# Patient Record
Sex: FEMALE | Race: WHITE | Employment: OTHER | URBAN - METROPOLITAN AREA
[De-identification: names, ages, dates, MRNs, and addresses within clinical notes are randomized per-mention and may not be internally consistent; named-entity substitution may affect disease eponyms.]

---

## 2017-01-10 ENCOUNTER — HOSPITAL ENCOUNTER (OUTPATIENT)
Dept: OTHER | Age: 82
Discharge: HOME | End: 2017-01-10
Attending: FAMILY MEDICINE | Admitting: CLINIC/CENTER

## 2017-01-10 LAB
BACTERIA: NEGATIVE /HPF
BILIRUBIN URINE: NEGATIVE MG/DL
BLOOD, URINE: NEGATIVE
CLARITY: CLEAR
COLOR: ABNORMAL
GLUCOSE, URINE: 150 MG/DL
HYALINE CASTS: 0 /LPF
KETONES, URINE: NEGATIVE MG/DL
LEUKOCYTE ESTERASE, URINE: ABNORMAL
MUCUS: ABNORMAL HPF
NITRITE URINE, QUANTITATIVE: NEGATIVE
PH, URINE: 5 (ref 5–8)
PROTEIN UA: NEGATIVE MG/DL
RBC URINE: <1 /HPF (ref 0–6)
SPECIFIC GRAVITY UA: 1.01 (ref 1–1.03)
SQUAMOUS EPITHELIAL: 1 /HPF
TRANSITIONAL EPITHELIAL: <1 /HPF
UROBILINOGEN, URINE: NORMAL EU/DL (ref 0.2–1)
WBC UA: 2 /HPF (ref 0–5)

## 2017-01-12 LAB
CULTURE: NORMAL
REPORT STATUS: NORMAL
REQUEST PROBLEM: NORMAL
SPECIMEN: NORMAL
TOTAL COLONY COUNT: NORMAL

## 2017-03-21 ENCOUNTER — HOSPITAL ENCOUNTER (OUTPATIENT)
Dept: OTHER | Age: 82
Discharge: HOME | End: 2017-03-21
Attending: FAMILY MEDICINE | Admitting: CLINIC/CENTER

## 2018-12-17 PROBLEM — K43.2 INCISIONAL HERNIA, WITHOUT OBSTRUCTION OR GANGRENE: Status: ACTIVE | Noted: 2018-12-17

## 2019-03-11 ENCOUNTER — HOSPITAL ENCOUNTER (OUTPATIENT)
Dept: CT IMAGING | Age: 84
Discharge: HOME | End: 2019-03-11
Payer: MEDICARE

## 2019-03-11 DIAGNOSIS — R91.1 COIN LESION: ICD-10-CM

## 2019-03-11 LAB
GFR AFRICAN AMERICAN: >60 ML/MIN/1.73M2
GFR NON-AFRICAN AMERICAN: 58 ML/MIN/1.73M2
POC CREATININE: 0.9 MG/DL (ref 0.6–1.1)

## 2019-03-11 PROCEDURE — 2580000003 HC RX 258: Performed by: FAMILY MEDICINE

## 2019-03-11 PROCEDURE — 6360000004 HC RX CONTRAST MEDICATION: Performed by: FAMILY MEDICINE

## 2019-03-11 PROCEDURE — 71260 CT THORAX DX C+: CPT

## 2019-03-11 RX ORDER — SODIUM CHLORIDE 0.9 % (FLUSH) 0.9 %
10 SYRINGE (ML) INJECTION PRN
Status: DISCONTINUED | OUTPATIENT
Start: 2019-03-11 | End: 2019-03-12 | Stop reason: HOSPADM

## 2019-03-11 RX ADMIN — IOPAMIDOL 75 ML: 755 INJECTION, SOLUTION INTRAVENOUS at 10:02

## 2019-03-11 RX ADMIN — Medication 10 ML: at 10:02

## 2019-05-02 RX ORDER — HYDROCHLOROTHIAZIDE 12.5 MG/1
12.5 TABLET ORAL DAILY
Qty: 30 TABLET | Refills: 1 | Status: ON HOLD | OUTPATIENT
Start: 2019-05-02 | End: 2019-05-31 | Stop reason: HOSPADM

## 2019-05-02 NOTE — TELEPHONE ENCOUNTER
Sent hctz 12.5 1 qg #30/1 refill to [facility] at 427pm per pharm request from [employer]   Electronically signed by Pascale Christine MA on 5/2/2019 at 4:27 PM

## 2019-05-28 ENCOUNTER — APPOINTMENT (OUTPATIENT)
Dept: MRI IMAGING | Age: 84
End: 2019-05-28
Payer: MEDICARE

## 2019-05-28 ENCOUNTER — APPOINTMENT (OUTPATIENT)
Dept: GENERAL RADIOLOGY | Age: 84
End: 2019-05-28
Payer: MEDICARE

## 2019-05-28 ENCOUNTER — HOSPITAL ENCOUNTER (OUTPATIENT)
Age: 84
Setting detail: OBSERVATION
Discharge: HOME | End: 2019-05-31
Attending: EMERGENCY MEDICINE | Admitting: HOSPITALIST
Payer: MEDICARE

## 2019-05-28 ENCOUNTER — APPOINTMENT (OUTPATIENT)
Dept: CT IMAGING | Age: 84
End: 2019-05-28
Payer: MEDICARE

## 2019-05-28 DIAGNOSIS — R00.2 PALPITATIONS: ICD-10-CM

## 2019-05-28 DIAGNOSIS — R55 NEAR SYNCOPE: Primary | ICD-10-CM

## 2019-05-28 LAB
ALBUMIN SERPL-MCNC: 3.7 GM/DL (ref 3.4–5)
ALP BLD-CCNC: 112 IU/L (ref 40–129)
ALT SERPL-CCNC: 29 U/L (ref 10–40)
ANION GAP SERPL CALCULATED.3IONS-SCNC: 13 MMOL/L (ref 4–16)
AST SERPL-CCNC: 22 IU/L (ref 15–37)
BASOPHILS ABSOLUTE: 0 K/CU MM
BASOPHILS RELATIVE PERCENT: 0.6 % (ref 0–1)
BILIRUB SERPL-MCNC: 0.6 MG/DL (ref 0–1)
BUN BLDV-MCNC: 17 MG/DL (ref 6–23)
CALCIUM SERPL-MCNC: 10 MG/DL (ref 8.3–10.6)
CHLORIDE BLD-SCNC: 100 MMOL/L (ref 99–110)
CO2: 24 MMOL/L (ref 21–32)
CREAT SERPL-MCNC: 1 MG/DL (ref 0.6–1.1)
DIFFERENTIAL TYPE: ABNORMAL
DIGOXIN LEVEL: 1.4 NG/ML (ref 0.8–2)
DOSE AMOUNT: NORMAL
DOSE TIME: NORMAL
EKG ATRIAL RATE: 90 BPM
EKG DIAGNOSIS: NORMAL
EKG P AXIS: 44 DEGREES
EKG P-R INTERVAL: 176 MS
EKG Q-T INTERVAL: 358 MS
EKG QRS DURATION: 84 MS
EKG QTC CALCULATION (BAZETT): 437 MS
EKG R AXIS: -19 DEGREES
EKG T AXIS: 60 DEGREES
EKG VENTRICULAR RATE: 90 BPM
EOSINOPHILS ABSOLUTE: 0.2 K/CU MM
EOSINOPHILS RELATIVE PERCENT: 2.9 % (ref 0–3)
GFR AFRICAN AMERICAN: >60 ML/MIN/1.73M2
GFR NON-AFRICAN AMERICAN: 53 ML/MIN/1.73M2
GLUCOSE BLD-MCNC: 149 MG/DL (ref 70–99)
GLUCOSE BLD-MCNC: 170 MG/DL (ref 70–99)
GLUCOSE BLD-MCNC: 262 MG/DL (ref 70–99)
HCT VFR BLD CALC: 39.6 % (ref 37–47)
HEMOGLOBIN: 12.5 GM/DL (ref 12.5–16)
IMMATURE NEUTROPHIL %: 0.7 % (ref 0–0.43)
LV EF: 60 %
LVEF MODALITY: NORMAL
LYMPHOCYTES ABSOLUTE: 1.7 K/CU MM
LYMPHOCYTES RELATIVE PERCENT: 24.2 % (ref 24–44)
MCH RBC QN AUTO: 28.9 PG (ref 27–31)
MCHC RBC AUTO-ENTMCNC: 31.6 % (ref 32–36)
MCV RBC AUTO: 91.5 FL (ref 78–100)
MONOCYTES ABSOLUTE: 0.6 K/CU MM
MONOCYTES RELATIVE PERCENT: 8.9 % (ref 0–4)
NUCLEATED RBC %: 0 %
PDW BLD-RTO: 13.7 % (ref 11.7–14.9)
PLATELET # BLD: 141 K/CU MM (ref 140–440)
PMV BLD AUTO: 8.8 FL (ref 7.5–11.1)
POTASSIUM SERPL-SCNC: 4.3 MMOL/L (ref 3.5–5.1)
PRO-BNP: 498.6 PG/ML
RBC # BLD: 4.33 M/CU MM (ref 4.2–5.4)
SEGMENTED NEUTROPHILS ABSOLUTE COUNT: 4.3 K/CU MM
SEGMENTED NEUTROPHILS RELATIVE PERCENT: 62.7 % (ref 36–66)
SODIUM BLD-SCNC: 137 MMOL/L (ref 135–145)
T4 FREE: 1.41 NG/DL (ref 0.9–1.8)
TOTAL IMMATURE NEUTOROPHIL: 0.05 K/CU MM
TOTAL NUCLEATED RBC: 0 K/CU MM
TOTAL PROTEIN: 7 GM/DL (ref 6.4–8.2)
TROPONIN T: <0.01 NG/ML
TROPONIN T: <0.01 NG/ML
TSH HIGH SENSITIVITY: 8.15 UIU/ML (ref 0.27–4.2)
WBC # BLD: 6.9 K/CU MM (ref 4–10.5)

## 2019-05-28 PROCEDURE — G0378 HOSPITAL OBSERVATION PER HR: HCPCS

## 2019-05-28 PROCEDURE — 6360000002 HC RX W HCPCS: Performed by: NURSE PRACTITIONER

## 2019-05-28 PROCEDURE — 84439 ASSAY OF FREE THYROXINE: CPT

## 2019-05-28 PROCEDURE — 6370000000 HC RX 637 (ALT 250 FOR IP): Performed by: INTERNAL MEDICINE

## 2019-05-28 PROCEDURE — 99285 EMERGENCY DEPT VISIT HI MDM: CPT

## 2019-05-28 PROCEDURE — 93005 ELECTROCARDIOGRAM TRACING: CPT | Performed by: EMERGENCY MEDICINE

## 2019-05-28 PROCEDURE — 70450 CT HEAD/BRAIN W/O DYE: CPT

## 2019-05-28 PROCEDURE — 96375 TX/PRO/DX INJ NEW DRUG ADDON: CPT

## 2019-05-28 PROCEDURE — 84443 ASSAY THYROID STIM HORMONE: CPT

## 2019-05-28 PROCEDURE — 93226 XTRNL ECG REC<48 HR SCAN A/R: CPT

## 2019-05-28 PROCEDURE — 83036 HEMOGLOBIN GLYCOSYLATED A1C: CPT

## 2019-05-28 PROCEDURE — 71046 X-RAY EXAM CHEST 2 VIEWS: CPT

## 2019-05-28 PROCEDURE — 96374 THER/PROPH/DIAG INJ IV PUSH: CPT

## 2019-05-28 PROCEDURE — 36415 COLL VENOUS BLD VENIPUNCTURE: CPT

## 2019-05-28 PROCEDURE — 83880 ASSAY OF NATRIURETIC PEPTIDE: CPT

## 2019-05-28 PROCEDURE — 96372 THER/PROPH/DIAG INJ SC/IM: CPT

## 2019-05-28 PROCEDURE — 99223 1ST HOSP IP/OBS HIGH 75: CPT | Performed by: INTERNAL MEDICINE

## 2019-05-28 PROCEDURE — 93306 TTE W/DOPPLER COMPLETE: CPT

## 2019-05-28 PROCEDURE — 80162 ASSAY OF DIGOXIN TOTAL: CPT

## 2019-05-28 PROCEDURE — 93010 ELECTROCARDIOGRAM REPORT: CPT | Performed by: INTERNAL MEDICINE

## 2019-05-28 PROCEDURE — 80053 COMPREHEN METABOLIC PANEL: CPT

## 2019-05-28 PROCEDURE — 2580000003 HC RX 258: Performed by: NURSE PRACTITIONER

## 2019-05-28 PROCEDURE — 6370000000 HC RX 637 (ALT 250 FOR IP): Performed by: NURSE PRACTITIONER

## 2019-05-28 PROCEDURE — 84484 ASSAY OF TROPONIN QUANT: CPT

## 2019-05-28 PROCEDURE — 85025 COMPLETE CBC W/AUTO DIFF WBC: CPT

## 2019-05-28 PROCEDURE — 82962 GLUCOSE BLOOD TEST: CPT

## 2019-05-28 RX ORDER — MELOXICAM 7.5 MG/1
7.5 TABLET ORAL DAILY
COMMUNITY
End: 2019-06-12 | Stop reason: SDUPTHER

## 2019-05-28 RX ORDER — NICOTINE POLACRILEX 4 MG
15 LOZENGE BUCCAL PRN
Status: DISCONTINUED | OUTPATIENT
Start: 2019-05-28 | End: 2019-05-31 | Stop reason: HOSPADM

## 2019-05-28 RX ORDER — HYDROCHLOROTHIAZIDE 12.5 MG/1
12.5 TABLET ORAL DAILY
Status: DISCONTINUED | OUTPATIENT
Start: 2019-05-28 | End: 2019-05-30

## 2019-05-28 RX ORDER — DEXTROSE MONOHYDRATE 25 G/50ML
12.5 INJECTION, SOLUTION INTRAVENOUS PRN
Status: DISCONTINUED | OUTPATIENT
Start: 2019-05-28 | End: 2019-05-31 | Stop reason: HOSPADM

## 2019-05-28 RX ORDER — HYDRALAZINE HYDROCHLORIDE 20 MG/ML
10 INJECTION INTRAMUSCULAR; INTRAVENOUS EVERY 6 HOURS PRN
Status: DISCONTINUED | OUTPATIENT
Start: 2019-05-28 | End: 2019-05-30

## 2019-05-28 RX ORDER — M-VIT,TX,IRON,MINS/CALC/FOLIC 27MG-0.4MG
1 TABLET ORAL DAILY
Status: DISCONTINUED | OUTPATIENT
Start: 2019-05-28 | End: 2019-05-31 | Stop reason: HOSPADM

## 2019-05-28 RX ORDER — DIGOXIN 125 MCG
250 TABLET ORAL DAILY
Status: DISCONTINUED | OUTPATIENT
Start: 2019-05-28 | End: 2019-05-31 | Stop reason: HOSPADM

## 2019-05-28 RX ORDER — DILTIAZEM HYDROCHLORIDE 120 MG/1
120 CAPSULE, COATED, EXTENDED RELEASE ORAL EVERY EVENING
Status: DISCONTINUED | OUTPATIENT
Start: 2019-05-28 | End: 2019-05-29

## 2019-05-28 RX ORDER — ONDANSETRON 2 MG/ML
4 INJECTION INTRAMUSCULAR; INTRAVENOUS EVERY 6 HOURS PRN
Status: DISCONTINUED | OUTPATIENT
Start: 2019-05-28 | End: 2019-05-31 | Stop reason: HOSPADM

## 2019-05-28 RX ORDER — SODIUM CHLORIDE 0.9 % (FLUSH) 0.9 %
10 SYRINGE (ML) INJECTION PRN
Status: DISCONTINUED | OUTPATIENT
Start: 2019-05-28 | End: 2019-05-31 | Stop reason: HOSPADM

## 2019-05-28 RX ORDER — ACETAMINOPHEN 325 MG/1
650 TABLET ORAL EVERY 4 HOURS PRN
Status: DISCONTINUED | OUTPATIENT
Start: 2019-05-28 | End: 2019-05-31 | Stop reason: HOSPADM

## 2019-05-28 RX ORDER — DEXTROSE MONOHYDRATE 50 MG/ML
100 INJECTION, SOLUTION INTRAVENOUS PRN
Status: DISCONTINUED | OUTPATIENT
Start: 2019-05-28 | End: 2019-05-31 | Stop reason: HOSPADM

## 2019-05-28 RX ORDER — SODIUM CHLORIDE 0.9 % (FLUSH) 0.9 %
10 SYRINGE (ML) INJECTION EVERY 12 HOURS SCHEDULED
Status: DISCONTINUED | OUTPATIENT
Start: 2019-05-28 | End: 2019-05-31 | Stop reason: HOSPADM

## 2019-05-28 RX ADMIN — ENOXAPARIN SODIUM 40 MG: 40 INJECTION SUBCUTANEOUS at 15:54

## 2019-05-28 RX ADMIN — HYDRALAZINE HYDROCHLORIDE 10 MG: 20 INJECTION INTRAMUSCULAR; INTRAVENOUS at 21:35

## 2019-05-28 RX ADMIN — ACETAMINOPHEN 650 MG: 325 TABLET ORAL at 21:34

## 2019-05-28 RX ADMIN — DILTIAZEM HYDROCHLORIDE 120 MG: 120 CAPSULE, COATED, EXTENDED RELEASE ORAL at 21:30

## 2019-05-28 RX ADMIN — SODIUM CHLORIDE, PRESERVATIVE FREE 10 ML: 5 INJECTION INTRAVENOUS at 21:37

## 2019-05-28 RX ADMIN — INSULIN LISPRO 1 UNITS: 100 INJECTION, SOLUTION INTRAVENOUS; SUBCUTANEOUS at 17:14

## 2019-05-28 RX ADMIN — ONDANSETRON 4 MG: 2 INJECTION INTRAMUSCULAR; INTRAVENOUS at 22:51

## 2019-05-28 ASSESSMENT — PAIN DESCRIPTION - DESCRIPTORS: DESCRIPTORS: ACHING

## 2019-05-28 ASSESSMENT — PAIN DESCRIPTION - ONSET: ONSET: ON-GOING

## 2019-05-28 ASSESSMENT — PAIN DESCRIPTION - PAIN TYPE: TYPE: CHRONIC PAIN

## 2019-05-28 ASSESSMENT — PAIN DESCRIPTION - LOCATION
LOCATION: BACK
LOCATION: ABDOMEN

## 2019-05-28 ASSESSMENT — PAIN DESCRIPTION - ORIENTATION: ORIENTATION: LEFT

## 2019-05-28 ASSESSMENT — PAIN DESCRIPTION - FREQUENCY: FREQUENCY: INTERMITTENT

## 2019-05-28 ASSESSMENT — PAIN SCALES - GENERAL
PAINLEVEL_OUTOF10: 4
PAINLEVEL_OUTOF10: 0
PAINLEVEL_OUTOF10: 7
PAINLEVEL_OUTOF10: 1

## 2019-05-28 ASSESSMENT — PAIN DESCRIPTION - PROGRESSION: CLINICAL_PROGRESSION: NOT CHANGED

## 2019-05-28 NOTE — ED PROVIDER NOTES
eMERGENCY dEPARTMENT eNCOUnter      PCP: Lesvia Tobias MD    [address] [facility]    Chief Complaint   Patient presents with    Palpitations     denies cp, reports feeling palpitations and dizziness when getting ready this morning    Dizziness     states, starting to settle down       HPI    Kendy Castillo is a 80 y.o. female who presents with palpitations, near syncope. She states that she was in her office getting some papers when she had onset of palpitations, help felt like her heart was racing, felt lightheaded and vision started to darken like she was about to pass out. She states she sat down, symptoms continued for a while until she arrived here. Now feeling better. Denies any chest pain. Does admit to some ankle edema recently. Denies any recent illness. She does state this happened once in the past and she was told that she had congestive heart failure after a workup. REVIEW OF SYSTEMS    Constitutional:  Denies fever, chills.    HENT:  Denies sore throat or ear pain   Cardiovascular:  Denies chest pain, + palpitations   Respiratory:  Denies cough or shortness of breath    GI:  Denies abdominal pain, nausea, vomiting, or diarrhea  :  Denies any urinary symptoms, flank pain  Musculoskeletal:  Denies back pain, extremity pain  Skin:  Denies rash, color change  Neurologic:  Denies headache, focal weakness or sensory changes   Lymphatic:  Denies swollen glands, edema    All other review of systems are negative  See HPI and nursing notes for additional information     PAST MEDICAL AND SURGICAL HISTORY    Past Medical History:   Diagnosis Date    Arrhythmia     Arthritis     osteoarthritis cervical and lumbar spine    Blood transfusion     CAD (coronary artery disease)     Cataracts, bilateral     CHF (congestive heart failure) (HCC)     COPD (chronic obstructive pulmonary disease) ([address]     Depression     [facility] mellitus [facility]     Fatty liver disease, nonalcoholic     H/O 24 hour EKG monitoring 9/15/2000    9/15/2000 -  Predominant rhythm is a sinus rhythm. No significant ectopy noted.  H/O cardiac catheterization 8/4/2005 8/4/2005 - Moderate degree of stenosis of the high diag, which is a heavily calcified vessel, however, there is a large ramus vessel supplying this same area as well. Rest of vessel is w/o any significant disease. Renals are w/o any significant stenosis.  H/O cardiovascular stress test 12/2/2010, 2/28/2008 12/2/2010 - Normal pattern of perfusion in all regions. LV is normal. No inducible myocardial ischemia. EF is 66%. LVSF is normal. No ECG changes. Exercise capacity is normal.    H/O cardiovascular stress test 2/24/2015    cardiolite-normal, no ischemia, EF69%    H/O Doppler ultrasound 9/15/2000    9/15/2000 - Carotid - No hemodynamically significant stenosis.  H/O echocardiogram 12/2/2010, 9/22/2009 12/2/2010 - Difficult apical imaging due to patient COPD. LVSF is normal. EF = > 55%. Impaired LV impairment. Mild-Moderate MR. Mild TR.    H/O echocardiogram 7/15/14    EF 55-60%. No significant valvulopathy is seen.  Heart valve problem     2 leaky heart valves    History of Doppler ultrasound 10/31/2000    10/31/2000 - Peripheral - Normal study.  HX OTHER MEDICAL 1/14/2004 1/14/2004 - 48 hr Holter - Predominant rhythm is sinus rhythm. No significant ectopy is seen.     Hyperlipidemia     Hypertension     Mild tricuspid regurgitation     Mitral regurgitation     Nausea & vomiting     Pulmonary hypertension (HCC)     Pulmonary nodules     Thyroid disease      Past Surgical History:   Procedure Laterality Date    APPENDECTOMY      BREAST SURGERY      bilateral lumpectomy    CHOLECYSTECTOMY      COLECTOMY      also head of pancreas    COLONOSCOPY  12-21-12    polyp    DIAGNOSTIC CARDIAC CATH LAB PROCEDURE  8/2005    FRACTURE SURGERY      repair of fractured nose    HYSTERECTOMY      PANCREAS SURGERY  11/7/2006    Whippcabrera Procedure    SKIN BIOPSY      moles from right shoulder, chin, left leg    THYROIDECTOMY, PARTIAL      THYROIDECTOMY, PARTIAL  1999    TONSILLECTOMY         CURRENT MEDICATIONS    Current Outpatient Rx   Medication Sig Dispense Refill    hydrochlorothiazide (HYDRODIURIL) 12.5 MG tablet Take 1 tablet by mouth daily 30 tablet 1    metFORMIN (GLUCOPHAGE) 1000 MG tablet Take 1,000 mg by mouth daily (with breakfast)      desoximetasone (TOPICORT) 0.25 % cream Apply topically 2 times daily Apply topically 2 times daily.  digoxin (LANOXIN) 0.25 MG tablet Take 250 mcg by mouth daily.  therapeutic multivitamin-minerals (THERAGRAN-M) tablet Take 1 tablet by mouth daily. ALLERGIES    Allergies   Allergen Reactions    Sulfa Antibiotics Anaphylaxis    Quinapril Hcl      Cough       SOCIAL AND FAMILY HISTORY    Social History     Socioeconomic History    Marital status:      Spouse name: None    Number of children: None    Years of education: None    Highest education level: None   Occupational History    Occupation: Retired   Social Needs    Financial resource strain: None    Food insecurity:     Worry: None     Inability: None    Transportation needs:     Medical: None     Non-medical: None   Tobacco Use    Smoking status: Never Smoker    Smokeless tobacco: Never Used    Tobacco comment: reviewed 7/20/15   Substance and Sexual Activity    Alcohol use: Yes     Comment: Rare alcohol use.        CAFFEINE: 1 cup coffee daily    Drug use: No    Sexual activity: Yes     Partners: Male     Comment:    Lifestyle    Physical activity:     Days per week: None     Minutes per session: None    Stress: None   Relationships    Social connections:     Talks on phone: None     Gets together: None     Attends Bahai service: None     Active member of club or organization: None     Attends meetings of clubs or organizations: None     Relationship status: None    Intimate partner violence:     Fear of current or ex partner: None     Emotionally abused: None     Physically abused: None     Forced sexual activity: None   Other Topics Concern    None   Social History Narrative    None     Family History   Problem Relation Age of Onset    Heart Failure Mother         CHF    Hypertension Mother     Cancer Father     Hypertension Father     Heart Disease Sister         CMP    Cancer Brother     Other Brother         aneurysm    Coronary Art Dis Brother     Heart Attack Brother     Other Sister         Bone problems    Other Sister         infection    Cancer Sister     Cancer Son     Early Death Son         MVA    Coronary Art Dis Daughter     Hypertension Daughter          PHYSICAL EXAM    VITAL SIGNS: BP (!) 200/95   Pulse 94   Temp 98.6 °F (37 °C) (Oral)   Resp 15   Ht 5' 6\" (1.676 m)   Wt 120 lb (54.4 kg)   SpO2 97%   BMI 19.37 kg/m²    Constitutional:  Well developed, No acute distress. HENT:  Normocephalic, Atraumatic, PERRL. EOMI. Sclera clear. Conjunctiva normal.  Neck: supple without ridigity  Cardiovascular:  Regular rate and rhythm, No murmurs  Respiratory:  Nonlabored breathing. Normal breath sounds  Abdomen: Soft, Non tender, bowel sounds present. Musculoskeletal: trace ankle edema, No tenderness  Integument:  Warm, Dry, no rash  Neurologic:  Alert & oriented , No focal deficits noted.   Speech normal.  Psychiatric:  Affect normal, Mood normal.       Labs:  Labs Reviewed   CBC WITH AUTO DIFFERENTIAL - Abnormal; Notable for the following components:       Result Value    MCHC 31.6 (*)     Monocytes % 8.9 (*)     Immature Neutrophil % 0.7 (*)     All other components within normal limits   COMPREHENSIVE METABOLIC PANEL - Abnormal; Notable for the following components:    Glucose 262 (*)     GFR Non- 53 (*)     All other components within normal limits   BRAIN NATRIURETIC PEPTIDE - Abnormal; Notable for the following components:    Pro-BNP 498.6 (*)     All other components within normal limits   TSH WITHOUT REFLEX - Abnormal; Notable for the following components:    TSH, High Sensitivity 8.150 (*)     All other components within normal limits   TROPONIN   T4, FREE   TROPONIN   DIGOXIN LEVEL   POCT GLUCOSE         EKG    This EKG was interpreted by me. Rate is 90, rhythm is sinus. ID and QT intervals are within normal limits. There is no ST segment or T wave changes. This EKG was compared to previous EKG from date 4/11/17    RADIOLOGY    Xr Chest Standard (2 Vw)    Result Date: 5/28/2019  EXAMINATION: TWO XRAY VIEWS OF THE CHEST 5/28/2019 9:51 am COMPARISON: 04/11/2017 HISTORY: ORDERING SYSTEM PROVIDED HISTORY: tachycardia, dizziness TECHNOLOGIST PROVIDED HISTORY: Reason for exam:->tachycardia, dizziness Ordering Physician Provided Reason for Exam:  chest pain Initial encounter FINDINGS: Lungs are hyperinflated. Diffuse osteopenia. No focal consolidation, pleural effusion or pneumothorax. The cardiomediastinal silhouette is unremarkable. No overt pulmonary edema. No acute osseous abnormality. COPD without acute cardiopulmonary findings. ED COURSE & MEDICAL DECISION MAKING       Vital signs and nursing notes reviewed during ED course. All pertinent Lab data and radiographic results reviewed with patient at bedside. The patient and/or the family were informed of the results of any tests/labs/imaging, the treatment plan, and time was allotted to answer questions. This is an 80 year female who presented with near syncope and palpitations. EKG here showed sinus rhythm, no evidence of arrhythmia. Workup thus far has been unremarkable with the exception of slightly elevated TSH. Blood pressure was elevated. Given the near syncope with palpitations concern for arrhythmia. Plan will be admission to the hospital for further observation and care.       Clinical  IMPRESSION    Palpitations, near syncope          (Please note the MDM and HPI sections of this note were completed with a voice recognition program.  Efforts were made to edit the dictations but occasionally words are mis-transcribed.)      Ty Ayala DO  05/28/19 8080

## 2019-05-28 NOTE — PROGRESS NOTES
This nurse was asked by tele to go to this pt's room, stating pt is in SVT, this nurse went to the room to assess pt, she complained of dizziness at the time and was finished ambulating. This nurse and another nurse, Ty Thrasher, applied O2 and heart rate began to decrease back to normal sinus rhythm. This nurse informed this pt's primary nurse of this episode.

## 2019-05-28 NOTE — H&P
History and Physical      Name:  Hugo Jamil /Age/Sex: 1933  (80 y.o. female)   MRN & CSN:  [id number] & [id number] Admission Date/Time: 2019  9:47 AM   Location:  ED21/ED-21 PCP: Shelly Sheffield MD       Admitting Physicians : Dr. Faye Warren is a 80 y.o.  female  who presents with Palpitations (denies cp, reports feeling palpitations and dizziness when getting ready this morning) and Dizziness (states, starting to settle down)    Assessment and Plan:   1. Near syncope likely cardiac related  # Dizziness with palpitation  No focal deficit. Elevated TSH and normal T4 free likely reactive. CXR showed COPD without acute exacerbation. Elevated blood pressure on arrival. CT head pending  -Orthostatic  Blood pressure  -Neuro check  -Echo  -Tele  -MRI brain  -Consult cardiology    2. HTN  -Continue HCTZ and Digoxin  - Hydralazine PRN  -Check dig level     3. DMII without complications    -Monitor FSBS and start SSI  -Check A1c  -Hold Metformin        DVT-PPX: Lovenox  Diet: Cardiac    Medications:   Medications:    Infusions:   PRN Meds:   No current facility-administered medications for this encounter. Current Outpatient Medications:     hydrochlorothiazide (HYDRODIURIL) 12.5 MG tablet, Take 1 tablet by mouth daily, Disp: 30 tablet, Rfl: 1    metFORMIN (GLUCOPHAGE) 1000 MG tablet, Take 1,000 mg by mouth daily (with breakfast), Disp: , Rfl:     desoximetasone (TOPICORT) 0.25 % cream, Apply topically 2 times daily Apply topically 2 times daily. , Disp: , Rfl:     digoxin (LANOXIN) 0.25 MG tablet, Take 250 mcg by mouth daily. , Disp: , Rfl:     therapeutic multivitamin-minerals (THERAGRAN-M) tablet, Take 1 tablet by mouth daily. , Disp: , Rfl:     History of present illness     Chief Complaint: Palpitations (denies cp, reports feeling palpitations and dizziness when getting ready this morning) and Dizziness (states, starting to settle down)      Hugo Jamil is a 80 y.o. female  who presents with palpitation and near syncope. Patient states she was in her office getting some papers when she started having palpitations with lightheaded and vision changes. The room started to darken as if she was going to pass out. She states she is due for eyeglasses changes and has been having occipital pain for a week. She hasn't been able to check her blood pressure regularly as she BP cuffs has been reading errors al the time. She reports similar symptoms and was told she had COPD and CHF. She has been stressed lately because her daughter has been diagnosed with stage IV ovarian cancer and has been taking care of her. Denies focal deficit, tingling, numbness, chest pain, recent illness, abdominal pain, N/V/D, or urinary symptoms. Hx of HTN, DMII, HLD, PHTN, CAD, COPD, depression, and thyroid disease. Review of Systems   Ten point ROS reviewed and negative, unless as noted below. GENERAL:  Denies fever, chills, night sweats, or changes in weight. EYES:   visual changes. ENT:  Denies ear pain, hearing loss or tinnitus  RESP:  Denies any cough, dyspnea, or wheezing. CV:  Denies any chest pain with exertion or at rest, palpitations, syncope, or edema. GI:  Denies any dysphagia, nausea, vomiting, abdominal pain, heartburn, changes in bowel habit, melena or rectal bleeding  MUSCULOSKELETAL:  Denies any joint swelling, joint pain, or loss of range of motion. NEURO:  lightheaded, tremors, dizziness, vertigo, memory loss, confusion, weakness, numbness or tingling. PSYCH:  Denies any sleeping problems, history of abuse, marital discord. HEME/LYMPHATIC/IMMUNO:  Denies , bruising, bleeding abnormalities   ENDO:  Denies any heat or cold intolerance, panemiaolyuria or polydipsia.       Objective:   No intake or output data in the 24 hours ending 05/28/19 1203   Vitals:   Vitals:    05/28/19 1123   BP:    Pulse: 87   Resp: 16   Temp:    SpO2: 98%     Physical Exam:   Gen:  awake, alert, Occupation: Retired   Social Needs    Financial resource strain: None    Food insecurity:     Worry: None     Inability: None    Transportation needs:     Medical: None     Non-medical: None   Tobacco Use    Smoking status: Never Smoker    Smokeless tobacco: Never Used    Tobacco comment: reviewed 7/20/15   Substance and Sexual Activity    Alcohol use: Yes     Comment: Rare alcohol use.        CAFFEINE: 1 cup coffee daily    Drug use: No    Sexual activity: Yes     Partners: Male     Comment:    Lifestyle    Physical activity:     Days per week: None     Minutes per session: None    Stress: None   Relationships    Social connections:     Talks on phone: None     Gets together: None     Attends Mormon service: None     Active member of club or organization: None     Attends meetings of clubs or organizations: None     Relationship status: None    Intimate partner violence:     Fear of current or ex partner: None     Emotionally abused: None     Physically abused: None     Forced sexual activity: None   Other Topics Concern    None   Social History Narrative    None       Electronically signed by MCKINLEY Winston CNP on 5/28/2019 at 12:03 PM

## 2019-05-28 NOTE — ED NOTES
Patient ambulated to bathroom, tolerated well. Urine sample obtained and sent to lab. Patient tells this RN upon return to room, \"I think I was bit by a bug or something. \" Patient proceeds to show this RN, of which she has a large red rash noted to her upper left thigh. Patient states this is ongoing 3 days. Dr Oliva Sparks is aware.       [facility], RN  05/28/19 1126

## 2019-05-28 NOTE — PLAN OF CARE
Problem: Pain:  Goal: Pain level will decrease  Description  Pain level will decrease  Outcome: Ongoing  Goal: Control of acute pain  Description  Control of acute pain  Outcome: Ongoing  Goal: Control of chronic pain  Description  Control of chronic pain  Outcome: Ongoing  Goal: Patient's pain/discomfort is manageable  Description  Patient's pain/discomfort is manageable  Outcome: Ongoing     Problem: Infection:  Goal: Will remain free from infection  Description  Will remain free from infection  Outcome: Ongoing     Problem: Safety:  Goal: Free from accidental physical injury  Description  Free from accidental physical injury  Outcome: Ongoing  Goal: Free from intentional harm  Description  Free from intentional harm  Outcome: Ongoing     Problem: Daily Care:  Goal: Daily care needs are met  Description  Daily care needs are met  Outcome: Ongoing     Problem: Skin Integrity:  Goal: Skin integrity will stabilize  Description  Skin integrity will stabilize  Outcome: Ongoing     Problem: Discharge Planning:  Goal: Patients continuum of care needs are met  Description  Patients continuum of care needs are met  Outcome: Ongoing

## 2019-05-28 NOTE — CONSULTS
CARDIOLOGY CONSULT NOTE   Reason for consultation:  dizziness    Referring physician:  Ariel Hinkle MD     Primary care physician: Rajan Simpson MD      Dear  Dr. Ariel Hinkle MD   Thanks for the consult. Chief Complaints :  Chief Complaint   Patient presents with    Palpitations     denies cp, reports feeling palpitations and dizziness when getting ready this morning    Dizziness     states, starting to settle down        History of present illness:Annie is a 80 y. o.year old who presents with complaints of feeling lightheaded, dizzy, associated palpitations. She was noted to have runs of SVT with heart rate up to 180. She says that she was trying to sort out some papers for her daughter when she started feeling lightheaded associated everything appeared to go dark. She felt that she was going to passout. She also felt palpitations  had some headache  . She has seen cardiology in the past. Echo in 2015 showed no valvular abnormality. She denies any previous history of SVT of palpitations  Labwork suggests   abnormal TSH but normal T4 levels    Past medical history:    has a past medical history of Arrhythmia, Arthritis, Blood transfusion, CAD (coronary artery disease), Cataracts, bilateral, CHF (congestive heart failure) ([address] COPD (chronic obstructive pulmonary disease) ([address] Depression, Diabetes mellitus ([address] Fatty liver disease, nonalcoholic, H/O 24 hour EKG monitoring, H/O cardiac catheterization, H/O cardiovascular stress test, H/O cardiovascular stress test, H/O Doppler ultrasound, H/O echocardiogram, H/O echocardiogram, Heart valve problem, History of Doppler ultrasound, HX OTHER MEDICAL, Hyperlipidemia, Hypertension, Mild tricuspid regurgitation, Mitral regurgitation, Nausea & vomiting, Pulmonary hypertension ([address] Pulmonary nodules, and Thyroid disease. Past surgical history:   has a past surgical history that includes Thyroidectomy, partial; colectomy;  Cholecystectomy; vial 0-3 Units  0-3 Units Subcutaneous Nightly Phillip [place], APRN - CNP        sodium chloride flush 0.9 % injection 10 mL  10 mL Intravenous 2 times per day Phillip [place], APRN - CNP        sodium chloride flush 0.9 % injection 10 mL  10 mL Intravenous PRN Phillip [place], APRN - CNP        magnesium hydroxide (MILK OF MAGNESIA) 400 MG/5ML suspension 30 mL  30 mL Oral Daily PRN Phillip , APRN - CNP        ondansetron (ZOFRAN) injection 4 mg  4 mg Intravenous Q6H PRN Phillip , APRN - CNP        enoxaparin (LOVENOX) injection 40 mg  40 mg Subcutaneous Daily Phillip , APRN - CNP   40 mg at 19 1554    acetaminophen (TYLENOL) tablet 650 mg  650 mg Oral Q4H PRN Phillip [place], APRN - CNP        hydrALAZINE (APRESOLINE) injection 10 mg  10 mg Intravenous Q6H PRN Phillip , APRN - CNP        glucose (GLUTOSE) 40 % oral gel 15 g  15 g Oral PRN Phillip , APRN - CNP        dextrose 50 % solution 12.5 g  12.5 g Intravenous PRN Phillip , APRN - CNP        glucagon (rDNA) injection 1 mg  1 mg Intramuscular PRN Phillip , APRN - CNP        dextrose 5 % solution  100 mL/hr Intravenous PRN Phillip , APRN - CNP         Review of Systems:   · Constitutional: No Fever or Weight Loss   · Eyes: No Decreased Vision  · ENT: No Headaches, Hearing Loss or Vertigo  · Cardiovascular: As per HPI  · Respiratory: As per HPI  · Gastrointestinal: No abdominal pain, appetite loss, blood in stools, constipation, diarrhea or heartburn  · Genitourinary: No dysuria, trouble voiding, or hematuria  · Musculoskeletal:  No gait disturbance, weakness or joint complaints  · Integumentary: No rash or pruritis  · Neurological: No TIA or stroke symptoms  · Psychiatric: No anxiety or depression  · Endocrine: No malaise, fatigue or temperature intolerance  · Hematologic/Lymphatic: No bleeding problems, blood clots or swollen lymph nodes  · Allergic/Immunologic: No nasal congestion or continue present medications   DVT prophylaxis if no contraindication  6. Dyslipidemia: continue statins           Thank you  much for consult and giving us the opportunity in contributing in the care of this patient. Please feel free to call me for any questions.        Guanakito Barillas MD, 5/28/2019 6:16 PM

## 2019-05-28 NOTE — PROGRESS NOTES
Medication History  [facility]    Patient Name: Ferny Chiang 5/25/1933     Medication history has been completed by: Mar Moore CPhT    Source(s) of information: Patient and Insurance claims    Primary Care Physician: Ziggy Foote MD     Pharmacy: [address] [place] [address]    Allergies as of 05/28/2019 - Review Complete 05/28/2019   Allergen Reaction Noted    Sulfa antibiotics Anaphylaxis 12/18/2012    Quinapril hcl  04/11/2013        Prior to Admission medications    Medication Sig Start Date End Date Taking? Authorizing Provider   meloxicam (MOBIC) 7.5 MG tablet Take 7.5 mg by mouth daily   Yes Historical Provider, MD   hydrochlorothiazide (HYDRODIURIL) 12.5 MG tablet Take 1 tablet by mouth daily 5/2/19  Yes Ziggy Foote MD   digoxin (LANOXIN) 0.25 MG tablet Take 250 mcg by mouth daily. Yes Historical Provider, MD   therapeutic multivitamin-minerals (THERAGRAN-M) tablet Take 1 tablet by mouth daily. Yes Historical Provider, MD           desoximetasone (TOPICORT) 0.25 % cream Apply topically 2 times daily Apply topically 2 times daily. Historical Provider, MD       Medications added or changed (ex. new medication, dosage change, interval change, formulation change):   Mobic new medication    Medications removed from list (include reason, ex. noncompliance, medication cost, therapy complete etc.):   Metformin no longer taking per patient    Medications requiring reconciliation with provider:    Mobic new medication not ordered    Other Comments:  Reviewed and updated med list per patient and verified with claims    To my knowledge the above medication history is accurate as of 5/28/2019 12:12 PM.   Mar Moore CPhT   5/28/2019 12:12 PM

## 2019-05-28 NOTE — ED TRIAGE NOTES
Patient to ER today for c/o palpitations and dizziness starting this morning when she was getting ready to leave the house. Patient denies any chest pain, but states she \"felt like my heart was racing\". Patient also reports being dizzy when this occurred, but states dizziness is improving and almost gone.

## 2019-05-29 ENCOUNTER — APPOINTMENT (OUTPATIENT)
Dept: GENERAL RADIOLOGY | Age: 84
End: 2019-05-29
Payer: MEDICARE

## 2019-05-29 ENCOUNTER — APPOINTMENT (OUTPATIENT)
Dept: ULTRASOUND IMAGING | Age: 84
End: 2019-05-29
Payer: MEDICARE

## 2019-05-29 ENCOUNTER — APPOINTMENT (OUTPATIENT)
Dept: NUCLEAR MEDICINE | Age: 84
End: 2019-05-29
Payer: MEDICARE

## 2019-05-29 ENCOUNTER — APPOINTMENT (OUTPATIENT)
Dept: MRI IMAGING | Age: 84
End: 2019-05-29
Payer: MEDICARE

## 2019-05-29 DIAGNOSIS — I47.1 SUPRAVENTRICULAR TACHYCARDIA (HCC): ICD-10-CM

## 2019-05-29 DIAGNOSIS — R91.8 PULMONARY NODULES: ICD-10-CM

## 2019-05-29 DIAGNOSIS — I10 ESSENTIAL HYPERTENSION: ICD-10-CM

## 2019-05-29 DIAGNOSIS — N95.2 ATROPHIC VAGINITIS: ICD-10-CM

## 2019-05-29 LAB
ALBUMIN SERPL-MCNC: 3.6 GM/DL (ref 3.4–5)
ANION GAP SERPL CALCULATED.3IONS-SCNC: 13 MMOL/L (ref 4–16)
BUN BLDV-MCNC: 18 MG/DL (ref 6–23)
CALCIUM SERPL-MCNC: 9.2 MG/DL (ref 8.3–10.6)
CHLORIDE BLD-SCNC: 100 MMOL/L (ref 99–110)
CO2: 23 MMOL/L (ref 21–32)
CREAT SERPL-MCNC: 0.9 MG/DL (ref 0.6–1.1)
ESTIMATED AVERAGE GLUCOSE: 163 MG/DL
GFR AFRICAN AMERICAN: >60 ML/MIN/1.73M2
GFR NON-AFRICAN AMERICAN: 59 ML/MIN/1.73M2
GLUCOSE BLD-MCNC: 158 MG/DL (ref 70–99)
GLUCOSE BLD-MCNC: 176 MG/DL (ref 70–99)
GLUCOSE BLD-MCNC: 231 MG/DL (ref 70–99)
HBA1C MFR BLD: 7.3 % (ref 4.2–6.3)
HCT VFR BLD CALC: 36.4 % (ref 37–47)
HEMOGLOBIN: 11.8 GM/DL (ref 12.5–16)
LIPASE: 10 IU/L (ref 13–60)
LV EF: 64 %
LVEF MODALITY: NORMAL
MCH RBC QN AUTO: 29.2 PG (ref 27–31)
MCHC RBC AUTO-ENTMCNC: 32.4 % (ref 32–36)
MCV RBC AUTO: 90.1 FL (ref 78–100)
PDW BLD-RTO: 13.7 % (ref 11.7–14.9)
PHOSPHORUS: 3.3 MG/DL (ref 2.5–4.9)
PLATELET # BLD: 155 K/CU MM (ref 140–440)
PMV BLD AUTO: 8.8 FL (ref 7.5–11.1)
POTASSIUM SERPL-SCNC: 4.1 MMOL/L (ref 3.5–5.1)
RBC # BLD: 4.04 M/CU MM (ref 4.2–5.4)
SODIUM BLD-SCNC: 136 MMOL/L (ref 135–145)
WBC # BLD: 8 K/CU MM (ref 4–10.5)

## 2019-05-29 PROCEDURE — 96372 THER/PROPH/DIAG INJ SC/IM: CPT

## 2019-05-29 PROCEDURE — 83690 ASSAY OF LIPASE: CPT

## 2019-05-29 PROCEDURE — 86376 MICROSOMAL ANTIBODY EACH: CPT

## 2019-05-29 PROCEDURE — 85027 COMPLETE CBC AUTOMATED: CPT

## 2019-05-29 PROCEDURE — G0378 HOSPITAL OBSERVATION PER HR: HCPCS

## 2019-05-29 PROCEDURE — 99233 SBSQ HOSP IP/OBS HIGH 50: CPT | Performed by: INTERNAL MEDICINE

## 2019-05-29 PROCEDURE — 82962 GLUCOSE BLOOD TEST: CPT

## 2019-05-29 PROCEDURE — 80069 RENAL FUNCTION PANEL: CPT

## 2019-05-29 PROCEDURE — 74018 RADEX ABDOMEN 1 VIEW: CPT

## 2019-05-29 PROCEDURE — 78452 HT MUSCLE IMAGE SPECT MULT: CPT

## 2019-05-29 PROCEDURE — 93880 EXTRACRANIAL BILAT STUDY: CPT

## 2019-05-29 PROCEDURE — 6370000000 HC RX 637 (ALT 250 FOR IP): Performed by: HOSPITALIST

## 2019-05-29 PROCEDURE — A9500 TC99M SESTAMIBI: HCPCS | Performed by: INTERNAL MEDICINE

## 2019-05-29 PROCEDURE — 6360000002 HC RX W HCPCS: Performed by: NURSE PRACTITIONER

## 2019-05-29 PROCEDURE — 6370000000 HC RX 637 (ALT 250 FOR IP): Performed by: NURSE PRACTITIONER

## 2019-05-29 PROCEDURE — 70551 MRI BRAIN STEM W/O DYE: CPT

## 2019-05-29 PROCEDURE — 6360000002 HC RX W HCPCS: Performed by: HOSPITALIST

## 2019-05-29 PROCEDURE — 2580000003 HC RX 258: Performed by: NURSE PRACTITIONER

## 2019-05-29 PROCEDURE — 93017 CV STRESS TEST TRACING ONLY: CPT

## 2019-05-29 PROCEDURE — 3430000000 HC RX DIAGNOSTIC RADIOPHARMACEUTICAL: Performed by: INTERNAL MEDICINE

## 2019-05-29 PROCEDURE — 6370000000 HC RX 637 (ALT 250 FOR IP): Performed by: INTERNAL MEDICINE

## 2019-05-29 PROCEDURE — APPSS30 APP SPLIT SHARED TIME 16-30 MINUTES: Performed by: NURSE PRACTITIONER

## 2019-05-29 PROCEDURE — 76536 US EXAM OF HEAD AND NECK: CPT

## 2019-05-29 PROCEDURE — 6360000002 HC RX W HCPCS: Performed by: INTERNAL MEDICINE

## 2019-05-29 PROCEDURE — 96376 TX/PRO/DX INJ SAME DRUG ADON: CPT

## 2019-05-29 PROCEDURE — 86800 THYROGLOBULIN ANTIBODY: CPT

## 2019-05-29 PROCEDURE — 36415 COLL VENOUS BLD VENIPUNCTURE: CPT

## 2019-05-29 RX ORDER — AMLODIPINE BESYLATE 5 MG/1
5 TABLET ORAL DAILY
Status: DISCONTINUED | OUTPATIENT
Start: 2019-05-29 | End: 2019-05-29

## 2019-05-29 RX ORDER — AMINOPHYLLINE DIHYDRATE 25 MG/ML
75 INJECTION, SOLUTION INTRAVENOUS ONCE
Status: COMPLETED | OUTPATIENT
Start: 2019-05-29 | End: 2019-05-29

## 2019-05-29 RX ORDER — LISINOPRIL 10 MG/1
10 TABLET ORAL DAILY
Status: ON HOLD | COMMUNITY
End: 2019-05-31 | Stop reason: HOSPADM

## 2019-05-29 RX ORDER — TRAMADOL HYDROCHLORIDE 50 MG/1
50 TABLET ORAL ONCE
Status: COMPLETED | OUTPATIENT
Start: 2019-05-29 | End: 2019-05-29

## 2019-05-29 RX ORDER — PROMETHAZINE HYDROCHLORIDE 25 MG/ML
6.25 INJECTION, SOLUTION INTRAMUSCULAR; INTRAVENOUS EVERY 6 HOURS PRN
Status: DISCONTINUED | OUTPATIENT
Start: 2019-05-29 | End: 2019-05-31 | Stop reason: HOSPADM

## 2019-05-29 RX ORDER — LEVOTHYROXINE SODIUM 0.05 MG/1
50 TABLET ORAL DAILY
Status: DISCONTINUED | OUTPATIENT
Start: 2019-05-30 | End: 2019-05-31 | Stop reason: HOSPADM

## 2019-05-29 RX ORDER — AMLODIPINE BESYLATE 10 MG/1
10 TABLET ORAL DAILY
Status: DISCONTINUED | OUTPATIENT
Start: 2019-05-30 | End: 2019-05-31 | Stop reason: HOSPADM

## 2019-05-29 RX ADMIN — ONDANSETRON 4 MG: 2 INJECTION INTRAMUSCULAR; INTRAVENOUS at 09:16

## 2019-05-29 RX ADMIN — Medication 30 MILLICURIE: at 11:05

## 2019-05-29 RX ADMIN — METOPROLOL TARTRATE 25 MG: 25 TABLET, FILM COATED ORAL at 21:37

## 2019-05-29 RX ADMIN — TRAMADOL HYDROCHLORIDE 50 MG: 50 TABLET, FILM COATED ORAL at 02:41

## 2019-05-29 RX ADMIN — Medication 10 MILLICURIE: at 08:35

## 2019-05-29 RX ADMIN — AMLODIPINE BESYLATE 5 MG: 5 TABLET ORAL at 15:15

## 2019-05-29 RX ADMIN — HYDRALAZINE HYDROCHLORIDE 10 MG: 20 INJECTION INTRAMUSCULAR; INTRAVENOUS at 11:41

## 2019-05-29 RX ADMIN — AMINOPHYLLINE 75 MG: 25 INJECTION, SOLUTION INTRAVENOUS at 11:08

## 2019-05-29 RX ADMIN — SODIUM CHLORIDE, PRESERVATIVE FREE 10 ML: 5 INJECTION INTRAVENOUS at 22:36

## 2019-05-29 RX ADMIN — PROMETHAZINE HYDROCHLORIDE 6.25 MG: 25 INJECTION INTRAMUSCULAR; INTRAVENOUS at 12:57

## 2019-05-29 RX ADMIN — INSULIN LISPRO 1 UNITS: 100 INJECTION, SOLUTION INTRAVENOUS; SUBCUTANEOUS at 21:37

## 2019-05-29 RX ADMIN — REGADENOSON 0.4 MG: 0.08 INJECTION, SOLUTION INTRAVENOUS at 11:06

## 2019-05-29 ASSESSMENT — PAIN DESCRIPTION - PROGRESSION
CLINICAL_PROGRESSION: NOT CHANGED

## 2019-05-29 ASSESSMENT — PAIN SCALES - GENERAL
PAINLEVEL_OUTOF10: 0
PAINLEVEL_OUTOF10: 0
PAINLEVEL_OUTOF10: 8

## 2019-05-29 NOTE — CARE COORDINATION
LSW spoke with pt about discharge plans. Pt lives with her  in a 1 story home. Pt denies any DME or HC in the home. Pt stated she is independent of all her ADLs. Pt has a PCP and can afford meds. Pt denies any needs and plans home.

## 2019-05-29 NOTE — PROGRESS NOTES
[address] [place] [address]  HOSPITALIST PROGRESS NOTE                       Name:  Agustina Doherty /Age/Sex: 1933  (80 y.o. female)   MRN & CSN:  [id number] & [id number] Admission Date/Time: 2019  9:47 AM   Location:  5268/6767-Y Attending:  Felizardo Boast, MD                                                  HPI  Agustina Doherty is a 80 y.o. female who presents with near syncope    SUBJECTIVE  -reports intermittent episodes of nausea/vomiting since this morning, denies having prior to admission, denies feeling dizzy or passing out. No chest pain/shortness of breath    10 point review of systems reviewed and negative unless noted above. ALLERGIES:   Allergies   Allergen Reactions    Sulfa Antibiotics Anaphylaxis    Quinapril Hcl      Cough       PCP: Latisha Payton MD    PAST MEDICAL HISTORY, SURGICAL HISTORY, SOCIAL HISTORY and  HOME MEDICATIONS all reviewed. OBJECTIVE  Vitals:    19 1141 19 1157 19 1200 19 1233   BP: (!) 178/80 (!) 219/90 (!) 214/94 (!) 187/69   Pulse:   85 86   Resp:   15 14   Temp:       TempSrc:       SpO2:       Weight:       Height:           PHYSICAL EXAM   GEN Awake female, sitting upright in bed in no apparent distress. Appears given age. EYES Pupils are equally round. No scleral erythema, discharge, or conjunctivitis. HENT Mucous membranes are moist. Oral pharynx without exudates, no evidence of thrush. NECK Supple, no apparent thyromegaly or masses. RESP Clear to auscultation, no wheezes, rales or rhonchi. Symmetric chest movement while on room air. CARDIO/VASC S1/S2 auscultated. Regular rate without appreciable murmurs, rubs, or gallops. No JVD or carotid bruits. Peripheral pulses equal bilaterally and palpable. No peripheral edema. GI Abdomen is soft without significant tenderness, masses, or guarding. Bowel sounds are normoactive. Rectal exam deferred.  No costovertebral angle tenderness. Normal appearing external genitalia.  Choi persistent will get CT  3-HTN- uncontrolled, needs optimization- adjusting medications  4-Elevated TSH- consulted endo          Disp:     Diet DIET CARDIAC;   DVT Prophylaxis [x] Lovenox, []  Heparin, [] SCDs, [] Ambulation   GI Prophylaxis [] PPI,  [] H2 Blocker,  [] Carafate,  [] Diet/Tube Feeds   Code Status Full Code   Disposition Patient requires continued admission due to nausea   CMS Level of Risk [] Low, [] Moderate,[x]  High  Patient's risk as above due to nausea     LETITIA GUZMAN MD 5/29/2019 1:12 PM

## 2019-05-29 NOTE — PROGRESS NOTES
Cardiology Progress Note     Today's Plan: stress test  echo    Admit Date:  5/28/2019    Consult reason/ Seen today for: dizziness    Subjective and  Overnight Events:  She c/o nausea today- no emesis. She denies any chest pain or SOB. She notes she had palpitations yesterday- denies any through the night    Telemetry SR  Had SVT early yesterday evening- no further events     Assessment / Plan / Recommendation:     SVT-  Check echo. Further treatment and testing pending clinical course. ? If related to abnormal TSH  Stress test shows no ischemia, normal echo with no significant valve abnormality  HTN: Very erratic high blood pressures  Dizziness : ? Orthostatics- she did have a drop in SBP of 22 points from lying to sitting,  Add compression stockings-keep hydrated   Dyslipidemia: continue statins       History of Presenting Illness:    Chief complain on admission : 80 y. o.year old who is admitted for  Chief Complaint   Patient presents with    Palpitations     denies cp, reports feeling palpitations and dizziness when getting ready this morning    Dizziness     states, starting to settle down        Past medical history:    has a past medical history of Arrhythmia, Arthritis, Blood transfusion, CAD (coronary artery disease), Cataracts, bilateral, CHF (congestive heart failure) ([address] COPD (chronic obstructive pulmonary disease) ([address] Depression, Diabetes mellitus ([address] Fatty liver disease, nonalcoholic, H/O 24 hour EKG monitoring, H/O cardiac catheterization, H/O cardiovascular stress test, H/O cardiovascular stress test, H/O Doppler ultrasound, H/O echocardiogram, H/O echocardiogram, Heart valve problem, History of Doppler ultrasound, HX OTHER MEDICAL, Hyperlipidemia, Hypertension, Mild tricuspid regurgitation, Mitral regurgitation, Nausea & vomiting, Pulmonary hypertension ([address] Pulmonary nodules, and Thyroid disease.   Past surgical history:   has a past surgical history that includes Thyroidectomy, partial; colectomy; Cholecystectomy; Appendectomy; Hysterectomy; Breast surgery; fracture surgery; Tonsillectomy; skin biopsy; Colonoscopy (12-21-12); Pancreas surgery (11/7/2006); Thyroidectomy, partial (1999); and Diagnostic Cardiac Cath Lab Procedure (8/2005). Social History:   reports that she has never smoked. She has never used smokeless tobacco. She reports that she drinks alcohol. She reports that she does not use drugs. Family history:  family history includes Cancer in her brother, father, sister, and son; Coronary Art Dis in her brother and daughter; Early Death in her son; Heart Attack in her brother; Heart Disease in her sister; Heart Failure in her mother; Hypertension in her daughter, father, and mother; Other in her brother, sister, and sister. Allergies   Allergen Reactions    Sulfa Antibiotics Anaphylaxis    Quinapril Hcl      Cough       Review of Systems:   All 14 systems were reviewed and are negative  Except for the positive findings  which as documented     BP (!) 165/82   Pulse 70   Temp 97.8 °F (36.6 °C) (Oral)   Resp 18   Ht 5' 6\" (1.676 m)   Wt 127 lb 9.6 oz (57.9 kg)   SpO2 98%   BMI 20.60 kg/m²       Intake/Output Summary (Last 24 hours) at 5/29/2019 1138  Last data filed at 5/28/2019 2130  Gross per 24 hour   Intake 100 ml   Output --   Net 100 ml       Physical Exam:  Constitutional:  Well developed, Well nourished, No acute distress  HENT:  Normocephalic, Atraumatic, Bilateral external ears normal, Oropharynx moist, Nose normal.   Neck-  No tenderness, Supple, No stridor. Eyes:  PERRL, Conjunctiva normal, No discharge. Respiratory:  Normal breath sounds, No respiratory distress, No wheezing, No chest tenderness.    Cardiovascular:  Normal heart rate, Normal rhythm, no murmurs appreciated  Abdomen/GI:  Bowel sounds normal, Soft, nausea    Musculoskeletal:  Intact distal pulses, no edema, No tenderness, No cyanosis, No clubbing. Integument:  Warm, Dry  Lymphatic:  No lymphadenopathy noted. Neurologic:  Alert & oriented x 3  Psychiatric:  Affect and Mood :pleasant     Medications:    digoxin  250 mcg Oral Daily    therapeutic multivitamin-minerals  1 tablet Oral Daily    hydrochlorothiazide  12.5 mg Oral Daily    insulin lispro  0-6 Units Subcutaneous TID WC    insulin lispro  0-3 Units Subcutaneous Nightly    sodium chloride flush  10 mL Intravenous 2 times per day    enoxaparin  40 mg Subcutaneous Daily    diltiazem  120 mg Oral QPM      dextrose       technetium sestamibi, technetium sestamibi, sodium chloride flush, magnesium hydroxide, ondansetron, acetaminophen, hydrALAZINE, glucose, dextrose, glucagon (rDNA), dextrose    Lab Data:  CBC:   Recent Labs     05/28/19  1000 05/29/19  1006   WBC 6.9 8.0   HGB 12.5 11.8*   HCT 39.6 36.4*   MCV 91.5 90.1    155     BMP:   Recent Labs     05/28/19  1000 05/29/19  1006    136   K 4.3 4.1    100   CO2 24 23   PHOS  --  3.3   BUN 17 18   CREATININE 1.0 0.9     PT/INR: No results for input(s): PROTIME, INR in the last 72 hours. BNP:    Recent Labs     05/28/19  1000   PROBNP 498.6*     TROPONIN:   Recent Labs     05/28/19  1000 05/28/19  1616   TROPONINT <0.010 <0.010            Impression:  Active Problems:    Near syncope  Resolved Problems:    * No resolved hospital problems. *       All labs, medications and tests reviewed by myself, continue all other medications of all above medical condition listed as is except for changes mentioned above. Thank you   Please call with questions. Electronically signed by MCKINLEY TABARES CNP on 5/29/2019 at 11:38 AM     CARDIOLOGY ATTENDING ADDENDUM    I have seen ,spoken to  and examined this patient personally, independently of the nurse practitioner. I have reviewed the hospital care given to date and reviewed all pertinent labs and imaging. The plan was developed mutually at the time of the visit with the patient,  NP   and myself. I have spoken with patient, nursing staff and provided written and verbal instructions . The above note has been reviewed and I agree with the assessment, diagnosis, and treatment plan with changes made by me as follows     HPI:  I have reviewed the above HPI  And agree with above   Francis Santiago is a 80 y. o.year old who and presents with had concerns including Palpitations (denies cp, reports feeling palpitations and dizziness when getting ready this morning) and Dizziness (states, starting to settle down). Chief Complaint   Patient presents with    Palpitations     denies cp, reports feeling palpitations and dizziness when getting ready this morning    Dizziness     states, starting to settle down     Interval history: The pressures running very high today    Physical Exam:  General:  Awake, alert, NAD  Head:normal  Eye:normal  Neck:  No JVD   Chest:  Clear to auscultation, respiration easy  Cardiovascular:  S1 and S2 audible, No added heart sounds, No signs of ankle edema, or volume overload, No evidence of JVD, No crackles  Abdomen:   nontender  Extremities:no edema  Pulses; palpable  Neuro: grossly normal      MEDICAL DECISION MAKING;    I agree with the above plan, which was planned by myself and discussed with NP.   Normal stress test and normal echo  Increase amlodipine to 10 milligrams daily        Emerald Jennings MD [facility]

## 2019-05-29 NOTE — PROGRESS NOTES
Dr. Juvencio Pettit was advised of patient's blood pressure after 10mg hydralazine. He stated she can have another 10mg hydralazine IV when she gets back to her room. I notified floor nurse, [address] [place] [address] RN.

## 2019-05-30 ENCOUNTER — APPOINTMENT (OUTPATIENT)
Dept: CT IMAGING | Age: 84
End: 2019-05-30
Payer: MEDICARE

## 2019-05-30 LAB
ALBUMIN SERPL-MCNC: 3.7 GM/DL (ref 3.4–5)
ANION GAP SERPL CALCULATED.3IONS-SCNC: 13 MMOL/L (ref 4–16)
BUN BLDV-MCNC: 22 MG/DL (ref 6–23)
CALCIUM SERPL-MCNC: 9.2 MG/DL (ref 8.3–10.6)
CHLORIDE BLD-SCNC: 100 MMOL/L (ref 99–110)
CO2: 23 MMOL/L (ref 21–32)
CREAT SERPL-MCNC: 1 MG/DL (ref 0.6–1.1)
GFR AFRICAN AMERICAN: >60 ML/MIN/1.73M2
GFR NON-AFRICAN AMERICAN: 53 ML/MIN/1.73M2
GLUCOSE BLD-MCNC: 178 MG/DL (ref 70–99)
GLUCOSE BLD-MCNC: 188 MG/DL (ref 70–99)
GLUCOSE BLD-MCNC: 194 MG/DL (ref 70–99)
GLUCOSE BLD-MCNC: 297 MG/DL (ref 70–99)
HCT VFR BLD CALC: 36.5 % (ref 37–47)
HEMOGLOBIN: 11.7 GM/DL (ref 12.5–16)
MCH RBC QN AUTO: 29 PG (ref 27–31)
MCHC RBC AUTO-ENTMCNC: 32.1 % (ref 32–36)
MCV RBC AUTO: 90.3 FL (ref 78–100)
PDW BLD-RTO: 14.1 % (ref 11.7–14.9)
PHOSPHORUS: 3.7 MG/DL (ref 2.5–4.9)
PLATELET # BLD: 160 K/CU MM (ref 140–440)
PMV BLD AUTO: 8.9 FL (ref 7.5–11.1)
POTASSIUM SERPL-SCNC: 4.4 MMOL/L (ref 3.5–5.1)
RBC # BLD: 4.04 M/CU MM (ref 4.2–5.4)
SODIUM BLD-SCNC: 136 MMOL/L (ref 135–145)
WBC # BLD: 8.4 K/CU MM (ref 4–10.5)

## 2019-05-30 PROCEDURE — 99233 SBSQ HOSP IP/OBS HIGH 50: CPT | Performed by: INTERNAL MEDICINE

## 2019-05-30 PROCEDURE — 80069 RENAL FUNCTION PANEL: CPT

## 2019-05-30 PROCEDURE — 6370000000 HC RX 637 (ALT 250 FOR IP): Performed by: HOSPITALIST

## 2019-05-30 PROCEDURE — G0378 HOSPITAL OBSERVATION PER HR: HCPCS

## 2019-05-30 PROCEDURE — 6360000002 HC RX W HCPCS: Performed by: NURSE PRACTITIONER

## 2019-05-30 PROCEDURE — 6370000000 HC RX 637 (ALT 250 FOR IP): Performed by: INTERNAL MEDICINE

## 2019-05-30 PROCEDURE — 85027 COMPLETE CBC AUTOMATED: CPT

## 2019-05-30 PROCEDURE — 36415 COLL VENOUS BLD VENIPUNCTURE: CPT

## 2019-05-30 PROCEDURE — 96372 THER/PROPH/DIAG INJ SC/IM: CPT

## 2019-05-30 PROCEDURE — APPSS30 APP SPLIT SHARED TIME 16-30 MINUTES: Performed by: NURSE PRACTITIONER

## 2019-05-30 PROCEDURE — 2580000003 HC RX 258: Performed by: NURSE PRACTITIONER

## 2019-05-30 PROCEDURE — 93225 XTRNL ECG REC<48 HRS REC: CPT

## 2019-05-30 PROCEDURE — 82962 GLUCOSE BLOOD TEST: CPT

## 2019-05-30 PROCEDURE — 74176 CT ABD & PELVIS W/O CONTRAST: CPT

## 2019-05-30 PROCEDURE — 6370000000 HC RX 637 (ALT 250 FOR IP): Performed by: NURSE PRACTITIONER

## 2019-05-30 PROCEDURE — 94761 N-INVAS EAR/PLS OXIMETRY MLT: CPT

## 2019-05-30 RX ORDER — HYDRALAZINE HYDROCHLORIDE 25 MG/1
25 TABLET, FILM COATED ORAL EVERY 8 HOURS PRN
Status: DISCONTINUED | OUTPATIENT
Start: 2019-05-30 | End: 2019-05-31 | Stop reason: HOSPADM

## 2019-05-30 RX ORDER — ACYCLOVIR 800 MG/1
800 TABLET ORAL
Status: DISCONTINUED | OUTPATIENT
Start: 2019-05-30 | End: 2019-05-31 | Stop reason: HOSPADM

## 2019-05-30 RX ORDER — PYRIDOSTIGMINE BROMIDE 60 MG/1
60 TABLET ORAL EVERY 8 HOURS SCHEDULED
Status: DISCONTINUED | OUTPATIENT
Start: 2019-05-30 | End: 2019-05-31 | Stop reason: HOSPADM

## 2019-05-30 RX ADMIN — SODIUM CHLORIDE, PRESERVATIVE FREE 10 ML: 5 INJECTION INTRAVENOUS at 08:06

## 2019-05-30 RX ADMIN — SODIUM CHLORIDE, PRESERVATIVE FREE 10 ML: 5 INJECTION INTRAVENOUS at 21:09

## 2019-05-30 RX ADMIN — MULTIPLE VITAMINS W/ MINERALS TAB 1 TABLET: TAB at 08:04

## 2019-05-30 RX ADMIN — MAGNESIUM CITRATE 296 ML: 1.75 LIQUID ORAL at 13:21

## 2019-05-30 RX ADMIN — ACETAMINOPHEN 650 MG: 325 TABLET ORAL at 21:08

## 2019-05-30 RX ADMIN — INSULIN LISPRO 3 UNITS: 100 INJECTION, SOLUTION INTRAVENOUS; SUBCUTANEOUS at 17:49

## 2019-05-30 RX ADMIN — METOPROLOL TARTRATE 25 MG: 25 TABLET, FILM COATED ORAL at 08:04

## 2019-05-30 RX ADMIN — HYDRALAZINE HYDROCHLORIDE 25 MG: 25 TABLET, FILM COATED ORAL at 14:43

## 2019-05-30 RX ADMIN — METOPROLOL TARTRATE 25 MG: 25 TABLET, FILM COATED ORAL at 21:08

## 2019-05-30 RX ADMIN — ACETAMINOPHEN 650 MG: 325 TABLET ORAL at 13:20

## 2019-05-30 RX ADMIN — INSULIN LISPRO 1 UNITS: 100 INJECTION, SOLUTION INTRAVENOUS; SUBCUTANEOUS at 08:07

## 2019-05-30 RX ADMIN — INSULIN LISPRO 1 UNITS: 100 INJECTION, SOLUTION INTRAVENOUS; SUBCUTANEOUS at 21:10

## 2019-05-30 RX ADMIN — ACYCLOVIR 800 MG: 800 TABLET ORAL at 17:49

## 2019-05-30 RX ADMIN — LEVOTHYROXINE SODIUM 50 MCG: 50 TABLET ORAL at 05:08

## 2019-05-30 RX ADMIN — AMLODIPINE BESYLATE 10 MG: 10 TABLET ORAL at 08:04

## 2019-05-30 RX ADMIN — PYRIDOSTIGMINE BROMIDE 60 MG: 60 TABLET ORAL at 21:08

## 2019-05-30 RX ADMIN — ENOXAPARIN SODIUM 40 MG: 40 INJECTION SUBCUTANEOUS at 08:03

## 2019-05-30 RX ADMIN — ACETAMINOPHEN 650 MG: 325 TABLET ORAL at 05:08

## 2019-05-30 RX ADMIN — HYDROCHLOROTHIAZIDE 12.5 MG: 12.5 TABLET ORAL at 08:04

## 2019-05-30 RX ADMIN — DIGOXIN 250 MCG: 125 TABLET ORAL at 08:04

## 2019-05-30 ASSESSMENT — PAIN DESCRIPTION - FREQUENCY: FREQUENCY: INTERMITTENT

## 2019-05-30 ASSESSMENT — PAIN DESCRIPTION - PROGRESSION
CLINICAL_PROGRESSION: NOT CHANGED

## 2019-05-30 ASSESSMENT — PAIN DESCRIPTION - LOCATION: LOCATION: BACK

## 2019-05-30 ASSESSMENT — PAIN SCALES - GENERAL
PAINLEVEL_OUTOF10: 5
PAINLEVEL_OUTOF10: 3
PAINLEVEL_OUTOF10: 8
PAINLEVEL_OUTOF10: 7

## 2019-05-30 ASSESSMENT — PAIN DESCRIPTION - PAIN TYPE: TYPE: CHRONIC PAIN

## 2019-05-30 ASSESSMENT — PAIN DESCRIPTION - DESCRIPTORS: DESCRIPTORS: ACHING

## 2019-05-30 ASSESSMENT — PAIN DESCRIPTION - ONSET: ONSET: ON-GOING

## 2019-05-30 NOTE — PLAN OF CARE
Problem: Pain:  Goal: Pain level will decrease  Description  Pain level will decrease  5/30/2019 1846 by Judith Wise RN  Outcome: Ongoing  5/30/2019 0548 by Tommy Hooper RN  Outcome: Ongoing  Goal: Control of acute pain  Description  Control of acute pain  5/30/2019 1846 by Judith Wise RN  Outcome: Ongoing  5/30/2019 0548 by Tommy Hooper RN  Outcome: Ongoing  Goal: Control of chronic pain  Description  Control of chronic pain  5/30/2019 1846 by Judith Wise RN  Outcome: Ongoing  5/30/2019 0548 by Tommy Hooper RN  Outcome: Ongoing  Goal: Patient's pain/discomfort is manageable  Description  Patient's pain/discomfort is manageable  5/30/2019 1846 by Judith Wise RN  Outcome: Ongoing  5/30/2019 0548 by Tommy Hooper RN  Outcome: Ongoing     Problem: Infection:  Goal: Will remain free from infection  Description  Will remain free from infection  5/30/2019 1846 by Judith Wise RN  Outcome: Ongoing  5/30/2019 0548 by Tommy Hooper RN  Outcome: Ongoing     Problem: Safety:  Goal: Free from accidental physical injury  Description  Free from accidental physical injury  5/30/2019 1846 by Judith Wise RN  Outcome: Ongoing  5/30/2019 0548 by Tommy Hooper RN  Outcome: Ongoing  Goal: Free from intentional harm  Description  Free from intentional harm  5/30/2019 1846 by Judith Wise RN  Outcome: Ongoing  5/30/2019 0548 by Tommy Hooper RN  Outcome: Ongoing     Problem: Daily Care:  Goal: Daily care needs are met  Description  Daily care needs are met  5/30/2019 1846 by Judith Wise RN  Outcome: Ongoing  5/30/2019 0548 by Tommy Hooper RN  Outcome: Ongoing     Problem: Skin Integrity:  Goal: Skin integrity will stabilize  Description  Skin integrity will stabilize  5/30/2019 1846 by Judith Wsie RN  Outcome: Ongoing  5/30/2019 0548 by Tommy Hooper RN  Outcome: Ongoing     Problem: Discharge Planning:  Goal: Patients continuum of care needs are met  Description  Patients continuum of care needs are met  5/30/2019 1846 by Larry Jimenez RN  Outcome: Ongoing  5/30/2019 0548 by Suhail Savage RN  Outcome: Ongoing

## 2019-05-30 NOTE — PROGRESS NOTES
Progress Note( Dr. Agnes Jensen)  5/30/2019  Subjective:   Admit Date: 5/28/2019  PCP: Alexsander Erickson MD    Admitted For : Syncope and passing out spell    Consulted For: Or possible borderline hypothyroidism in patient had partial thyroidectomy of left lobe    Interval History: Feels much better    Denies any chest pains,   Denies SOB . Denies nausea or vomiting. No new bowel or bladder symptoms. No intake or output data in the 24 hours ending 05/30/19 0706    DATA    CBC:   Recent Labs     05/28/19  1000 05/29/19  1006 05/30/19  0442   WBC 6.9 8.0 8.4   HGB 12.5 11.8* 11.7*    155 160    CMP:  Recent Labs     05/28/19  1000 05/29/19  1006 05/30/19  0442    136 136   K 4.3 4.1 4.4    100 100   CO2 24 23 23   BUN 17 18 22   CREATININE 1.0 0.9 1.0   CALCIUM 10.0 9.2 9.2   PROT 7.0  --   --    LABALBU 3.7 3.6 3.7   BILITOT 0.6  --   --    ALKPHOS 112  --   --    AST 22  --   --    ALT 29  --   --      Lipids:   Lab Results   Component Value Date    CHOL 149 08/01/2005    HDL 39 08/01/2005    TRIG 185 08/01/2005     Glucose:  Recent Labs     05/28/19  2336 05/29/19  1404 05/29/19  2137   POCGLU 170* 231* 176*     [id number]:  Lab Results   Component Value Date    LABA1C 7.3 05/28/2019     High Sensitivity TSH:   Lab Results   Component Value Date    TSHHS 8.150 05/28/2019     Free T3: No results found for: FT3  Free T4:  Lab Results   Component Value Date    T4FREE 1.41 05/28/2019       Xr Chest Standard (2 Vw)    Result Date: 5/28/2019  EXAMINATION: TWO XRAY VIEWS OF THE CHEST 5/28/2019 9:51 am COMPARISON: 04/11/2017 HISTORY: ORDERING SYSTEM PROVIDED HISTORY: tachycardia, dizziness TECHNOLOGIST PROVIDED HISTORY: Reason for exam:->tachycardia, dizziness Ordering Physician Provided Reason for Exam:  chest pain Initial encounter FINDINGS: Lungs are hyperinflated. Diffuse osteopenia. No focal consolidation, pleural effusion or pneumothorax. The cardiomediastinal silhouette is unremarkable. No overt pulmonary edema. No acute osseous abnormality. COPD without acute cardiopulmonary findings. Xr Abdomen (kub) (single Ap View)    Result Date: 5/29/2019  EXAMINATION: ONE SUPINE XRAY VIEW(S) OF THE ABDOMEN 5/29/2019 8:22 pm COMPARISON: 12/19/2016. HISTORY: ORDERING SYSTEM PROVIDED HISTORY: nausea/vomiting TECHNOLOGIST PROVIDED HISTORY: Reason for exam:->nausea/vomiting Ordering Physician Provided Reason for Exam: nausea/vomiting Acuity: Unknown Type of Exam: Subsequent/Follow-up Additional signs and symptoms: nausea/vomiting Relevant Medical/Surgical History: nausea/vomiting FINDINGS: Nonspecific nonobstructive bowel gas pattern. No free intraperitoneal air. Mild-to-moderate teen stool throughout the colon. Vascular calcifications noted. Surgical clips within the central abdomen. No definite renal calculi. Nonspecific nonobstructive bowel gas pattern. Mild-to-moderate retained stool throughout the colon     Ct Head Wo Contrast    Result Date: 5/29/2019  EXAMINATION: CT OF THE HEAD WITHOUT CONTRAST  5/28/2019 10:11 pm TECHNIQUE: CT of the head was performed without the administration of intravenous contrast. Dose modulation, iterative reconstruction, and/or weight based adjustment of the mA/kV was utilized to reduce the radiation dose to as low as reasonably achievable. COMPARISON: None. HISTORY: ORDERING SYSTEM PROVIDED HISTORY: SYNCOPE/FAINTING TECHNOLOGIST PROVIDED HISTORY: Has a \"code stroke\" or \"stroke alert\" been called? ->No Ordering Physician Provided Reason for Exam: Syncope/fainting Acuity: Acute Type of Exam: Initial Additional signs and symptoms: no Relevant Medical/Surgical History: none FINDINGS: BRAIN/VENTRICLES: There is no acute intracranial hemorrhage, mass effect or midline shift. No abnormal extra-axial fluid collection. The gray-white differentiation is maintained without evidence of an acute infarct. There is no evidence of hydrocephalus.  Atherosclerosis of the intracranial vasculature is noted. ORBITS: The visualized portion of the orbits demonstrate no acute abnormality. SINUSES: Complete opacification of the left maxillary sinus. Scattered opacification of the remaining paranasal sinuses. The mastoid air cells are clear. SOFT TISSUES/SKULL:  No acute abnormality of the visualized skull or soft tissues. 1. No acute intracranial abnormality. 2. Atherosclerosis. 3. Scattered sinusitis. Us Head Neck Soft Tissue Thyroid    Result Date: 5/29/2019  EXAMINATION: THYROID ULTRASOUND 5/29/2019 COMPARISON: None. HISTORY: ORDERING SYSTEM PROVIDED HISTORY: THYROID DISEASE TECHNOLOGIST PROVIDED HISTORY: Ordering Physician Provided Reason for Exam: abnormal  labs, partial thyroidectomy 1999 Acuity: Acute FINDINGS: Cervical lymphadenopathy: No abnormal lymph nodes in the imaged portions of the neck. Right thyroid lobe:  4.8 x 2.4 x 1.5 cm Left thyroid lobe:  Absent Isthmus:  5 mm thickness Thyroid Gland:  Thyroid gland demonstrates heterogeneous, multinodular echotexture and normal appearing vascularity. Nodules: No completely solid thyroid nodules are present. Multifocal colloid cysts are seen. NODULE: Right 1 Size: 1 x 1.2 x 1.2 cm Location: Inferior right thyroid lobe 1. Composition:  Mixed cystic and solid (1) 2. Echogenicity:  Isoechoic (1)-cyst with nodule 3. Shape: Wider-than-tall (0) 4. Margins:  Smooth (0) 5. Echogenic foci:  None (0) ACR TI-RADS total points:  2 ACR TI-RADS risk category: TR2     Left thyroid lobectomy. Mild multinodular goiter. Benign colloid cysts as well as benign cyst with nodule (NODULE Right 1: ACR TI-RADS TR2: Recommend:  No follow-up. *) ______________________________________________________________________________ ____ Skyler Reach TI-RADS recommendations: TR5 (>= 7 points):  FNA if >= 1 cm; follow-up if 0.5-0.9 cm in 1, 2, 3, 4, and 5 years TR4 (4-6 points):  FNA if >= 1.5 cm; follow-up if 1.0-1.4 cm in 1, 2, 3, and 5 years TR3 (3 points):  FNA if RECOMMENDATIONS: Consider follow-up evaluation with CTA neck. Mri Brain Wo Contrast    Result Date: 5/29/2019  EXAMINATION: MRI OF THE BRAIN WITHOUT CONTRAST  5/28/2019 5:11 pm TECHNIQUE: Multiplanar multisequence MRI of the brain was performed without the administration of intravenous contrast. COMPARISON: None. HISTORY: ORDERING SYSTEM PROVIDED HISTORY: SYNCOPE/FAINTING TECHNOLOGIST PROVIDED HISTORY: Ordering Physician Provided Reason for Exam: dizziness, syncope Acuity: Acute Type of Exam: Initial FINDINGS: INTRACRANIAL STRUCTURES/VENTRICLES: There is no acute infarct. No mass effect or midline shift. No evidence of an acute intracranial hemorrhage. The ventricles and sulci are normal in size and configuration. The sellar/suprasellar regions appear unremarkable. The normal signal voids within the major intracranial vessels appear maintained. ORBITS: The visualized portion of the orbits demonstrate no acute abnormality. SINUSES: There is a large air-fluid level in the left maxillary sinus and a small air-fluid level in the right maxillary antrum. There is also opacification of several left-sided ethmoid air cells. BONES/SOFT TISSUES: The bone marrow signal intensity appears normal. The soft tissues demonstrate no acute abnormality. No acute infarct. Probable acute sinusitis.      Nm Myocardial Spect Rest Exercise Or Rx    Result Date: 5/29/2019  Cardiac Perfusion Imaging   Demographics   Patient Name      Dhavalbelle Xie     Date of study        05/29/2019   Date of Birth     05/25/1933         Gender               Female   Age               80 year(s)         Race                    Patient Number    [id number]         Room Number          8372   Visit Number      [id number]          Height               66 inches   Corporate ID      [id number]           Weight               120 pounds   Accession Number  [id number]                                        NM Technologist      Barrington Headley RT   Ordering          Marie Todd Tyson 7  Physician         MD                 Cardiologist         MD   Conclusions   Summary  Normal EF 64 % with normal ventricular contractility. No infarct or ischemia noted. Normal stress myocardial perfusion. This is a normal study. Signatures   ------------------------------------------------------------------  Electronically signed by Josie Gambino MD (Interpreting  cardiologist) on 05/29/2019 at 14:30  ------------------------------------------------------------------  Procedure Procedure Type:   Nuclear Stress Test:Pharmacological, Myocardial Perfusion Imaging with  Pharm, NM MYOCARDIAL SPECT REST EXERCISE OR RX  Indications: Syncope and abnormal rest ECG. Risk Factors   The patient risk factors include:hypercholesterolemia, hypertension,  diabetes mellitus, chronic lung disease and prior heart failure . Stress Protocols   Resting ECG  Normal sinus rhythm. Resting HR:70 bpm  Resting BP:184/86 mmHg  Stress Protocol:Pharmacologic - Lexiscan  Peak HR:85 bpm                               HR/BP product:[id number]  Peak BP:215/90 mmHg  Predicted HR: 134 bpm  % of predicted HR: 63   Exercise duration: 01:06 min  Reason for termination:Completed   ECG Findings  Normal sinus rhythm. Arrhythmias  No rhythm abnormality. Symptoms  Nausea. The patient was given an intravenous injection of Aminophylline to relieve  symptoms from [place]. Complications  Procedure complication was none. Stress Interpretation  ECG portion of stress test is negative for ischemia by diagnostic criteria. Procedure Medications   - Lexiscan I.V. 0.4 mg admininstered @ 05/29/2019 11:05.   - Aminophylline I.V. bolus (over 15sec.) 75 mg admininstered @ 05/29/2019     11:08.   Imaging Protocols   Rest                             Stress   Isotope:Sestamibi 99mTc          Isotope: Sestamibi 99mTc  Isotope dose:10.6 mCi            Isotope dose:32.5 mCi  Administration route: I.V. Administration route: I.V. Injection Date:05/29/2019 08:35  Injection Date:05/29/2019 11:05  Scan Date:05/29/2019 09:20       Scan Date:05/29/2019 11:50   Perfusion Interpretation   Normal EF 64 % with normal ventricular contractility. No infarct or ischemia noted. Imaging Results    Summed scores     - Summed stress score: 11     - Summed rest score: 6     - Summed difference score:    5   Rest ejection  Ejection fraction:64 %  EDV :67 ml  ESV :24 ml  Stroke volume :43 ml  Medical History   Accession#:  [id number]  Admission Data Admission date: 05/28/2019 Admission Time: 09:47 Hospital Status: Outpatient. Scheduled Medicines   Medications:    amLODIPine  10 mg Oral Daily    metoprolol tartrate  25 mg Oral BID    levothyroxine  50 mcg Oral Daily    digoxin  250 mcg Oral Daily    therapeutic multivitamin-minerals  1 tablet Oral Daily    hydrochlorothiazide  12.5 mg Oral Daily    insulin lispro  0-6 Units Subcutaneous TID WC    insulin lispro  0-3 Units Subcutaneous Nightly    sodium chloride flush  10 mL Intravenous 2 times per day    enoxaparin  40 mg Subcutaneous Daily      Infusions:    dextrose           Objective:   Vitals: BP (!) 171/81   Pulse 71   Temp 98.9 °F (37.2 °C) (Oral)   Resp 16   Ht 5' 6\" (1.676 m)   Wt 129 lb (58.5 kg)   SpO2 99%   BMI 20.82 kg/m²   General appearance: alert and cooperative with exam  Neck: no JVD or bruit  Thyroid : scar of LEFT Thyroidectomy , NODULE IN RIGHT LOBE  Lungs: Has Vesicular Breath sounds   Heart:  regular rate and rhythm  Abdomen: soft, non-tender; bowel sounds normal; no masses,  no organomegaly  Musculoskeletal: Normal  Extremities: extremities normal, , no edema  Neurologic:  Awake, alert, oriented to name, place and time. Cranial nerves II-XII are grossly intact. Motor is  intact. Sensory is intact. ,  and gait is normal.    Assessment:     Patient Active

## 2019-05-30 NOTE — PROGRESS NOTES
[address] [place] [address]  HOSPITALIST PROGRESS NOTE                       Name:  Alejandrina Hercules /Age/Sex: 1933  (80 y.o. female)   MRN & CSN:  [id number] & [id number] Admission Date/Time: 2019  9:47 AM   Location:  [address] [place] [address] Attending:  Tamar Houston MD                                                  HPI  Alejandrina Hercules is a 80 y.o. female who presents with near syncope    SUBJECTIVE  -reports left sided abdominal pain, reports new lesions on left thigh- rash    10 point review of systems reviewed and negative unless noted above. ALLERGIES:   Allergies   Allergen Reactions    Sulfa Antibiotics Anaphylaxis    Nitrofuran Derivatives     Quinapril Hcl      Cough       PCP: Anna Hunt MD    PAST MEDICAL HISTORY, SURGICAL HISTORY, SOCIAL HISTORY and  HOME MEDICATIONS all reviewed. OBJECTIVE  Vitals:    19 2137 19 2145 19 0356 19 0803   BP: (!) 150/65 (!) 150/65 (!) 171/81 (!) 194/86   Pulse: 75 74 71 77   Resp:  15 16 16   Temp:  98.5 °F (36.9 °C) 98.9 °F (37.2 °C) 98.4 °F (36.9 °C)   TempSrc:  Oral Oral Oral   SpO2:   99% 98%   Weight:   129 lb (58.5 kg)    Height:           PHYSICAL EXAM   GEN Awake female, sitting upright in bed in no apparent distress. Appears given age. EYES Pupils are equally round. No scleral erythema, discharge, or conjunctivitis. HENT Mucous membranes are moist. Oral pharynx without exudates, no evidence of thrush. NECK Supple, no apparent thyromegaly or masses. RESP Clear to auscultation, no wheezes, rales or rhonchi. Symmetric chest movement while on room air. CARDIO/VASC S1/S2 auscultated. Regular rate without appreciable murmurs, rubs, or gallops. No JVD or carotid bruits. Peripheral pulses equal bilaterally and palpable. No peripheral edema. GI Abdomen is soft without significant tenderness, masses, or guarding. Bowel sounds are normoactive. Rectal exam deferred.  No costovertebral angle tenderness.  Normal appearing external genitalia. Choi catheter is not present. HEME/LYMPH No palpable cervical lymphadenopathy and no hepatosplenomegaly. No petechiae or ecchymoses. MSK Spontaneous movement of all extremities. No gross joint deformities. SKIN Normal coloration, warm, dry. NEURO Cranial nerves appear grossly intact, normal speech, no lateralizing weakness. PSYCH Awake, alert, oriented x 4. Affect appropriate. INTAKE: In: 10 [I.V.:10]  Out: -   OUTPUT: In: 10   Out: -     LABS  Recent Labs     05/28/19  1000 05/29/19  1006 05/30/19  0442   WBC 6.9 8.0 8.4   HGB 12.5 11.8* 11.7*   HCT 39.6 36.4* 36.5*    155 160      Recent Labs     05/28/19  1000 05/29/19  1006 05/30/19  0442    136 136   K 4.3 4.1 4.4    100 100   CO2 24 23 23   PHOS  --  3.3 3.7   BUN 17 18 22   CREATININE 1.0 0.9 1.0     Recent Labs     05/28/19  1000   AST 22   ALT 29   BILITOT 0.6   ALKPHOS 112     No results for input(s): INR in the last 72 hours.   Recent Labs     05/28/19  1000 05/28/19  1616   TROPONINT <0.010 <0.010          Abnormal labs for today noted      Imaging:     ECHO:    Microbiology:  Blood culture:    Urine culture:    Sputum culture:    Procedures done this admission:    MEDS  Scheduled Meds:   acyclovir  800 mg Oral 5x Daily    amLODIPine  10 mg Oral Daily    metoprolol tartrate  25 mg Oral BID    levothyroxine  50 mcg Oral Daily    digoxin  250 mcg Oral Daily    therapeutic multivitamin-minerals  1 tablet Oral Daily    hydrochlorothiazide  12.5 mg Oral Daily    insulin lispro  0-6 Units Subcutaneous TID WC    insulin lispro  0-3 Units Subcutaneous Nightly    sodium chloride flush  10 mL Intravenous 2 times per day    enoxaparin  40 mg Subcutaneous Daily     Continuous Infusions:   dextrose       PRN Meds:iohexol, hydrALAZINE, promethazine, sodium chloride flush, magnesium hydroxide, ondansetron, acetaminophen, glucose, dextrose, glucagon (rDNA), dextrose        ASSESSMENT and PLAN  Hospital Day: 3    1-Near syncope/?arrythmia- stress test negative, carotid duplex and MRI negative. Likely related to either hypertensive encephalopathy or orthostatic hypotension. Asymptomatic today  2-left sided abdominal pain with nausea- ?constipation per KUB, CT non-acute- giving laxatives and see how she does  3-HTN- uncontrolled, adding hydralazine oral prn- prefer short acting given orthostatic hypotension  4-Elevated TSH- consulted endo  5-Left thigh ? herpes zoster- short course of acyclovir    Discharge planning once BP is better controlled      Disp:     Diet DIET CARDIAC;   DVT Prophylaxis [x] Lovenox, []  Heparin, [] SCDs, [] Ambulation   GI Prophylaxis [] PPI,  [] H2 Blocker,  [] Carafate,  [] Diet/Tube Feeds   Code Status Full Code   Disposition Patient requires continued admission due to nausea   CMS Level of Risk [] Low, [] Moderate,[x]  High  Patient's risk as above due to nausea     LETITIA GUZMAN MD 5/30/2019 2:03 PM

## 2019-05-30 NOTE — PROGRESS NOTES
Cardiology Progress Note     Today's Plan: Holter    Admit Date:  5/28/2019    Consult reason/ Seen today for: dizziness    Subjective and  Overnight Events:  Her nausea has improved. She denies any chest pain or SOB- denies any palpations     Telemetry SR  Had SVT :  no further events per telemetry     Assessment / Plan / Recommendation:     SVT- Echo EF 60 % no significant VHD; Will check Holter monitor:  ? If related to abnormal TSH  Stress test shows no ischemia, normal echo with no significant valve abnormality  HTN: Very erratic high blood pressures- tolerating higher dose of Norvasc   Dizziness : +Orthostatics- she did have a drop in SBP of 22 points from lying to sitting,  Add compression stockings-keep hydrated   Stop hctz, although hydralaizine is not the best option  Dyslipidemia: continue statins       History of Presenting Illness:    Chief complain on admission : 80 y. o.year old who is admitted for  Chief Complaint   Patient presents with    Palpitations     denies cp, reports feeling palpitations and dizziness when getting ready this morning    Dizziness     states, starting to settle down        Past medical history:    has a past medical history of Arrhythmia, Arthritis, Atrophic vaginitis, Blood transfusion, CAD (coronary artery disease), Cataracts, bilateral, CHF (congestive heart failure) ([address] COPD (chronic obstructive pulmonary disease) ([address] Depression, Essential hypertension, Fatty liver disease, nonalcoholic, GERD (gastroesophageal reflux disease), H/O 24 hour EKG monitoring, H/O cardiac catheterization, H/O cardiovascular stress test, H/O cardiovascular stress test, H/O Doppler ultrasound, H/O echocardiogram, H/O echocardiogram, Heart valve problem, History of Doppler ultrasound, HX OTHER MEDICAL, Hyperlipidemia, Mild tricuspid regurgitation, Mitral regurgitation, Nausea & vomiting, Osteopenia, Pulmonary hypertension ([facility] Pulmonary nodules, Supraventricular tachycardia ([facility] [address] Thyroid disease, and Type 2 diabetes mellitus ([facility] [address] Past surgical history:   has a past surgical history that includes Thyroidectomy, partial; colectomy; Cholecystectomy; Appendectomy; Hysterectomy; Breast surgery; fracture surgery; Tonsillectomy; skin biopsy; Colonoscopy (12-21-12); Pancreas surgery (11/7/2006); Thyroidectomy, partial (1999); and Diagnostic Cardiac Cath Lab Procedure (8/2005). Social History:   reports that she has never smoked. She has never used smokeless tobacco. She reports that she drinks alcohol. She reports that she does not use drugs. Family history:  family history includes Cancer in her brother, father, sister, and son; Coronary Art Dis in her brother and daughter; Early Death in her son; Heart Attack in her brother; Heart Disease in her sister; Heart Failure in her mother; Hypertension in her daughter, father, and mother; Other in her brother, sister, and sister. Allergies   Allergen Reactions    Sulfa Antibiotics Anaphylaxis    Nitrofuran Derivatives     Quinapril Hcl      Cough       Review of Systems:   All 14 systems were reviewed and are negative  Except for the positive findings  which as documented     BP (!) 194/86   Pulse 77   Temp 98.4 °F (36.9 °C) (Oral)   Resp 16   Ht 5' 6\" (1.676 m)   Wt 129 lb (58.5 kg)   SpO2 98%   BMI 20.82 kg/m²       Intake/Output Summary (Last 24 hours) at 5/30/2019 1022  Last data filed at 5/30/2019 0806  Gross per 24 hour   Intake 10 ml   Output --   Net 10 ml       Physical Exam:  Constitutional:  Well developed, Well nourished, No acute distress  HENT:  Normocephalic, Atraumatic, Bilateral external ears normal, Oropharynx moist, Nose normal.   Neck-  No tenderness, Supple, No stridor. Eyes:  PERRL, Conjunctiva normal, No discharge. Respiratory:  Normal breath sounds, No respiratory distress, No wheezing, No chest tenderness.    Cardiovascular:  Normal heart rate, Normal rhythm, no murmurs appreciated  Abdomen/GI:  Bowel sounds normal, Soft, nausea    Musculoskeletal:  Intact distal pulses, no edema, No tenderness, No cyanosis, No clubbing. Integument:  Warm, Dry  Lymphatic:  No lymphadenopathy noted. Neurologic:  Alert & oriented x 3  Psychiatric:  Affect and Mood :pleasant     Medications:    amLODIPine  10 mg Oral Daily    metoprolol tartrate  25 mg Oral BID    levothyroxine  50 mcg Oral Daily    digoxin  250 mcg Oral Daily    therapeutic multivitamin-minerals  1 tablet Oral Daily    hydrochlorothiazide  12.5 mg Oral Daily    insulin lispro  0-6 Units Subcutaneous TID WC    insulin lispro  0-3 Units Subcutaneous Nightly    sodium chloride flush  10 mL Intravenous 2 times per day    enoxaparin  40 mg Subcutaneous Daily      dextrose       promethazine, sodium chloride flush, magnesium hydroxide, ondansetron, acetaminophen, hydrALAZINE, glucose, dextrose, glucagon (rDNA), dextrose    Lab Data:  CBC:   Recent Labs     05/28/19  1000 05/29/19  1006 05/30/19  0442   WBC 6.9 8.0 8.4   HGB 12.5 11.8* 11.7*   HCT 39.6 36.4* 36.5*   MCV 91.5 90.1 90.3    155 160     BMP:   Recent Labs     05/28/19  1000 05/29/19  1006 05/30/19  0442    136 136   K 4.3 4.1 4.4    100 100   CO2 24 23 23   PHOS  --  3.3 3.7   BUN 17 18 22   CREATININE 1.0 0.9 1.0     PT/INR: No results for input(s): PROTIME, INR in the last 72 hours. BNP:    Recent Labs     05/28/19  1000   PROBNP 498.6*     TROPONIN:   Recent Labs     05/28/19  1000 05/28/19  1616   TROPONINT <0.010 <0.010            Impression:  Active Problems:    Near syncope  Resolved Problems:    * No resolved hospital problems. *       All labs, medications and tests reviewed by myself, continue all other medications of all above medical condition listed as is except for changes mentioned above. Thank you   Please call with questions.     Electronically signed by MCKINLEY Sanon - PARISA on 5/30/2019 at 10:22 AM     CARDIOLOGY ATTENDING ADDENDUM    I have seen ,spoken to  and examined this patient personally, independently of the nurse practitioner. I have reviewed the hospital care given to date and reviewed all pertinent labs and imaging. The plan was developed mutually at the time of the visit with the patient,  NP   and myself. I have spoken with patient, nursing staff and provided written and verbal instructions . The above note has been reviewed and I agree with the assessment, diagnosis, and treatment plan with changes made by me as follows     HPI:  I have reviewed the above HPI  And agree with above   Logan Lynne is a 80 y. o.year old who and presents with had concerns including Palpitations (denies cp, reports feeling palpitations and dizziness when getting ready this morning) and Dizziness (states, starting to settle down). Chief Complaint   Patient presents with    Palpitations     denies cp, reports feeling palpitations and dizziness when getting ready this morning    Dizziness     states, starting to settle down     Interval history:  bp running high c/o right sided pain     Physical Exam:  General:  Awake, alert, NAD  Head:normal  Eye:normal  Neck:  No JVD   Chest:  Clear to auscultation, respiration easy  Cardiovascular:  S1 and S2 audible, No added heart sounds, No signs of ankle edema, or volume overload, No evidence of JVD, No crackles  Abdomen:   nontender  Extremities:  No  edema  Pulses; palpable  Neuro: grossly normal      MEDICAL DECISION MAKING;    I agree with the above plan, which was planned by myself and discussed with NP.   Stop hctz  I am not so sure about hydralazine but her bp is running very high  Can consider pyridosytgmine for orthostatic        Jc Shaikh MD [facility] [place]

## 2019-05-31 VITALS
SYSTOLIC BLOOD PRESSURE: 157 MMHG | BODY MASS INDEX: 20.73 KG/M2 | OXYGEN SATURATION: 96 % | HEART RATE: 78 BPM | DIASTOLIC BLOOD PRESSURE: 92 MMHG | RESPIRATION RATE: 19 BRPM | HEIGHT: 66 IN | WEIGHT: 129 LBS | TEMPERATURE: 97.7 F

## 2019-05-31 LAB
ACQUISITION DURATION: NORMAL S
ANTITHYROGLOBULIN AB: <0.9 IU/ML (ref 0–4)
ANTITHYROGLOBULIN AB: NORMAL IU/ML (ref 0–4)
ANTITHYROID MICORSOMAL: 0.4 IU/ML (ref 0–9)
ANTITHYROID MICORSOMAL: NORMAL IU/ML (ref 0–9)
AVERAGE HEART RATE: 77 BPM
EKG DIAGNOSIS: NORMAL
FASTEST SUPRAVENTRICULAR RATE: 128 BPM
FASTEST VENTRICULAR RATE: 106 BPM
GLUCOSE BLD-MCNC: 188 MG/DL (ref 70–99)
HOOKUP DATE: NORMAL
HOOKUP TIME: NORMAL
LONGEST SUPRAVENTRICULAR RATE: 128 BPM
LONGEST VE RUN RATE: 106 BPM
MAX HEART RATE TIME/DATE: NORMAL
MAX HEART RATE: 128 BPM
MIN HEART RATE TIME/DATE: NORMAL
MIN HEART RATE: 48 BPM
NUMBER OF FASTEST SUPRAVENTRICULAR BEATS: 4
NUMBER OF FASTEST VENTRICULAR BEATS: 3
NUMBER OF LONGEST SUPRAVENTRICULAR BEATS: 4
NUMBER OF LONGEST VENTRICULAR BEATS: 3
NUMBER OF QRS COMPLEXES: NORMAL
NUMBER OF SUPRAVENTRICULAR BEATS IN RUNS: 7
NUMBER OF SUPRAVENTRICULAR COUPLETS: 6
NUMBER OF SUPRAVENTRICULAR ECTOPICS: 282
NUMBER OF SUPRAVENTRICULAR ISOLATED BEATS: 263
NUMBER OF SUPRAVENTRICULAR RUNS: 2
NUMBER OF VENTRICULAR BEATS IN RUNS: 3
NUMBER OF VENTRICULAR BIGEMINAL CYCLES: 0
NUMBER OF VENTRICULAR COUPLETS: 0
NUMBER OF VENTRICULAR ECTOPICS: 52
NUMBER OF VENTRICULAR ISOLATED BEATS: 49
NUMBER OF VENTRICULAR RUNS: 1

## 2019-05-31 PROCEDURE — 96372 THER/PROPH/DIAG INJ SC/IM: CPT

## 2019-05-31 PROCEDURE — 6370000000 HC RX 637 (ALT 250 FOR IP): Performed by: INTERNAL MEDICINE

## 2019-05-31 PROCEDURE — 6360000002 HC RX W HCPCS: Performed by: NURSE PRACTITIONER

## 2019-05-31 PROCEDURE — 6370000000 HC RX 637 (ALT 250 FOR IP): Performed by: HOSPITALIST

## 2019-05-31 PROCEDURE — 82962 GLUCOSE BLOOD TEST: CPT

## 2019-05-31 PROCEDURE — 2580000003 HC RX 258: Performed by: NURSE PRACTITIONER

## 2019-05-31 PROCEDURE — 96376 TX/PRO/DX INJ SAME DRUG ADON: CPT

## 2019-05-31 PROCEDURE — 6370000000 HC RX 637 (ALT 250 FOR IP): Performed by: NURSE PRACTITIONER

## 2019-05-31 PROCEDURE — G0378 HOSPITAL OBSERVATION PER HR: HCPCS

## 2019-05-31 RX ORDER — LEVOTHYROXINE SODIUM 0.05 MG/1
50 TABLET ORAL DAILY
Qty: 30 TABLET | Refills: 3 | Status: SHIPPED | OUTPATIENT
Start: 2019-06-01 | End: 2019-06-12

## 2019-05-31 RX ORDER — AMLODIPINE BESYLATE 10 MG/1
10 TABLET ORAL DAILY
Qty: 30 TABLET | Refills: 3 | Status: SHIPPED | OUTPATIENT
Start: 2019-06-01 | End: 2019-06-12

## 2019-05-31 RX ORDER — ONDANSETRON 4 MG/1
4 TABLET, FILM COATED ORAL DAILY PRN
Qty: 30 TABLET | Refills: 0 | Status: SHIPPED | OUTPATIENT
Start: 2019-05-31 | End: 2019-06-04

## 2019-05-31 RX ORDER — VALACYCLOVIR HYDROCHLORIDE 1 G/1
1000 TABLET, FILM COATED ORAL 3 TIMES DAILY
Qty: 15 TABLET | Refills: 0 | Status: SHIPPED | OUTPATIENT
Start: 2019-05-31 | End: 2019-06-04

## 2019-05-31 RX ORDER — HYDRALAZINE HYDROCHLORIDE 25 MG/1
25 TABLET, FILM COATED ORAL EVERY 8 HOURS PRN
Qty: 90 TABLET | Refills: 0 | Status: SHIPPED | OUTPATIENT
Start: 2019-05-31 | End: 2019-06-12

## 2019-05-31 RX ORDER — TRAMADOL HYDROCHLORIDE 50 MG/1
50 TABLET ORAL ONCE
Status: COMPLETED | OUTPATIENT
Start: 2019-05-31 | End: 2019-05-31

## 2019-05-31 RX ADMIN — INSULIN LISPRO 1 UNITS: 100 INJECTION, SOLUTION INTRAVENOUS; SUBCUTANEOUS at 08:02

## 2019-05-31 RX ADMIN — ACYCLOVIR 800 MG: 800 TABLET ORAL at 06:23

## 2019-05-31 RX ADMIN — AMLODIPINE BESYLATE 10 MG: 10 TABLET ORAL at 08:01

## 2019-05-31 RX ADMIN — MULTIPLE VITAMINS W/ MINERALS TAB 1 TABLET: TAB at 08:01

## 2019-05-31 RX ADMIN — ACYCLOVIR 800 MG: 800 TABLET ORAL at 00:08

## 2019-05-31 RX ADMIN — METOPROLOL TARTRATE 25 MG: 25 TABLET, FILM COATED ORAL at 08:01

## 2019-05-31 RX ADMIN — SODIUM CHLORIDE, PRESERVATIVE FREE 10 ML: 5 INJECTION INTRAVENOUS at 08:01

## 2019-05-31 RX ADMIN — ONDANSETRON 4 MG: 2 INJECTION INTRAMUSCULAR; INTRAVENOUS at 08:01

## 2019-05-31 RX ADMIN — DIGOXIN 250 MCG: 125 TABLET ORAL at 08:02

## 2019-05-31 RX ADMIN — HYDRALAZINE HYDROCHLORIDE 25 MG: 25 TABLET, FILM COATED ORAL at 02:53

## 2019-05-31 RX ADMIN — TRAMADOL HYDROCHLORIDE 50 MG: 50 TABLET, FILM COATED ORAL at 02:46

## 2019-05-31 RX ADMIN — LEVOTHYROXINE SODIUM 50 MCG: 50 TABLET ORAL at 06:23

## 2019-05-31 RX ADMIN — PYRIDOSTIGMINE BROMIDE 60 MG: 60 TABLET ORAL at 06:23

## 2019-05-31 RX ADMIN — ENOXAPARIN SODIUM 40 MG: 40 INJECTION SUBCUTANEOUS at 08:01

## 2019-05-31 ASSESSMENT — PAIN SCALES - GENERAL
PAINLEVEL_OUTOF10: 3
PAINLEVEL_OUTOF10: 6

## 2019-05-31 ASSESSMENT — PAIN DESCRIPTION - PROGRESSION
CLINICAL_PROGRESSION: NOT CHANGED
CLINICAL_PROGRESSION: NOT CHANGED

## 2019-05-31 ASSESSMENT — PAIN DESCRIPTION - PAIN TYPE
TYPE: CHRONIC PAIN
TYPE: CHRONIC PAIN

## 2019-05-31 ASSESSMENT — PAIN DESCRIPTION - ORIENTATION: ORIENTATION: LEFT

## 2019-05-31 ASSESSMENT — PAIN DESCRIPTION - DESCRIPTORS: DESCRIPTORS: ACHING

## 2019-05-31 ASSESSMENT — PAIN DESCRIPTION - LOCATION
LOCATION: BACK
LOCATION: BACK

## 2019-05-31 ASSESSMENT — PAIN DESCRIPTION - ONSET: ONSET: ON-GOING

## 2019-05-31 ASSESSMENT — PAIN DESCRIPTION - FREQUENCY: FREQUENCY: INTERMITTENT

## 2019-05-31 NOTE — DISCHARGE SUMMARY
present. HEME/LYMPH No petechiae or ecchymoses. MSK Spontaneous movement of BL upper extremities  SKIN Normal coloration, warm, dry. NEURO Cranial nerves appear grossly intact  PSYCH Awake, alert.     BMP/CBC  Recent Labs     05/29/19  1006 05/30/19  0442    136   K 4.1 4.4    100   CO2 23 23   BUN 18 22   CREATININE 0.9 1.0   WBC 8.0 8.4   HCT 36.4* 36.5*    160     SIGNIFICANT IMAGING AND LABS:      Discharge Time of 31minutes    Electronically signed by Marylin Castañeda MD on 5/31/2019 at 11:58 AM

## 2019-05-31 NOTE — PROGRESS NOTES
cardiopulmonary findings. Xr Abdomen (kub) (single Ap View)    Result Date: 5/29/2019  EXAMINATION: ONE SUPINE XRAY VIEW(S) OF THE ABDOMEN 5/29/2019 8:22 pm COMPARISON: 12/19/2016. HISTORY: ORDERING SYSTEM PROVIDED HISTORY: nausea/vomiting TECHNOLOGIST PROVIDED HISTORY: Reason for exam:->nausea/vomiting Ordering Physician Provided Reason for Exam: nausea/vomiting Acuity: Unknown Type of Exam: Subsequent/Follow-up Additional signs and symptoms: nausea/vomiting Relevant Medical/Surgical History: nausea/vomiting FINDINGS: Nonspecific nonobstructive bowel gas pattern. No free intraperitoneal air. Mild-to-moderate teen stool throughout the colon. Vascular calcifications noted. Surgical clips within the central abdomen. No definite renal calculi. Nonspecific nonobstructive bowel gas pattern. Mild-to-moderate retained stool throughout the colon     Ct Head Wo Contrast    Result Date: 5/29/2019  EXAMINATION: CT OF THE HEAD WITHOUT CONTRAST  5/28/2019 10:11 pm TECHNIQUE: CT of the head was performed without the administration of intravenous contrast. Dose modulation, iterative reconstruction, and/or weight based adjustment of the mA/kV was utilized to reduce the radiation dose to as low as reasonably achievable. COMPARISON: None. HISTORY: ORDERING SYSTEM PROVIDED HISTORY: SYNCOPE/FAINTING TECHNOLOGIST PROVIDED HISTORY: Has a \"code stroke\" or \"stroke alert\" been called? ->No Ordering Physician Provided Reason for Exam: Syncope/fainting Acuity: Acute Type of Exam: Initial Additional signs and symptoms: no Relevant Medical/Surgical History: none FINDINGS: BRAIN/VENTRICLES: There is no acute intracranial hemorrhage, mass effect or midline shift. No abnormal extra-axial fluid collection. The gray-white differentiation is maintained without evidence of an acute infarct. There is no evidence of hydrocephalus. Atherosclerosis of the intracranial vasculature is noted.  ORBITS: The visualized portion of the orbits demonstrate no acute abnormality. SINUSES: Complete opacification of the left maxillary sinus. Scattered opacification of the remaining paranasal sinuses. The mastoid air cells are clear. SOFT TISSUES/SKULL:  No acute abnormality of the visualized skull or soft tissues. 1. No acute intracranial abnormality. 2. Atherosclerosis. 3. Scattered sinusitis. Us Head Neck Soft Tissue Thyroid    Result Date: 5/29/2019  EXAMINATION: THYROID ULTRASOUND 5/29/2019 COMPARISON: None. HISTORY: ORDERING SYSTEM PROVIDED HISTORY: THYROID DISEASE TECHNOLOGIST PROVIDED HISTORY: Ordering Physician Provided Reason for Exam: abnormal  labs, partial thyroidectomy 1999 Acuity: Acute FINDINGS: Cervical lymphadenopathy: No abnormal lymph nodes in the imaged portions of the neck. Right thyroid lobe:  4.8 x 2.4 x 1.5 cm Left thyroid lobe:  Absent Isthmus:  5 mm thickness Thyroid Gland:  Thyroid gland demonstrates heterogeneous, multinodular echotexture and normal appearing vascularity. Nodules: No completely solid thyroid nodules are present. Multifocal colloid cysts are seen. NODULE: Right 1 Size: 1 x 1.2 x 1.2 cm Location: Inferior right thyroid lobe 1. Composition:  Mixed cystic and solid (1) 2. Echogenicity:  Isoechoic (1)-cyst with nodule 3. Shape: Wider-than-tall (0) 4. Margins:  Smooth (0) 5. Echogenic foci:  None (0) ACR TI-RADS total points:  2 ACR TI-RADS risk category: TR2     Left thyroid lobectomy. Mild multinodular goiter. Benign colloid cysts as well as benign cyst with nodule (NODULE Right 1: ACR TI-RADS TR2: Recommend:  No follow-up. *) ______________________________________________________________________________ ____ Rhunette Marlyn TI-RADS recommendations: TR5 (>= 7 points):  FNA if >= 1 cm; follow-up if 0.5-0.9 cm in 1, 2, 3, 4, and 5 years TR4 (4-6 points):  FNA if >= 1.5 cm; follow-up if 1.0-1.4 cm in 1, 2, 3, and 5 years TR3 (3 points):  FNA if >= 2.5 cm; follow-up if 1.5-2.4 cm in 1, 3, and 5 years TR2 (2 points):  No FNA or follow-up TR1 (0 points):  No FNA or follow-up ACR TI-RADS recommends that no more than two nodules with the highest ACR TI-RADS point total should be biopsied and no more than four nodules should be followed. Vl Dup Carotid Bilateral    Result Date: 5/29/2019  EXAMINATION: ULTRASOUND EVALUATION OF THE CAROTID ARTERIES 5/29/2019 COMPARISON: None. HISTORY: ORDERING SYSTEM PROVIDED HISTORY: syncope TECHNOLOGIST PROVIDED HISTORY: Reason for exam:->syncope Ordering Physician Provided Reason for Exam: syncope, dizziness, HTN, DM, palpitations FINDINGS: RIGHT: The right common carotid artery demonstrates peak systolic velocities of 003 and 119 cm/sec in the proximal and distal segments respectively. The right internal carotid artery demonstrates the systolic velocities of 85, 87, and 127 cm/sec in the proximal, mid and distal segments respectively. The external carotid artery is patent. The vertebral artery demonstrates normal antegrade flow. Atherosclerotic plaque is present at the bifurcation. ICA/CCA ratio of 1.2. LEFT: The left common carotid artery demonstrates peak systolic velocities of 508 and 99 cm/sec in the proximal and distal segments respectively. The left internal carotid artery demonstrates the systolic velocities of 66, 131, and 114 cm/sec in the proximal, mid and distal segments respectively. The external carotid artery is patent. The vertebral artery demonstrates normal antegrade flow. Atherosclerotic plaque is present at the bifurcation. ICA/CCA ratio of 1.1.     1. The right internal carotid artery demonstrates 50-69% stenosis by peak systolic velocity criteria. 2. The left internal carotid artery demonstrates 50-69% stenosis by peak systolic velocity criteria. 3. Bilateral vertebral arteries are patent with flow in the normal direction. 4. Atherosclerotic disease is present at the bilateral carotid bifurcation. RECOMMENDATIONS: Consider follow-up evaluation with CTA neck.      Mri Brain Wo Cody  Physician         MD                 Cardiologist         MD   Conclusions   Summary  Normal EF 64 % with normal ventricular contractility. No infarct or ischemia noted. Normal stress myocardial perfusion. This is a normal study. Signatures   ------------------------------------------------------------------  Electronically signed by Rufus Garcia MD (Interpreting  cardiologist) on 05/29/2019 at 14:30  ------------------------------------------------------------------  Procedure Procedure Type:   Nuclear Stress Test:Pharmacological, Myocardial Perfusion Imaging with  Pharm, NM MYOCARDIAL SPECT REST EXERCISE OR RX  Indications: Syncope and abnormal rest ECG. Risk Factors   The patient risk factors include:hypercholesterolemia, hypertension,  diabetes mellitus, chronic lung disease and prior heart failure . Stress Protocols   Resting ECG  Normal sinus rhythm. Resting HR:70 bpm  Resting BP:184/86 mmHg  Stress Protocol:Pharmacologic - Lexiscan  Peak HR:85 bpm                               HR/BP product:[id number]  Peak BP:215/90 mmHg  Predicted HR: 134 bpm  % of predicted HR: 63   Exercise duration: 01:06 min  Reason for termination:Completed   ECG Findings  Normal sinus rhythm. Arrhythmias  No rhythm abnormality. Symptoms  Nausea. The patient was given an intravenous injection of Aminophylline to relieve  symptoms from [place]. Complications  Procedure complication was none. Stress Interpretation  ECG portion of stress test is negative for ischemia by diagnostic criteria. Procedure Medications   - Lexiscan I.V. 0.4 mg admininstered @ 05/29/2019 11:05.   - Aminophylline I.V. bolus (over 15sec.) 75 mg admininstered @ 05/29/2019     11:08.   Imaging Protocols   Rest                             Stress   Isotope:Sestamibi 99mTc          Isotope: Sestamibi 99mTc  Isotope dose:10.6 mCi            Isotope dose:32.5 mCi  Administration route: I.V. 3. Review the ultrasound results has a nodule on the right lobe  4. Increases Synthroid dose to 50 µg  5. Has cardiac studies done including a stress test.  Negative   6. Possible home today  7. Will follow     .      Tyrone Montes MD

## 2019-06-02 ENCOUNTER — APPOINTMENT (OUTPATIENT)
Dept: CT IMAGING | Age: 84
End: 2019-06-02
Payer: MEDICARE

## 2019-06-02 ENCOUNTER — HOSPITAL ENCOUNTER (EMERGENCY)
Age: 84
Discharge: HOME | End: 2019-06-02
Attending: EMERGENCY MEDICINE
Payer: MEDICARE

## 2019-06-02 VITALS
HEIGHT: 66 IN | OXYGEN SATURATION: 98 % | DIASTOLIC BLOOD PRESSURE: 68 MMHG | SYSTOLIC BLOOD PRESSURE: 163 MMHG | WEIGHT: 129 LBS | HEART RATE: 81 BPM | BODY MASS INDEX: 20.73 KG/M2 | TEMPERATURE: 97.4 F | RESPIRATION RATE: 16 BRPM

## 2019-06-02 DIAGNOSIS — R10.9 ABDOMINAL PAIN, UNSPECIFIED ABDOMINAL LOCATION: Primary | ICD-10-CM

## 2019-06-02 LAB
ALBUMIN SERPL-MCNC: 4 GM/DL (ref 3.4–5)
ALP BLD-CCNC: 103 IU/L (ref 40–129)
ALT SERPL-CCNC: 31 U/L (ref 10–40)
ANION GAP SERPL CALCULATED.3IONS-SCNC: 10 MMOL/L (ref 4–16)
AST SERPL-CCNC: 26 IU/L (ref 15–37)
BACTERIA: NEGATIVE /HPF
BASOPHILS ABSOLUTE: 0 K/CU MM
BASOPHILS RELATIVE PERCENT: 0.3 % (ref 0–1)
BILIRUB SERPL-MCNC: 0.5 MG/DL (ref 0–1)
BILIRUBIN URINE: NEGATIVE MG/DL
BLOOD, URINE: NEGATIVE
BUN BLDV-MCNC: 15 MG/DL (ref 6–23)
CALCIUM SERPL-MCNC: 9.9 MG/DL (ref 8.3–10.6)
CHLORIDE BLD-SCNC: 95 MMOL/L (ref 99–110)
CLARITY: CLEAR
CO2: 28 MMOL/L (ref 21–32)
COLOR: YELLOW
CREAT SERPL-MCNC: 1 MG/DL (ref 0.6–1.1)
DIFFERENTIAL TYPE: ABNORMAL
EOSINOPHILS ABSOLUTE: 0.2 K/CU MM
EOSINOPHILS RELATIVE PERCENT: 1.7 % (ref 0–3)
GFR AFRICAN AMERICAN: >60 ML/MIN/1.73M2
GFR NON-AFRICAN AMERICAN: 53 ML/MIN/1.73M2
GLUCOSE BLD-MCNC: 203 MG/DL (ref 70–99)
GLUCOSE, URINE: NEGATIVE MG/DL
HCT VFR BLD CALC: 39.4 % (ref 37–47)
HEMOGLOBIN: 12.7 GM/DL (ref 12.5–16)
IMMATURE NEUTROPHIL %: 0.5 % (ref 0–0.43)
KETONES, URINE: NEGATIVE MG/DL
LACTATE: 1.3 MMOL/L (ref 0.4–2)
LEUKOCYTE ESTERASE, URINE: NEGATIVE
LIPASE: 14 IU/L (ref 13–60)
LYMPHOCYTES ABSOLUTE: 1.8 K/CU MM
LYMPHOCYTES RELATIVE PERCENT: 15.7 % (ref 24–44)
MCH RBC QN AUTO: 29.3 PG (ref 27–31)
MCHC RBC AUTO-ENTMCNC: 32.2 % (ref 32–36)
MCV RBC AUTO: 91 FL (ref 78–100)
MONOCYTES ABSOLUTE: 0.8 K/CU MM
MONOCYTES RELATIVE PERCENT: 6.6 % (ref 0–4)
NITRITE URINE, QUANTITATIVE: NEGATIVE
NUCLEATED RBC %: 0 %
PDW BLD-RTO: 13.8 % (ref 11.7–14.9)
PH, URINE: 7 (ref 5–8)
PLATELET # BLD: 175 K/CU MM (ref 140–440)
PMV BLD AUTO: 8.7 FL (ref 7.5–11.1)
POTASSIUM SERPL-SCNC: 4.3 MMOL/L (ref 3.5–5.1)
PROTEIN UA: 30 MG/DL
RBC # BLD: 4.33 M/CU MM (ref 4.2–5.4)
RBC URINE: <1 /HPF (ref 0–6)
SEGMENTED NEUTROPHILS ABSOLUTE COUNT: 8.6 K/CU MM
SEGMENTED NEUTROPHILS RELATIVE PERCENT: 75.2 % (ref 36–66)
SODIUM BLD-SCNC: 133 MMOL/L (ref 135–145)
SPECIFIC GRAVITY UA: 1.01 (ref 1–1.03)
SQUAMOUS EPITHELIAL: <1 /HPF
TOTAL IMMATURE NEUTOROPHIL: 0.06 K/CU MM
TOTAL NUCLEATED RBC: 0 K/CU MM
TOTAL PROTEIN: 7.2 GM/DL (ref 6.4–8.2)
TRICHOMONAS: ABNORMAL /HPF
UROBILINOGEN, URINE: NORMAL MG/DL (ref 0.2–1)
WBC # BLD: 11.5 K/CU MM (ref 4–10.5)
WBC UA: 2 /HPF (ref 0–5)

## 2019-06-02 PROCEDURE — 85025 COMPLETE CBC W/AUTO DIFF WBC: CPT

## 2019-06-02 PROCEDURE — 74177 CT ABD & PELVIS W/CONTRAST: CPT

## 2019-06-02 PROCEDURE — 96375 TX/PRO/DX INJ NEW DRUG ADDON: CPT

## 2019-06-02 PROCEDURE — 83605 ASSAY OF LACTIC ACID: CPT

## 2019-06-02 PROCEDURE — 6360000002 HC RX W HCPCS: Performed by: PHYSICIAN ASSISTANT

## 2019-06-02 PROCEDURE — 36415 COLL VENOUS BLD VENIPUNCTURE: CPT

## 2019-06-02 PROCEDURE — 81001 URINALYSIS AUTO W/SCOPE: CPT

## 2019-06-02 PROCEDURE — 96376 TX/PRO/DX INJ SAME DRUG ADON: CPT

## 2019-06-02 PROCEDURE — 2580000003 HC RX 258: Performed by: PHYSICIAN ASSISTANT

## 2019-06-02 PROCEDURE — 83690 ASSAY OF LIPASE: CPT

## 2019-06-02 PROCEDURE — 6360000004 HC RX CONTRAST MEDICATION: Performed by: PHYSICIAN ASSISTANT

## 2019-06-02 PROCEDURE — 99284 EMERGENCY DEPT VISIT MOD MDM: CPT

## 2019-06-02 PROCEDURE — 96374 THER/PROPH/DIAG INJ IV PUSH: CPT

## 2019-06-02 PROCEDURE — 80053 COMPREHEN METABOLIC PANEL: CPT

## 2019-06-02 PROCEDURE — 6370000000 HC RX 637 (ALT 250 FOR IP): Performed by: PHYSICIAN ASSISTANT

## 2019-06-02 RX ORDER — ONDANSETRON 2 MG/ML
4 INJECTION INTRAMUSCULAR; INTRAVENOUS ONCE
Status: COMPLETED | OUTPATIENT
Start: 2019-06-02 | End: 2019-06-02

## 2019-06-02 RX ORDER — HYDRALAZINE HYDROCHLORIDE 25 MG/1
25 TABLET, FILM COATED ORAL ONCE
Status: COMPLETED | OUTPATIENT
Start: 2019-06-02 | End: 2019-06-02

## 2019-06-02 RX ORDER — METOPROLOL TARTRATE 50 MG/1
25 TABLET, FILM COATED ORAL ONCE
Status: DISCONTINUED | OUTPATIENT
Start: 2019-06-02 | End: 2019-06-02 | Stop reason: HOSPADM

## 2019-06-02 RX ORDER — HYDROCODONE BITARTRATE AND ACETAMINOPHEN 5; 325 MG/1; MG/1
1 TABLET ORAL EVERY 4 HOURS PRN
Qty: 10 TABLET | Refills: 0 | Status: SHIPPED | OUTPATIENT
Start: 2019-06-02 | End: 2019-06-05

## 2019-06-02 RX ORDER — 0.9 % SODIUM CHLORIDE 0.9 %
10 VIAL (ML) INJECTION
Status: COMPLETED | OUTPATIENT
Start: 2019-06-02 | End: 2019-06-02

## 2019-06-02 RX ORDER — MORPHINE SULFATE 4 MG/ML
2 INJECTION, SOLUTION INTRAMUSCULAR; INTRAVENOUS
Status: DISCONTINUED | OUTPATIENT
Start: 2019-06-02 | End: 2019-06-02 | Stop reason: HOSPADM

## 2019-06-02 RX ORDER — AMLODIPINE BESYLATE 5 MG/1
10 TABLET ORAL ONCE
Status: DISCONTINUED | OUTPATIENT
Start: 2019-06-02 | End: 2019-06-02 | Stop reason: HOSPADM

## 2019-06-02 RX ADMIN — IOPAMIDOL 75 ML: 755 INJECTION, SOLUTION INTRAVENOUS at 07:49

## 2019-06-02 RX ADMIN — ONDANSETRON 4 MG: 2 INJECTION INTRAMUSCULAR; INTRAVENOUS at 08:31

## 2019-06-02 RX ADMIN — ONDANSETRON 4 MG: 2 INJECTION INTRAMUSCULAR; INTRAVENOUS at 06:56

## 2019-06-02 RX ADMIN — MORPHINE SULFATE 2 MG: 4 INJECTION INTRAVENOUS at 08:28

## 2019-06-02 RX ADMIN — HYDRALAZINE HYDROCHLORIDE 25 MG: 25 TABLET, FILM COATED ORAL at 06:56

## 2019-06-02 RX ADMIN — SODIUM CHLORIDE, PRESERVATIVE FREE 10 ML: 5 INJECTION INTRAVENOUS at 07:49

## 2019-06-02 ASSESSMENT — PAIN SCALES - GENERAL
PAINLEVEL_OUTOF10: 8
PAINLEVEL_OUTOF10: 10

## 2019-06-02 ASSESSMENT — PAIN DESCRIPTION - ORIENTATION: ORIENTATION: LEFT

## 2019-06-02 ASSESSMENT — PAIN DESCRIPTION - LOCATION: LOCATION: FLANK

## 2019-06-02 ASSESSMENT — PAIN DESCRIPTION - PAIN TYPE: TYPE: ACUTE PAIN

## 2019-06-02 NOTE — ED PROVIDER NOTES
advanced practice provider's documentation. Comment: Please note this report has been produced using speech recognition software and may contain errors related to that system including errors in grammar, punctuation, and spelling, as well as words and phrases that may be inappropriate. If there are any questions or concerns please feel free to contact the dictating provider for clarification.         Alvin Guerrero MD  06/02/19 1737

## 2019-06-02 NOTE — ED NOTES
Discharge instructions reviewed with patient. Medications and follow up were discussed.  Patient denies further questions and verbalizes understanding     Adonay RN  06/02/19 8751

## 2019-06-02 NOTE — ED PROVIDER NOTES
eMERGENCY dEPARTMENT eNCOUnter      PCP: Kendell Keys MD    [facility]    Chief Complaint   Patient presents with    Flank Pain     left       HPI    Jana Ibarra is a 80 y.o. female who presents with abdominal pain. Onset - at least several days, worse since last night  Location - left lower quadrant  Duration - since onset  Character - achy, painful  Aggravating/Alleviating factors - none known/none known  Associate symptoms - nausea  Radiation - none  Severity - 10/10      REVIEW OF SYSTEMS    Constitutional:  Denies fever, chills, weight loss or weakness   HENT:  Denies sore throat or ear pain   Cardiovascular:  Denies chest pain, palpitations or swelling   Respiratory:  Denies cough or shortness of breath   GI:  See HPI above  : No hematuria or dysuria. No vaginal symptoms. Musculoskeletal:  Denies back pain or groin pain or masses. No pain or swelling of extremities. Skin:  Denies rash  Neurologic:  Denies headache, focal weakness or sensory changes   Endocrine:  Denies polyuria or polydypsia   Lymphatic:  Denies swollen glands     All other review of systems are negative  See HPI and nursing notes for additional information     PAST MEDICAL & SURGICAL HISTORY    Past Medical History:   Diagnosis Date    Arrhythmia     Arthritis     osteoarthritis cervical and lumbar spine    Atrophic vaginitis     Blood transfusion     CAD (coronary artery disease)     Cataracts, bilateral     CHF (congestive heart failure) (HCC)     COPD (chronic obstructive pulmonary disease) ([facility] [address]     Depression     Essential hypertension     Fatty liver disease, nonalcoholic     GERD (gastroesophageal reflux disease)     H/O 24 hour EKG monitoring 9/15/2000    9/15/2000 -  Predominant rhythm is a sinus rhythm. No significant ectopy noted.     H/O cardiac catheterization 8/4/2005 8/4/2005 - Moderate degree of stenosis of the high diag, which is a heavily calcified vessel, however, there is a large Outpatient Rx   Medication Sig Dispense Refill    valACYclovir (VALTREX) 1 g tablet Take 1 tablet by mouth 3 times daily for 5 days 15 tablet 0    amLODIPine (NORVASC) 10 MG tablet Take 1 tablet by mouth daily 30 tablet 3    hydrALAZINE (APRESOLINE) 25 MG tablet Take 1 tablet by mouth every 8 hours as needed (for SBP>160) 90 tablet 0    levothyroxine (SYNTHROID) 50 MCG tablet Take 1 tablet by mouth Daily 30 tablet 3    metoprolol tartrate (LOPRESSOR) 25 MG tablet Take 1 tablet by mouth 2 times daily 60 tablet 3    ondansetron (ZOFRAN) 4 MG tablet Take 1 tablet by mouth daily as needed for Nausea or Vomiting 30 tablet 0    conjugated estrogens (PREMARIN) 0.625 MG/GM vaginal cream Place vaginally Twice a Week      meloxicam (MOBIC) 7.5 MG tablet Take 7.5 mg by mouth daily      desoximetasone (TOPICORT) 0.25 % cream Apply topically 2 times daily Apply topically 2 times daily.  digoxin (LANOXIN) 0.25 MG tablet Take 250 mcg by mouth daily.  therapeutic multivitamin-minerals (THERAGRAN-M) tablet Take 1 tablet by mouth daily. ALLERGIES    Allergies   Allergen Reactions    Sulfa Antibiotics Anaphylaxis    Nitrofuran Derivatives     Quinapril Hcl      Cough       SOCIAL AND FAMILY HISTORY    Social History     Socioeconomic History    Marital status:      Spouse name: None    Number of children: None    Years of education: None    Highest education level: None   Occupational History    Occupation: Retired   Social Needs    Financial resource strain: None    Food insecurity:     Worry: None     Inability: None    Transportation needs:     Medical: None     Non-medical: None   Tobacco Use    Smoking status: Never Smoker    Smokeless tobacco: Never Used    Tobacco comment: reviewed 7/20/15   Substance and Sexual Activity    Alcohol use: Yes     Comment: Rare alcohol use.        CAFFEINE: 1 cup coffee daily    Drug use: No    Sexual activity: Yes     Partners: Male Comment:    Lifestyle    Physical activity:     Days per week: None     Minutes per session: None    Stress: None   Relationships    Social connections:     Talks on phone: None     Gets together: None     Attends Druze service: None     Active member of club or organization: None     Attends meetings of clubs or organizations: None     Relationship status: None    Intimate partner violence:     Fear of current or ex partner: None     Emotionally abused: None     Physically abused: None     Forced sexual activity: None   Other Topics Concern    None   Social History Narrative    None     Family History   Problem Relation Age of Onset    Heart Failure Mother         CHF    Hypertension Mother     Cancer Father         Pancreatic    Hypertension Father     Heart Disease Sister         CMP    Cancer Brother         Bone    Other Brother         aneurysm    Coronary Art Dis Brother     Heart Attack Brother     Other Sister         Bone problems    Other Sister         infection    Cancer Sister         Ovarian    Cancer Son     Early Death Son         MVA    Coronary Art Dis Daughter     Hypertension Daughter        PHYSICAL EXAM    VITAL SIGNS: BP (!) 163/68   Pulse 81   Temp 97.4 °F (36.3 °C)   Resp 16   Ht 5' 6\" (1.676 m)   Wt 129 lb (58.5 kg)   SpO2 98%   BMI 20.82 kg/m²    Constitutional:  Well developed, well nourished. No distress  Eyes:  Sclera nonicteric, conjunctiva moist  HENT:  Atraumatic. PERRL. EOMI.  moist mucus membranes. Neck/Lymphatics: supple, no JVD, no swollen nodes  Respiratory:  No retractions, no accessory muscle use, normal breath sounds   Cardiovascular:   normal rate, normal rhythm, no murmurs    GI:    No gross discoloration.       -no Jac's sign (periumbilical ecchymosis)       -no Grey-Heredia's sign (flank ecchymosis)      Bowel sounds present, no audible bruits.  Soft,  No distention, no guarding, no rigidity,   + Mild left lower quadrant abdominal tenderness, no rebound, no palpable pulsatile masses,   No McBurney's point tenderness   Negative Rovsing sign    Negative Puckett's sign. Back:   No CVA tenderness to percussion. Musculoskeletal:  No edema, no deformity  Vascular: There is no discernible palpable discrepancy of radial pulses bilaterally or between radial pulses & femoral pulses.   Integument: No rash, dry skin  Neurologic:  Alert & oriented, normal speech  Psychiatric: Cooperative, pleasant affect       LABS:  Results for orders placed or performed during the hospital encounter of 06/02/19   CBC Auto Differential   Result Value Ref Range    WBC 11.5 (H) 4.0 - 10.5 K/CU MM    RBC 4.33 4.2 - 5.4 M/CU MM    Hemoglobin 12.7 12.5 - 16.0 GM/DL    Hematocrit 39.4 37 - 47 %    MCV 91.0 78 - 100 FL    MCH 29.3 27 - 31 PG    MCHC 32.2 32.0 - 36.0 %    RDW 13.8 11.7 - 14.9 %    Platelets 613 310 - 122 K/CU MM    MPV 8.7 7.5 - 11.1 FL    Differential Type AUTOMATED DIFFERENTIAL     Segs Relative 75.2 (H) 36 - 66 %    Lymphocytes % 15.7 (L) 24 - 44 %    Monocytes % 6.6 (H) 0 - 4 %    Eosinophils % 1.7 0 - 3 %    Basophils % 0.3 0 - 1 %    Segs Absolute 8.6 K/CU MM    Lymphocytes # 1.8 K/CU MM    Monocytes # 0.8 K/CU MM    Eosinophils # 0.2 K/CU MM    Basophils # 0.0 K/CU MM    Nucleated RBC % 0.0 %    Total Nucleated RBC 0.0 K/CU MM    Total Immature Neutrophil 0.06 K/CU MM    Immature Neutrophil % 0.5 (H) 0 - 0.43 %   Comprehensive Metabolic Panel   Result Value Ref Range    Sodium 133 (L) 135 - 145 MMOL/L    Potassium 4.3 3.5 - 5.1 MMOL/L    Chloride 95 (L) 99 - 110 mMol/L    CO2 28 21 - 32 MMOL/L    BUN 15 6 - 23 MG/DL    CREATININE 1.0 0.6 - 1.1 MG/DL    Glucose 203 (H) 70 - 99 MG/DL    Calcium 9.9 8.3 - 10.6 MG/DL    Alb 4.0 3.4 - 5.0 GM/DL    Total Protein 7.2 6.4 - 8.2 GM/DL    Total Bilirubin 0.5 0.0 - 1.0 MG/DL    ALT 31 10 - 40 U/L    AST 26 15 - 37 IU/L    Alkaline Phosphatase 103 40 - 129 IU/L    GFR Non- 53 (L) >60 mL/min/1.73m2    GFR African American >60 >60 mL/min/1.73m2    Anion Gap 10 4 - 16   Urinalysis   Result Value Ref Range    Color, UA YELLOW YELLOW    Clarity, UA CLEAR CLEAR    Glucose, Urine NEGATIVE NEGATIVE MG/DL    Bilirubin Urine NEGATIVE NEGATIVE MG/DL    Ketones, Urine NEGATIVE NEGATIVE MG/DL    Specific Gravity, UA 1.009 1.001 - 1.035    Blood, Urine NEGATIVE NEGATIVE    pH, Urine 7.0 5.0 - 8.0    Protein, UA 30 (A) NEGATIVE MG/DL    Urobilinogen, Urine NORMAL 0.2 - 1.0 MG/DL    Nitrite Urine, Quantitative NEGATIVE NEGATIVE    Leukocyte Esterase, Urine NEGATIVE NEGATIVE    RBC, UA <1 0 - 6 /HPF    WBC, UA 2 0 - 5 /HPF    Bacteria, UA NEGATIVE NEGATIVE /HPF    Squam Epithel, UA <1 /HPF    Trichomonas, UA NONE SEEN NONE SEEN /HPF   Lipase   Result Value Ref Range    Lipase 14 13 - 60 IU/L   Lactic Acid, Plasma   Result Value Ref Range    Lactate 1.3 0.4 - 2.0 mMOL/L           RADIOLOGY/PROCEDURES    Ct Abdomen Pelvis W Iv Contrast    Result Date: 6/2/2019  EXAMINATION: CT OF THE ABDOMEN AND PELVIS WITH CONTRAST 6/2/2019 7:44 am TECHNIQUE: CT of the abdomen and pelvis was performed with the administration of intravenous contrast. Multiplanar reformatted images are provided for review. Dose modulation, iterative reconstruction, and/or weight based adjustment of the mA/kV was utilized to reduce the radiation dose to as low as reasonably achievable. COMPARISON: 05/30/2019 CT HISTORY: ORDERING SYSTEM PROVIDED HISTORY: abd pain TECHNOLOGIST PROVIDED HISTORY: Ordering Physician Provided Reason for Exam: abd pain Acuity: Acute Type of Exam: Initial Additional signs and symptoms: LLQ pain, nausea Relevant Medical/Surgical History: Hx Hyster, [place], Appy, Colectomy, Pancreas surg / 75cc [id number] FINDINGS: Lower Chest:  The lung bases are clear. The base of the heart is normal. Organs: Decreased attenuation in the liver evidence of underlying steatosis. The gallbladder is surgically absent.   Stable pneumobilia corresponding to prior procedure. Prior surgical change of Whipple procedure. No inflammation or mass at the operative site. The pancreatic tail is normal. There are calcified granulomata in the spleen. The adrenal glands and right kidney are normal.  Stable 6.0 cm left renal cyst. GI/Bowel: There are multiple loops of nondistended small bowel that are fluid-filled and several with air-fluid levels. No obvious mucosal abnormalities. Nonvisualized appendix with no inflammation at the level of the cecum. The colon is normal. Pelvis: The bladder is unremarkable. The uterus is absent. Peritoneum/Retroperitoneum: The aorta tapers normally. Stable shotty lymph nodes in the right upper quadrant at the prior operative site measuring up to 10 mm. Bones/Soft Tissues: No significant skeletal abnormalities. 1. Stable appearance of the abdomen and pelvis from recent prior CT. No acute abnormality is detected. 2. The patient has undergone prior Whipple procedure. Stable findings of pneumobilia and mesenteric lymph node enlargement in the right upper quadrant. ED COURSE & MEDICAL DECISION MAKING        Exact cause of patient's symptoms unknown. In discussion with , pain has been present for at least one week, worse over the past several days. No acute abdomen findings on exam today and CT abdomen with IV contrast does not reveal acute abnormality. Patient clinically nontoxic-appearing, well-hydrated, afebrile. Patient states she would like to go home. I'm agreeable to this with outpatient follow-up with PCP. Patient agrees to return immediately to the emergency department if symptoms return, change in nature, any new symptoms occur. Vital signs and nursing notes reviewed during ED course. Patient care and presentation staffed with supervising MD.   Patient seen by supervising MD today- see his/her note for details of the encounter. Clinical  IMPRESSION    1.  Abdominal pain, unspecified abdominal location              Comment: Please note this report has been produced using speech recognition software and may contain errors related to that system including errors in grammar, punctuation, and spelling, as well as words and phrases that may be inappropriate. If there are any questions or concerns please feel free to contact the dictating provider for clarification.         Radha Landisma  06/02/19 [id number]

## 2019-06-02 NOTE — ED NOTES
Patient requests pain medication, Sahara ABBOTT aware.       Dalal Antoinette, [place]  06/02/19 [id number]

## 2019-06-02 NOTE — ED NOTES
Patient medicated for pain and nausea.  Patient moved back into bed from chair for comfort, denies any other needs     Alber Albert RN  06/02/19 [id number]

## 2019-06-02 NOTE — CARE COORDINATION
CM review of pt chart for readmission risk, pt last admission Obs. 5/28-31/19 for near syncope, abd pain, HTN. To returns to ER today with same abd pain and HTN. To treated in ER with fluids and medication, s/s have resolve HTN improved. POC for this pt is to discharge home with O/P follow up, pt agreeable to POC.  PANCHO,RN/CM

## 2019-06-02 NOTE — ED NOTES
Patient back from [facility], [address] [place] [address]  06/02/19 [id number]

## 2019-06-04 ENCOUNTER — OFFICE VISIT (OUTPATIENT)
Dept: FAMILY MEDICINE CLINIC | Age: 84
End: 2019-06-04
Payer: MEDICARE

## 2019-06-04 VITALS
BODY MASS INDEX: 21.19 KG/M2 | DIASTOLIC BLOOD PRESSURE: 80 MMHG | HEART RATE: 77 BPM | WEIGHT: 124.1 LBS | OXYGEN SATURATION: 95 % | SYSTOLIC BLOOD PRESSURE: 128 MMHG | HEIGHT: 64 IN

## 2019-06-04 DIAGNOSIS — R91.8 PULMONARY NODULES: ICD-10-CM

## 2019-06-04 DIAGNOSIS — I10 ESSENTIAL HYPERTENSION: Primary | ICD-10-CM

## 2019-06-04 DIAGNOSIS — I65.23 CAROTID ARTERY STENOSIS, ASYMPTOMATIC, BILATERAL: ICD-10-CM

## 2019-06-04 DIAGNOSIS — E04.9 GOITER: ICD-10-CM

## 2019-06-04 DIAGNOSIS — E78.2 MIXED HYPERLIPIDEMIA: ICD-10-CM

## 2019-06-04 DIAGNOSIS — B02.9 HERPES ZOSTER WITHOUT COMPLICATION: ICD-10-CM

## 2019-06-04 DIAGNOSIS — E11.9 TYPE 2 DIABETES MELLITUS WITHOUT COMPLICATION, WITHOUT LONG-TERM CURRENT USE OF INSULIN (HCC): ICD-10-CM

## 2019-06-04 PROCEDURE — 99214 OFFICE O/P EST MOD 30 MIN: CPT | Performed by: PHYSICIAN ASSISTANT

## 2019-06-04 RX ORDER — HYDROCHLOROTHIAZIDE 12.5 MG/1
12.5 TABLET ORAL DAILY
COMMUNITY
End: 2019-06-04

## 2019-06-04 ASSESSMENT — PATIENT HEALTH QUESTIONNAIRE - PHQ9
SUM OF ALL RESPONSES TO PHQ QUESTIONS 1-9: 0
SUM OF ALL RESPONSES TO PHQ QUESTIONS 1-9: 0
SUM OF ALL RESPONSES TO PHQ9 QUESTIONS 1 & 2: 0
2. FEELING DOWN, DEPRESSED OR HOPELESS: 0
1. LITTLE INTEREST OR PLEASURE IN DOING THINGS: 0

## 2019-06-04 NOTE — PROGRESS NOTES
6/4/2019    Ghada All    Chief Complaint   Patient presents with    Follow-Up from Hospital     sx have improved per patient       HPI  History was obtained from the patient. Pt was in the hospital 5-28 to 5-31 with near syncope question arrhythmia. Patient's stress test was negative. Echo with preserved ejection fraction. They felt it may be related to related to hypertensive encephalopathy or orthostatic hypotension. They stopped her hydrochlorothiazide and she has documented intolerance to ace inhibitors so they started her on metoprolol and Norvasc with hydralazine when necessary for systolic blood pressure greater than 160. Patient's TSH was elevated she was started on levothyroxine seen by [facility] and she will follow up with him for this. She also had left-sided abdominal pain with nausea found have constipation per KUB CT with no acute changes. She also felt was found of left thigh herpes zoster. Patient states she is to follow-up in the next week or 2 with cardiology and she will call for this appointment and is also status post a following up with endocrinology. She will call the endocrinologist when she goes home today for the appointment. Patient states she is feeling better the pain for the shingles is improving. They did give her some pain medicine for this. Patient did return to the hospital in 6/2 for abdominal pain CT of the abdomen was negative. Patient states her abdomen does feel better.       REVIEW OF SYMPTOMS    Constitutional:  Denies fever, chills, weight loss or weakness  Eyes:  Denies photophobia or discharge  ENT:  Denies sore throat or ear pain  Cardiovascular:  Denies chest pain, palpitations or swelling  Respiratory:  Denies cough or shortness of breath  GI:  Denies abdominal pain, nausea, vomiting, or diarrhea  Musculoskeletal:  Denies back pain  Skin:  She is improving  Neurologic:  Denies headache, focal weakness, or sensory changes  Endocrine:  Denies polyuria or polydipsia  Lymphatic:  Denies swollen glands  Psychiatric:  Denies depression, suicidal ideation or homicidal ideation  All symptoms negative except as marked. PAST MEDICAL HISTORY  Past Medical History:   Diagnosis Date    Arrhythmia     Arthritis     osteoarthritis cervical and lumbar spine    Atrophic vaginitis     Blood transfusion     CAD (coronary artery disease)     Cataracts, bilateral     CHF (congestive heart failure) (HCC)     COPD (chronic obstructive pulmonary disease) (HCC)     Depression     Essential hypertension     Fatty liver disease, nonalcoholic     GERD (gastroesophageal reflux disease)     H/O 24 hour EKG monitoring 9/15/2000    9/15/2000 -  Predominant rhythm is a sinus rhythm. No significant ectopy noted.  H/O cardiac catheterization 8/4/2005 8/4/2005 - Moderate degree of stenosis of the high diag, which is a heavily calcified vessel, however, there is a large ramus vessel supplying this same area as well. Rest of vessel is w/o any significant disease. Renals are w/o any significant stenosis.  H/O cardiovascular stress test 12/2/2010, 2/28/2008 12/2/2010 - Normal pattern of perfusion in all regions. LV is normal. No inducible myocardial ischemia. EF is 66%. LVSF is normal. No ECG changes. Exercise capacity is normal.    H/O cardiovascular stress test 2/24/2015    cardiolite-normal, no ischemia, EF69%    H/O Doppler ultrasound 9/15/2000    9/15/2000 - Carotid - No hemodynamically significant stenosis.  H/O echocardiogram 12/2/2010, 9/22/2009 12/2/2010 - Difficult apical imaging due to patient COPD. LVSF is normal. EF = > 55%. Impaired LV impairment. Mild-Moderate MR. Mild TR.    H/O echocardiogram 7/15/14    EF 55-60%. No significant valvulopathy is seen.  Heart valve problem     2 leaky heart valves    History of Doppler ultrasound 10/31/2000    10/31/2000 - Peripheral - Normal study.     HX OTHER MEDICAL 1/14/2004 1/14/2004 - 48 hr Holter - Predominant rhythm is sinus rhythm. No significant ectopy is seen.  Hyperlipidemia     Mild tricuspid regurgitation     Mitral regurgitation     Nausea & vomiting     Osteopenia     Pulmonary hypertension (HCC)     Pulmonary nodules     Supraventricular tachycardia (HCC)     Thyroid disease     Type 2 diabetes mellitus ([address]        FAMILY HISTORY  Family History   Problem Relation Age of Onset    Heart Failure Mother         CHF    Hypertension Mother     Cancer Father         Pancreatic    Hypertension Father     Heart Disease Sister         CMP    Cancer Brother         Bone    Other Brother         aneurysm    Coronary Art Dis Brother     Heart Attack Brother     Other Sister         Bone problems    Other Sister         infection    Cancer Sister         Ovarian    Cancer Son     Early Death Son         MVA    Coronary Art Dis Daughter     Hypertension Daughter        SOCIAL HISTORY  Social History     Socioeconomic History    Marital status:      Spouse name: None    Number of children: None    Years of education: None    Highest education level: None   Occupational History    Occupation: Retired   Social Needs    Financial resource strain: None    Food insecurity:     Worry: None     Inability: None    Transportation needs:     Medical: None     Non-medical: None   Tobacco Use    Smoking status: Never Smoker    Smokeless tobacco: Never Used    Tobacco comment: reviewed 7/20/15   Substance and Sexual Activity    Alcohol use: Yes     Comment: Rare alcohol use.        CAFFEINE: 1 cup coffee daily    Drug use: No    Sexual activity: Yes     Partners: Male     Comment:    Lifestyle    Physical activity:     Days per week: None     Minutes per session: None    Stress: None   Relationships    Social connections:     Talks on phone: None     Gets together: None     Attends Jehovah's witness service: None     Active member of club or organization: None     Attends meetings of clubs or organizations: None     Relationship status: None    Intimate partner violence:     Fear of current or ex partner: None     Emotionally abused: None     Physically abused: None     Forced sexual activity: None   Other Topics Concern    None   Social History Narrative    None        SURGICAL HISTORY  Past Surgical History:   Procedure Laterality Date    APPENDECTOMY      BREAST SURGERY      bilateral lumpectomy    CHOLECYSTECTOMY      COLECTOMY      also head of pancreas    COLONOSCOPY  12-21-12    polyp    DIAGNOSTIC CARDIAC CATH LAB PROCEDURE  8/2005    FRACTURE SURGERY      repair of fractured nose    HYSTERECTOMY      LALA/BSO    PANCREAS SURGERY  11/7/2006    Whipple Procedure    SKIN BIOPSY      moles from right shoulder, chin, left leg    THYROIDECTOMY, PARTIAL      THYROIDECTOMY, PARTIAL  1999    TONSILLECTOMY         CURRENT MEDICATIONS  Current Outpatient Medications   Medication Sig Dispense Refill    HYDROcodone-acetaminophen (NORCO) 5-325 MG per tablet Take 1 tablet by mouth every 4 hours as needed for Pain for up to 3 days. Intended supply: 3 days. Take lowest dose possible to manage pain 10 tablet 0    amLODIPine (NORVASC) 10 MG tablet Take 1 tablet by mouth daily 30 tablet 3    hydrALAZINE (APRESOLINE) 25 MG tablet Take 1 tablet by mouth every 8 hours as needed (for SBP>160) 90 tablet 0    levothyroxine (SYNTHROID) 50 MCG tablet Take 1 tablet by mouth Daily 30 tablet 3    metoprolol tartrate (LOPRESSOR) 25 MG tablet Take 1 tablet by mouth 2 times daily 60 tablet 3    conjugated estrogens (PREMARIN) 0.625 MG/GM vaginal cream Place vaginally Twice a Week      meloxicam (MOBIC) 7.5 MG tablet Take 7.5 mg by mouth daily      desoximetasone (TOPICORT) 0.25 % cream Apply topically 2 times daily Apply topically 2 times daily.  digoxin (LANOXIN) 0.25 MG tablet Take 250 mcg by mouth daily.         therapeutic multivitamin-minerals (THERAGRAN-M) tablet Take 1 tablet by mouth daily. No current facility-administered medications for this visit.         ALLERGIES  Allergies   Allergen Reactions    Sulfa Antibiotics Anaphylaxis    Nitrofuran Derivatives     Quinapril Hcl      Cough       PHYSICAL EXAM    /80   Pulse 77   Ht 5' 4\" (1.626 m)   Wt 124 lb 1.6 oz (56.3 kg)   SpO2 95%   BMI 21.30 kg/m²     Constitutional:  Well developed, well nourished  HENT:  Normocephalic, atraumatic, bilateral external ears normal, oropharynx moist, nose normal  Eyes:  PERRLA, EOMI, conjunctiva normal, no discharge, no scleral icterus  Neck:  Normal range of motion, no tenderness, supple, no thyromegaly no carotid bruits noted  Lymphatic:  No lymphadenopathy noted  Cardiovascular:  Normal heart rate, normal rhythm, no murmurs, gallops or rubs  Thorax & Lungs:  Normal breath sounds, no respiratory distress, no wheezing  Abdomen:  Soft, no tenderness, no masses, no pulsatile masses, not distended, bowel sounds normal  Skin:  Warm, dry, shingles the left thigh scabbed and healing  Back:  No tenderness, No CVA tenderness  Extremities:  No edema, no tenderness, no cyanosis, no clubbing  Musculoskeletal:  Good range of motion  all major joints, no tenderness to palpation or major deformities noted  Neurologic:  Alert & oriented X 3, normal motor function, normal sensory function, no focal deficits noted  Psychiatric:  Affect normal, mood normal    ASSESSMENT & PLAN    Alplaus Reveal was seen today for follow-up from hospital.    Diagnoses and all orders for this visit:    Essential hypertension    Pulmonary nodules    Goiter    Herpes zoster without complication    Type 2 diabetes mellitus without complication, without long-term current use of insulin (HCC)    Mixed hyperlipidemia         Medications Discontinued During This Encounter   Medication Reason    ondansetron (ZOFRAN) 4 MG tablet LIST CLEANUP    valACYclovir (VALTREX) 1 g tablet LIST CLEANUP    hydrochlorothiazide

## 2019-06-10 ENCOUNTER — OFFICE VISIT (OUTPATIENT)
Dept: FAMILY MEDICINE CLINIC | Age: 84
End: 2019-06-10
Payer: MEDICARE

## 2019-06-10 VITALS
WEIGHT: 120.4 LBS | SYSTOLIC BLOOD PRESSURE: 142 MMHG | TEMPERATURE: 98.2 F | HEART RATE: 93 BPM | DIASTOLIC BLOOD PRESSURE: 74 MMHG | OXYGEN SATURATION: 98 % | BODY MASS INDEX: 20.67 KG/M2

## 2019-06-10 DIAGNOSIS — B02.9 HERPES ZOSTER WITHOUT COMPLICATION: Primary | ICD-10-CM

## 2019-06-10 PROCEDURE — 99213 OFFICE O/P EST LOW 20 MIN: CPT | Performed by: NURSE PRACTITIONER

## 2019-06-10 RX ORDER — GABAPENTIN 100 MG/1
100 CAPSULE ORAL 2 TIMES DAILY
Qty: 60 CAPSULE | Refills: 0 | Status: SHIPPED | OUTPATIENT
Start: 2019-06-10 | End: 2019-06-10 | Stop reason: CLARIF

## 2019-06-10 RX ORDER — GABAPENTIN 100 MG/1
100 CAPSULE ORAL 2 TIMES DAILY
Qty: 30 CAPSULE | Refills: 0 | Status: SHIPPED | OUTPATIENT
Start: 2019-06-10 | End: 2019-06-12

## 2019-06-10 ASSESSMENT — ENCOUNTER SYMPTOMS
CHEST TIGHTNESS: 0
ABDOMINAL PAIN: 0
WHEEZING: 0
PHOTOPHOBIA: 0
EYE PAIN: 0
EYE DISCHARGE: 0
SHORTNESS OF BREATH: 0
SINUS PAIN: 0
COUGH: 0
RHINORRHEA: 0
EYE REDNESS: 0
BACK PAIN: 1
SINUS PRESSURE: 0
SORE THROAT: 0
ABDOMINAL DISTENTION: 0

## 2019-06-10 NOTE — PROGRESS NOTES
Christopher Arce   5/25/1933    Chief Complaint   Patient presents with    Herpes Zoster        Subjective:  Haylie Escalante is a 80 y.o. female who is here today with her  complaining of left hip and lateral upper thigh pain for 1 week with blistering painful, itching, and burning rash. She states that she was admitted to the hospital for the left sided pain and rapid heart rate. She was diagnosed with shingles during her admission and review of the discharge summary shows that she was prescribed Valtrex for 5 days. She is a poor historian but believes that she took and completed this medication. States \"my daughter takes care of all my medications for me. \"   She states the pain in her left back and hip is a burning, shooting pain that has worsened since her discharge. She has been taking ASA OTC for her pain without relief. Pt also had imaging of her left hip while in the hospital which was negative for acute injury. Denies recent falls or injury. Review of Systems   Constitutional: Negative for activity change, appetite change, chills, diaphoresis, fatigue, fever and unexpected weight change. HENT: Negative for congestion, ear discharge, ear pain, postnasal drip, rhinorrhea, sinus pressure, sinus pain, sneezing and sore throat. Eyes: Negative for photophobia, pain, discharge and redness. Respiratory: Negative for cough, chest tightness, shortness of breath and wheezing. Cardiovascular: Negative for chest pain, palpitations and leg swelling. Gastrointestinal: Negative for abdominal distention and abdominal pain. Musculoskeletal: Positive for arthralgias and back pain. Negative for joint swelling and myalgias. Back pain is left sided and radiates down lateral thigh   Skin: Positive for rash. Negative for pallor and wound. 7 day history of itching painful rash on left hip down left thigh. Allergic/Immunologic: Negative for immunocompromised state.    Neurological: Negative for dizziness, syncope, weakness, light-headedness and headaches. Hematological: Negative for adenopathy. Does not bruise/bleed easily. Psychiatric/Behavioral: Negative for sleep disturbance. Current Outpatient Medications   Medication Sig Dispense Refill    gabapentin (NEURONTIN) 100 MG capsule Take 1 capsule by mouth 2 times daily for 15 days. 60 capsule 0    amLODIPine (NORVASC) 10 MG tablet Take 1 tablet by mouth daily 30 tablet 3    hydrALAZINE (APRESOLINE) 25 MG tablet Take 1 tablet by mouth every 8 hours as needed (for SBP>160) 90 tablet 0    levothyroxine (SYNTHROID) 50 MCG tablet Take 1 tablet by mouth Daily 30 tablet 3    metoprolol tartrate (LOPRESSOR) 25 MG tablet Take 1 tablet by mouth 2 times daily 60 tablet 3    meloxicam (MOBIC) 7.5 MG tablet Take 7.5 mg by mouth daily      digoxin (LANOXIN) 0.25 MG tablet Take 250 mcg by mouth daily.  therapeutic multivitamin-minerals (THERAGRAN-M) tablet Take 1 tablet by mouth daily. No current facility-administered medications for this visit. Allergies   Allergen Reactions    Sulfa Antibiotics Anaphylaxis    Nitrofuran Derivatives     Quinapril Hcl      Cough     Past Medical History:   Diagnosis Date    Arrhythmia     Arthritis     osteoarthritis cervical and lumbar spine    Atrophic vaginitis     Blood transfusion     CAD (coronary artery disease)     Cataracts, bilateral     CHF (congestive heart failure) (HCC)     COPD (chronic obstructive pulmonary disease) (HCC)     Depression     Essential hypertension     Fatty liver disease, nonalcoholic     GERD (gastroesophageal reflux disease)     H/O 24 hour EKG monitoring 9/15/2000    9/15/2000 -  Predominant rhythm is a sinus rhythm. No significant ectopy noted.     H/O cardiac catheterization 8/4/2005 8/4/2005 - Moderate degree of stenosis of the high diag, which is a heavily calcified vessel, however, there is a large ramus vessel supplying this same area as

## 2019-06-10 NOTE — PATIENT INSTRUCTIONS
Patient Education        Neuropathic Pain: Care Instructions  Your Care Instructions    Neuropathic pain is caused by pressure on or damage to your nerves. It's often simply called nerve pain. Some people feel this type of pain all the time. For others, it comes and goes. Diabetes, shingles, or an injury can cause nerve pain. Many people say the pain feels sharp, burning, or stabbing. But some people feel it as a dull ache. In some cases, it makes your skin very sensitive. So touch, pressure, and other sensations that did not hurt before may now cause pain. It's important to know that this kind of pain is real and can affect your quality of life. It's also important to know that treatment can help. Treatment includes pain medicines, exercise, and physical therapy. Medicines can help reduce the number of pain signals that travel over the nerves. This can make the painful areas less sensitive. It can also help you sleep better and improve your mood. But medicines are only one part of successful treatment. Most people do best with more than one kind of treatment. Your doctor may recommend that you try cognitive-behavioral therapy and stress management. Or, if needed, you may decide to try to quit smoking, lower your blood pressure, or better control blood sugar. These kinds of healthy changes can also make a difference. If you feel that your treatment is not working, talk to your doctor. And be sure to tell your doctor if you think you might be depressed or anxious. These are common problems that can also be treated. Follow-up care is a key part of your treatment and safety. Be sure to make and go to all appointments, and call your doctor if you are having problems. It's also a good idea to know your test results and keep a list of the medicines you take. How can you care for yourself at home? · Be safe with medicines. Read and follow all instructions on the label.   ? If the doctor gave you a prescription medicine for pain, take it as prescribed. ? If you are not taking a prescription pain medicine, ask your doctor if you can take an over-the-counter medicine. · Save hard tasks for days when you have less pain. Follow a hard task with an easy task. And remember to take breaks. · Relax, and reduce stress. You may want to try deep breathing or meditation. These can help. · Keep moving. Gentle, daily exercise can help reduce pain. Your doctor or physical therapist can tell you what type of exercise is best for you. This may include walking, swimming, and stationary biking. It may also include stretches and range-of-motion exercises. · Try heat, cold packs, and massage. · Get enough sleep. Constant pain can make you more tired. If the pain makes it hard to sleep, talk with your doctor. · Think positively. Your thoughts can affect your pain. Do fun things to distract yourself from the pain. See a movie, read a book, listen to music, or spend time with a friend. · Keep a pain diary. Try to write down how strong your pain is and what it feels like. Also try to notice and write down how your moods, thoughts, sleep, activities, and medicine affect your pain. These notes can help you and your doctor find the best ways to treat your pain. Reducing constipation caused by pain medicine  Pain medicines often cause constipation. To reduce constipation:  · Include fruits, vegetables, beans, and whole grains in your diet each day. These foods are high in fiber. · Drink plenty of fluids, enough so that your urine is light yellow or clear like water. If you have kidney, heart, or liver disease and have to limit fluids, talk with your doctor before you increase the amount of fluids you drink. · Get some exercise every day. Build up slowly to 30 to 60 minutes a day on 5 or more days of the week. · Take a fiber supplement, such as Citrucel or Metamucil, every day if needed.  Read and follow all instructions on the label.  · Schedule time each day for a bowel movement. Having a daily routine may help. Take your time and do not strain when having a bowel movement. · Ask your doctor about a laxative. The goal is to have one easy bowel movement every 1 to 2 days. Do not let constipation go untreated for more than 3 days. When should you call for help? Call your doctor now or seek immediate medical care if:    · You feel sad, anxious, or hopeless for more than a few days. This could mean you are depressed. Depression is common in people who have a lot of pain. But it can be treated.     · You have trouble with bowel movements, such as:  ? No bowel movement in 3 days. ? Blood in the anal area, in your stool, or on the toilet paper. ? Diarrhea for more than 24 hours.    Watch closely for changes in your health, and be sure to contact your doctor if:    · Your pain is getting worse.     · You can't sleep because of pain.     · You are very worried or anxious about your pain.     · You have trouble taking your pain medicine.     · You have any concerns about your pain medicine or its side effects.     · You have vomiting or cramps for more than 2 hours. Where can you learn more? Go to [email or web address] org and sign in to your [employer] account. Enter K039 in the [facility] box to learn more about \"Neuropathic Pain: Care Instructions. \"     If you do not have an account, please click on the \"Sign Up Now\" link. Current as of: Mariaa 3, 2018  Content Version: 12.0  © 2428-0977 [employer], Incorporated. Care instructions adapted under license by [facility] ([facility]). If you have questions about a medical condition or this instruction, always ask your healthcare professional. [place] [address] any warranty or liability for your use of this information.          Patient Education        Shingles: Care Instructions  Your Care Instructions    Shingles (herpes zoster) causes pain and a blistered rash. The rash can appear anywhere on the body but will be on only one side of the body, the left or right. It will be in a band, a strip, or a small area. The pain can be very severe. Shingles can also cause tingling or itching in the area of the rash. The blisters scab over after a few days and heal in 2 to 4 weeks. Medicines can help you feel better and may help prevent more serious problems caused by shingles. Shingles is caused by the same virus that causes chickenpox. When you have chickenpox, the virus gets into your nerve roots and stays there (becomes dormant) long after you get over the chickenpox. If the virus becomes active again, it can cause shingles. Follow-up care is a key part of your treatment and safety. Be sure to make and go to all appointments, and call your doctor if you are having problems. It's also a good idea to know your test results and keep a list of the medicines you take. How can you care for yourself at home? · Be safe with medicines. Take your medicines exactly as prescribed. Call your doctor if you think you are having a problem with your medicine. Antiviral medicine helps you get better faster. · Try not to scratch or pick at the blisters. They will crust over and fall off on their own if you leave them alone. · Put cool, wet cloths on the area to relieve pain and itching. You can also use calamine lotion. Try not to use so much lotion that it cakes and is hard to get off. · Put cornstarch or baking soda on the sores to help dry them out so they heal faster. · Do not use thick ointment, such as petroleum jelly, on the sores. This will keep them from drying and healing. · To help remove loose crusts, soak them in tap water. This can help decrease oozing, and dry and soothe the skin. · Take an over-the-counter pain medicine, such as acetaminophen (Tylenol), ibuprofen (Advil, Motrin), or naproxen (Aleve). Read and follow all instructions on the label.   · Avoid close contact

## 2019-06-12 ENCOUNTER — OFFICE VISIT (OUTPATIENT)
Dept: FAMILY MEDICINE CLINIC | Age: 84
End: 2019-06-12
Payer: MEDICARE

## 2019-06-12 VITALS
TEMPERATURE: 97.2 F | BODY MASS INDEX: 20.69 KG/M2 | HEART RATE: 92 BPM | DIASTOLIC BLOOD PRESSURE: 62 MMHG | SYSTOLIC BLOOD PRESSURE: 110 MMHG | OXYGEN SATURATION: 97 % | WEIGHT: 121.2 LBS | HEIGHT: 64 IN

## 2019-06-12 DIAGNOSIS — R42 DIZZINESS: Primary | ICD-10-CM

## 2019-06-12 DIAGNOSIS — I47.1 SUPRAVENTRICULAR TACHYCARDIA (HCC): ICD-10-CM

## 2019-06-12 DIAGNOSIS — B02.9 HERPES ZOSTER WITHOUT COMPLICATION: ICD-10-CM

## 2019-06-12 DIAGNOSIS — D3A.8 PRIMARY PANCREATIC NEUROENDOCRINE TUMOR: ICD-10-CM

## 2019-06-12 PROBLEM — R55 NEAR SYNCOPE: Status: RESOLVED | Noted: 2019-05-28 | Resolved: 2019-06-12

## 2019-06-12 PROCEDURE — 99214 OFFICE O/P EST MOD 30 MIN: CPT | Performed by: PHYSICIAN ASSISTANT

## 2019-06-12 RX ORDER — MELOXICAM 7.5 MG/1
7.5 TABLET ORAL DAILY
Qty: 90 TABLET | Refills: 1 | Status: SHIPPED | OUTPATIENT
Start: 2019-06-12 | End: 2020-06-26

## 2019-06-12 RX ORDER — MIDODRINE HYDROCHLORIDE 2.5 MG/1
2.5 TABLET ORAL 3 TIMES DAILY PRN
Qty: 90 TABLET | Refills: 3 | Status: SHIPPED | OUTPATIENT
Start: 2019-06-12 | End: 2019-09-19

## 2019-06-12 RX ORDER — DIGOXIN 250 MCG
250 TABLET ORAL DAILY
Qty: 90 TABLET | Refills: 1 | Status: SHIPPED | OUTPATIENT
Start: 2019-06-12 | End: 2019-09-19 | Stop reason: ALTCHOICE

## 2019-06-12 RX ORDER — HYDROCHLOROTHIAZIDE 12.5 MG/1
12.5 TABLET ORAL DAILY
COMMUNITY
End: 2019-07-02

## 2019-06-12 RX ORDER — HYDROCHLOROTHIAZIDE 12.5 MG/1
12.5 TABLET ORAL DAILY
Qty: 90 TABLET | Refills: 1 | Status: CANCELLED | OUTPATIENT
Start: 2019-06-12

## 2019-06-12 RX ORDER — GABAPENTIN 100 MG/1
100 CAPSULE ORAL 3 TIMES DAILY PRN
Qty: 30 CAPSULE | Refills: 0 | Status: SHIPPED | OUTPATIENT
Start: 2019-06-12 | End: 2020-06-26

## 2019-06-12 ASSESSMENT — ENCOUNTER SYMPTOMS
ABDOMINAL PAIN: 0
SHORTNESS OF BREATH: 0
CHEST TIGHTNESS: 0

## 2019-06-12 NOTE — PROGRESS NOTES
6/12/2019    [facility]    Chief Complaint   Patient presents with    Congestive Heart Failure    Follow-Up from [facility] 6/2/19 LEFT FLANK PAIN. PER PT, STILL HAS OCCASSIONAL PAIN.  Dizziness     X 1 DAY. DENIES SOB OR HA.       HPI  History was obtained from the patient. Jane Brady is a 80 y.o. female who presents today to follow-up after her hospital admission and ER follow-up. She was already seen by Thelma Cowan and is here to continue her follow-up on her dizziness and her pain from the shingles over her left thigh. Pain from the shingles is improving, patient rates the pain a 3 out of 10 but  states that she complains of the pain quite often. She has tried Tylenol and ibuprofen without improvement. The dizziness persists, is only present when getting up from sitting or laying down. She has not yet followed up with cardiology. She has not been taking the hydrochlorothiazide because she ran out of the prescription but did not adjust her medicines as instructed by the hospital, is not taking the Norvasc or the metoprolol. REVIEW OF SYMPTOMS    Review of Systems   Constitutional: Negative for activity change, fatigue and unexpected weight change. Respiratory: Negative for chest tightness and shortness of breath. Cardiovascular: Negative for chest pain. Gastrointestinal: Negative for abdominal pain. Musculoskeletal:        Pain with zoster rash. Neurological: Positive for dizziness and light-headedness. Negative for syncope, facial asymmetry, numbness and headaches. No changes in hearing, vision, or speech.        SOCIAL HISTORY  Social History     Socioeconomic History    Marital status:      Spouse name: None    Number of children: None    Years of education: None    Highest education level: None   Occupational History    Occupation: Retired   Social Needs    Financial resource strain: None    Food insecurity:     Worry: None     Inability: None    Transportation needs:     Medical: None     Non-medical: None   Tobacco Use    Smoking status: Never Smoker    Smokeless tobacco: Never Used    Tobacco comment: reviewed 7/20/15   Substance and Sexual Activity    Alcohol use: Yes     Comment: Rare alcohol use. CAFFEINE: 1 cup coffee daily    Drug use: No    Sexual activity: Yes     Partners: Male     Comment:    Lifestyle    Physical activity:     Days per week: None     Minutes per session: None    Stress: None   Relationships    Social connections:     Talks on phone: None     Gets together: None     Attends [employer] service: None     Active member of club or organization: None     Attends meetings of clubs or organizations: None     Relationship status: None    Intimate partner violence:     Fear of current or ex partner: None     Emotionally abused: None     Physically abused: None     Forced sexual activity: None   Other Topics Concern    None   Social History Narrative    None        CURRENT MEDICATIONS  Current Outpatient Medications   Medication Sig Dispense Refill    hydrochlorothiazide (HYDRODIURIL) 12.5 MG tablet Take 12.5 mg by mouth daily      digoxin (LANOXIN) 250 MCG tablet Take 1 tablet by mouth daily 90 tablet 1    meloxicam (MOBIC) 7.5 MG tablet Take 1 tablet by mouth daily 90 tablet 1    midodrine (PROAMATINE) 2.5 MG tablet Take 1 tablet by mouth 3 times daily as needed (for dizziness) 90 tablet 3    gabapentin (NEURONTIN) 100 MG capsule Take 1 capsule by mouth 3 times daily as needed (shingles pain) for up to 10 days. Intended supply: 90 days 30 capsule 0     No current facility-administered medications for this visit.           PHYSICAL EXAM    /62 (Site: Left Upper Arm, Position: Standing, Cuff Size: Small Adult)   Pulse 92   Temp 97.2 °F (36.2 °C)   Ht 5' 4\" (1.626 m)   Wt 121 lb 3.2 oz (55 kg)   SpO2 97% Comment: RA  BMI 20.80 kg/m²     Physical Exam   Constitutional: She is oriented to person, place, and time. She appears well-developed and well-nourished. Cardiovascular: Normal rate, regular rhythm and normal heart sounds. Pulmonary/Chest: Effort normal and breath sounds normal.   Neurological: She is alert and oriented to person, place, and time. No cranial nerve deficit. Skin: Skin is warm. Rash (left thigh) noted. Carlyn Cunningham had to reevaluate the rash as I currently am pregnant. Josy says that the rash looks significantly better and is almost completely healed. Psychiatric: She has a normal mood and affect. Her behavior is normal. Thought content normal.   Nursing note and vitals reviewed. Orthostatics are not positive, the systolic blood pressure dropped about 15 points. Patient was not symptomatic when orthostatics were done. ASSESSMENT & PLAN    1. Dizziness  Patient to follow-up with cardiology, is to make an appointment as soon as possible. Dizziness may be related to orthostatic hypotension that was just not significant today. She already has a high diet and sodium, eating a lot of fast food, drinking soda, eating canned goods, adding table salt, and does note high intake of water. Since there is really no area for her to increase her sodium intake she was given a prescription for the Midodrine to take as needed until she can follow-up with cardiology. Her blood pressure is within normal limits today so patient is to not start the metoprolol or Norvasc from the hospital until she follows up with cardiology. - midodrine (PROAMATINE) 2.5 MG tablet; Take 1 tablet by mouth 3 times daily as needed (for dizziness)  Dispense: 90 tablet; Refill: 3    2. Herpes zoster without complication  Rash appears to be healing well per p.m. Start gabapentin 100 mg to take 3 times a day as needed for pain. - meloxicam (MOBIC) 7.5 MG tablet; Take 1 tablet by mouth daily  Dispense: 90 tablet; Refill: 1  - gabapentin (NEURONTIN) 100 MG capsule;  Take 1 capsule by mouth 3 times daily as needed (shingles pain) for up to 10 days. Intended supply: 90 days  Dispense: 30 capsule; Refill: 0    3. Supraventricular tachycardia (HCC)  - digoxin (LANOXIN) 250 MCG tablet; Take 1 tablet by mouth daily  Dispense: 90 tablet; Refill: 1       Electronically signed by Christopher Orlando PA-C on 6/12/2019    Please note that this chart was generated using dragon dictation software. Although every effort was made to ensure the accuracy of this automated transcription, some errors in transcription may have occurred.

## 2019-06-13 ENCOUNTER — HOSPITAL ENCOUNTER (EMERGENCY)
Age: 84
Discharge: HOME | End: 2019-06-13
Payer: MEDICARE

## 2019-06-13 VITALS
RESPIRATION RATE: 16 BRPM | WEIGHT: 121 LBS | OXYGEN SATURATION: 99 % | HEART RATE: 95 BPM | TEMPERATURE: 97.7 F | DIASTOLIC BLOOD PRESSURE: 101 MMHG | SYSTOLIC BLOOD PRESSURE: 218 MMHG | BODY MASS INDEX: 20.66 KG/M2 | HEIGHT: 64 IN

## 2019-06-13 DIAGNOSIS — I10 HYPERTENSION, UNSPECIFIED TYPE: ICD-10-CM

## 2019-06-13 DIAGNOSIS — R21 RASH: Primary | ICD-10-CM

## 2019-06-13 PROCEDURE — 6360000002 HC RX W HCPCS: Performed by: PHYSICIAN ASSISTANT

## 2019-06-13 PROCEDURE — 96372 THER/PROPH/DIAG INJ SC/IM: CPT

## 2019-06-13 PROCEDURE — 99283 EMERGENCY DEPT VISIT LOW MDM: CPT

## 2019-06-13 RX ORDER — VALACYCLOVIR HYDROCHLORIDE 1 G/1
1000 TABLET, FILM COATED ORAL EVERY 8 HOURS
Qty: 21 TABLET | Refills: 0 | Status: SHIPPED | OUTPATIENT
Start: 2019-06-13 | End: 2019-06-20

## 2019-06-13 RX ORDER — KETOROLAC TROMETHAMINE 30 MG/ML
15 INJECTION, SOLUTION INTRAMUSCULAR; INTRAVENOUS ONCE
Status: COMPLETED | OUTPATIENT
Start: 2019-06-13 | End: 2019-06-13

## 2019-06-13 RX ORDER — PREDNISONE 20 MG/1
20 TABLET ORAL DAILY
Qty: 5 TABLET | Refills: 0 | Status: SHIPPED | OUTPATIENT
Start: 2019-06-13 | End: 2019-06-18

## 2019-06-13 RX ADMIN — KETOROLAC TROMETHAMINE 15 MG: 30 INJECTION, SOLUTION INTRAMUSCULAR; INTRAVENOUS at 09:17

## 2019-06-13 ASSESSMENT — PAIN SCALES - GENERAL
PAINLEVEL_OUTOF10: 10
PAINLEVEL_OUTOF10: 10

## 2019-06-13 ASSESSMENT — PAIN DESCRIPTION - PAIN TYPE: TYPE: ACUTE PAIN

## 2019-06-13 NOTE — ED NOTES
This nurse explained discharge instructions to patient and  at this time;  verbally yelled at this nurse for a \"pain shot\". Gómez ABBOTT notified.       Pito Bah RN  06/13/19 [id number]

## 2019-06-13 NOTE — ED TRIAGE NOTES
Pt presents to the ED today with c/o rash to her left thigh and left hip pain, pt states that she feels the left hip pain is from the rash. Pt states that she was seen by Dr. Nataliia Baldwin office yesterday and they told her that it was shingles and called her blood pressure, pain, and nausea medications. Pt describes the pain as sharp constant pain that travels up her leg to her thigh.

## 2019-06-13 NOTE — ED PROVIDER NOTES
EMERGENCY DEPARTMENT ENCOUNTER      PCP: Deion Quintana MD    [address] [facility]    Chief Complaint   Patient presents with    Rash     painful rash to left side of hip and leg- pt concerned its shingles    Hip Pain     left sided; started last night         This patient was not evaluated by the attending physician. I have independently evaluated this patient . HPI    Berry Collins is a 80 y.o. female who presents with a rash since the onset 7-8 days. The duration has been constant since the onset. The quality rash is that it is painful. The location is left posterior lateral thigh. Patient has tried no medications for relief of symptoms. No known aggravating or alleviating factors. No new product use, contact with plants, new foods, new medications. REVIEW OF SYSTEMS    General: Denies fevers or malaise (HZO)   ENT: Denies throat swelling or tongue swelling. Denies eye pain or redness, changes in vision (HZO)   Denies ear pain or rash in ear canal or auricle  Heart:  No chest pain. Pulmonary: Denies wheezes, difficulty breathing,  or chest tightness  GI:  No abdominal pain, vomiting, or diarrhea. :  Denies urinary discomfort. Denies urine color or odor changes. Neuro:   Denies HA (HZO)  Denies numbness, tingling, weakness. Denies facial paralysis or difficulty smiling / chewing.   Skin: See HPI    All other review of systems are negative  See HPI and nursing notes for additional information     PAST MEDICAL & SURGICAL HISTORY    Past Medical History:   Diagnosis Date    Arrhythmia     Arthritis     osteoarthritis cervical and lumbar spine    Atrophic vaginitis     Blood transfusion     CAD (coronary artery disease)     Cataracts, bilateral     CHF (congestive heart failure) (HCC)     COPD (chronic obstructive pulmonary disease) ([facility] [address]     Depression     Essential hypertension     Fatty liver disease, nonalcoholic     GERD (gastroesophageal reflux disease)     H/O 24 PROCEDURE  8/2005    FRACTURE SURGERY      repair of fractured nose    HYSTERECTOMY      LALA/BSO    PANCREAS SURGERY  11/7/2006    Whipple Procedure    SKIN BIOPSY      moles from right shoulder, chin, left leg    THYROIDECTOMY, PARTIAL      THYROIDECTOMY, PARTIAL  1999    TONSILLECTOMY         CURRENT MEDICATIONS    Current Outpatient Rx   Medication Sig Dispense Refill    valACYclovir (VALTREX) 1 g tablet Take 1 tablet by mouth every 8 hours for 7 days 21 tablet 0    predniSONE (DELTASONE) 20 MG tablet Take 1 tablet by mouth daily for 5 days 5 tablet 0    hydrochlorothiazide (HYDRODIURIL) 12.5 MG tablet Take 12.5 mg by mouth daily      digoxin (LANOXIN) 250 MCG tablet Take 1 tablet by mouth daily 90 tablet 1    meloxicam (MOBIC) 7.5 MG tablet Take 1 tablet by mouth daily 90 tablet 1    midodrine (PROAMATINE) 2.5 MG tablet Take 1 tablet by mouth 3 times daily as needed (for dizziness) 90 tablet 3    gabapentin (NEURONTIN) 100 MG capsule Take 1 capsule by mouth 3 times daily as needed (shingles pain) for up to 10 days. Intended supply: 90 days 30 capsule 0       ALLERGIES    Allergies   Allergen Reactions    Sulfa Antibiotics Anaphylaxis    Nitrofuran Derivatives     Quinapril Hcl      Cough       SOCIAL & FAMILY HISTORY    Social History     Socioeconomic History    Marital status:      Spouse name: None    Number of children: None    Years of education: None    Highest education level: None   Occupational History    Occupation: Retired   Social Needs    Financial resource strain: None    Food insecurity:     Worry: None     Inability: None    Transportation needs:     Medical: None     Non-medical: None   Tobacco Use    Smoking status: Never Smoker    Smokeless tobacco: Never Used    Tobacco comment: reviewed 7/20/15   Substance and Sexual Activity    Alcohol use: Yes     Comment: Rare alcohol use.        CAFFEINE: 1 cup coffee daily    Drug use: No    Sexual activity: Yes Partners: Male     Comment:    Lifestyle    Physical activity:     Days per week: None     Minutes per session: None    Stress: None   Relationships    Social connections:     Talks on phone: None     Gets together: None     Attends Jehovah's witness service: None     Active member of club or organization: None     Attends meetings of clubs or organizations: None     Relationship status: None    Intimate partner violence:     Fear of current or ex partner: None     Emotionally abused: None     Physically abused: None     Forced sexual activity: None   Other Topics Concern    None   Social History Narrative    None     Family History   Problem Relation Age of Onset    Heart Failure Mother         CHF    Hypertension Mother     Cancer Father         Pancreatic    Hypertension Father     Heart Disease Sister         CMP    Cancer Brother         Bone    Other Brother         aneurysm    Coronary Art Dis Brother     Heart Attack Brother     Other Sister         Bone problems    Other Sister         infection    Cancer Sister         Ovarian    Cancer Son     Early Death Son         MVA    Coronary Art Dis Daughter     Hypertension Daughter          PHYSICAL EXAM    VITAL SIGNS: BP (!) 213/109   Pulse 99   Temp 97.7 °F (36.5 °C) (Oral)   Resp 16   Ht 5' 4\" (1.626 m)   Wt 121 lb (54.9 kg)   SpO2 99%   BMI 20.77 kg/m²   Constitutional:  Well developed, well nourished  HENT:  Atraumatic, no facial or lip swelling. No rash or vesicles on Pt's nose (Jarrett's sign)  No rash or vesicles on Pt's auricles or external auditory canals  ORAL EXAM:   No tongue swelling, airway patent/Throat is clear  Neck/Lymphatics: supple, no JVD, no swollen nodes  Respiratory:  Nonlabored breathing. Lungs clear. Cardiovascular:  No JVD   Musculoskeletal:  No edema, no acute deformities.      Neurologic:    - Alert & oriented person, place, time, and situation, no speech difficulties or slurring.  - No obvious errors related to that system including errors in grammar, punctuation, and spelling, as well as words and phrases that may be inappropriate. If there are any questions or concerns please feel free to contact the dictating provider for clarification.        Erica Fermin, [place]  06/13/19 [id number]

## 2019-06-13 NOTE — ED NOTES
Toradol given without difficulty; discharge education reinforced with patient and her . Both voiced understanding.       Marimar Diaz RN  06/13/19 [id number]

## 2019-07-02 RX ORDER — HYDROCHLOROTHIAZIDE 12.5 MG/1
TABLET ORAL
Qty: 30 TABLET | Refills: 3 | Status: SHIPPED | OUTPATIENT
Start: 2019-07-02 | End: 2019-09-19

## 2019-07-07 DIAGNOSIS — B02.9 HERPES ZOSTER WITHOUT COMPLICATION: ICD-10-CM

## 2019-07-09 RX ORDER — GABAPENTIN 100 MG/1
100 CAPSULE ORAL 2 TIMES DAILY
Qty: 30 CAPSULE | Refills: 0 | Status: SHIPPED | OUTPATIENT
Start: 2019-07-09 | End: 2020-06-26

## 2019-07-09 RX ORDER — GABAPENTIN 100 MG/1
CAPSULE ORAL
Qty: 30 CAPSULE | Refills: 0 | Status: SHIPPED | OUTPATIENT
Start: 2019-07-09 | End: 2020-06-26

## 2019-08-08 ENCOUNTER — TELEPHONE (OUTPATIENT)
Dept: FAMILY MEDICINE CLINIC | Age: 84
End: 2019-08-08

## 2019-09-19 ENCOUNTER — OFFICE VISIT (OUTPATIENT)
Dept: FAMILY MEDICINE CLINIC | Age: 84
End: 2019-09-19
Payer: MEDICARE

## 2019-09-19 VITALS
DIASTOLIC BLOOD PRESSURE: 80 MMHG | HEART RATE: 64 BPM | HEIGHT: 64 IN | WEIGHT: 124 LBS | BODY MASS INDEX: 21.17 KG/M2 | SYSTOLIC BLOOD PRESSURE: 180 MMHG

## 2019-09-19 DIAGNOSIS — E11.9 TYPE 2 DIABETES MELLITUS WITHOUT COMPLICATION, WITHOUT LONG-TERM CURRENT USE OF INSULIN (HCC): Chronic | ICD-10-CM

## 2019-09-19 DIAGNOSIS — N30.00 ACUTE CYSTITIS WITHOUT HEMATURIA: ICD-10-CM

## 2019-09-19 DIAGNOSIS — I10 ESSENTIAL HYPERTENSION: Chronic | ICD-10-CM

## 2019-09-19 DIAGNOSIS — R41.89 COGNITIVE DEFICITS: Chronic | ICD-10-CM

## 2019-09-19 DIAGNOSIS — I47.1 SUPRAVENTRICULAR TACHYCARDIA (HCC): Chronic | ICD-10-CM

## 2019-09-19 DIAGNOSIS — N30.00 ACUTE CYSTITIS WITHOUT HEMATURIA: Primary | ICD-10-CM

## 2019-09-19 LAB
BILIRUBIN, POC: NEGATIVE
BLOOD URINE, POC: POSITIVE
CLARITY, POC: NORMAL
COLOR, POC: YELLOW
GLUCOSE URINE, POC: NEGATIVE
KETONES, POC: NORMAL
LEUKOCYTE EST, POC: POSITIVE
NITRITE, POC: POSITIVE
PH, POC: 5
PROTEIN, POC: POSITIVE
SPECIFIC GRAVITY, POC: 1.01
UROBILINOGEN, POC: NEGATIVE

## 2019-09-19 PROCEDURE — 81002 URINALYSIS NONAUTO W/O SCOPE: CPT | Performed by: FAMILY MEDICINE

## 2019-09-19 PROCEDURE — 1123F ACP DISCUSS/DSCN MKR DOCD: CPT | Performed by: FAMILY MEDICINE

## 2019-09-19 PROCEDURE — G8420 CALC BMI NORM PARAMETERS: HCPCS | Performed by: FAMILY MEDICINE

## 2019-09-19 PROCEDURE — 1090F PRES/ABSN URINE INCON ASSESS: CPT | Performed by: FAMILY MEDICINE

## 2019-09-19 PROCEDURE — 99214 OFFICE O/P EST MOD 30 MIN: CPT | Performed by: FAMILY MEDICINE

## 2019-09-19 PROCEDURE — 1036F TOBACCO NON-USER: CPT | Performed by: FAMILY MEDICINE

## 2019-09-19 PROCEDURE — G0008 ADMIN INFLUENZA VIRUS VAC: HCPCS | Performed by: FAMILY MEDICINE

## 2019-09-19 PROCEDURE — G8427 DOCREV CUR MEDS BY ELIG CLIN: HCPCS | Performed by: FAMILY MEDICINE

## 2019-09-19 PROCEDURE — G8598 ASA/ANTIPLAT THER USED: HCPCS | Performed by: FAMILY MEDICINE

## 2019-09-19 PROCEDURE — 90653 IIV ADJUVANT VACCINE IM: CPT | Performed by: FAMILY MEDICINE

## 2019-09-19 PROCEDURE — 4040F PNEUMOC VAC/ADMIN/RCVD: CPT | Performed by: FAMILY MEDICINE

## 2019-09-19 RX ORDER — CIPROFLOXACIN 500 MG/1
250 TABLET, FILM COATED ORAL 2 TIMES DAILY
Status: DISCONTINUED | OUTPATIENT
Start: 2019-09-19 | End: 2019-09-20

## 2019-09-19 RX ORDER — DONEPEZIL HYDROCHLORIDE 5 MG/1
5 TABLET, FILM COATED ORAL NIGHTLY
Qty: 30 TABLET | Refills: 5 | Status: SHIPPED | OUTPATIENT
Start: 2019-09-19 | End: 2020-06-26

## 2019-09-19 RX ORDER — DILTIAZEM HYDROCHLORIDE 120 MG/1
120 CAPSULE, COATED, EXTENDED RELEASE ORAL DAILY
Status: DISCONTINUED | OUTPATIENT
Start: 2019-09-19 | End: 2019-09-20

## 2019-09-19 ASSESSMENT — ENCOUNTER SYMPTOMS
WHEEZING: 0
SHORTNESS OF BREATH: 0
COUGH: 0
ABDOMINAL PAIN: 0
CHEST TIGHTNESS: 0
RESPIRATORY NEGATIVE: 1

## 2019-09-19 NOTE — PROGRESS NOTES
2019     Pedro Lopes      Chief Complaint   Patient presents with    Urinary Tract Infection     BURNING W/ URINATION, ODOR x 1-2 DAYS    Follow-Up from [address] TO ER 2019. STILL HAS EPISODES OF THIS INTERMITTENGLY. PT HAD EKG, HOLTER, STRESS, ECHO AND F/U W/ CARDIOLOGIST.    Memory Loss     SHORT TERM MEMORY LOSS PER PT       LALITHA Jarquin is a 80 y.o. female who presents today with the followin. Acute cystitis without hematuria    2. Cognitive deficits    3. Type 2 diabetes mellitus without complication, without long-term current use of insulin ([facility] [address]    4. Essential hypertension    5. Supraventricular tachycardia ([facility] [address]      The patient complains of dysuria  Frequency burning with urination no flank pain no fever no chills  She had many urinary tract infections in the past but is been over a year since she had one  She is seen a urologist in the distant past  She does have atrophic vaginitis  She is allergic to sulfonylurea medicines  She was started on digoxin since she was last here because of supraventricular cardia  She had a previous negative cardiac work-up  She had a spell of this in the last week where her heart was racing lasted over 10 minutes she did not feel she was going to faint it terminated on its own  She is worried about short-term memory loss  She is a well-controlled type II diabetic  She was on blood pressure medicine in the past but her pressure got too low today was very high  REVIEW OF SYMPTOMS    Review of Systems   Constitutional: Negative. Negative for activity change, appetite change and unexpected weight change. HENT: Negative. Respiratory: Negative. Negative for cough, chest tightness, shortness of breath and wheezing. Cardiovascular: Negative. Negative for chest pain and leg swelling. Gastrointestinal: Negative for abdominal pain.    Endocrine:        Controlled type 2 diabetes   Genitourinary: nightly    Culture the urine  Start intervention for early dementia  Add Cardizem CD and stop the digoxin to control supraventricular tachycardia  Follow-up in 6 weeks    Return in about 6 weeks (around 10/31/2019). Electronically signed by Colleen Duncan MD on 9/19/2019    Please note that this chart was generated using dragon dictation software. Although every effort was made to ensure the accuracy of this automated transcription, some errors in transcription may have occurred.

## 2019-09-19 NOTE — PROGRESS NOTES
Vaccine Information Sheet, \"Influenza - Inactivated\"  given to Jin Keys, or parent/legal guardian of  Jin Keys and verbalized understanding. Patient responses:    Have you ever had a reaction to a flu vaccine? No  Are you able to eat eggs without adverse effects? No  Do you have any current illness? No  Have you ever had Guillian Lincoln Syndrome? No    Flu vaccine given per order. Please see immunization tab.   Electronically signed by Uziel Razo MA on 9/19/2019 at 11:33 AM

## 2019-09-20 RX ORDER — DILTIAZEM HYDROCHLORIDE 120 MG/1
120 CAPSULE, COATED, EXTENDED RELEASE ORAL DAILY
Qty: 90 CAPSULE | Refills: 1 | Status: SHIPPED | OUTPATIENT
Start: 2019-09-20 | End: 2020-03-27

## 2019-09-20 RX ORDER — CIPROFLOXACIN 500 MG/1
500 TABLET, FILM COATED ORAL 2 TIMES DAILY
Qty: 20 TABLET | Refills: 0 | Status: SHIPPED | OUTPATIENT
Start: 2019-09-20 | End: 2019-09-30

## 2019-09-21 LAB
ORGANISM: ABNORMAL
URINE CULTURE, ROUTINE: ABNORMAL

## 2020-03-27 RX ORDER — DILTIAZEM HYDROCHLORIDE 120 MG/1
CAPSULE, COATED, EXTENDED RELEASE ORAL
Qty: 90 CAPSULE | Refills: 0 | Status: SHIPPED | OUTPATIENT
Start: 2020-03-27 | End: 2020-07-01 | Stop reason: SDUPTHER

## 2020-06-26 ENCOUNTER — OFFICE VISIT (OUTPATIENT)
Dept: FAMILY MEDICINE CLINIC | Age: 85
End: 2020-06-26
Payer: MEDICARE

## 2020-06-26 VITALS
HEART RATE: 59 BPM | SYSTOLIC BLOOD PRESSURE: 140 MMHG | OXYGEN SATURATION: 99 % | WEIGHT: 123 LBS | TEMPERATURE: 97.2 F | HEIGHT: 64 IN | BODY MASS INDEX: 21 KG/M2 | DIASTOLIC BLOOD PRESSURE: 72 MMHG

## 2020-06-26 PROCEDURE — 1036F TOBACCO NON-USER: CPT | Performed by: PHYSICIAN ASSISTANT

## 2020-06-26 PROCEDURE — 1090F PRES/ABSN URINE INCON ASSESS: CPT | Performed by: PHYSICIAN ASSISTANT

## 2020-06-26 PROCEDURE — 4130F TOPICAL PREP RX AOE: CPT | Performed by: PHYSICIAN ASSISTANT

## 2020-06-26 PROCEDURE — G8420 CALC BMI NORM PARAMETERS: HCPCS | Performed by: PHYSICIAN ASSISTANT

## 2020-06-26 PROCEDURE — G8427 DOCREV CUR MEDS BY ELIG CLIN: HCPCS | Performed by: PHYSICIAN ASSISTANT

## 2020-06-26 PROCEDURE — 99213 OFFICE O/P EST LOW 20 MIN: CPT | Performed by: PHYSICIAN ASSISTANT

## 2020-06-26 PROCEDURE — 1123F ACP DISCUSS/DSCN MKR DOCD: CPT | Performed by: PHYSICIAN ASSISTANT

## 2020-06-26 PROCEDURE — 4040F PNEUMOC VAC/ADMIN/RCVD: CPT | Performed by: PHYSICIAN ASSISTANT

## 2020-06-26 ASSESSMENT — PATIENT HEALTH QUESTIONNAIRE - PHQ9
2. FEELING DOWN, DEPRESSED OR HOPELESS: 0
SUM OF ALL RESPONSES TO PHQ9 QUESTIONS 1 & 2: 0
SUM OF ALL RESPONSES TO PHQ QUESTIONS 1-9: 0
SUM OF ALL RESPONSES TO PHQ QUESTIONS 1-9: 0
1. LITTLE INTEREST OR PLEASURE IN DOING THINGS: 0

## 2020-06-26 NOTE — PATIENT INSTRUCTIONS
to help the drops get into the ear. · It's important to keep the liquid in the ear canal for 3 to 5 minutes. When should you call for help? Call your doctor now or seek immediate medical care if:  · You have a new or higher fever. · You have new or worse pain, swelling, warmth, or redness around or behind your ear. · You have new or increasing pus or blood draining from your ear. Watch closely for changes in your health, and be sure to contact your doctor if:  · You are not getting better after 2 days (48 hours). Where can you learn more? Go to [email or web address] and sign in to your [employer] account. Enter C706 in the Wimdu box to learn more about \"Swimmer's Ear: Care Instructions. \"     If you do not have an account, please click on the \"Sign Up Now\" link. Current as of: July 29, 2019               Content Version: 12.5  © 6809-8754 Healthwise, Incorporated. Care instructions adapted under license by [facility] ([facility]). If you have questions about a medical condition or this instruction, always ask your healthcare professional. [address] any warranty or liability for your use of this information.

## 2020-06-26 NOTE — PROGRESS NOTES
6/26/2020    Ananda Saldaña    Chief Complaint   Patient presents with    Otitis Media     pt sates she has some bleeding into her left ear for the last 2 days       HPI  History was obtained from patient and her daughter. Morris Zhou is a 80 y.o. female who presents today with complaints of possible blood from the left ear yesterday morning and this morning. Denies pain. Gabe foul odor from the ear. Denies cold-like symptoms, fever, chills, or headaches. She admits to use of T-tips. No recent swimming. REVIEW OF SYMPTOMS    Constitutional:  Denies fever, chills, weight loss or weakness  ENT: Admits possible blood from left ear. See HPI. Denies cold like symptoms. Denies ear pain. Cardiovascular:  Denies chest pain, palpitations or swelling  Respiratory:  Denies cough or shortness of breath  Skin:  No rashes  Neurologic:  Denies headache    PAST MEDICAL HISTORY  Past Medical History:   Diagnosis Date    Arrhythmia     Arthritis     osteoarthritis cervical and lumbar spine    Atrophic vaginitis     Blood transfusion     CAD (coronary artery disease)     Cataracts, bilateral     CHF (congestive heart failure) (HCC)     COPD (chronic obstructive pulmonary disease) (HCC)     Depression     Essential hypertension     Fatty liver disease, nonalcoholic     GERD (gastroesophageal reflux disease)     H/O 24 hour EKG monitoring 9/15/2000    9/15/2000 -  Predominant rhythm is a sinus rhythm. No significant ectopy noted.  H/O cardiac catheterization 8/4/2005 8/4/2005 - Moderate degree of stenosis of the high diag, which is a heavily calcified vessel, however, there is a large ramus vessel supplying this same area as well. Rest of vessel is w/o any significant disease. Renals are w/o any significant stenosis.  H/O cardiovascular stress test 12/2/2010, 2/28/2008 12/2/2010 - Normal pattern of perfusion in all regions. LV is normal. No inducible myocardial ischemia. EF is 66%.  LVSF is normal. No ECG changes. Exercise capacity is normal.    H/O cardiovascular stress test 2/24/2015    cardiolite-normal, no ischemia, EF69%    H/O Doppler ultrasound 9/15/2000    9/15/2000 - Carotid - No hemodynamically significant stenosis.  H/O echocardiogram 12/2/2010, 9/22/2009 12/2/2010 - Difficult apical imaging due to patient COPD. LVSF is normal. EF = > 55%. Impaired LV impairment. Mild-Moderate MR. Mild TR.    H/O echocardiogram 7/15/14    EF 55-60%. No significant valvulopathy is seen.  Heart valve problem     2 leaky heart valves    History of Doppler ultrasound 10/31/2000    10/31/2000 - Peripheral - Normal study.  HX OTHER MEDICAL 1/14/2004 1/14/2004 - 48 hr Holter - Predominant rhythm is sinus rhythm. No significant ectopy is seen.     Hyperlipidemia     Mild tricuspid regurgitation     Mitral regurgitation     Nausea & vomiting     Near syncope 5/28/2019    Osteopenia     Pulmonary hypertension (HCC)     Pulmonary nodules     Rectal bleeding 12/21/2012    Supraventricular tachycardia (HCC)     Thyroid disease     Type 2 diabetes mellitus ([address]        FAMILY HISTORY  Family History   Problem Relation Age of Onset    Heart Failure Mother         CHF    Hypertension Mother     Cancer Father         Pancreatic    Hypertension Father     Heart Disease Sister         CMP    Cancer Brother         Bone    Other Brother         aneurysm    Coronary Art Dis Brother     Heart Attack Brother     Other Sister         Bone problems    Other Sister         infection    Cancer Sister         Ovarian    Cancer Son     Early Death Son         MVA    Coronary Art Dis Daughter     Hypertension Daughter        SOCIAL HISTORY  Social History     Socioeconomic History    Marital status:      Spouse name: None    Number of children: None    Years of education: None    Highest education level: None   Occupational History    Occupation: Retired   Social Needs    Financial resource strain: None    Food insecurity     Worry: None     Inability: None    Transportation needs     Medical: None     Non-medical: None   Tobacco Use    Smoking status: Never Smoker    Smokeless tobacco: Never Used   Substance and Sexual Activity    Alcohol use: Yes     Comment: Rare alcohol use.        CAFFEINE: 1 cup coffee daily    Drug use: No    Sexual activity: Yes     Partners: Male     Comment:    Lifestyle    Physical activity     Days per week: None     Minutes per session: None    Stress: None   Relationships    Social connections     Talks on phone: None     Gets together: None     Attends Sikh service: None     Active member of club or organization: None     Attends meetings of clubs or organizations: None     Relationship status: None    Intimate partner violence     Fear of current or ex partner: None     Emotionally abused: None     Physically abused: None     Forced sexual activity: None   Other Topics Concern    None   Social History Narrative    None        SURGICAL HISTORY  Past Surgical History:   Procedure Laterality Date    APPENDECTOMY      BREAST SURGERY      bilateral lumpectomy    CHOLECYSTECTOMY      COLECTOMY      also head of pancreas    COLONOSCOPY  12-21-12    polyp    DIAGNOSTIC CARDIAC CATH LAB PROCEDURE  8/2005    FRACTURE SURGERY      repair of fractured nose    HYSTERECTOMY      LALA/BSO    PANCREAS SURGERY  11/7/2006    Whipple Procedure    SKIN BIOPSY      moles from right shoulder, chin, left leg    THYROIDECTOMY, PARTIAL      THYROIDECTOMY, PARTIAL  1999    TONSILLECTOMY         CURRENT MEDICATIONS  Current Outpatient Medications   Medication Sig Dispense Refill    ciprofloxacin-dexamethasone (CIPRODEX) 0.3-0.1 % otic suspension Place 4 drops into the left ear 2 times daily for 7 days 7.5 mL 0    dilTIAZem (CARDIZEM CD) 120 MG extended release capsule TAKE ONE CAPSULE BY MOUTH DAILY 90 capsule 0     No current facility-administered plan and is in agreement. Patient will call with  worsening of symptoms, questions or concerns. Please note that this chart was generated using dragon dictation software. Although every effort was made to ensure the accuracy of this automated transcription, some errors in transcription may have occurred.     Electronically signed by Shelley Vanegas PA-C on 6/26/2020

## 2020-07-01 ENCOUNTER — OFFICE VISIT (OUTPATIENT)
Dept: FAMILY MEDICINE CLINIC | Age: 85
End: 2020-07-01
Payer: MEDICARE

## 2020-07-01 VITALS
OXYGEN SATURATION: 99 % | SYSTOLIC BLOOD PRESSURE: 142 MMHG | DIASTOLIC BLOOD PRESSURE: 72 MMHG | BODY MASS INDEX: 20.45 KG/M2 | WEIGHT: 119.8 LBS | HEIGHT: 64 IN | HEART RATE: 60 BPM | TEMPERATURE: 97.4 F

## 2020-07-01 PROCEDURE — 99214 OFFICE O/P EST MOD 30 MIN: CPT | Performed by: PHYSICIAN ASSISTANT

## 2020-07-01 RX ORDER — ASPIRIN 81 MG/1
81 TABLET, CHEWABLE ORAL DAILY
COMMUNITY
End: 2021-10-22

## 2020-07-01 RX ORDER — DILTIAZEM HYDROCHLORIDE 180 MG/1
CAPSULE, COATED, EXTENDED RELEASE ORAL
Qty: 90 CAPSULE | Refills: 1 | Status: SHIPPED | OUTPATIENT
Start: 2020-07-01 | End: 2021-10-22

## 2020-07-01 ASSESSMENT — ENCOUNTER SYMPTOMS
CHEST TIGHTNESS: 0
SHORTNESS OF BREATH: 0

## 2020-07-01 NOTE — PROGRESS NOTES
7/1/2020    Trent Landa    Chief Complaint   Patient presents with    Other     med refills , no problems        HPI  History was obtained from the patient. Chey Valderrama is a 80 y.o. female who presents today for routine visit. Patient has history of hypertension. Notes that readings at home have fluctuated has had some higher readings in the 158U systolic. She states she is compliant with her medication. Patient came with a letter from her daughter who is worried about patient's memory. Patient notes that she seems to be more forgetful but has not noticed any major changes. Has history of type 2 diabetes and has been diet controlled, due for reevaluation on blood work. Has history of coronary artery disease, denies having any chest pain or dyspnea on exertion. REVIEW OF SYMPTOMS    Review of Systems   Constitutional: Negative for activity change, chills and fever. Respiratory: Negative for chest tightness and shortness of breath. Cardiovascular: Negative for chest pain. Neurological: Negative for dizziness, light-headedness and headaches. SOCIAL HISTORY  Social History     Socioeconomic History    Marital status:      Spouse name: None    Number of children: None    Years of education: None    Highest education level: None   Occupational History    Occupation: Retired   Social Needs    Financial resource strain: None    Food insecurity     Worry: None     Inability: None    Transportation needs     Medical: None     Non-medical: None   Tobacco Use    Smoking status: Never Smoker    Smokeless tobacco: Never Used   Substance and Sexual Activity    Alcohol use: Yes     Comment: Rare alcohol use.        CAFFEINE: 1 cup coffee daily    Drug use: No    Sexual activity: Yes     Partners: Male     Comment:    Lifestyle    Physical activity     Days per week: None     Minutes per session: None    Stress: None   Relationships    Social connections     Talks on phone: None     Gets together: None     Attends [employer] service: None     Active member of club or organization: None     Attends meetings of clubs or organizations: None     Relationship status: None    Intimate partner violence     Fear of current or ex partner: None     Emotionally abused: None     Physically abused: None     Forced sexual activity: None   Other Topics Concern    None   Social History Narrative    None        CURRENT MEDICATIONS  Current Outpatient Medications   Medication Sig Dispense Refill    dilTIAZem (CARDIZEM CD) 180 MG extended release capsule TAKE ONE CAPSULE BY MOUTH DAILY 90 capsule 1    aspirin 81 MG chewable tablet Take 81 mg by mouth daily      ciprofloxacin-dexamethasone (CIPRODEX) 0.3-0.1 % otic suspension Place 4 drops into the left ear 2 times daily for 7 days 7.5 mL 0     No current facility-administered medications for this visit. PHYSICAL EXAM    BP (!) 142/72   Pulse 60   Temp 97.4 °F (36.3 °C)   Ht 5' 4\" (1.626 m)   Wt 119 lb 12.8 oz (54.3 kg)   SpO2 99%   BMI 20.56 kg/m²     Physical Exam  Vitals signs and nursing note reviewed. Constitutional:       Appearance: Normal appearance. HENT:      Head: Normocephalic and atraumatic. Eyes:      Extraocular Movements: Extraocular movements intact. Cardiovascular:      Rate and Rhythm: Normal rate and regular rhythm. Pulses: Normal pulses. Heart sounds: Normal heart sounds. Pulmonary:      Effort: Pulmonary effort is normal.      Breath sounds: Normal breath sounds. Neurological:      General: No focal deficit present. Mental Status: She is alert and oriented to person, place, and time. Psychiatric:         Mood and Affect: Mood normal.         Behavior: Behavior normal.         ASSESSMENT & PLAN    1. Essential hypertension  Increasing diltiazem to 180 mg, patient to monitor blood pressures and pulses at home and give us call report in a couple of weeks.   - dilTIAZem (CARDIZEM CD) 180 MG extended release capsule; TAKE ONE CAPSULE BY MOUTH DAILY  Dispense: 90 capsule; Refill: 1    2. Memory loss  MMSE score was 26 out of 30 which is not too terrible. Patient had an MRI of her brain done a little over a year ago which was completely normal.  We will start work-up with blood work as detailed out below and if that does not show anything correctable consider referral to neurology for evaluation per daughter's request.  - Vitamin B12; Future  - CBC Auto Differential; Future  - Comprehensive Metabolic Panel; Future  - TSH without Reflex; Future    3. Type 2 diabetes mellitus without complication, without long-term current use of insulin ([facility] [address]  Reevaluate on blood work  - Hemoglobin A1C; Future    4. Coronary artery disease involving native coronary artery of native heart without angina pectoris  Currently well controlled, patient without any chest pain. Electronically signed by Sanjiv Bazan PA-C on 7/1/2020    Please note that this chart was generated using dragon dictation software. Although every effort was made to ensure the accuracy of this automated transcription, some errors in transcription may have occurred.

## 2020-10-14 ENCOUNTER — TELEPHONE (OUTPATIENT)
Dept: FAMILY MEDICINE CLINIC | Age: 85
End: 2020-10-14

## 2020-10-14 NOTE — LETTER
[address] [place] [address]  Phone: [phone]  Fax: [phone]    Chaim Cloud MD        October 15, 2020     Patient: Jared Greenberg   YOB: 1933   Date of Visit: 10/14/2020       To Whom It May Concern: It is my medical opinion that Jared Greenberg requires a disability parking placard. Duration of need: 5 years    If you have any questions or concerns, please don't hesitate to call.     Sincerely,        Chaim Cloud MD

## 2021-02-05 ENCOUNTER — OFFICE VISIT (OUTPATIENT)
Dept: PRIMARY CARE CLINIC | Age: 86
End: 2021-02-05
Payer: MEDICARE

## 2021-02-05 VITALS
HEART RATE: 62 BPM | TEMPERATURE: 96.4 F | BODY MASS INDEX: 21.87 KG/M2 | WEIGHT: 127.4 LBS | DIASTOLIC BLOOD PRESSURE: 72 MMHG | SYSTOLIC BLOOD PRESSURE: 130 MMHG | OXYGEN SATURATION: 100 %

## 2021-02-05 DIAGNOSIS — R35.0 URINARY FREQUENCY: Primary | ICD-10-CM

## 2021-02-05 DIAGNOSIS — R39.15 URINARY URGENCY: ICD-10-CM

## 2021-02-05 DIAGNOSIS — R82.90 BAD ODOR OF URINE: ICD-10-CM

## 2021-02-05 LAB
BILIRUBIN, POC: NEGATIVE
BLOOD URINE, POC: ABNORMAL
CLARITY, POC: ABNORMAL
COLOR, POC: YELLOW
GLUCOSE URINE, POC: NEGATIVE
KETONES, POC: NEGATIVE
LEUKOCYTE EST, POC: ABNORMAL
NITRITE, POC: POSITIVE
PH, POC: 5.5
PROTEIN, POC: ABNORMAL
SPECIFIC GRAVITY, POC: 1.02
UROBILINOGEN, POC: ABNORMAL

## 2021-02-05 PROCEDURE — G8420 CALC BMI NORM PARAMETERS: HCPCS | Performed by: PHYSICIAN ASSISTANT

## 2021-02-05 PROCEDURE — 81002 URINALYSIS NONAUTO W/O SCOPE: CPT | Performed by: PHYSICIAN ASSISTANT

## 2021-02-05 PROCEDURE — 99213 OFFICE O/P EST LOW 20 MIN: CPT | Performed by: PHYSICIAN ASSISTANT

## 2021-02-05 PROCEDURE — G8484 FLU IMMUNIZE NO ADMIN: HCPCS | Performed by: PHYSICIAN ASSISTANT

## 2021-02-05 PROCEDURE — 1090F PRES/ABSN URINE INCON ASSESS: CPT | Performed by: PHYSICIAN ASSISTANT

## 2021-02-05 PROCEDURE — 4040F PNEUMOC VAC/ADMIN/RCVD: CPT | Performed by: PHYSICIAN ASSISTANT

## 2021-02-05 PROCEDURE — 1123F ACP DISCUSS/DSCN MKR DOCD: CPT | Performed by: PHYSICIAN ASSISTANT

## 2021-02-05 PROCEDURE — G8427 DOCREV CUR MEDS BY ELIG CLIN: HCPCS | Performed by: PHYSICIAN ASSISTANT

## 2021-02-05 PROCEDURE — 1036F TOBACCO NON-USER: CPT | Performed by: PHYSICIAN ASSISTANT

## 2021-02-05 RX ORDER — CIPROFLOXACIN 500 MG/1
500 TABLET, FILM COATED ORAL 2 TIMES DAILY
Qty: 14 TABLET | Refills: 0 | Status: SHIPPED | OUTPATIENT
Start: 2021-02-05 | End: 2022-05-10 | Stop reason: SDUPTHER

## 2021-02-05 NOTE — PATIENT INSTRUCTIONS
Patient Education        Urinary Tract Infection in Women: Care Instructions  Your Care Instructions     A urinary tract infection, or UTI, is a general term for an infection anywhere between the kidneys and the urethra (where urine comes out). Most UTIs are bladder infections. They often cause pain or burning when you urinate. UTIs are caused by bacteria and can be cured with antibiotics. Be sure to complete your treatment so that the infection goes away. Follow-up care is a key part of your treatment and safety. Be sure to make and go to all appointments, and call your doctor if you are having problems. It's also a good idea to know your test results and keep a list of the medicines you take. How can you care for yourself at home? · Take your antibiotics as directed. Do not stop taking them just because you feel better. You need to take the full course of antibiotics. · Drink extra water and other fluids for the next day or two. This may help wash out the bacteria that are causing the infection. (If you have kidney, heart, or liver disease and have to limit fluids, talk with your doctor before you increase your fluid intake.)  · Avoid drinks that are carbonated or have caffeine. They can irritate the bladder. · Urinate often. Try to empty your bladder each time. · To relieve pain, take a hot bath or lay a heating pad set on low over your lower belly or genital area. Never go to sleep with a heating pad in place. To prevent UTIs  · Drink plenty of water each day. This helps you urinate often, which clears bacteria from your system. (If you have kidney, heart, or liver disease and have to limit fluids, talk with your doctor before you increase your fluid intake.)  · Urinate when you need to. · Urinate right after you have sex. · Change sanitary pads often. · Avoid douches, bubble baths, feminine hygiene sprays, and other feminine hygiene products that have deodorants.   · After going to the bathroom, wipe from front to back. When should you call for help? Call your doctor now or seek immediate medical care if:    · Symptoms such as fever, chills, nausea, or vomiting get worse or appear for the first time.     · You have new pain in your back just below your rib cage. This is called flank pain.     · There is new blood or pus in your urine.     · You have any problems with your antibiotic medicine. Watch closely for changes in your health, and be sure to contact your doctor if:    · You are not getting better after taking an antibiotic for 2 days.     · Your symptoms go away but then come back. Where can you learn more? Go to [email or web address] org and sign in to your [employer] account. Enter R451 in the [employer] box to learn more about \"Urinary Tract Infection in Women: Care Instructions. \"     If you do not have an account, please click on the \"Sign Up Now\" link. Current as of: June 29, 2020               Content Version: 12.6  © 2006-2020 [employer]. Care instructions adapted under license by [facility] ([facility]). If you have questions about a medical condition or this instruction, always ask your healthcare professional. [place] [address] any warranty or liability for your use of this information.

## 2021-02-05 NOTE — PROGRESS NOTES
2/5/2021    [facility]    Chief Complaint   Patient presents with    Urinary Tract Infection     roughly 4 days       HPI  History was obtained from patient and her daughter. Otoniel Almodovar is a 80 y.o. female who presents today with complaints of urinary frequency, urgency, and strong urine odor x 4 days. He denies dysuria, hematuria, vaginal discharge or bleeding, flank pain, nausea, vomiting, fever or chills. PAST MEDICAL HISTORY  Past Medical History:   Diagnosis Date    Arrhythmia     Arthritis     osteoarthritis cervical and lumbar spine    Atrophic vaginitis     Blood transfusion     CAD (coronary artery disease)     Cataracts, bilateral     CHF (congestive heart failure) (HCC)     COPD (chronic obstructive pulmonary disease) (HCC)     Depression     Essential hypertension     Fatty liver disease, nonalcoholic     GERD (gastroesophageal reflux disease)     H/O 24 hour EKG monitoring 9/15/2000    9/15/2000 -  Predominant rhythm is a sinus rhythm. No significant ectopy noted.  H/O cardiac catheterization 8/4/2005 8/4/2005 - Moderate degree of stenosis of the high diag, which is a heavily calcified vessel, however, there is a large ramus vessel supplying this same area as well. Rest of vessel is w/o any significant disease. Renals are w/o any significant stenosis.  H/O cardiovascular stress test 12/2/2010, 2/28/2008 12/2/2010 - Normal pattern of perfusion in all regions. LV is normal. No inducible myocardial ischemia. EF is 66%. LVSF is normal. No ECG changes. Exercise capacity is normal.    H/O cardiovascular stress test 2/24/2015    cardiolite-normal, no ischemia, EF69%    H/O Doppler ultrasound 9/15/2000    9/15/2000 - Carotid - No hemodynamically significant stenosis.  H/O echocardiogram 12/2/2010, 9/22/2009 12/2/2010 - Difficult apical imaging due to patient COPD. LVSF is normal. EF = > 55%. Impaired LV impairment. Mild-Moderate MR. Mild TR.  H/O echocardiogram 7/15/14    EF 55-60%. No significant valvulopathy is seen.  Heart valve problem     2 leaky heart valves    History of Doppler ultrasound 10/31/2000    10/31/2000 - Peripheral - Normal study.  HX OTHER MEDICAL 1/14/2004 1/14/2004 - 48 hr Holter - Predominant rhythm is sinus rhythm. No significant ectopy is seen.  Hyperlipidemia     Mild tricuspid regurgitation     Mitral regurgitation     Nausea & vomiting     Near syncope 5/28/2019    Osteopenia     Pulmonary hypertension (HCC)     Pulmonary nodules     Rectal bleeding 12/21/2012    Supraventricular tachycardia (HCC)     Thyroid disease     Type 2 diabetes mellitus ([facility] [address]        FAMILY HISTORY  Family History   Problem Relation Age of Onset    Heart Failure Mother         CHF    Hypertension Mother     Cancer Father         Pancreatic    Hypertension Father     Heart Disease Sister         CMP    Cancer Brother         Bone    Other Brother         aneurysm    Coronary Art Dis Brother     Heart Attack Brother     Other Sister         Bone problems    Other Sister         infection    Cancer Sister         Ovarian    Cancer Son     Early Death Son         MVA    Coronary Art Dis Daughter     Hypertension Daughter        SOCIAL HISTORY  Social History     Socioeconomic History    Marital status:      Spouse name: None    Number of children: None    Years of education: None    Highest education level: None   Occupational History    Occupation: Retired   Social Needs    Financial resource strain: None    Food insecurity     Worry: None     Inability: None    Transportation needs     Medical: None     Non-medical: None   Tobacco Use    Smoking status: Never Smoker    Smokeless tobacco: Never Used   Substance and Sexual Activity    Alcohol use: Yes     Comment: Rare alcohol use.        CAFFEINE: 1 cup coffee daily    Drug use: No    Sexual activity: Yes     Partners: Male Comment:    Lifestyle    Physical activity     Days per week: None     Minutes per session: None    Stress: None   Relationships    Social connections     Talks on phone: None     Gets together: None     Attends Religion service: None     Active member of club or organization: None     Attends meetings of clubs or organizations: None     Relationship status: None    Intimate partner violence     Fear of current or ex partner: None     Emotionally abused: None     Physically abused: None     Forced sexual activity: None   Other Topics Concern    None   Social History Narrative    None        SURGICAL HISTORY  Past Surgical History:   Procedure Laterality Date    APPENDECTOMY      BREAST SURGERY      bilateral lumpectomy    CHOLECYSTECTOMY      COLECTOMY      also head of pancreas    COLONOSCOPY  12-21-12    polyp    DIAGNOSTIC CARDIAC CATH LAB PROCEDURE  8/2005    FRACTURE SURGERY      repair of fractured nose    HYSTERECTOMY      LALA/BSO    PANCREAS SURGERY  11/7/2006    Whipple Procedure    SKIN BIOPSY      moles from right shoulder, chin, left leg    THYROIDECTOMY, PARTIAL      THYROIDECTOMY, PARTIAL  1999    TONSILLECTOMY         CURRENT MEDICATIONS  Current Outpatient Medications   Medication Sig Dispense Refill    ciprofloxacin (CIPRO) 500 MG tablet Take 1 tablet by mouth 2 times daily for 7 days 14 tablet 0    dilTIAZem (CARDIZEM CD) 180 MG extended release capsule TAKE ONE CAPSULE BY MOUTH DAILY (Patient not taking: Reported on 2/5/2021) 90 capsule 1    aspirin 81 MG chewable tablet Take 81 mg by mouth daily       No current facility-administered medications for this visit.         ALLERGIES  Allergies   Allergen Reactions    Sulfa Antibiotics Anaphylaxis    Nitrofuran Derivatives     Quinapril Hcl      Cough       PHYSICAL EXAM    /72   Pulse 62   Temp 96.4 °F (35.8 °C) (Infrared)   Wt 127 lb 6.4 oz (57.8 kg)   SpO2 100%   BMI 21.87 kg/m² Constitutional:  Well developed, well nourished  HENT:  Normocephalic, atraumatic, bilateral external ears normal  Eyes: conjunctiva normal, no discharge, no scleral icterus  Cardiovascular:  Normal heart rate, normal rhythm, no murmurs, gallops or rubs  Thorax & Lungs:  Normal breath sounds, no respiratory distress, no wheezing  Abdomen:  Soft, mild suprapubic tenderness to palpation, no masses, no pulsatile masses, no distention, bowel sounds normal  Skin:  Warm, dry, no erythema, no rash  Back:  No tenderness, No CVA tenderness  Neurologic:  Alert & oriented   Psychiatric:  Affect normal, mood normal    ASSESSMENT & PLAN    Adriana Albarran was seen today for urinary tract infection. Diagnoses and all orders for this visit:    Urinary frequency  -     ciprofloxacin (CIPRO) 500 MG tablet; Take 1 tablet by mouth 2 times daily for 7 days  -     POCT Urinalysis no Micro  -     Culture, Urine    Urinary urgency  -     ciprofloxacin (CIPRO) 500 MG tablet; Take 1 tablet by mouth 2 times daily for 7 days  -     POCT Urinalysis no Micro  -     Culture, Urine    Bad odor of urine  -     ciprofloxacin (CIPRO) 500 MG tablet; Take 1 tablet by mouth 2 times daily for 7 days  -     POCT Urinalysis no Micro  -     Culture, Urine    UA positive for leukocytes, blood, nitrates. We will treat for presumed urinary tract infection with Cipro 500 mg twice daily for 7 days. Urine culture pending and we will call with results when available. Patient was encouraged to drink plenty of water. She was counseled on signs/symptoms that would warrant reevaluation at the emergency room. There are no discontinued medications. No follow-ups on file. Plan of care reviewed with patient who verbalizes understanding and wishes to continue. Patient verbalizes understanding with the above plan and is in agreement. Patient will call with  worsening of symptoms, questions or concerns. Please note that this chart was generated using dragon dictation software. Although every effort was made to ensure the accuracy of this automated transcription, some errors in transcription may have occurred.     Electronically signed by Kerri Chaudhari PA-C on 2/5/2021

## 2021-02-05 NOTE — PROGRESS NOTES
2/5/2021    [facility]    Chief Complaint   Patient presents with    Urinary Tract Infection     roughly 4 days       HPI  History was obtained from patient and her daughter. Cass Hall is a 80 y.o. female who presents today with complaints of urinary frequency, urgency, and strong urine odor for 4 days. Patient denies dysuria, hematuria, vaginal discharge or bleeding, nausea, vomiting, fever, chills, flank pain. PAST MEDICAL HISTORY  Past Medical History:   Diagnosis Date    Arrhythmia     Arthritis     osteoarthritis cervical and lumbar spine    Atrophic vaginitis     Blood transfusion     CAD (coronary artery disease)     Cataracts, bilateral     CHF (congestive heart failure) (HCC)     COPD (chronic obstructive pulmonary disease) (HCC)     Depression     Essential hypertension     Fatty liver disease, nonalcoholic     GERD (gastroesophageal reflux disease)     H/O 24 hour EKG monitoring 9/15/2000    9/15/2000 -  Predominant rhythm is a sinus rhythm. No significant ectopy noted.  H/O cardiac catheterization 8/4/2005 8/4/2005 - Moderate degree of stenosis of the high diag, which is a heavily calcified vessel, however, there is a large ramus vessel supplying this same area as well. Rest of vessel is w/o any significant disease. Renals are w/o any significant stenosis.  H/O cardiovascular stress test 12/2/2010, 2/28/2008 12/2/2010 - Normal pattern of perfusion in all regions. LV is normal. No inducible myocardial ischemia. EF is 66%. LVSF is normal. No ECG changes. Exercise capacity is normal.    H/O cardiovascular stress test 2/24/2015    cardiolite-normal, no ischemia, EF69%    H/O Doppler ultrasound 9/15/2000    9/15/2000 - Carotid - No hemodynamically significant stenosis.  H/O echocardiogram 12/2/2010, 9/22/2009 12/2/2010 - Difficult apical imaging due to patient COPD. LVSF is normal. EF = > 55%. Impaired LV impairment. Mild-Moderate MR.  Mild TR.    H/O echocardiogram 7/15/14    EF 55-60%. No significant valvulopathy is seen.  Heart valve problem     2 leaky heart valves    History of Doppler ultrasound 10/31/2000    10/31/2000 - Peripheral - Normal study.  HX OTHER MEDICAL 1/14/2004 1/14/2004 - 48 hr Holter - Predominant rhythm is sinus rhythm. No significant ectopy is seen.  Hyperlipidemia     Mild tricuspid regurgitation     Mitral regurgitation     Nausea & vomiting     Near syncope 5/28/2019    Osteopenia     Pulmonary hypertension (HCC)     Pulmonary nodules     Rectal bleeding 12/21/2012    Supraventricular tachycardia (HCC)     Thyroid disease     Type 2 diabetes mellitus ([address]        FAMILY HISTORY  Family History   Problem Relation Age of Onset    Heart Failure Mother         CHF    Hypertension Mother     Cancer Father         Pancreatic    Hypertension Father     Heart Disease Sister         CMP    Cancer Brother         Bone    Other Brother         aneurysm    Coronary Art Dis Brother     Heart Attack Brother     Other Sister         Bone problems    Other Sister         infection    Cancer Sister         Ovarian    Cancer Son     Early Death Son         MVA    Coronary Art Dis Daughter     Hypertension Daughter        SOCIAL HISTORY  Social History     Socioeconomic History    Marital status:      Spouse name: None    Number of children: None    Years of education: None    Highest education level: None   Occupational History    Occupation: Retired   Social Needs    Financial resource strain: None    Food insecurity     Worry: None     Inability: None    Transportation needs     Medical: None     Non-medical: None   Tobacco Use    Smoking status: Never Smoker    Smokeless tobacco: Never Used   Substance and Sexual Activity    Alcohol use: Yes     Comment: Rare alcohol use.        CAFFEINE: 1 cup coffee daily    Drug use: No    Sexual activity: Yes     Partners: Male     Comment:    Lifestyle    Physical activity     Days per week: None     Minutes per session: None    Stress: None   Relationships    Social connections     Talks on phone: None     Gets together: None     Attends Jehovah's witness service: None     Active member of club or organization: None     Attends meetings of clubs or organizations: None     Relationship status: None    Intimate partner violence     Fear of current or ex partner: None     Emotionally abused: None     Physically abused: None     Forced sexual activity: None   Other Topics Concern    None   Social History Narrative    None        SURGICAL HISTORY  Past Surgical History:   Procedure Laterality Date    APPENDECTOMY      BREAST SURGERY      bilateral lumpectomy    CHOLECYSTECTOMY      COLECTOMY      also head of pancreas    COLONOSCOPY  12-21-12    polyp    DIAGNOSTIC CARDIAC CATH LAB PROCEDURE  8/2005    FRACTURE SURGERY      repair of fractured nose    HYSTERECTOMY      LALA/BSO    PANCREAS SURGERY  11/7/2006    Whipple Procedure    SKIN BIOPSY      moles from right shoulder, chin, left leg    THYROIDECTOMY, PARTIAL      THYROIDECTOMY, PARTIAL  1999    TONSILLECTOMY         CURRENT MEDICATIONS  Current Outpatient Medications   Medication Sig Dispense Refill    ciprofloxacin (CIPRO) 500 MG tablet Take 1 tablet by mouth 2 times daily for 7 days 14 tablet 0    dilTIAZem (CARDIZEM CD) 180 MG extended release capsule TAKE ONE CAPSULE BY MOUTH DAILY (Patient not taking: Reported on 2/5/2021) 90 capsule 1    aspirin 81 MG chewable tablet Take 81 mg by mouth daily       No current facility-administered medications for this visit.         ALLERGIES  Allergies   Allergen Reactions    Sulfa Antibiotics Anaphylaxis    Nitrofuran Derivatives     Quinapril Hcl      Cough       PHYSICAL EXAM    /72   Pulse 62   Temp 96.4 °F (35.8 °C) (Infrared)   Wt 127 lb 6.4 oz (57.8 kg)   SpO2 100%   BMI 21.87 kg/m²     Constitutional:  Well developed, well nourished  HENT: Normocephalic, atraumatic, bilateral external ears normal  Eyes:  conjunctiva normal, no discharge, no scleral icterus  Cardiovascular:  Normal heart rate, normal rhythm, no murmurs, gallops or rubs  Thorax & Lungs:  Normal breath sounds, no respiratory distress, no wheezing  Abdomen:  Soft, mild suprapubic tenderness to palpation, no masses, no pulsatile masses, not distended, bowel sounds normal  Skin:  Warm, dry, no erythema, no rash  Back:  No tenderness, No CVA tenderness  Neurologic:  Alert & oriented  Psychiatric:  Affect normal, mood normal    ASSESSMENT & PLAN    Lis Wing was seen today for urinary tract infection. Diagnoses and all orders for this visit:    Urinary frequency  -     ciprofloxacin (CIPRO) 500 MG tablet; Take 1 tablet by mouth 2 times daily for 7 days  -     POCT Urinalysis no Micro  -     Culture, Urine    Urinary urgency  -     ciprofloxacin (CIPRO) 500 MG tablet; Take 1 tablet by mouth 2 times daily for 7 days  -     POCT Urinalysis no Micro  -     Culture, Urine    Bad odor of urine  -     ciprofloxacin (CIPRO) 500 MG tablet; Take 1 tablet by mouth 2 times daily for 7 days  -     POCT Urinalysis no Micro  -     Culture, Urine    UA was positive for leukocytes, blood, nitrates. We will treat for presumed urinary tract infection with Cipro 500 mg twice daily for 7 days. Urine culture pending and we will call with results once available. Patient was encouraged to rest and stay well-hydrated. We discussed signs/symptoms that would warrant need for ED visit. There are no discontinued medications. No follow-ups on file. Plan of care reviewed with patient who verbalizes understanding and wishes to continue. Patient verbalizes understanding with the above plan and is in agreement. Patient will call with  worsening of symptoms, questions or concerns. Please note that this chart was generated using dragon dictation software.   Although every effort was made to ensure the accuracy of this automated transcription, some errors in transcription may have occurred.     Electronically signed by Anna Rubalcava PA-C on 2/5/2021

## 2021-02-08 LAB
ORGANISM: ABNORMAL
URINE CULTURE, ROUTINE: ABNORMAL

## 2021-04-05 DIAGNOSIS — L23.7 POISON IVY DERMATITIS: Primary | ICD-10-CM

## 2021-04-05 RX ORDER — PREDNISONE 20 MG/1
TABLET ORAL
Qty: 42 TABLET | Refills: 0 | Status: SHIPPED | OUTPATIENT
Start: 2021-04-05 | End: 2021-04-26

## 2021-10-22 ENCOUNTER — OFFICE VISIT (OUTPATIENT)
Dept: FAMILY MEDICINE CLINIC | Age: 86
End: 2021-10-22
Payer: MEDICARE

## 2021-10-22 VITALS
TEMPERATURE: 98.2 F | OXYGEN SATURATION: 97 % | SYSTOLIC BLOOD PRESSURE: 132 MMHG | HEIGHT: 64 IN | HEART RATE: 70 BPM | WEIGHT: 143 LBS | BODY MASS INDEX: 24.41 KG/M2 | DIASTOLIC BLOOD PRESSURE: 84 MMHG

## 2021-10-22 DIAGNOSIS — E11.9 TYPE 2 DIABETES MELLITUS WITHOUT COMPLICATION, WITHOUT LONG-TERM CURRENT USE OF INSULIN (HCC): ICD-10-CM

## 2021-10-22 DIAGNOSIS — I10 ESSENTIAL HYPERTENSION: ICD-10-CM

## 2021-10-22 DIAGNOSIS — R30.0 DYSURIA: Primary | ICD-10-CM

## 2021-10-22 LAB
BILIRUBIN, POC: NEGATIVE
BLOOD URINE, POC: ABNORMAL
CLARITY, POC: ABNORMAL
COLOR, POC: ABNORMAL
GLUCOSE URINE, POC: NEGATIVE
KETONES, POC: NEGATIVE
LEUKOCYTE EST, POC: ABNORMAL
NITRITE, POC: POSITIVE
PH, POC: 5
PROTEIN, POC: ABNORMAL
SPECIFIC GRAVITY, POC: 1.02
UROBILINOGEN, POC: 0.2

## 2021-10-22 PROCEDURE — 1123F ACP DISCUSS/DSCN MKR DOCD: CPT | Performed by: FAMILY MEDICINE

## 2021-10-22 PROCEDURE — G8484 FLU IMMUNIZE NO ADMIN: HCPCS | Performed by: FAMILY MEDICINE

## 2021-10-22 PROCEDURE — 81002 URINALYSIS NONAUTO W/O SCOPE: CPT | Performed by: FAMILY MEDICINE

## 2021-10-22 PROCEDURE — G8427 DOCREV CUR MEDS BY ELIG CLIN: HCPCS | Performed by: FAMILY MEDICINE

## 2021-10-22 PROCEDURE — 4040F PNEUMOC VAC/ADMIN/RCVD: CPT | Performed by: FAMILY MEDICINE

## 2021-10-22 PROCEDURE — 1090F PRES/ABSN URINE INCON ASSESS: CPT | Performed by: FAMILY MEDICINE

## 2021-10-22 PROCEDURE — 99214 OFFICE O/P EST MOD 30 MIN: CPT | Performed by: FAMILY MEDICINE

## 2021-10-22 PROCEDURE — G8420 CALC BMI NORM PARAMETERS: HCPCS | Performed by: FAMILY MEDICINE

## 2021-10-22 PROCEDURE — 1036F TOBACCO NON-USER: CPT | Performed by: FAMILY MEDICINE

## 2021-10-22 RX ORDER — CIPROFLOXACIN 250 MG/1
250 TABLET, FILM COATED ORAL 2 TIMES DAILY
Qty: 20 TABLET | Refills: 0 | Status: SHIPPED | OUTPATIENT
Start: 2021-10-22 | End: 2021-11-01

## 2021-10-22 SDOH — ECONOMIC STABILITY: FOOD INSECURITY: WITHIN THE PAST 12 MONTHS, YOU WORRIED THAT YOUR FOOD WOULD RUN OUT BEFORE YOU GOT MONEY TO BUY MORE.: NEVER TRUE

## 2021-10-22 SDOH — ECONOMIC STABILITY: FOOD INSECURITY: WITHIN THE PAST 12 MONTHS, THE FOOD YOU BOUGHT JUST DIDN'T LAST AND YOU DIDN'T HAVE MONEY TO GET MORE.: NEVER TRUE

## 2021-10-22 ASSESSMENT — PATIENT HEALTH QUESTIONNAIRE - PHQ9
SUM OF ALL RESPONSES TO PHQ QUESTIONS 1-9: 0
SUM OF ALL RESPONSES TO PHQ9 QUESTIONS 1 & 2: 0
SUM OF ALL RESPONSES TO PHQ QUESTIONS 1-9: 0
1. LITTLE INTEREST OR PLEASURE IN DOING THINGS: 0
2. FEELING DOWN, DEPRESSED OR HOPELESS: 0
SUM OF ALL RESPONSES TO PHQ QUESTIONS 1-9: 0

## 2021-10-22 ASSESSMENT — SOCIAL DETERMINANTS OF HEALTH (SDOH): HOW HARD IS IT FOR YOU TO PAY FOR THE VERY BASICS LIKE FOOD, HOUSING, MEDICAL CARE, AND HEATING?: NOT HARD AT ALL

## 2021-10-22 NOTE — PROGRESS NOTES
10/22/2021    Scott Potts    Chief Complaint   Patient presents with    Urinary Tract Infection     discomfort while urinating, urine is discolored, lbp       HPI    Milind Caldera is a 80 y.o. female who presents today with follow-up. Patient has a history of UTIs with recurrent symptoms. Patient's last UTI was over a year ago. Patient has dysuria. She had some lightheadedness yesterday. Patient believes her diabetes is well controlled with nutrition alone. Patient has not been checking her blood sugars. Her last A1c was over 1 year ago. Our lab is closed at this hour. Patient's blood pressure appears appropriate for age and diabetes. No signs of sepsis right now. REVIEW OF SYSTEMS    Constitutional: See above  Eyes:  no photophobia or discharge  ENT:  no sore throat or ear pain  Cardiovascular:  Denies chest pain, palpitations or swelling  Respiratory:  Denies cough or shortness of breath  GI:  no abdominal pain, nausea, vomiting, or diarrhea  Musculoskeletal:  no back pain  Skin:  No rashes  Neurologic:  no headache, focal weakness, or sensory changes  Endocrine: See above      PAST MEDICAL HISTORY  Past Medical History:   Diagnosis Date    Arrhythmia     Arthritis     osteoarthritis cervical and lumbar spine    Atrophic vaginitis     Blood transfusion     CAD (coronary artery disease)     Cataracts, bilateral     CHF (congestive heart failure) (HCC)     COPD (chronic obstructive pulmonary disease) (HCC)     Depression     Essential hypertension     Fatty liver disease, nonalcoholic     GERD (gastroesophageal reflux disease)     H/O 24 hour EKG monitoring 9/15/2000    9/15/2000 -  Predominant rhythm is a sinus rhythm. No significant ectopy noted.  H/O cardiac catheterization 8/4/2005 8/4/2005 - Moderate degree of stenosis of the high diag, which is a heavily calcified vessel, however, there is a large ramus vessel supplying this same area as well.  Rest of vessel is w/o any significant disease. Renals are w/o any significant stenosis.  H/O cardiovascular stress test 12/2/2010, 2/28/2008 12/2/2010 - Normal pattern of perfusion in all regions. LV is normal. No inducible myocardial ischemia. EF is 66%. LVSF is normal. No ECG changes. Exercise capacity is normal.    H/O cardiovascular stress test 2/24/2015    cardiolite-normal, no ischemia, EF69%    H/O Doppler ultrasound 9/15/2000    9/15/2000 - Carotid - No hemodynamically significant stenosis.  H/O echocardiogram 12/2/2010, 9/22/2009 12/2/2010 - Difficult apical imaging due to patient COPD. LVSF is normal. EF = > 55%. Impaired LV impairment. Mild-Moderate MR. Mild TR.    H/O echocardiogram 7/15/14    EF 55-60%. No significant valvulopathy is seen.  Heart valve problem     2 leaky heart valves    History of Doppler ultrasound 10/31/2000    10/31/2000 - Peripheral - Normal study.  HX OTHER MEDICAL 1/14/2004 1/14/2004 - 48 hr Holter - Predominant rhythm is sinus rhythm. No significant ectopy is seen.     Hyperlipidemia     Mild tricuspid regurgitation     Mitral regurgitation     Nausea & vomiting     Near syncope 5/28/2019    Osteopenia     Pulmonary hypertension (HCC)     Pulmonary nodules     Rectal bleeding 12/21/2012    Supraventricular tachycardia (HCC)     Thyroid disease     Type 2 diabetes mellitus ([facility] [address]        FAMILY HISTORY  Family History   Problem Relation Age of Onset    Heart Failure Mother         CHF    Hypertension Mother     Cancer Father         Pancreatic    Hypertension Father     Heart Disease Sister         CMP    Cancer Brother         Bone    Other Brother         aneurysm    Coronary Art Dis Brother     Heart Attack Brother     Other Sister         Bone problems    Other Sister         infection    Cancer Sister         Ovarian    Cancer Son     Early Death Son         MVA    Coronary Art Dis Daughter     Hypertension Daughter        SOCIAL HISTORY  Social History     Socioeconomic History    Marital status:      Spouse name: Not on file    Number of children: Not on file    Years of education: Not on file    Highest education level: Not on file   Occupational History    Occupation: Retired   Tobacco Use    Smoking status: Never Smoker    Smokeless tobacco: Never Used   Vaping Use    Vaping Use: Never used   Substance and Sexual Activity    Alcohol use: Yes     Comment: Rare alcohol use. CAFFEINE: 1 cup coffee daily    Drug use: No    Sexual activity: Yes     Partners: Male     Comment:    Other Topics Concern    Not on file   Social History Narrative    Not on file     Social Determinants of Health     Financial Resource Strain: Low Risk     Difficulty of Paying Living Expenses: Not hard at all   Food Insecurity: No Food Insecurity    Worried About Running Out of Food in the Last Year: Never true    [address] in the Last Year: Never true   Transportation Needs:     Lack of Transportation (Medical):      Lack of Transportation (Non-Medical):    Physical Activity:     Days of Exercise per Week:     Minutes of Exercise per Session:    Stress:     Feeling of Stress :    Social Connections:     Frequency of Communication with Friends and Family:     Frequency of Social Gatherings with Friends and Family:     Attends Sikhism Services:     Active Member of Clubs or Organizations:     Attends Club or Organization Meetings:     Marital Status:    Intimate Partner Violence:     Fear of Current or Ex-Partner:     Emotionally Abused:     Physically Abused:     Sexually Abused:         SURGICAL HISTORY  Past Surgical History:   Procedure Laterality Date    APPENDECTOMY      BREAST SURGERY      bilateral lumpectomy    CHOLECYSTECTOMY      COLECTOMY      also head of pancreas    COLONOSCOPY  12-21-12    polyp    DIAGNOSTIC CARDIAC CATH LAB PROCEDURE  8/2005    FRACTURE SURGERY      repair of fractured nose    HYSTERECTOMY      LALA/BSO    PANCREAS SURGERY  11/7/2006    Whipple Procedure    SKIN BIOPSY      moles from right shoulder, chin, left leg    THYROIDECTOMY, PARTIAL      THYROIDECTOMY, PARTIAL  1999    TONSILLECTOMY         CURRENT MEDICATIONS  Current Outpatient Medications   Medication Sig Dispense Refill    ciprofloxacin (CIPRO) 250 MG tablet Take 1 tablet by mouth 2 times daily for 10 days 20 tablet 0     No current facility-administered medications for this visit. ALLERGIES  Allergies   Allergen Reactions    Sulfa Antibiotics Anaphylaxis    Nitrofuran Derivatives     Quinapril Hcl      Cough       PHYSICAL EXAM  /84   Pulse 70   Temp 98.2 °F (36.8 °C)   Ht 5' 4\" (1.626 m)   Wt 143 lb (64.9 kg)   SpO2 97%   BMI 24.55 kg/m²     ASSESSMENT & PLAN    1. Dysuria  Treat for presumptive UTI  Push fluids and rest  Defer flu shot while sick. - POCT Urinalysis no Micro  - Culture, Urine  - ciprofloxacin (CIPRO) 250 MG tablet; Take 1 tablet by mouth 2 times daily for 10 days  Dispense: 20 tablet; Refill: 0    2. Type 2 diabetes mellitus without complication, without long-term current use of insulin (HCC)  Issue is stable check labs . Adjust medication off of lab results. Patient to get labs done next week. - Hemoglobin A1C; Future  - Microalbumin / Creatinine Urine Ratio; Future  - Lipid Panel; Future    3. Essential hypertension  Consider home blood pressure checks  Follow-up through with Dr. Sana Gomez in 1 to 2 months    - CBC Auto Differential; Future  - Comprehensive Metabolic Panel;  Future        Electronically signed by Ra Jacobo MD on 10/22/2021

## 2021-10-25 DIAGNOSIS — E11.9 TYPE 2 DIABETES MELLITUS WITHOUT COMPLICATION, WITHOUT LONG-TERM CURRENT USE OF INSULIN (HCC): ICD-10-CM

## 2021-10-25 LAB
CREATININE URINE: 66.6 MG/DL (ref 28–259)
MICROALBUMIN UR-MCNC: 6.6 MG/DL
MICROALBUMIN/CREAT UR-RTO: 99.1 MG/G (ref 0–30)

## 2021-10-27 LAB
ORGANISM: ABNORMAL
URINE CULTURE, ROUTINE: ABNORMAL

## 2022-04-19 ENCOUNTER — TELEPHONE (OUTPATIENT)
Dept: FAMILY MEDICINE CLINIC | Age: 87
End: 2022-04-19

## 2022-05-10 ENCOUNTER — OFFICE VISIT (OUTPATIENT)
Dept: FAMILY MEDICINE CLINIC | Age: 87
End: 2022-05-10
Payer: MEDICARE

## 2022-05-10 VITALS
DIASTOLIC BLOOD PRESSURE: 98 MMHG | SYSTOLIC BLOOD PRESSURE: 164 MMHG | TEMPERATURE: 98.7 F | HEART RATE: 77 BPM | WEIGHT: 157.6 LBS | BODY MASS INDEX: 27.05 KG/M2 | OXYGEN SATURATION: 97 %

## 2022-05-10 DIAGNOSIS — R21 SKIN RASH: ICD-10-CM

## 2022-05-10 DIAGNOSIS — R82.90 ABNORMAL URINALYSIS: ICD-10-CM

## 2022-05-10 DIAGNOSIS — R30.0 DYSURIA: Primary | ICD-10-CM

## 2022-05-10 DIAGNOSIS — R35.0 URINARY FREQUENCY: ICD-10-CM

## 2022-05-10 LAB
BILIRUBIN, POC: NEGATIVE
BLOOD URINE, POC: ABNORMAL
CLARITY, POC: ABNORMAL
COLOR, POC: YELLOW
GLUCOSE URINE, POC: NEGATIVE
KETONES, POC: NEGATIVE
LEUKOCYTE EST, POC: ABNORMAL
NITRITE, POC: NEGATIVE
PH, POC: 5
PROTEIN, POC: ABNORMAL
SPECIFIC GRAVITY, POC: >=1.03
UROBILINOGEN, POC: 0.2

## 2022-05-10 PROCEDURE — 4040F PNEUMOC VAC/ADMIN/RCVD: CPT | Performed by: PHYSICIAN ASSISTANT

## 2022-05-10 PROCEDURE — 99213 OFFICE O/P EST LOW 20 MIN: CPT | Performed by: PHYSICIAN ASSISTANT

## 2022-05-10 PROCEDURE — 1036F TOBACCO NON-USER: CPT | Performed by: PHYSICIAN ASSISTANT

## 2022-05-10 PROCEDURE — G8427 DOCREV CUR MEDS BY ELIG CLIN: HCPCS | Performed by: PHYSICIAN ASSISTANT

## 2022-05-10 PROCEDURE — 1090F PRES/ABSN URINE INCON ASSESS: CPT | Performed by: PHYSICIAN ASSISTANT

## 2022-05-10 PROCEDURE — 1123F ACP DISCUSS/DSCN MKR DOCD: CPT | Performed by: PHYSICIAN ASSISTANT

## 2022-05-10 PROCEDURE — G8417 CALC BMI ABV UP PARAM F/U: HCPCS | Performed by: PHYSICIAN ASSISTANT

## 2022-05-10 PROCEDURE — 81002 URINALYSIS NONAUTO W/O SCOPE: CPT | Performed by: PHYSICIAN ASSISTANT

## 2022-05-10 RX ORDER — CIPROFLOXACIN 500 MG/1
500 TABLET, FILM COATED ORAL 2 TIMES DAILY
Qty: 14 TABLET | Refills: 0 | Status: SHIPPED | OUTPATIENT
Start: 2022-05-10 | End: 2022-05-17

## 2022-05-10 RX ORDER — CLOBETASOL PROPIONATE 0.5 MG/G
OINTMENT TOPICAL
Qty: 60 G | Refills: 1 | Status: ON HOLD | OUTPATIENT
Start: 2022-05-10 | End: 2022-08-05 | Stop reason: HOSPADM

## 2022-05-10 RX ORDER — AMOXICILLIN 500 MG/1
CAPSULE ORAL
COMMUNITY
Start: 2022-04-27 | End: 2022-06-16

## 2022-05-10 NOTE — PROGRESS NOTES
5/10/2022    [facility]    Chief Complaint   Patient presents with    Dysuria    Rash     dry and itchy skin     HPI  History was obtained from patient and her daughter   Davide Segura is a 80 y.o. female who presents today with complaints of dysuria, urinary frequency, and urgency. She denies hematuria, abdominal pain, flank pain, nausea, vomiting, fever or chills. She also states that she has had a pruritic skin rash for a few weeks. She has tried over-the-counter remedies without improvement. Rash is located on hands, arms, lower legs and back. She denies drainage from the areas. She denies discomfort. She denies any known new exposures. No changes in lotions, soaps, detergents, etc.      PAST MEDICAL HISTORY  Past Medical History:   Diagnosis Date    Arrhythmia     Arthritis     osteoarthritis cervical and lumbar spine    Atrophic vaginitis     Blood transfusion     CAD (coronary artery disease)     Cataracts, bilateral     CHF (congestive heart failure) (HCC)     COPD (chronic obstructive pulmonary disease) (HCC)     Depression     Essential hypertension     Fatty liver disease, nonalcoholic     GERD (gastroesophageal reflux disease)     H/O 24 hour EKG monitoring 9/15/2000    9/15/2000 -  Predominant rhythm is a sinus rhythm. No significant ectopy noted.  H/O cardiac catheterization 8/4/2005 8/4/2005 - Moderate degree of stenosis of the high diag, which is a heavily calcified vessel, however, there is a large ramus vessel supplying this same area as well. Rest of vessel is w/o any significant disease. Renals are w/o any significant stenosis.  H/O cardiovascular stress test 12/2/2010, 2/28/2008 12/2/2010 - Normal pattern of perfusion in all regions. LV is normal. No inducible myocardial ischemia. EF is 66%. LVSF is normal. No ECG changes.  Exercise capacity is normal.    H/O cardiovascular stress test 2/24/2015    cardiolite-normal, no ischemia, EF69%    H/O Doppler ultrasound 9/15/2000    9/15/2000 - Carotid - No hemodynamically significant stenosis.  H/O echocardiogram 12/2/2010, 9/22/2009 12/2/2010 - Difficult apical imaging due to patient COPD. LVSF is normal. EF = > 55%. Impaired LV impairment. Mild-Moderate MR. Mild TR.    H/O echocardiogram 7/15/14    EF 55-60%. No significant valvulopathy is seen.  Heart valve problem     2 leaky heart valves    History of Doppler ultrasound 10/31/2000    10/31/2000 - Peripheral - Normal study.  HX OTHER MEDICAL 1/14/2004 1/14/2004 - 48 hr Holter - Predominant rhythm is sinus rhythm. No significant ectopy is seen.     Hyperlipidemia     Mild tricuspid regurgitation     Mitral regurgitation     Nausea & vomiting     Near syncope 5/28/2019    Osteopenia     Pulmonary hypertension (HCC)     Pulmonary nodules     Rectal bleeding 12/21/2012    Supraventricular tachycardia (HCC)     Thyroid disease     Type 2 diabetes mellitus ([address]        FAMILY HISTORY  Family History   Problem Relation Age of Onset    Heart Failure Mother         CHF    Hypertension Mother     Cancer Father         Pancreatic    Hypertension Father     Heart Disease Sister         CMP    Cancer Brother         Bone    Other Brother         aneurysm    Coronary Art Dis Brother     Heart Attack Brother     Other Sister         Bone problems    Other Sister         infection    Cancer Sister         Ovarian    Cancer Son     Early Death Son         MVA    Coronary Art Dis Daughter     Hypertension Daughter        SOCIAL HISTORY  Social History     Socioeconomic History    Marital status:      Spouse name: None    Number of children: None    Years of education: None    Highest education level: None   Occupational History    Occupation: Retired   Tobacco Use    Smoking status: Never Smoker    Smokeless tobacco: Never Used   Vaping Use    Vaping Use: Never used   Substance and Sexual Activity    Alcohol use: Yes     Comment: Rare alcohol use. CAFFEINE: 1 cup coffee daily    Drug use: No    Sexual activity: Yes     Partners: Male     Comment:    Other Topics Concern    None   Social History Narrative    None     Social Determinants of Health     Financial Resource Strain: Low Risk     Difficulty of Paying Living Expenses: Not hard at all   Food Insecurity: No Food Insecurity    Worried About Running Out of Food in the Last Year: Never true    Max of Food in the Last Year: Never true   Transportation Needs:     Lack of Transportation (Medical): Not on file    Lack of Transportation (Non-Medical):  Not on file   Physical Activity:     Days of Exercise per Week: Not on file    Minutes of Exercise per Session: Not on file   Stress:     Feeling of Stress : Not on file   Social Connections:     Frequency of Communication with Friends and Family: Not on file    Frequency of Social Gatherings with Friends and Family: Not on file    Attends Jew Services: Not on file    Active Member of [address] [place] [address] or Organizations: Not on file    Attends Club or Organization Meetings: Not on file    Marital Status: Not on file   Intimate Partner Violence:     Fear of Current or Ex-Partner: Not on file    Emotionally Abused: Not on file    Physically Abused: Not on file    Sexually Abused: Not on file   Housing Stability:     Unable to Pay for Housing in the Last Year: Not on file    Number of [place] in the Last Year: Not on file    Unstable Housing in the Last Year: Not on file        SURGICAL HISTORY  Past Surgical History:   Procedure Laterality Date    APPENDECTOMY      BREAST SURGERY      bilateral lumpectomy    CHOLECYSTECTOMY      COLECTOMY      also head of pancreas    COLONOSCOPY  12-21-12    polyp    DIAGNOSTIC CARDIAC CATH LAB PROCEDURE  8/2005    FRACTURE SURGERY      repair of fractured nose    HYSTERECTOMY      LALA/BSO    PANCREAS SURGERY  11/7/2006    Whipple Procedure    SKIN BIOPSY      moles from right shoulder, chin, left leg    THYROIDECTOMY, PARTIAL      THYROIDECTOMY, PARTIAL  1999    TONSILLECTOMY         CURRENT MEDICATIONS  Current Outpatient Medications   Medication Sig Dispense Refill    ciprofloxacin (CIPRO) 500 MG tablet Take 1 tablet by mouth 2 times daily for 7 days 14 tablet 0    clobetasol (TEMOVATE) 0.05 % ointment Apply topically 2 times daily as needed. 60 g 1    amoxicillin (AMOXIL) 500 MG capsule  (Patient not taking: Reported on 5/10/2022)       No current facility-administered medications for this visit. ALLERGIES  Allergies   Allergen Reactions    Sulfa Antibiotics Anaphylaxis    Nitrofuran Derivatives     Quinapril Hcl      Cough       PHYSICAL EXAM    BP (!) 164/98   Pulse 77   Temp 98.7 °F (37.1 °C) (Oral)   Wt 157 lb 9.6 oz (71.5 kg)   SpO2 97%   Breastfeeding No   BMI 27.05 kg/m²     Constitutional:  Well developed, well nourished  HENT:  Normocephalic, atraumatic  Eyes:  conjunctiva normal, no discharge, no scleral icterus  Cardiovascular:  Normal heart rate, normal rhythm, no murmurs, gallops or rubs  Thorax & Lungs:  Normal breath sounds, no respiratory distress, no wheezing, no rales, no rhonchi  Abdomen:  Soft, nontender. No distention. Skin: Erythematous excoriated rash located on dorsal aspect of bilateral hands, mid to low back. No tenderness. No drainage. No burrows. Back:  No CVA tenderness  Extremities: Mild edema bilateral lower extremities, right slightly worse than left. Patient states this is baseline. Neurologic:  Alert & oriented   Psychiatric:  Affect normal, mood normal    ASSESSMENT & PLAN    Isidoroumair Henderson was seen today for dysuria and rash. Diagnoses and all orders for this visit:    Dysuria  -     POCT Urinalysis no Micro  -     Culture, Urine  -     ciprofloxacin (CIPRO) 500 MG tablet; Take 1 tablet by mouth 2 times daily for 7 days    Abnormal urinalysis  -     Culture, Urine  -     ciprofloxacin (CIPRO) 500 MG tablet;  Take 1 tablet by mouth 2 times daily for 7 days    Urinary frequency  -     ciprofloxacin (CIPRO) 500 MG tablet; Take 1 tablet by mouth 2 times daily for 7 days    Skin rash  -     clobetasol (TEMOVATE) 0.05 % ointment; Apply topically 2 times daily as needed. UA positive for blood and leukocytes. We will treat for presumed urinary tract infection with Cipro. Urine culture pending. UTI prophylaxis discussed. Apply clobetasol ointment to hands and back rash twice daily as needed. Follow-up with PCP this month as planned. Medications Discontinued During This Encounter   Medication Reason    ciprofloxacin (CIPRO) 500 MG tablet REORDER        No follow-ups on file. Patient verbalizes understanding with the above plan and is in agreement. Patient will call with  questions or concerns. Please note that this chart was generated using dragon dictation software. Although every effort was made to ensure the accuracy of this automated transcription, some errors in transcription may have occurred.     Electronically signed by Giovanni Gallardo PA-C on 5/10/2022

## 2022-05-10 NOTE — PATIENT INSTRUCTIONS
Patient Education        Urinary Tract Infection (UTI) in Women: Care Instructions  Overview     A urinary tract infection, or UTI, is a general term for an infection anywhere between the kidneys and the urethra (where urine comes out). Most UTIs arebladder infections. They often cause pain or burning when you urinate. UTIs are caused by bacteria and can be cured with antibiotics. Be sure tocomplete your treatment so that the infection does not get worse. Follow-up care is a key part of your treatment and safety. Be sure to make and go to all appointments, and call your doctor if you are having problems. It's also a good idea to know your test results and keep alist of the medicines you take. How can you care for yourself at home?  Take your antibiotics as directed. Do not stop taking them just because you feel better. You need to take the full course of antibiotics.  Drink extra water and other fluids for the next day or two. This will help make the urine less concentrated and help wash out the bacteria that are causing the infection. (If you have kidney, heart, or liver disease and have to limit fluids, talk with your doctor before you increase the amount of fluids you drink.)   Avoid drinks that are carbonated or have caffeine. They can irritate the bladder.  Urinate often. Try to empty your bladder each time.  To relieve pain, take a hot bath or lay a heating pad set on low over your lower belly or genital area. Never go to sleep with a heating pad in place. To prevent UTIs   Drink plenty of water each day. This helps you urinate often, which clears bacteria from your system. (If you have kidney, heart, or liver disease and have to limit fluids, talk with your doctor before you increase the amount of fluids you drink.)   Urinate when you need to.  If you are sexually active, urinate right after you have sex.  Change sanitary pads often.    Avoid douches, bubble baths, feminine hygiene sprays, and other feminine hygiene products that have deodorants.  After going to the bathroom, wipe from front to back. When should you call for help? Call your doctor now or seek immediate medical care if:     Symptoms such as fever, chills, nausea, or vomiting get worse or appear for the first time.      You have new pain in your back just below your rib cage. This is called flank pain.      There is new blood or pus in your urine.      You have any problems with your antibiotic medicine. Watch closely for changes in your health, and be sure to contact your doctor if:     You are not getting better after taking an antibiotic for 2 days.      Your symptoms go away but then come back. Where can you learn more? Go to [email or web address]://[email or web address].[email or web address] org and sign in to your [email or web address] account. Enter W941 in the [employer] box to learn more about \"Urinary Tract Infection (UTI) in Women: Care Instructions. \"     If you do not have an account, please click on the \"Sign Up Now\" link. Current as of: October 18, 2021               Content Version: 13.2  © 7847-7726 [employer]. Care instructions adapted under license by [facility] [address] If you have questions about a medical condition or this instruction, always ask your healthcare professional. [place] [address] any warranty or liability for your use of this information. Patient Education        Rash: Care Instructions  Your Care Instructions  A rash is any irritation or inflammation of the skin. Rashes have many possiblecauses, including allergy, infection, illness, heat, and emotional stress. Follow-up care is a key part of your treatment and safety. Be sure to make and go to all appointments, and call your doctor if you are having problems. It's also a good idea to know your test results and keep alist of the medicines you take. How can you care for yourself at home?  Wash the area with water only. Soap can make dryness and itching worse. Pat dry.  Put cold, wet cloths on the rash to reduce itching.  Keep cool, and stay out of the sun.  Leave the rash open to the air as much of the time as possible.  Sometimes petroleum jelly (Vaseline) can help relieve the discomfort caused by a rash. A moisturizing lotion, such as Cetaphil, also may help. Calamine lotion may help for rashes caused by contact with something (such as a plant or soap) that irritated the skin. Use it 3 or 4 times a day.  If your doctor prescribed a cream, use it as directed. If your doctor prescribed medicine, take it exactly as directed.  If itching affects your sleep, ask your doctor if you can take an antihistamine that might reduce itching and make you sleepy, such as diphenhydramine (Benadryl). Be safe with medicines. Read and follow all instructions on the label. When should you call for help? Call your doctor now or seek immediate medical care if:     You have signs of infection, such as:  ? Increased pain, swelling, warmth, or redness. ? Red streaks leading from the area. ? Pus draining from the area. ? A fever.      You have joint pain along with the rash. Watch closely for changes in your health, and be sure to contact your doctor if:     Your rash is changing or getting worse. For example, call if you have pain along with the rash, the rash is spreading, or you have new blisters.      You do not get better after 1 week. Where can you learn more? Go to https://[email or web address] [email or web address]-Septic.Traansmission. org and sign in to your [employer] account. Enter K382 in the [employer] box to learn more about \"Rash: Care Instructions. \"     If you do not have an account, please click on the \"Sign Up Now\" link. Current as of: November 15, 2021               Content Version: 13.2  © 2908-1127 [employer]. Care instructions adapted under license by [facility] ([facility]).  If you have questions about a medical condition or this instruction, always ask your healthcare professional. [place] [address] any warranty or liability for your use of this information.

## 2022-05-11 LAB — URINE CULTURE, ROUTINE: NORMAL

## 2022-05-11 RX ORDER — ONDANSETRON 4 MG/1
4 TABLET, ORALLY DISINTEGRATING ORAL EVERY 12 HOURS PRN
Qty: 15 TABLET | Refills: 0 | Status: ON HOLD | OUTPATIENT
Start: 2022-05-11 | End: 2022-08-05 | Stop reason: HOSPADM

## 2022-05-11 NOTE — TELEPHONE ENCOUNTER
Pt's daughter has called and stated that the cipro the pt was prescribed from her visit on 5/10/2022 has made her sick to her stomach. Did the ATB need to be changed or should something just be sent over to the pharmacy for the nausea? Please advise.

## 2022-05-11 NOTE — TELEPHONE ENCOUNTER
Due to her allergies - should try something for nausea to see if this helps. Sent Zofran to the pharmacy. Have patient take the Zofran 30 mins before taking the cipro and see if this helps.

## 2022-05-21 DIAGNOSIS — R30.0 DYSURIA: ICD-10-CM

## 2022-05-21 DIAGNOSIS — R82.90 ABNORMAL URINALYSIS: ICD-10-CM

## 2022-05-21 DIAGNOSIS — R35.0 URINARY FREQUENCY: ICD-10-CM

## 2022-05-23 RX ORDER — CIPROFLOXACIN 500 MG/1
TABLET, FILM COATED ORAL
Qty: 14 TABLET | Refills: 0 | OUTPATIENT
Start: 2022-05-23

## 2022-06-16 ENCOUNTER — APPOINTMENT (OUTPATIENT)
Dept: GENERAL RADIOLOGY | Age: 87
End: 2022-06-16
Payer: MEDICARE

## 2022-06-16 ENCOUNTER — HOSPITAL ENCOUNTER (OUTPATIENT)
Age: 87
Setting detail: OBSERVATION
Discharge: HOME | End: 2022-06-18
Attending: INTERNAL MEDICINE | Admitting: INTERNAL MEDICINE
Payer: MEDICARE

## 2022-06-16 DIAGNOSIS — R60.0 BILATERAL LOWER EXTREMITY EDEMA: ICD-10-CM

## 2022-06-16 DIAGNOSIS — R79.89 ELEVATED BRAIN NATRIURETIC PEPTIDE (BNP) LEVEL: ICD-10-CM

## 2022-06-16 DIAGNOSIS — R00.0 TACHYCARDIA: Primary | ICD-10-CM

## 2022-06-16 DIAGNOSIS — I16.0 HYPERTENSIVE URGENCY: ICD-10-CM

## 2022-06-16 DIAGNOSIS — E89.0 POSTOPERATIVE HYPOTHYROIDISM: ICD-10-CM

## 2022-06-16 LAB
ALBUMIN SERPL-MCNC: 3.7 GM/DL (ref 3.4–5)
ALP BLD-CCNC: 92 IU/L (ref 40–129)
ALT SERPL-CCNC: 19 U/L (ref 10–40)
ANION GAP SERPL CALCULATED.3IONS-SCNC: 11 MMOL/L (ref 4–16)
APTT: 26.2 SECONDS (ref 25.1–37.1)
AST SERPL-CCNC: 15 IU/L (ref 15–37)
BACTERIA: NEGATIVE /HPF
BASE EXCESS: 3 (ref 0–2.4)
BASOPHILS ABSOLUTE: 0 K/CU MM
BASOPHILS RELATIVE PERCENT: 0.3 % (ref 0–1)
BILIRUB SERPL-MCNC: 0.3 MG/DL (ref 0–1)
BILIRUBIN URINE: NEGATIVE MG/DL
BLOOD, URINE: NEGATIVE
BUN BLDV-MCNC: 19 MG/DL (ref 6–23)
CALCIUM SERPL-MCNC: 8.9 MG/DL (ref 8.3–10.6)
CHLORIDE BLD-SCNC: 106 MMOL/L (ref 99–110)
CLARITY: CLEAR
CO2: 22 MMOL/L (ref 21–32)
COLOR: YELLOW
COMMENT: ABNORMAL
CREAT SERPL-MCNC: 1 MG/DL (ref 0.6–1.1)
D DIMER: <200 NG/ML(DDU)
DIFFERENTIAL TYPE: ABNORMAL
EOSINOPHILS ABSOLUTE: 0.2 K/CU MM
EOSINOPHILS RELATIVE PERCENT: 2 % (ref 0–3)
ESTIMATED AVERAGE GLUCOSE: 154 MG/DL
GFR AFRICAN AMERICAN: >60 ML/MIN/1.73M2
GFR NON-AFRICAN AMERICAN: 52 ML/MIN/1.73M2
GLUCOSE BLD-MCNC: 132 MG/DL (ref 70–99)
GLUCOSE BLD-MCNC: 192 MG/DL (ref 70–99)
GLUCOSE, URINE: NEGATIVE MG/DL
HBA1C MFR BLD: 7 % (ref 4.2–6.3)
HCO3 VENOUS: 22.6 MMOL/L (ref 19–25)
HCT VFR BLD CALC: 39 % (ref 37–47)
HEMOGLOBIN: 12.4 GM/DL (ref 12.5–16)
IMMATURE NEUTROPHIL %: 0.3 % (ref 0–0.43)
INR BLD: 1 INDEX
KETONES, URINE: NEGATIVE MG/DL
LEUKOCYTE ESTERASE, URINE: NEGATIVE
LYMPHOCYTES ABSOLUTE: 2.5 K/CU MM
LYMPHOCYTES RELATIVE PERCENT: 28.7 % (ref 24–44)
MCH RBC QN AUTO: 29.1 PG (ref 27–31)
MCHC RBC AUTO-ENTMCNC: 31.8 % (ref 32–36)
MCV RBC AUTO: 91.5 FL (ref 78–100)
MONOCYTES ABSOLUTE: 0.7 K/CU MM
MONOCYTES RELATIVE PERCENT: 7.5 % (ref 0–4)
NITRITE URINE, QUANTITATIVE: NEGATIVE
NUCLEATED RBC %: 0 %
O2 SAT, VEN: 92.8 % (ref 50–70)
PCO2, VEN: 41 MMHG (ref 38–52)
PDW BLD-RTO: 13.4 % (ref 11.7–14.9)
PH VENOUS: 7.35 (ref 7.32–7.42)
PH, URINE: 5.5 (ref 5–8)
PLATELET # BLD: 171 K/CU MM (ref 140–440)
PMV BLD AUTO: 9.5 FL (ref 7.5–11.1)
PO2, VEN: 75 MMHG (ref 28–48)
POTASSIUM SERPL-SCNC: 4.2 MMOL/L (ref 3.5–5.1)
PRO-BNP: 3648 PG/ML
PROTEIN UA: NEGATIVE MG/DL
PROTHROMBIN TIME: 12.9 SECONDS (ref 11.7–14.5)
RBC # BLD: 4.26 M/CU MM (ref 4.2–5.4)
RBC URINE: 1 /HPF (ref 0–6)
SEGMENTED NEUTROPHILS ABSOLUTE COUNT: 5.3 K/CU MM
SEGMENTED NEUTROPHILS RELATIVE PERCENT: 61.2 % (ref 36–66)
SODIUM BLD-SCNC: 139 MMOL/L (ref 135–145)
SPECIFIC GRAVITY UA: 1.02 (ref 1–1.03)
SQUAMOUS EPITHELIAL: 1 /HPF
T4 FREE: 1.24 NG/DL (ref 0.9–1.8)
TOTAL IMMATURE NEUTOROPHIL: 0.03 K/CU MM
TOTAL NUCLEATED RBC: 0 K/CU MM
TOTAL PROTEIN: 6.5 GM/DL (ref 6.4–8.2)
TRICHOMONAS: NORMAL /HPF
TROPONIN T: <0.01 NG/ML
TSH HIGH SENSITIVITY: 5.62 UIU/ML (ref 0.27–4.2)
UROBILINOGEN, URINE: 0.2 MG/DL (ref 0.2–1)
WBC # BLD: 8.6 K/CU MM (ref 4–10.5)
WBC UA: 4 /HPF (ref 0–5)

## 2022-06-16 PROCEDURE — 85610 PROTHROMBIN TIME: CPT

## 2022-06-16 PROCEDURE — 82962 GLUCOSE BLOOD TEST: CPT

## 2022-06-16 PROCEDURE — G0378 HOSPITAL OBSERVATION PER HR: HCPCS

## 2022-06-16 PROCEDURE — 80053 COMPREHEN METABOLIC PANEL: CPT

## 2022-06-16 PROCEDURE — 96361 HYDRATE IV INFUSION ADD-ON: CPT

## 2022-06-16 PROCEDURE — 83880 ASSAY OF NATRIURETIC PEPTIDE: CPT

## 2022-06-16 PROCEDURE — 81001 URINALYSIS AUTO W/SCOPE: CPT

## 2022-06-16 PROCEDURE — 84484 ASSAY OF TROPONIN QUANT: CPT

## 2022-06-16 PROCEDURE — 2580000003 HC RX 258: Performed by: INTERNAL MEDICINE

## 2022-06-16 PROCEDURE — 6370000000 HC RX 637 (ALT 250 FOR IP): Performed by: INTERNAL MEDICINE

## 2022-06-16 PROCEDURE — 85025 COMPLETE CBC W/AUTO DIFF WBC: CPT

## 2022-06-16 PROCEDURE — 71045 X-RAY EXAM CHEST 1 VIEW: CPT

## 2022-06-16 PROCEDURE — 2500000003 HC RX 250 WO HCPCS: Performed by: PHYSICIAN ASSISTANT

## 2022-06-16 PROCEDURE — 84443 ASSAY THYROID STIM HORMONE: CPT

## 2022-06-16 PROCEDURE — 96360 HYDRATION IV INFUSION INIT: CPT

## 2022-06-16 PROCEDURE — 96374 THER/PROPH/DIAG INJ IV PUSH: CPT

## 2022-06-16 PROCEDURE — 85730 THROMBOPLASTIN TIME PARTIAL: CPT

## 2022-06-16 PROCEDURE — 83036 HEMOGLOBIN GLYCOSYLATED A1C: CPT

## 2022-06-16 PROCEDURE — 2580000003 HC RX 258: Performed by: PHYSICIAN ASSISTANT

## 2022-06-16 PROCEDURE — 82805 BLOOD GASES W/O2 SATURATION: CPT

## 2022-06-16 PROCEDURE — 85379 FIBRIN DEGRADATION QUANT: CPT

## 2022-06-16 PROCEDURE — 93005 ELECTROCARDIOGRAM TRACING: CPT | Performed by: PHYSICIAN ASSISTANT

## 2022-06-16 PROCEDURE — 84439 ASSAY OF FREE THYROXINE: CPT

## 2022-06-16 PROCEDURE — 99285 EMERGENCY DEPT VISIT HI MDM: CPT

## 2022-06-16 RX ORDER — SODIUM CHLORIDE 0.9 % (FLUSH) 0.9 %
10 SYRINGE (ML) INJECTION PRN
Status: DISCONTINUED | OUTPATIENT
Start: 2022-06-16 | End: 2022-06-18 | Stop reason: HOSPADM

## 2022-06-16 RX ORDER — INSULIN LISPRO 100 [IU]/ML
0-3 INJECTION, SOLUTION INTRAVENOUS; SUBCUTANEOUS NIGHTLY
Status: DISCONTINUED | OUTPATIENT
Start: 2022-06-16 | End: 2022-06-18 | Stop reason: HOSPADM

## 2022-06-16 RX ORDER — FUROSEMIDE 10 MG/ML
20 INJECTION INTRAMUSCULAR; INTRAVENOUS ONCE
Status: DISCONTINUED | OUTPATIENT
Start: 2022-06-16 | End: 2022-06-16

## 2022-06-16 RX ORDER — ATORVASTATIN CALCIUM 10 MG/1
20 TABLET, FILM COATED ORAL NIGHTLY
Status: DISCONTINUED | OUTPATIENT
Start: 2022-06-16 | End: 2022-06-18 | Stop reason: HOSPADM

## 2022-06-16 RX ORDER — DEXTROSE MONOHYDRATE 50 MG/ML
100 INJECTION, SOLUTION INTRAVENOUS PRN
Status: DISCONTINUED | OUTPATIENT
Start: 2022-06-16 | End: 2022-06-18 | Stop reason: HOSPADM

## 2022-06-16 RX ORDER — MECLIZINE HCL 12.5 MG/1
12.5 TABLET ORAL 3 TIMES DAILY PRN
Status: DISCONTINUED | OUTPATIENT
Start: 2022-06-16 | End: 2022-06-18 | Stop reason: HOSPADM

## 2022-06-16 RX ORDER — ACETAMINOPHEN 325 MG/1
650 TABLET ORAL EVERY 6 HOURS PRN
Status: DISCONTINUED | OUTPATIENT
Start: 2022-06-16 | End: 2022-06-18 | Stop reason: HOSPADM

## 2022-06-16 RX ORDER — LABETALOL HYDROCHLORIDE 5 MG/ML
10 INJECTION, SOLUTION INTRAVENOUS ONCE
Status: COMPLETED | OUTPATIENT
Start: 2022-06-16 | End: 2022-06-16

## 2022-06-16 RX ORDER — ASPIRIN 81 MG/1
81 TABLET ORAL DAILY
Status: DISCONTINUED | OUTPATIENT
Start: 2022-06-17 | End: 2022-06-18 | Stop reason: HOSPADM

## 2022-06-16 RX ORDER — 0.9 % SODIUM CHLORIDE 0.9 %
1000 INTRAVENOUS SOLUTION INTRAVENOUS ONCE
Status: COMPLETED | OUTPATIENT
Start: 2022-06-16 | End: 2022-06-16

## 2022-06-16 RX ORDER — POLYETHYLENE GLYCOL 3350 17 G/17G
17 POWDER, FOR SOLUTION ORAL DAILY PRN
Status: DISCONTINUED | OUTPATIENT
Start: 2022-06-16 | End: 2022-06-18 | Stop reason: HOSPADM

## 2022-06-16 RX ORDER — LANOLIN ALCOHOL/MO/W.PET/CERES
6 CREAM (GRAM) TOPICAL NIGHTLY PRN
Status: DISCONTINUED | OUTPATIENT
Start: 2022-06-17 | End: 2022-06-18 | Stop reason: HOSPADM

## 2022-06-16 RX ORDER — CARVEDILOL 6.25 MG/1
6.25 TABLET ORAL 2 TIMES DAILY WITH MEALS
Status: DISCONTINUED | OUTPATIENT
Start: 2022-06-16 | End: 2022-06-17

## 2022-06-16 RX ORDER — LABETALOL HYDROCHLORIDE 5 MG/ML
10 INJECTION, SOLUTION INTRAVENOUS EVERY 4 HOURS PRN
Status: DISCONTINUED | OUTPATIENT
Start: 2022-06-16 | End: 2022-06-17

## 2022-06-16 RX ORDER — SODIUM CHLORIDE 0.9 % (FLUSH) 0.9 %
5-40 SYRINGE (ML) INJECTION EVERY 12 HOURS SCHEDULED
Status: DISCONTINUED | OUTPATIENT
Start: 2022-06-16 | End: 2022-06-18 | Stop reason: HOSPADM

## 2022-06-16 RX ORDER — INSULIN LISPRO 100 [IU]/ML
0-6 INJECTION, SOLUTION INTRAVENOUS; SUBCUTANEOUS
Status: DISCONTINUED | OUTPATIENT
Start: 2022-06-17 | End: 2022-06-18 | Stop reason: HOSPADM

## 2022-06-16 RX ORDER — ENOXAPARIN SODIUM 100 MG/ML
40 INJECTION SUBCUTANEOUS DAILY
Status: DISCONTINUED | OUTPATIENT
Start: 2022-06-17 | End: 2022-06-17

## 2022-06-16 RX ORDER — ACETAMINOPHEN 650 MG/1
650 SUPPOSITORY RECTAL EVERY 6 HOURS PRN
Status: DISCONTINUED | OUTPATIENT
Start: 2022-06-16 | End: 2022-06-18 | Stop reason: HOSPADM

## 2022-06-16 RX ORDER — HYDROCHLOROTHIAZIDE 25 MG/1
25 TABLET ORAL DAILY
Status: DISCONTINUED | OUTPATIENT
Start: 2022-06-16 | End: 2022-06-18 | Stop reason: HOSPADM

## 2022-06-16 RX ORDER — SODIUM CHLORIDE 9 MG/ML
INJECTION, SOLUTION INTRAVENOUS PRN
Status: DISCONTINUED | OUTPATIENT
Start: 2022-06-16 | End: 2022-06-18 | Stop reason: HOSPADM

## 2022-06-16 RX ADMIN — SODIUM CHLORIDE 1000 ML: 9 INJECTION, SOLUTION INTRAVENOUS at 18:05

## 2022-06-16 RX ADMIN — SODIUM CHLORIDE, PRESERVATIVE FREE 10 ML: 5 INJECTION INTRAVENOUS at 22:16

## 2022-06-16 RX ADMIN — ATORVASTATIN CALCIUM 20 MG: 10 TABLET, FILM COATED ORAL at 22:16

## 2022-06-16 RX ADMIN — LABETALOL HYDROCHLORIDE 10 MG: 5 INJECTION, SOLUTION INTRAVENOUS at 21:01

## 2022-06-16 RX ADMIN — CARVEDILOL 6.25 MG: 6.25 TABLET, FILM COATED ORAL at 22:15

## 2022-06-16 RX ADMIN — HYDROCHLOROTHIAZIDE 25 MG: 25 TABLET ORAL at 22:15

## 2022-06-16 ASSESSMENT — PAIN - FUNCTIONAL ASSESSMENT
PAIN_FUNCTIONAL_ASSESSMENT: NONE - DENIES PAIN
PAIN_FUNCTIONAL_ASSESSMENT: NONE - DENIES PAIN

## 2022-06-16 NOTE — ED NOTES
The patient presents to the emergency department alert and oriented with a complaint of a rapid heart for two days. The patient denies any chest pain or shortness of breath. The patient placed on the monitor with vital signs taken. Assessment as follows; Skin is pink, warm and dry. Lung sounds are clear. Heart rate is 120 with a Junctional Tachycardia interpreted on the 12 lead EKG. No P waves were seen. Ronny Zaman RN  06/16/22 [id number]

## 2022-06-16 NOTE — ED PROVIDER NOTES
[address] ENCOUNTER        Pt Name: Bre Ojeda  MRN: [id number]  [place] 5/25/1933  Date of evaluation: 6/16/2022  Provider: Ross Talley PA-C  PCP: Alize Wan MD  Note Started: 5:27 PM EDT       YAMILETH. I have evaluated this patient. My supervising physician was available for consultation. Dioni Nation DO      CHIEF COMPLAINT       Chief Complaint   Patient presents with    Palpitations     states that she has been having alot os stress, states feel fatigue    Hypertension     has a history       HISTORY OF PRESENT ILLNESS   (Location, Timing/Onset, Context/Setting, Quality, Duration, Modifying Factors, Severity, Associated Signs and Symptoms)  Note limiting factors. Chief Complaint: Palpitation, elevated blood pressure    Bre Ojeda is a 80 y.o. female who presents with her daughter. Patient resenting with complaint of increased heart rate/palpitation. Noted about 2 to 3 days ago was a bit better yesterday but again today at 2 PM her heart rate increased. ED initial heart rate is 120-130. Does not appear to vary in rhythm to suggest A. fib. No history of A. fib. History of tachycardia. Patient also with history of hypertension. The patient's ED blood pressure was 183/102. She had been off medication times years. In fact, she takes no regularly prescribed medication other than occasional aspirin. At this time she reports no chest pain or shortness of breath. No gastrointestinal or urinary complaints. No headache, vertigo or tinnitus reported. Patient states she felt lightheaded last week sat down and felt better. The patient with history of subtotal thyroidectomy due to a nodule. Not on thyroid medication and not had checked times years.     Patient states she is under good deal of stress with her  who lives at a care facility fell and broke his hip and is currently admitted this facility. This patient has history of CAD, pulmonary hypertension, HTN, CHF, GERD, DM, pulmonary nodule, supraventricular tachycardia, cognitive deficits, carotid artery stenosis bilateral, primary pancreatic neuroendocrine tumor, HLD, COPD, osteopenia    The patient surgical history partial thyroidectomy, excision head of pancreas/Whipple procedure, cholecystectomy, appendectomy, hysterectomy, breast surgery, colonoscopy    Nursing Notes were all reviewed and agreed with or any disagreements were addressed in the HPI. REVIEW OF SYSTEMS    (2-9 systems for level 4, 10 or more for level 5)     Review of Systems    Positives and Pertinent negatives as per HPI. Except as noted above in the ROS, all other systems were reviewed and negative. PAST MEDICAL HISTORY     Past Medical History:   Diagnosis Date    Arrhythmia     Arthritis     osteoarthritis cervical and lumbar spine    Atrophic vaginitis     Blood transfusion     CAD (coronary artery disease)     Cataracts, bilateral     CHF (congestive heart failure) (HCC)     COPD (chronic obstructive pulmonary disease) (HCC)     Depression     Essential hypertension     Fatty liver disease, nonalcoholic     GERD (gastroesophageal reflux disease)     H/O 24 hour EKG monitoring 9/15/2000    9/15/2000 -  Predominant rhythm is a sinus rhythm. No significant ectopy noted.  H/O cardiac catheterization 8/4/2005 8/4/2005 - Moderate degree of stenosis of the high diag, which is a heavily calcified vessel, however, there is a large ramus vessel supplying this same area as well. Rest of vessel is w/o any significant disease. Renals are w/o any significant stenosis.  H/O cardiovascular stress test 12/2/2010, 2/28/2008 12/2/2010 - Normal pattern of perfusion in all regions. LV is normal. No inducible myocardial ischemia. EF is 66%. LVSF is normal. No ECG changes.  Exercise capacity is normal.    H/O cardiovascular stress test 2/24/2015    cardiolite-normal, no ischemia, EF69%    H/O Doppler ultrasound 9/15/2000    9/15/2000 - Carotid - No hemodynamically significant stenosis.  H/O echocardiogram 12/2/2010, 9/22/2009 12/2/2010 - Difficult apical imaging due to patient COPD. LVSF is normal. EF = > 55%. Impaired LV impairment. Mild-Moderate MR. Mild TR.    H/O echocardiogram 7/15/14    EF 55-60%. No significant valvulopathy is seen.  Heart valve problem     2 leaky heart valves    History of Doppler ultrasound 10/31/2000    10/31/2000 - Peripheral - Normal study.  HX OTHER MEDICAL 1/14/2004 1/14/2004 - 48 hr Holter - Predominant rhythm is sinus rhythm. No significant ectopy is seen.  Hyperlipidemia     Mild tricuspid regurgitation     Mitral regurgitation     Nausea & vomiting     Near syncope 5/28/2019    Osteopenia     Pulmonary hypertension (HCC)     Pulmonary nodules     Rectal bleeding 12/21/2012    Supraventricular tachycardia (HCC)     Thyroid disease     Type 2 diabetes mellitus ([address]          SURGICAL HISTORY     Past Surgical History:   Procedure Laterality Date    APPENDECTOMY      BREAST SURGERY      bilateral lumpectomy    CHOLECYSTECTOMY      COLECTOMY      also head of pancreas    COLONOSCOPY  12-21-12    polyp    DIAGNOSTIC CARDIAC CATH LAB PROCEDURE  8/2005    FRACTURE SURGERY      repair of fractured nose    HYSTERECTOMY      LALA/BSO    PANCREAS SURGERY  11/7/2006    Whipple Procedure    SKIN BIOPSY      moles from right shoulder, chin, left leg    THYROIDECTOMY, PARTIAL      THYROIDECTOMY, PARTIAL  1999    TONSILLECTOMY           CURRENTMEDICATIONS       Previous Medications    CLOBETASOL (TEMOVATE) 0.05 % OINTMENT    Apply topically 2 times daily as needed.     ONDANSETRON (ZOFRAN-ODT) 4 MG DISINTEGRATING TABLET    Take 1 tablet by mouth every 12 hours as needed for Nausea         ALLERGIES     Sulfa antibiotics, Nitrofuran derivatives, and Quinapril hcl    FAMILYHISTORY Family History   Problem Relation Age of Onset    Heart Failure Mother         CHF    Hypertension Mother     Cancer Father         Pancreatic    Hypertension Father     Heart Disease Sister         CMP    Cancer Brother         Bone    Other Brother         aneurysm    Coronary Art Dis Brother     Heart Attack Brother     Other Sister         Bone problems    Other Sister         infection    Cancer Sister         Ovarian    Cancer Son     Early Death Son         MVA    Coronary Art Dis Daughter     Hypertension Daughter           SOCIAL HISTORY       Social History     Tobacco Use    Smoking status: Never Smoker    Smokeless tobacco: Never Used   Vaping Use    Vaping Use: Never used   Substance Use Topics    Alcohol use: Yes     Comment: Rare alcohol use. CAFFEINE: 1 cup coffee daily    Drug use: No       SCREENINGS             PHYSICAL EXAM    (up to 7 for level 4, 8 or more for level 5)     ED Triage Vitals [06/16/22 1655]   BP Temp Temp Source Heart Rate Resp SpO2 Height Weight   (!) 193/102 97.9 °F (36.6 °C) Oral (!) 120 18 99 % 5' 4\" (1.626 m) 140 lb (63.5 kg)       Physical Exam  Vitals and nursing note reviewed. Constitutional:       Appearance: Normal appearance. She is well-developed and normal weight. HENT:      Head: Normocephalic and atraumatic. Right Ear: External ear normal.      Left Ear: External ear normal.   Eyes:      General: No scleral icterus. Right eye: No discharge. Left eye: No discharge. Conjunctiva/sclera: Conjunctivae normal.   Neck:      Thyroid: No thyroid mass, thyromegaly or thyroid tenderness. Cardiovascular:      Rate and Rhythm: Regular rhythm. Tachycardia present. Heart sounds: Normal heart sounds. Pulmonary:      Effort: Pulmonary effort is normal.      Breath sounds: Normal breath sounds. Abdominal:      General: Abdomen is flat. Bowel sounds are normal.      Palpations: Abdomen is soft.    Musculoskeletal: General: Normal range of motion. Cervical back: Normal range of motion and neck supple. Right lower leg: Edema present. Left lower leg: Edema present. Skin:     General: Skin is warm and dry. Findings: No rash. Neurological:      General: No focal deficit present. Mental Status: She is alert and oriented to person, place, and time. Mental status is at baseline. Psychiatric:         Mood and Affect: Mood normal.         Behavior: Behavior normal.         Thought Content: Thought content normal.         Judgment: Judgment normal.         DIAGNOSTIC RESULTS   LABS:    Labs Reviewed   BLOOD GAS, VENOUS - Abnormal; Notable for the following components:       Result Value    pO2, [address] 75 (*)     Base Excess 3 (*)     O2 Sat, [place] 92.8 (*)     All other components within normal limits   TSH - Abnormal; Notable for the following components:    TSH, High Sensitivity 5.620 (*)     All other components within normal limits   CBC WITH AUTO DIFFERENTIAL - Abnormal; Notable for the following components:    Hemoglobin 12.4 (*)     MCHC 31.8 (*)     Monocytes % 7.5 (*)     All other components within normal limits   COMPREHENSIVE METABOLIC PANEL W/ REFLEX TO MG FOR LOW K - Abnormal; Notable for the following components:    Glucose 192 (*)     GFR Non- 52 (*)     All other components within normal limits   BRAIN NATRIURETIC PEPTIDE - Abnormal; Notable for the following components:    Pro-BNP 3,648 (*)     All other components within normal limits   URINALYSIS WITH REFLEX TO CULTURE   T4, FREE   TROPONIN   PROTIME-INR   APTT   D-DIMER, QUANTITATIVE       When ordered only abnormal lab results are displayed. All other labs were within normal range or not returned as of this dictation. EKG: When ordered, EKG's are interpreted by the Emergency Department Physician in the absence of a cardiologist.  Please see their note for interpretation of EKG.     RADIOLOGY:   Non-plain film images such as CT, Ultrasound and MRI are read by the radiologist. Plain radiographic images are visualized and preliminarily interpreted by the ED Provider with the below findings:        Interpretation per the Radiologist below, if available at the time of this note:    XR CHEST PORTABLE   Final Result   No acute abnormality detected. No results found. PROCEDURES   Unless otherwise noted below, none     Procedures    CRITICAL CARE TIME     Critical Care  There was a high probability of life-threatening clinical deterioration in the patient's condition requiring my urgent intervention. Total critical care time with the patient was 45 minutes excluding separately reportable procedures. Critical care required due to patients findings of tachycardia/junctional tachycardia, elevated BNP, lower extremity edema, uncontrolled hypertension and hypothyroidism postoperative. I spent discussing case with patient, daughter and hospitalist.      CONSULTS:  IP CONSULT TO HOSPITALIST      EMERGENCY DEPARTMENT COURSE and DIFFERENTIAL DIAGNOSIS/MDM:   Vitals:    Vitals:    06/16/22 1655 06/16/22 1806 06/16/22 1900 06/16/22 2021   BP: (!) 193/102 (!) 179/99 (!) 177/102 (!) 176/110   Pulse: (!) 120 (!) 121 (!) 117 (!) 123   Resp: 18 16 16 26   Temp: 97.9 °F (36.6 °C)   98.1 °F (36.7 °C)   TempSrc: Oral   Oral   SpO2: 99% 98% 98% 99%   Weight: 140 lb (63.5 kg)      Height: 5' 4\" (1.626 m)          Patient was given the following medications:  Medications   labetalol (NORMODYNE;TRANDATE) injection 10 mg (has no administration in time range)   furosemide (LASIX) injection 20 mg (has no administration in time range)   0.9 % sodium chloride bolus (0 mLs IntraVENous Stopped 6/16/22 1958)         Is this patient to be included in the SEP-1 Core Measure due to severe sepsis or septic shock? No   Exclusion criteria - the patient is NOT to be included for SEP-1 Core Measure due to:   Infection is not suspected    This patient presenting with paroxysmal tachycardia beginning a few days ago. Rather sustained throughout the day. The patient's initial heart rate 120. The lowest 117 without medication. The patient blood pressure initially 193/102 and the lowest 951 systolic. Medication given Lasix 20 mg IV, labetalol 10 mg IV. The patient had elevated BNP at 3648. Chest x-ray not showing cardiomegaly or evidence of pulmonary vascularization or pulmonary edema. The patient D-dimer not elevated less than 200. UA showed no sign of UTI. The patient troponin less than 0.01. The patient has normal renal function with BUN 19 and creatinine 1.0.  GFR 52. The patient with history of a partial thyroidectomy due to nodular structure and the patient not knowing which area was excised. The patient had been on levothyroxine in the past.  Today her TSH is 5.620. Free T4 is 1.24. The patient is currently on no medication. I did briefly discussed with attending physician who recommends admission due to the tachycardia, elevated BNP, lower extremity edema and uncontrolled hypertension. She may also need low-dose levothyroxine for the hypothyroidism based on the TSH. I requested repeat EKG as the initial showed baseline artifact. I did suggest potential junctional tachycardia. I did discuss case with hospitalist and the patient will be admitted for further evaluation and treatment. FINAL IMPRESSION      1. Tachycardia    2. Hypertensive urgency    3. Elevated brain natriuretic peptide (BNP) level    4. Bilateral lower extremity edema    5. Postoperative hypothyroidism          DISPOSITION/PLAN   DISPOSITION Decision To Admit 06/16/2022 08:28:12 PM      PATIENT REFERRED TO:  No follow-up provider specified.     DISCHARGE MEDICATIONS:  New Prescriptions    No medications on file       DISCONTINUED MEDICATIONS:  Discontinued Medications    AMOXICILLIN (AMOXIL) 500 MG CAPSULE                  (Please note that portions of this note were completed with a voice recognition program.  Efforts were made to edit the dictations but occasionally words are mis-transcribed. )    Mariela Walker PA-C (electronically signed)           Mariela Walker PA-C  06/16/22 2037       Mariela Walker PA-C  06/16/22 2039

## 2022-06-17 LAB
ALBUMIN SERPL-MCNC: 3.5 GM/DL (ref 3.4–5)
ALP BLD-CCNC: 90 IU/L (ref 40–128)
ALT SERPL-CCNC: 18 U/L (ref 10–40)
ANION GAP SERPL CALCULATED.3IONS-SCNC: 6 MMOL/L (ref 4–16)
AST SERPL-CCNC: 17 IU/L (ref 15–37)
BASOPHILS ABSOLUTE: 0 K/CU MM
BASOPHILS RELATIVE PERCENT: 0.4 % (ref 0–1)
BILIRUB SERPL-MCNC: 0.3 MG/DL (ref 0–1)
BUN BLDV-MCNC: 17 MG/DL (ref 6–23)
CALCIUM SERPL-MCNC: 8.7 MG/DL (ref 8.3–10.6)
CHLORIDE BLD-SCNC: 110 MMOL/L (ref 99–110)
CHOLESTEROL: 124 MG/DL
CO2: 27 MMOL/L (ref 21–32)
CREAT SERPL-MCNC: 1 MG/DL (ref 0.6–1.1)
DIFFERENTIAL TYPE: ABNORMAL
EKG ATRIAL RATE: 117 BPM
EKG ATRIAL RATE: 121 BPM
EKG ATRIAL RATE: 70 BPM
EKG DIAGNOSIS: NORMAL
EKG P AXIS: 72 DEGREES
EKG P-R INTERVAL: 182 MS
EKG P-R INTERVAL: 200 MS
EKG Q-T INTERVAL: 332 MS
EKG Q-T INTERVAL: 358 MS
EKG Q-T INTERVAL: 412 MS
EKG QRS DURATION: 86 MS
EKG QRS DURATION: 86 MS
EKG QRS DURATION: 96 MS
EKG QTC CALCULATION (BAZETT): 444 MS
EKG QTC CALCULATION (BAZETT): 471 MS
EKG QTC CALCULATION (BAZETT): 501 MS
EKG R AXIS: -14 DEGREES
EKG R AXIS: -18 DEGREES
EKG R AXIS: -29 DEGREES
EKG T AXIS: 26 DEGREES
EKG T AXIS: 30 DEGREES
EKG T AXIS: 69 DEGREES
EKG VENTRICULAR RATE: 118 BPM
EKG VENTRICULAR RATE: 121 BPM
EKG VENTRICULAR RATE: 70 BPM
EOSINOPHILS ABSOLUTE: 0.2 K/CU MM
EOSINOPHILS RELATIVE PERCENT: 2.5 % (ref 0–3)
GFR AFRICAN AMERICAN: >60 ML/MIN/1.73M2
GFR NON-AFRICAN AMERICAN: 52 ML/MIN/1.73M2
GLUCOSE BLD-MCNC: 143 MG/DL (ref 70–99)
GLUCOSE BLD-MCNC: 152 MG/DL (ref 70–99)
GLUCOSE BLD-MCNC: 153 MG/DL (ref 70–99)
GLUCOSE BLD-MCNC: 259 MG/DL (ref 70–99)
HCT VFR BLD CALC: 37.7 % (ref 37–47)
HDLC SERPL-MCNC: 38 MG/DL
HEMOGLOBIN: 11.6 GM/DL (ref 12.5–16)
IMMATURE NEUTROPHIL %: 0.4 % (ref 0–0.43)
LDL CHOLESTEROL CALCULATED: 64 MG/DL
LV EF: 50 %
LVEF MODALITY: NORMAL
LYMPHOCYTES ABSOLUTE: 1.9 K/CU MM
LYMPHOCYTES RELATIVE PERCENT: 27.3 % (ref 24–44)
MAGNESIUM: 1.6 MG/DL (ref 1.8–2.4)
MCH RBC QN AUTO: 28.7 PG (ref 27–31)
MCHC RBC AUTO-ENTMCNC: 30.8 % (ref 32–36)
MCV RBC AUTO: 93.3 FL (ref 78–100)
MONOCYTES ABSOLUTE: 0.5 K/CU MM
MONOCYTES RELATIVE PERCENT: 7 % (ref 0–4)
NUCLEATED RBC %: 0 %
PDW BLD-RTO: 13.4 % (ref 11.7–14.9)
PLATELET # BLD: 141 K/CU MM (ref 140–440)
PMV BLD AUTO: 9.4 FL (ref 7.5–11.1)
POTASSIUM SERPL-SCNC: 4.6 MMOL/L (ref 3.5–5.1)
RBC # BLD: 4.04 M/CU MM (ref 4.2–5.4)
SEGMENTED NEUTROPHILS ABSOLUTE COUNT: 4.4 K/CU MM
SEGMENTED NEUTROPHILS RELATIVE PERCENT: 62.4 % (ref 36–66)
SODIUM BLD-SCNC: 143 MMOL/L (ref 135–145)
TOTAL IMMATURE NEUTOROPHIL: 0.03 K/CU MM
TOTAL NUCLEATED RBC: 0 K/CU MM
TOTAL PROTEIN: 5.6 GM/DL (ref 6.4–8.2)
TRIGL SERPL-MCNC: 112 MG/DL
WBC # BLD: 7.1 K/CU MM (ref 4–10.5)

## 2022-06-17 PROCEDURE — 93010 ELECTROCARDIOGRAM REPORT: CPT | Performed by: INTERNAL MEDICINE

## 2022-06-17 PROCEDURE — 6370000000 HC RX 637 (ALT 250 FOR IP): Performed by: NURSE PRACTITIONER

## 2022-06-17 PROCEDURE — 6370000000 HC RX 637 (ALT 250 FOR IP): Performed by: INTERNAL MEDICINE

## 2022-06-17 PROCEDURE — 93306 TTE W/DOPPLER COMPLETE: CPT

## 2022-06-17 PROCEDURE — 6360000002 HC RX W HCPCS: Performed by: INTERNAL MEDICINE

## 2022-06-17 PROCEDURE — 83735 ASSAY OF MAGNESIUM: CPT

## 2022-06-17 PROCEDURE — 96375 TX/PRO/DX INJ NEW DRUG ADDON: CPT

## 2022-06-17 PROCEDURE — 80053 COMPREHEN METABOLIC PANEL: CPT

## 2022-06-17 PROCEDURE — G0378 HOSPITAL OBSERVATION PER HR: HCPCS

## 2022-06-17 PROCEDURE — 93005 ELECTROCARDIOGRAM TRACING: CPT | Performed by: NURSE PRACTITIONER

## 2022-06-17 PROCEDURE — 85025 COMPLETE CBC W/AUTO DIFF WBC: CPT

## 2022-06-17 PROCEDURE — 99215 OFFICE O/P EST HI 40 MIN: CPT | Performed by: INTERNAL MEDICINE

## 2022-06-17 PROCEDURE — 94761 N-INVAS EAR/PLS OXIMETRY MLT: CPT

## 2022-06-17 PROCEDURE — 82962 GLUCOSE BLOOD TEST: CPT

## 2022-06-17 PROCEDURE — 96372 THER/PROPH/DIAG INJ SC/IM: CPT

## 2022-06-17 PROCEDURE — 36415 COLL VENOUS BLD VENIPUNCTURE: CPT

## 2022-06-17 PROCEDURE — 2580000003 HC RX 258: Performed by: INTERNAL MEDICINE

## 2022-06-17 PROCEDURE — 80061 LIPID PANEL: CPT

## 2022-06-17 RX ORDER — METOPROLOL TARTRATE 50 MG/1
50 TABLET, FILM COATED ORAL 2 TIMES DAILY
Status: DISCONTINUED | OUTPATIENT
Start: 2022-06-17 | End: 2022-06-18 | Stop reason: HOSPADM

## 2022-06-17 RX ORDER — DIPHENHYDRAMINE HYDROCHLORIDE 50 MG/ML
50 INJECTION INTRAMUSCULAR; INTRAVENOUS ONCE
Status: COMPLETED | OUTPATIENT
Start: 2022-06-17 | End: 2022-06-17

## 2022-06-17 RX ORDER — METHYLPREDNISOLONE SODIUM SUCCINATE 40 MG/ML
40 INJECTION, POWDER, LYOPHILIZED, FOR SOLUTION INTRAMUSCULAR; INTRAVENOUS ONCE
Status: COMPLETED | OUTPATIENT
Start: 2022-06-17 | End: 2022-06-17

## 2022-06-17 RX ADMIN — ATORVASTATIN CALCIUM 20 MG: 10 TABLET, FILM COATED ORAL at 21:22

## 2022-06-17 RX ADMIN — MELATONIN 6 MG: at 21:21

## 2022-06-17 RX ADMIN — APIXABAN 5 MG: 5 TABLET, FILM COATED ORAL at 21:22

## 2022-06-17 RX ADMIN — ASPIRIN 81 MG: 81 TABLET, COATED ORAL at 10:03

## 2022-06-17 RX ADMIN — DIPHENHYDRAMINE HYDROCHLORIDE 50 MG: 50 INJECTION, SOLUTION INTRAMUSCULAR; INTRAVENOUS at 21:41

## 2022-06-17 RX ADMIN — INSULIN LISPRO 1 UNITS: 100 INJECTION, SOLUTION INTRAVENOUS; SUBCUTANEOUS at 21:23

## 2022-06-17 RX ADMIN — TRIMETHOBENZAMIDE HYDROCHLORIDE 200 MG: 100 INJECTION INTRAMUSCULAR at 23:34

## 2022-06-17 RX ADMIN — HYDROCHLOROTHIAZIDE 25 MG: 25 TABLET ORAL at 10:03

## 2022-06-17 RX ADMIN — SODIUM CHLORIDE, PRESERVATIVE FREE 10 ML: 5 INJECTION INTRAVENOUS at 11:51

## 2022-06-17 RX ADMIN — INSULIN LISPRO 1 UNITS: 100 INJECTION, SOLUTION INTRAVENOUS; SUBCUTANEOUS at 18:01

## 2022-06-17 RX ADMIN — INSULIN LISPRO 3 UNITS: 100 INJECTION, SOLUTION INTRAVENOUS; SUBCUTANEOUS at 11:49

## 2022-06-17 RX ADMIN — METOPROLOL TARTRATE 50 MG: 50 TABLET, FILM COATED ORAL at 11:49

## 2022-06-17 RX ADMIN — METOPROLOL TARTRATE 50 MG: 50 TABLET, FILM COATED ORAL at 21:22

## 2022-06-17 RX ADMIN — APIXABAN 5 MG: 5 TABLET, FILM COATED ORAL at 10:06

## 2022-06-17 RX ADMIN — METHYLPREDNISOLONE SODIUM SUCCINATE 40 MG: 40 INJECTION, POWDER, FOR SOLUTION INTRAMUSCULAR; INTRAVENOUS at 21:41

## 2022-06-17 RX ADMIN — SODIUM CHLORIDE, PRESERVATIVE FREE 10 ML: 5 INJECTION INTRAVENOUS at 21:22

## 2022-06-17 ASSESSMENT — ENCOUNTER SYMPTOMS
NAUSEA: 0
DIARRHEA: 0
ALLERGIC/IMMUNOLOGIC NEGATIVE: 1
BACK PAIN: 0
ABDOMINAL PAIN: 0
SORE THROAT: 0
CHEST TIGHTNESS: 0
EYES NEGATIVE: 1
SINUS PRESSURE: 0
RESPIRATORY NEGATIVE: 1
WHEEZING: 0
VOMITING: 1
SHORTNESS OF BREATH: 0

## 2022-06-17 ASSESSMENT — PAIN SCALES - GENERAL: PAINLEVEL_OUTOF10: 0

## 2022-06-17 NOTE — ACP (ADVANCE CARE PLANNING)
Advance Care Planning   Advance Care Planning Note  Amb[facility]    Date:  6/16/2022    Received request from patient. Consultation conversation participants:   Patient who understands ACP conversation     Goals of ACP Conversation:  Discuss advance care planning documents    Health Care Decision Makers:    No healthcare decision makers have been documented. Click here to complete [address] including selection of the Healthcare Decision Maker Relationship (ie \"Primary\")   Summary:  Completed [address]  {Verify that ACP/Healthcare POA documents are present, complete, need to be signed and witnessed. Advance Care Planning Documents (Patient Wishes)  Currently on file:   Healthcare Power of /Advance Directive Appointment of [address]  Living Will/Advance Directive  NA    Assessment:   Documents need to be signed, In the assessment narrative, address the way holistic dimensions influence ACP decisions - medical, psychological, psychosocial, family systems, ethical, cultural, societal and spiritual    Interventions:  Provided education on documents for clarity and greater understanding  Discussed and provided education on state decision maker hierarchy  Assisted in the completion of documents according to patient's wishes at this time  Documents completed but not signed at this time    Care Preferences Communicated:   No  NA    Outcomes:  New advance directive completed. Documents not signed but completed. Patient / Healthcare Decision Maker Instructions:  Return a copy of Advance Directive Form(s) to medical office. Follow up with  to continue their ACP conversation.   Patient have documenbt need to be completed    Electronically signed by Josi Brito on 6/17/2022 at 11:23 AM.

## 2022-06-17 NOTE — CARE COORDINATION
Met c pt to initiate discharge planning. Pt lives at home normally with spouse however he is currently at [facility] for skilled care and she feels he may need to be there permanently. She states she remains fully ind with ADLs, uses no DME. Pt does not drive but has reliable transport from family. Pt has pcp/ active insurance and can afford meds. Pt denied needs, advised CM available if needs arise.

## 2022-06-17 NOTE — ED NOTES
Report called to [facility]. All questions answered at this time.      Axel Ellsworth RN  06/16/22 2052

## 2022-06-17 NOTE — H&P
Hospital Medicine History and Physical    6/16/2022    Date of Admission: 6/16/2022    Date of Service: Pt seen/examined on 6/16/2022 and admitted to observation. Assessment/plan:  1. Hypertensive urgency. Secondary to medication noncompliance. Per patient, she stopped taking medications about 4 years ago. Will start Coreg 6.25 mg twice daily, hydrochlorothiazide 25 mg daily. We will also place her on as needed IV labetalol for systolic blood pressure greater than 150 mmHg. We will update echocardiogram.  2. Sinus tachycardia. TSH is elevated but free T4 is normal.  D-dimer is low, making pulmonary embolism less likely. Could be secondary to anxiety. However, patient already on beta-blocker for the indication, see above. Monitor on telemetry overnight. 3. Uncontrolled diabetes type 2 with hyperglycemia. Secondary to medication noncompliance. Checking hemoglobin A1c. Continue sliding scale insulin. Monitor Accu-Chek closely and adjust insulin dose as needed. 4. History of coronary artery disease. Patient has been started on aspirin, beta-blocker, statin. Check lipid profile in the morning. 5. Elevated TSH. Free T4 is normal.  Consider repeat TSH in 6 weeks  6. Other comorbidities: history of SVT, CAD, chronic diastolic heart failure (echocardiogram from May 2019 with grade 2 diastolic dysfunction), COPD, essential hypertension, GERD, osteoarthritis, diabetes type 2 with hyperglycemia. Activities: Up with assist  Prophylaxis: Subcutaneous Lovenox  Code status: DNR-CC.    ==========================================================  Chief complaint:  Chief Complaint   Patient presents with    Palpitations     states that she has been having alot os stress, states feel fatigue    Hypertension     has a history       History of Presenting Illness:   This is a pleasant 80 y.o. female with history of SVT, CAD, chronic diastolic heart failure (echocardiogram from May 2019 with grade 2 diastolic dysfunction), COPD, essential hypertension, GERD, osteoarthritis, diabetes type 2, who presents to the emergency room for evaluation of palpitations, fatigue, also stating that she has noticed significantly elevated blood pressure, describing systolic blood pressure greater than 100 mmHg. Symptoms have been ongoing for the past couple of days. On presentation to the emergency room, blood pressure was as high as 193/102. Patient does not have chest pain or shortness of breath. She reports that she stopped taking all her medications, including cardiac and diabetic medications, about 4 years ago, when she discussed with her primary care physician that [place] my age, I do not think I should be taking any medicine. \"  EKG obtained in the emergency room with sinus tachycardia, rate 121 beats per minutes with no acute ischemic changes. Patient is being admitted for further evaluation. Past Medical History:      Diagnosis Date    Arrhythmia     Arthritis     osteoarthritis cervical and lumbar spine    Atrophic vaginitis     Blood transfusion     CAD (coronary artery disease)     Cataracts, bilateral     CHF (congestive heart failure) (HCC)     COPD (chronic obstructive pulmonary disease) (HCC)     Depression     Essential hypertension     Fatty liver disease, nonalcoholic     GERD (gastroesophageal reflux disease)     H/O 24 hour EKG monitoring 9/15/2000    9/15/2000 -  Predominant rhythm is a sinus rhythm. No significant ectopy noted.  H/O cardiac catheterization 8/4/2005 8/4/2005 - Moderate degree of stenosis of the high diag, which is a heavily calcified vessel, however, there is a large ramus vessel supplying this same area as well. Rest of vessel is w/o any significant disease. Renals are w/o any significant stenosis.  H/O cardiovascular stress test 12/2/2010, 2/28/2008 12/2/2010 - Normal pattern of perfusion in all regions. LV is normal. No inducible myocardial ischemia.  EF is 66%. LVSF is normal. No ECG changes. Exercise capacity is normal.    H/O cardiovascular stress test 2/24/2015    cardiolite-normal, no ischemia, EF69%    H/O Doppler ultrasound 9/15/2000    9/15/2000 - Carotid - No hemodynamically significant stenosis.  H/O echocardiogram 12/2/2010, 9/22/2009 12/2/2010 - Difficult apical imaging due to patient COPD. LVSF is normal. EF = > 55%. Impaired LV impairment. Mild-Moderate MR. Mild TR.    H/O echocardiogram 7/15/14    EF 55-60%. No significant valvulopathy is seen.  Heart valve problem     2 leaky heart valves    History of Doppler ultrasound 10/31/2000    10/31/2000 - Peripheral - Normal study.  HX OTHER MEDICAL 1/14/2004 1/14/2004 - 48 hr Holter - Predominant rhythm is sinus rhythm. No significant ectopy is seen.  Hyperlipidemia     Mild tricuspid regurgitation     Mitral regurgitation     Nausea & vomiting     Near syncope 5/28/2019    Osteopenia     Pulmonary hypertension (HCC)     Pulmonary nodules     Rectal bleeding 12/21/2012    Supraventricular tachycardia (HCC)     Thyroid disease     Type 2 diabetes mellitus (HCC)        Past Surgical History:      Procedure Laterality Date    APPENDECTOMY      BREAST SURGERY      bilateral lumpectomy    CHOLECYSTECTOMY      COLECTOMY      also head of pancreas    COLONOSCOPY  12-21-12    polyp    DIAGNOSTIC CARDIAC CATH LAB PROCEDURE  8/2005    FRACTURE SURGERY      repair of fractured nose    HYSTERECTOMY      LALA/BSO    PANCREAS SURGERY  11/7/2006    Whipple Procedure    SKIN BIOPSY      moles from right shoulder, chin, left leg    THYROIDECTOMY, PARTIAL      THYROIDECTOMY, PARTIAL  1999    TONSILLECTOMY         Medications (prior to admission):  Prior to Admission medications    Medication Sig Start Date End Date Taking?  Authorizing Provider   ondansetron (ZOFRAN-ODT) 4 MG disintegrating tablet Take 1 tablet by mouth every 12 hours as needed for Nausea 5/11/22   Jag Galdamez, APRN - CNP   clobetasol (TEMOVATE) 0.05 % ointment Apply topically 2 times daily as needed. 5/10/22   Tory Necessary, MARISA       Allergy(ies):  Sulfa antibiotics, Nitrofuran derivatives, and Quinapril hcl    Social History:  TOBACCO:  reports that she has never smoked. She has never used smokeless tobacco.  ETOH:  reports current alcohol use. Family History:      Problem Relation Age of Onset    Heart Failure Mother         CHF    Hypertension Mother     Cancer Father         Pancreatic    Hypertension Father     Heart Disease Sister         CMP    Cancer Brother         Bone    Other Brother         aneurysm    Coronary Art Dis Brother     Heart Attack Brother     Other Sister         Bone problems    Other Sister         infection    Cancer Sister         Ovarian    Cancer Son     Early Death Son         MVA    Coronary Art Dis Daughter     Hypertension Daughter        Review of Systems:  Pertinent positives are listed in HPI. At least 10-point ROS reviewed and were negative. Vitals and physical examination:  BP (!) 176/110   Pulse (!) 123   Temp 98.1 °F (36.7 °C) (Oral)   Resp 26   Ht 5' 4\" (1.626 m)   Wt 140 lb (63.5 kg)   SpO2 99%   BMI 24.03 kg/m²   Gen/overall appearance: Not in acute distress. Alert. Oriented x3. Head: Normocephalic, atraumatic  Eyes: EOMI, good acuity  ENT: Oral mucosa moist  Neck: No JVD, thyromegaly  CVS: Nml S1S2, no MRG. Tachycardic. Pulm: Clear bilaterally. No crackles/wheezes  Gastrointestinal: Soft, NT/ND, +BS  Musculoskeletal: Trace bilateral lower extremity edema. Warm  Neuro: No focal deficit. Moves extremity spontaneously. Psychiatry: Appropriate affect. Not agitated. Skin: Warm, dry with normal turgor.  No rash  Capillary refill: Brisk,< 3 seconds   Peripheral Pulses: +2 palpable, equal bilaterally       Labs/imaging/EKG:  CBC:   Recent Labs     06/16/22  1758   WBC 8.6   HGB 12.4*        BMP:    Recent Labs     06/16/22  1758      K 4.2      CO2 22   BUN 19   CREATININE 1.0   GLUCOSE 192*     Hepatic:   Recent Labs     06/16/22  1758   AST 15   ALT 19   BILITOT 0.3   ALKPHOS 92       XR CHEST PORTABLE    Result Date: 6/16/2022  EXAMINATION: ONE XRAY VIEW OF THE CHEST 6/16/2022 2:14 pm COMPARISON: 05/28/2019 HISTORY: ORDERING SYSTEM PROVIDED HISTORY: Tachycardia TECHNOLOGIST PROVIDED HISTORY: Reason for exam:->Tachycardia Reason for Exam: tachycardia FINDINGS: Chronic interstitial opacities are seen. No acute infiltrates seen. Cardial pericardial silhouette is prominent but stable. No pneumothorax. No free air. No acute bony abnormality. No acute abnormality detected. EKG: Sinus tachycardia, rate 121 bpm.  Septal Q waves present. No acute ST/T changes. . I reviewed EKG. Discussed with ER MARISA.       Thank you Alize Wan MD for the opportunity to be involved in this patient's care.    -----------------------------  Michaelle Flood MD  [facility] hospitalist

## 2022-06-17 NOTE — ED NOTES
Care assumed from [facility] at this time. Patient is alert and oriented and no distress noted at this time. Patient assisted to bathroom at this time. Patient was independently ambulatory at this time. Patients call light in reach.      Luigi Hernandez RN  06/16/22 2000

## 2022-06-17 NOTE — PROGRESS NOTES
mellitus:   6/22/2022: Hemoglobin A1C 7%, does not take any home medications    Monitor blood sugars AC/HS with low dose SSI   Hypoglycemia protocol     Elevated TSH:    Free T4 is normal   Recommend repeat TSH I 6 weeks as outpatient      History of Coronary artery disease:   Lipid panel normal beside HDL 38   Continue Statin       Diet ADULT DIET; Regular; Low Sodium (2 gm)   DVT Prophylaxis [] Lovenox, []  Heparin, [] SCDs, [] Ambulation,  [x] Eliquis, [] Xarelto  [] Coumadin   Code Status DNR-CCA   Disposition From: home  Expected Disposition: home   Estimated Date of Discharge: 6/18 pending rate control   Patient requires continued admission due to rate control    Surrogate Decision Maker/ POA Oumou Meyers      Subjective:     Chief Complaint: Palpitations (states that she has been having alot os stress, states feel fatigue) and Hypertension (has a history)       Regi Thomas is a 80 y.o. female who presents with her heart \"racing. \" She states she stopped taking all her routine medications 4 years ago and does not follow routinely with PCP. She denies chest pain or shortness of breath. She states she feels like her heart intermittently races. Review of Systems:    Review of Systems   Constitutional: Positive for fatigue. Negative for activity change, diaphoresis and fever. HENT: Negative for congestion, sinus pressure and sore throat. Eyes: Negative. Respiratory: Negative. Negative for chest tightness, shortness of breath and wheezing. Cardiovascular: Positive for palpitations and leg swelling (bilateral lower extremity edema 1+). Negative for chest pain. Gastrointestinal: Positive for vomiting. Negative for abdominal pain, diarrhea and nausea. Endocrine: Negative. Genitourinary: Negative. Negative for difficulty urinating, dysuria and urgency. Musculoskeletal: Negative. Negative for arthralgias, back pain and neck pain. Skin: Negative. Allergic/Immunologic: Negative. Neurological: Negative. Negative for numbness and headaches. Hematological: Negative. Psychiatric/Behavioral: Negative. Objective:   No intake or output data in the 24 hours ending 06/17/22 1051     Vitals:   Vitals:    06/17/22 0752   BP: 137/77   Pulse: (!) 121   Resp: 17   Temp: 98.4 °F (36.9 °C)   SpO2: 97%       Physical Exam:     General: Elderly woman resting in bed in no acute distress. She is alert and orientated x4  Eyes: EOMI   ENT: neck supple, no JVD   Cardiovascular: Irregular rhythm/rate on telemetry rates low 100's  Respiratory: Clear to auscultation on room air   Gastrointestinal: Soft, non tender  Genitourinary: no suprapubic tenderness  Musculoskeletal: Bilateral lower extremity edema 1+  Skin: warm, dry  Neuro: Alert. GCS 15  Psych: Mood appropriate.      Medications:   Medications:    apixaban  5 mg Oral BID    metoprolol tartrate  50 mg Oral BID    sodium chloride flush  5-40 mL IntraVENous 2 times per day    hydroCHLOROthiazide  25 mg Oral Daily    atorvastatin  20 mg Oral Nightly    aspirin  81 mg Oral Daily    insulin lispro  0-6 Units SubCUTAneous TID WC    insulin lispro  0-3 Units SubCUTAneous Nightly      Infusions:    sodium chloride      dextrose       PRN Meds: metoprolol (LOPRESSOR) ivpb, 5 mg, Q6H PRN  sodium chloride flush, 10 mL, PRN  sodium chloride, , PRN  polyethylene glycol, 17 g, Daily PRN  acetaminophen, 650 mg, Q6H PRN   Or  acetaminophen, 650 mg, Q6H PRN  trimethobenzamide, 200 mg, Q8H PRN  meclizine, 12.5 mg, TID PRN  glucose, 4 tablet, PRN  dextrose bolus, 125 mL, PRN   Or  dextrose bolus, 250 mL, PRN  glucagon (rDNA), 1 mg, PRN  dextrose, 100 mL/hr, PRN  melatonin, 6 mg, Nightly PRN        Labs      Recent Results (from the past 24 hour(s))   EKG 12 Lead    Collection Time: 06/16/22  4:59 PM   Result Value Ref Range    Ventricular Rate 118 BPM    Atrial Rate 117 BPM    QRS Duration 96 ms    Q-T Interval 358 ms    QTc Calculation (Bazett) 501 ms R Axis -14 degrees    T Axis 69 degrees    Diagnosis       Accelerated Junctional rhythm  Septal infarct , age undetermined  Abnormal ECG  When compared with ECG of 28-MAY-2019 09:55,  Junctional rhythm has replaced Sinus rhythm  Septal infarct is now present  T wave amplitude has increased in Anterior leads     Blood Gas, Venous    Collection Time: 06/16/22  5:15 PM   Result Value Ref Range    pH, Blayne 7.35 7.32 - 7.42    pCO2, Blayne 41 38 - 52 mmHG    pO2, Blayne 75 (H) 28 - 48 mmHG    Base Excess 3 (H) 0 - 2.4    HCO3, Venous 22.6 19 - 25 MMOL/L    O2 Sat, Blayne 92.8 (H) 50 - 70 %    Comment ER VBG    Hemoglobin A1C    Collection Time: 06/16/22  5:15 PM   Result Value Ref Range    Hemoglobin A1C 7.0 (H) 4.2 - 6.3 %    eAG 154 mg/dL   Urinalysis with Reflex to Culture    Collection Time: 06/16/22  5:50 PM    Specimen: Urine   Result Value Ref Range    Color, UA YELLOW YELLOW    Clarity, UA CLEAR CLEAR    Glucose, Urine NEGATIVE NEGATIVE MG/DL    Bilirubin Urine NEGATIVE NEGATIVE MG/DL    Ketones, Urine NEGATIVE NEGATIVE MG/DL    Specific Gravity, UA 1.020 1.001 - 1.035    Blood, Urine NEGATIVE NEGATIVE    pH, Urine 5.5 5.0 - 8.0    Protein, UA NEGATIVE NEGATIVE MG/DL    Urobilinogen, Urine 0.2 0.2 - 1.0 MG/DL    Nitrite Urine, Quantitative NEGATIVE NEGATIVE    Leukocyte Esterase, Urine NEGATIVE NEGATIVE    RBC, UA 1 0 - 6 /HPF    WBC, UA 4 0 - 5 /HPF    Bacteria, UA NEGATIVE NEGATIVE /HPF    Squam Epithel, UA 1 /HPF    Trichomonas, UA NONE SEEN NONE SEEN /HPF   TSH    Collection Time: 06/16/22  5:58 PM   Result Value Ref Range    TSH, High Sensitivity 5.620 (H) 0.270 - 4.20 uIu/ml   T4, Free    Collection Time: 06/16/22  5:58 PM   Result Value Ref Range    T4 Free 1.24 0.9 - 1.8 NG/DL   CBC with Auto Differential    Collection Time: 06/16/22  5:58 PM   Result Value Ref Range    WBC 8.6 4.0 - 10.5 K/CU MM    RBC 4.26 4.2 - 5.4 M/CU MM    Hemoglobin 12.4 (L) 12.5 - 16.0 GM/DL    Hematocrit 39.0 37 - 47 %    MCV 91.5 78 - 100 FL    MCH 29.1 27 - 31 PG    MCHC 31.8 (L) 32.0 - 36.0 %    RDW 13.4 11.7 - 14.9 %    Platelets 796 337 - 930 K/CU MM    MPV 9.5 7.5 - 11.1 FL    Differential Type AUTOMATED DIFFERENTIAL     Segs Relative 61.2 36 - 66 %    Lymphocytes % 28.7 24 - 44 %    Monocytes % 7.5 (H) 0 - 4 %    Eosinophils % 2.0 0 - 3 %    Basophils % 0.3 0 - 1 %    Segs Absolute 5.3 K/CU MM    Lymphocytes Absolute 2.5 K/CU MM    Monocytes Absolute 0.7 K/CU MM    Eosinophils Absolute 0.2 K/CU MM    Basophils Absolute 0.0 K/CU MM    Nucleated RBC % 0.0 %    Total Nucleated RBC 0.0 K/CU MM    Total Immature Neutrophil 0.03 K/CU MM    Immature Neutrophil % 0.3 0 - 0.43 %   Comprehensive Metabolic Panel w/ Reflex to MG    Collection Time: 06/16/22  5:58 PM   Result Value Ref Range    Sodium 139 135 - 145 MMOL/L    Potassium 4.2 3.5 - 5.1 MMOL/L    Chloride 106 99 - 110 mMol/L    CO2 22 21 - 32 MMOL/L    BUN 19 6 - 23 MG/DL    CREATININE 1.0 0.6 - 1.1 MG/DL    Glucose 192 (H) 70 - 99 MG/DL    Calcium 8.9 8.3 - 10.6 MG/DL    Albumin 3.7 3.4 - 5.0 GM/DL    Total Protein 6.5 6.4 - 8.2 GM/DL    Total Bilirubin 0.3 0.0 - 1.0 MG/DL    ALT 19 10 - 40 U/L    AST 15 15 - 37 IU/L    Alkaline Phosphatase 92 40 - 129 IU/L    GFR Non- 52 (L) >60 mL/min/1.73m2    GFR African American >60 >60 mL/min/1.73m2    Anion Gap 11 4 - 16   Troponin    Collection Time: 06/16/22  5:58 PM   Result Value Ref Range    Troponin T <0.010 <0.01 NG/ML   Brain Natriuretic Peptide    Collection Time: 06/16/22  5:58 PM   Result Value Ref Range    Pro-BNP 3,648 (H) <300 PG/ML   Protime-INR    Collection Time: 06/16/22  5:58 PM   Result Value Ref Range    Protime 12.9 11.7 - 14.5 SECONDS    INR 1.00 INDEX   APTT    Collection Time: 06/16/22  5:58 PM   Result Value Ref Range    aPTT 26.2 25.1 - 37.1 SECONDS   D-Dimer, Quantitative    Collection Time: 06/16/22  5:58 PM   Result Value Ref Range    D-Dimer, Quant <200 <230 NG/mL(DDU)   EKG 12 Lead    Collection Time: 06/16/22  8:39 PM   Result Value Ref Range    Ventricular Rate 121 BPM    Atrial Rate 121 BPM    P-R Interval 200 ms    QRS Duration 86 ms    Q-T Interval 332 ms    QTc Calculation (Bazett) 471 ms    R Axis -29 degrees    T Axis 30 degrees    Diagnosis       Sinus tachycardia  Septal infarct (cited on or before 16-JUN-2022)  Abnormal ECG  When compared with ECG of 16-JUN-2022 16:59,  Sinus rhythm has replaced Junctional rhythm  Nonspecific T wave abnormality no longer evident in Lateral leads     POCT Glucose    Collection Time: 06/16/22  9:54 PM   Result Value Ref Range    POC Glucose 132 (H) 70 - 99 MG/DL   Comprehensive Metabolic Panel w/ Reflex to MG    Collection Time: 06/17/22  7:35 AM   Result Value Ref Range    Sodium 143 135 - 145 MMOL/L    Potassium 4.6 3.5 - 5.1 MMOL/L    Chloride 110 99 - 110 mMol/L    CO2 27 21 - 32 MMOL/L    BUN 17 6 - 23 MG/DL    CREATININE 1.0 0.6 - 1.1 MG/DL    Glucose 153 (H) 70 - 99 MG/DL    Calcium 8.7 8.3 - 10.6 MG/DL    Albumin 3.5 3.4 - 5.0 GM/DL    Total Protein 5.6 (L) 6.4 - 8.2 GM/DL    Total Bilirubin 0.3 0.0 - 1.0 MG/DL    ALT 18 10 - 40 U/L    AST 17 15 - 37 IU/L    Alkaline Phosphatase 90 40 - 128 IU/L    GFR Non- 52 (L) >60 mL/min/1.73m2    GFR African American >60 >60 mL/min/1.73m2    Anion Gap 6 4 - 16   CBC auto differential    Collection Time: 06/17/22  7:35 AM   Result Value Ref Range    WBC 7.1 4.0 - 10.5 K/CU MM    RBC 4.04 (L) 4.2 - 5.4 M/CU MM    Hemoglobin 11.6 (L) 12.5 - 16.0 GM/DL    Hematocrit 37.7 37 - 47 %    MCV 93.3 78 - 100 FL    MCH 28.7 27 - 31 PG    MCHC 30.8 (L) 32.0 - 36.0 %    RDW 13.4 11.7 - 14.9 %    Platelets 654 659 - 917 K/CU MM    MPV 9.4 7.5 - 11.1 FL    Differential Type AUTOMATED DIFFERENTIAL     Segs Relative 62.4 36 - 66 %    Lymphocytes % 27.3 24 - 44 %    Monocytes % 7.0 (H) 0 - 4 %    Eosinophils % 2.5 0 - 3 %    Basophils % 0.4 0 - 1 %    Segs Absolute 4.4 K/CU MM    Lymphocytes Absolute 1.9 K/CU MM    Monocytes Absolute 0.5 K/CU MM    Eosinophils Absolute 0.2 K/CU MM    Basophils Absolute 0.0 K/CU MM    Nucleated RBC % 0.0 %    Total Nucleated RBC 0.0 K/CU MM    Total Immature Neutrophil 0.03 K/CU MM    Immature Neutrophil % 0.4 0 - 0.43 %   Lipid Panel    Collection Time: 06/17/22  7:35 AM   Result Value Ref Range    Triglycerides 112 <150 MG/DL    Cholesterol 124 <200 MG/DL    HDL 38 (L) >40 MG/DL    LDL Calculated 64 <100 MG/DL        Imaging/Diagnostics Last 24 Hours   XR CHEST PORTABLE    Result Date: 6/16/2022  EXAMINATION: ONE XRAY VIEW OF THE CHEST 6/16/2022 2:14 pm COMPARISON: 05/28/2019 HISTORY: ORDERING SYSTEM PROVIDED HISTORY: Tachycardia TECHNOLOGIST PROVIDED HISTORY: Reason for exam:->Tachycardia Reason for Exam: tachycardia FINDINGS: Chronic interstitial opacities are seen. No acute infiltrates seen. Cardial pericardial silhouette is prominent but stable. No pneumothorax. No free air. No acute bony abnormality. No acute abnormality detected.        Electronically signed by Korene Epley, APRN - CNP on 6/17/2022 at 10:51 AM

## 2022-06-17 NOTE — ACP (ADVANCE CARE PLANNING)
Advance Care Planning   Advance Care Planning Note  Ambulatory Spiritual Care Services    Date:  6/16/2022    Received request from patient. Consultation conversation participants:   Patient who understands ACP conversation     Goals of ACP Conversation:  Discuss advance care planning documents    Health Care Decision Makers:    No healthcare decision makers have been documented. Click here to complete [address] including selection of the Healthcare Decision Maker Relationship (ie \"Primary\")   Summary:  Completed New Documents  {Verify that ACP/Healthcare POA documents are present, complete, witnessed & notarized as form requires. Then verify accuracy of Healthcare Decision Maker(s) contact information. Last, ensure Decision Maker(s) is designated Primary, Secondary. Advance Care Planning Documents (Patient Wishes)  Currently on file:   Living Will/Advance Directive  NA    Assessment:    ACP decisions - medical, psychological, psychosocial, family systems, ethical, cultural, societal and spiritual. Advanced Directive completed with patient's two daughters as primary and secondary Decision Makers. Patient was clear about her wishes. Interventions:  Provided education on documents for clarity and greater understanding  Discussed and provided education on state decision maker hierarchy  Document was singed and placed in chart    Care Preferences Communicated:   No    Outcomes:  New advance directive completed. Returned original document(s) to patient, as well as copies for distribution to appointed agents  Copy of advance directive given to staff to scan into medical record. NA    Patient / Healthcare Decision Maker Instructions:  Return a copy of Advance Directive Form(s) to medical office.   NA    Electronically signed by Kong Peralta on 6/17/2022 at 12:49 PM.

## 2022-06-17 NOTE — PROGRESS NOTES
Physical Therapy    Physical Therapy Treatment Note  Name: Ron Summers MRN: [id number] :   1933   Date:  2022   Admission Date: 2022 Room:  [facility]      PT arrives for pt evaluation. Upon entry PT notes pt is up in BR independently. PT observes pt AMB able to perform all ADLs in BR with independence, AMB without AD independently. PT performs screen, confirmed information re: pt PLOF and home environment, support system with pt daughter. Daughter confirms family will be present to support and pt is at baseline. Pt is without Acute PT needs and will be d/c from Therapy at this time. Recommend home with initial .   Electronically signed by:    Geovany Díaz, PT  2022, 2:11 PM

## 2022-06-17 NOTE — CONSULTS
INPATIENT CARDIOLOGY CONSULT NOTE         Reason for consultation:  Palpitations     Referring physician:  Divya Armijo MD     Primary care physician: Lan Yeboah MD      Dear Divya Armijo MD Thank you for the consult    Chief Complaint   Patient presents with    Palpitations     states that she has been having alot os stress, states feel fatigue    Hypertension     has a history       History of present illness:Annie is a 80 y. o.year old who  presents with   Chief Complaint   Patient presents with    Palpitations     states that she has been having alot os stress, states feel fatigue    Hypertension     has a history       Patient is a 75-year-old female who does not follow-up with doctors regularly has prior medical history significant for essential hypertension and has not been on medication for many years. Patient presented to the hospital with chief complaint of palpitations. Cardiology consulted to evaluate patient for hypertensive urgency      On review of telemetry patient is noted to have paroxysmal atrial fibrillation, EKG shows supraventricular tachycardia    Patient denies any current symptoms. Denies any chest pain or shortness of breath.              Past medical history:    has a past medical history of Arrhythmia, Arthritis, Atrophic vaginitis, Blood transfusion, CAD (coronary artery disease), Cataracts, bilateral, CHF (congestive heart failure) ([address] COPD (chronic obstructive pulmonary disease) ([address] Depression, Essential hypertension, Fatty liver disease, nonalcoholic, GERD (gastroesophageal reflux disease), H/O 24 hour EKG monitoring, H/O cardiac catheterization, H/O cardiovascular stress test, H/O cardiovascular stress test, H/O Doppler ultrasound, H/O echocardiogram, H/O echocardiogram, Heart valve problem, History of Doppler ultrasound, HX OTHER MEDICAL, Hyperlipidemia, Mild tricuspid regurgitation, Mitral regurgitation, Nausea & vomiting, Near syncope, Osteopenia, Pulmonary hypertension ([address] Pulmonary nodules, Rectal bleeding, Supraventricular tachycardia ([address] Thyroid disease, and Type 2 diabetes mellitus ([address] Past surgical history:   has a past surgical history that includes Thyroidectomy, partial; colectomy; Cholecystectomy; Appendectomy; Hysterectomy; Breast surgery; fracture surgery; Tonsillectomy; skin biopsy; Colonoscopy (12-21-12); Pancreas surgery (11/7/2006); Thyroidectomy, partial (1999); and Diagnostic Cardiac Cath Lab Procedure (8/2005). Social History:   reports that she has never smoked. She has never used smokeless tobacco. She reports current alcohol use. She reports that she does not use drugs.   Family history:   no family history of CAD, STROKE of DM    Allergies   Allergen Reactions    Sulfa Antibiotics Anaphylaxis    Nitrofuran Derivatives     Quinapril Hcl      Cough       apixaban (ELIQUIS) tablet 5 mg, BID  metoprolol (LOPRESSOR) 5 mg in sodium chloride 0.9 % 50 mL IVPB, Q6H PRN  metoprolol tartrate (LOPRESSOR) tablet 50 mg, BID  sodium chloride flush 0.9 % injection 5-40 mL, 2 times per day  sodium chloride flush 0.9 % injection 10 mL, PRN  0.9 % sodium chloride infusion, PRN  polyethylene glycol (GLYCOLAX) packet 17 g, Daily PRN  acetaminophen (TYLENOL) tablet 650 mg, Q6H PRN   Or  acetaminophen (TYLENOL) suppository 650 mg, Q6H PRN  trimethobenzamide (TIGAN) injection 200 mg, Q8H PRN  meclizine (ANTIVERT) tablet 12.5 mg, TID PRN  hydroCHLOROthiazide (HYDRODIURIL) tablet 25 mg, Daily  atorvastatin (LIPITOR) tablet 20 mg, Nightly  aspirin EC tablet 81 mg, Daily  insulin lispro (HUMALOG) injection vial 0-6 Units, TID WC  insulin lispro (HUMALOG) injection vial 0-3 Units, Nightly  glucose chewable tablet 16 g, PRN  dextrose bolus 10% 125 mL, PRN   Or  dextrose bolus 10% 250 mL, PRN  glucagon (rDNA) injection 1 mg, PRN  dextrose 5 % solution, PRN  melatonin tablet 6 mg, Nightly PRN      Current Facility-Administered Medications Medication Dose Route Frequency Provider Last Rate Last Admin    apixaban (ELIQUIS) tablet 5 mg  5 mg Oral BID Shelia Sparks MD   5 mg at 06/17/22 1006    metoprolol (LOPRESSOR) 5 mg in sodium chloride 0.9 % 50 mL IVPB  5 mg IntraVENous Q6H PRN MCKINLEY Sifuentes CNP        metoprolol tartrate (LOPRESSOR) tablet 50 mg  50 mg Oral BID MCKINLEY Sifuentes - CNP        sodium chloride flush 0.9 % injection 5-40 mL  5-40 mL IntraVENous 2 times per day Heide Zabala MD   10 mL at 06/16/22 2216    sodium chloride flush 0.9 % injection 10 mL  10 mL IntraVENous PRN Heide Zabala MD        0.9 % sodium chloride infusion   IntraVENous PRN Heide Zabala MD        polyethylene glycol (GLYCOLAX) packet 17 g  17 g Oral Daily PRN Heide Zabala MD        acetaminophen (TYLENOL) tablet 650 mg  650 mg Oral Q6H PRN Heide Zabala MD        Or    acetaminophen (TYLENOL) suppository 650 mg  650 mg Rectal Q6H PRN Heide Zabala MD        trimethobenzamide (TIGAN) injection 200 mg  200 mg IntraMUSCular Q8H PRN Heide Zabala MD        meclizine (ANTIVERT) tablet 12.5 mg  12.5 mg Oral TID PRN Heide Zabala MD        hydroCHLOROthiazide (HYDRODIURIL) tablet 25 mg  25 mg Oral Daily Heide Zabala MD   25 mg at 06/17/22 1003    atorvastatin (LIPITOR) tablet 20 mg  20 mg Oral Nightly Heide Zabala MD   20 mg at 06/16/22 2216    aspirin EC tablet 81 mg  81 mg Oral Daily Heide Zabala MD   81 mg at 06/17/22 1003    insulin lispro (HUMALOG) injection vial 0-6 Units  0-6 Units SubCUTAneous TID  Ameya Terrell MD        insulin lispro (HUMALOG) injection vial 0-3 Units  0-3 Units SubCUTAneous Nightly Heide Zabala MD        glucose chewable tablet 16 g  4 tablet Oral PRN Heide Zabala MD        dextrose bolus 10% 125 mL  125 mL IntraVENous PRN Heide Zabala MD        Or    dextrose bolus 10% 250 mL  250 mL IntraVENous PRN MD Charlotte Casey glucagon (rDNA) injection 1 mg  1 mg IntraMUSCular PRN Corrinne Plumber, MD        dextrose 5 % solution  100 mL/hr IntraVENous PRN Corrinne Plumber, MD        melatonin tablet 6 mg  6 mg Oral Nightly PRN Polina Blas MD             Review of Systems:     · Constitutional: No Fever or Weight Loss   · Eyes: No Decreased Vision  · ENT: No Headaches, Hearing Loss or Vertigo  · Cardiovascular:   no chest pain,  no dyspnea on exertion,  no palpitations or loss of consciousness  · Respiratory: No cough or wheezing    · Gastrointestinal: No abdominal pain, appetite loss, blood in stools, constipation, diarrhea or heartburn  · Genitourinary: No dysuria, trouble voiding, or hematuria  · Musculoskeletal:  No gait disturbance, weakness or joint complaints  · Integumentary: No rash or pruritis  · Neurological: No TIA or stroke symptoms  · Psychiatric: No anxiety or depression  · Endocrine: No malaise, fatigue or temperature intolerance  · Hematologic/Lymphatic: No bleeding problems, blood clots or swollen lymph nodes  · Allergic/Immunologic: No nasal congestion or hives    All other systems were reviewed and were negative otherwise. Physical Examination:      Vitals:    06/17/22 0752   BP: 137/77   Pulse: (!) 121   Resp: 17   Temp: 98.4 °F (36.9 °C)   SpO2: 97%      Wt Readings from Last 3 Encounters:   06/16/22 140 lb (63.5 kg)   05/10/22 157 lb 9.6 oz (71.5 kg)   10/22/21 143 lb (64.9 kg)     Body mass index is 24.03 kg/m². General Appearance:  No distress, conversant  Constitutional:  Well developed, Well nourished  HEENT:  Normocephalic, Atraumatic, Oropharynx moist   Nose normal. Neck Supple Carotid: no carotid bruit  Eyes:  Conjunctiva normal, No discharge. Respiratory:    ormal breath sounds, No respiratory distress, No wheezing, no use of accessory muscles, diaphragm movement is normal  No chest Tenderness  Cardiovascular: S1-S2 No murmurs auscultated. No rubs, thrills or gallops. Normal  rhythm. Pedal pulses are normal. Nopedal edema  GI:  Soft Non tender, non distended. Musculoskeletal:   No tenderness, No cyanosis, No clubbing. Integument:  Warm, Dry, No erythema, No rash. Lymphatic:  No lymphadenopathy noted. Neurologic:  Alert & oriented x 3  No focal deficits noted. Psychiatric:  Affect normal, Judgment normal, Mood normal.       Lab Review     Recent Labs     06/17/22  0735   WBC 7.1   HGB 11.6*   HCT 37.7         Recent Labs     06/17/22  0735      K 4.6      CO2 27   BUN 17   CREATININE 1.0     Recent Labs     06/17/22  0735   AST 17   ALT 18   BILITOT 0.3   ALKPHOS 90     No results for input(s): TROPONINI in the last 72 hours. Lab Results   Component Value Date    BNP 49 05/24/2013     Lab Results   Component Value Date    INR 1.00 06/16/2022    PROTIME 12.9 06/16/2022         All labs, images, EKGs were personally reviewed      Assessment: 80 y. o.year old with PMH of  has a past medical history of Arrhythmia, Arthritis, Atrophic vaginitis, Blood transfusion, CAD (coronary artery disease), Cataracts, bilateral, CHF (congestive heart failure) ([address] COPD (chronic obstructive pulmonary disease) ([address] Depression, Essential hypertension, Fatty liver disease, nonalcoholic, GERD (gastroesophageal reflux disease), H/O 24 hour EKG monitoring, H/O cardiac catheterization, H/O cardiovascular stress test, H/O cardiovascular stress test, H/O Doppler ultrasound, H/O echocardiogram, H/O echocardiogram, Heart valve problem, History of Doppler ultrasound, HX OTHER MEDICAL, Hyperlipidemia, Mild tricuspid regurgitation, Mitral regurgitation, Nausea & vomiting, Near syncope, Osteopenia, Pulmonary hypertension ([address] Pulmonary nodules, Rectal bleeding, Supraventricular tachycardia ([address] Thyroid disease, and Type 2 diabetes mellitus ([address] Medical Decision Making :       1. Essential hypertension  2. Hypertensive urgency  3.  Noncompliance with medication in the past  4. Supraventricular tachycardia  5. Paroxysmal atrial fibrillation      Patient mentioned that she was noncompliant with medication as she not feel like taking medication in the past however if she needs them she will be compliant in the future. Overnight patient had episodes of paroxysmal atrial fibrillation. [id number]    Start patient on Eliquis 5 mg p.o. twice daily  Low-dose aspirin 81 mg daily  hydrochlorothiazide 25 mg daily  metoprolol tartrate 50 mg p.o. twice daily    Echocardiogram today    Outpatient follow-up with cardiology. Will evaluate with 30-day event monitor  If symptoms of dyspnea or chest pain, will consider stress test in outpatient setting.        Thank you for the consult            Dr. Paulo Harry  6/17/2022 11:30 AM

## 2022-06-17 NOTE — ED PROVIDER NOTES
EKG: Sinus tachycardia, rate of 118, QRS duration 96, QT interval 358, QTc interval 501, no ST elevation, questionable SVT. EKG #2: SVT, rate of 121, UT interval 200, QRS duration 86, QT interval 332, QTc interval 471, no ST elevation.      Dione Cortez, DO  06/18/22 1352

## 2022-06-18 VITALS
TEMPERATURE: 97.4 F | HEART RATE: 58 BPM | RESPIRATION RATE: 16 BRPM | BODY MASS INDEX: 23.9 KG/M2 | HEIGHT: 64 IN | DIASTOLIC BLOOD PRESSURE: 58 MMHG | OXYGEN SATURATION: 96 % | SYSTOLIC BLOOD PRESSURE: 143 MMHG | WEIGHT: 140 LBS

## 2022-06-18 LAB
ALBUMIN SERPL-MCNC: 3.6 GM/DL (ref 3.4–5)
ALP BLD-CCNC: 98 IU/L (ref 40–128)
ALT SERPL-CCNC: 26 U/L (ref 10–40)
ANION GAP SERPL CALCULATED.3IONS-SCNC: 12 MMOL/L (ref 4–16)
AST SERPL-CCNC: 22 IU/L (ref 15–37)
BASOPHILS ABSOLUTE: 0 K/CU MM
BASOPHILS RELATIVE PERCENT: 0.1 % (ref 0–1)
BILIRUB SERPL-MCNC: 0.4 MG/DL (ref 0–1)
BUN BLDV-MCNC: 22 MG/DL (ref 6–23)
CALCIUM SERPL-MCNC: 8.6 MG/DL (ref 8.3–10.6)
CHLORIDE BLD-SCNC: 104 MMOL/L (ref 99–110)
CO2: 20 MMOL/L (ref 21–32)
CREAT SERPL-MCNC: 1.1 MG/DL (ref 0.6–1.1)
DIFFERENTIAL TYPE: ABNORMAL
EOSINOPHILS ABSOLUTE: 0 K/CU MM
EOSINOPHILS RELATIVE PERCENT: 0.1 % (ref 0–3)
GFR AFRICAN AMERICAN: 57 ML/MIN/1.73M2
GFR NON-AFRICAN AMERICAN: 47 ML/MIN/1.73M2
GLUCOSE BLD-MCNC: 195 MG/DL (ref 70–99)
GLUCOSE BLD-MCNC: 203 MG/DL (ref 70–99)
GLUCOSE BLD-MCNC: 236 MG/DL (ref 70–99)
GLUCOSE BLD-MCNC: 333 MG/DL (ref 70–99)
HCT VFR BLD CALC: 37.1 % (ref 37–47)
HEMOGLOBIN: 11.8 GM/DL (ref 12.5–16)
IMMATURE NEUTROPHIL %: 0.4 % (ref 0–0.43)
LYMPHOCYTES ABSOLUTE: 0.9 K/CU MM
LYMPHOCYTES RELATIVE PERCENT: 13.3 % (ref 24–44)
MCH RBC QN AUTO: 28.4 PG (ref 27–31)
MCHC RBC AUTO-ENTMCNC: 31.8 % (ref 32–36)
MCV RBC AUTO: 89.2 FL (ref 78–100)
MONOCYTES ABSOLUTE: 0.1 K/CU MM
MONOCYTES RELATIVE PERCENT: 1 % (ref 0–4)
NUCLEATED RBC %: 0 %
PDW BLD-RTO: 13.2 % (ref 11.7–14.9)
PLATELET # BLD: 153 K/CU MM (ref 140–440)
PMV BLD AUTO: 9.5 FL (ref 7.5–11.1)
POTASSIUM SERPL-SCNC: 4.4 MMOL/L (ref 3.5–5.1)
RBC # BLD: 4.16 M/CU MM (ref 4.2–5.4)
SEGMENTED NEUTROPHILS ABSOLUTE COUNT: 5.8 K/CU MM
SEGMENTED NEUTROPHILS RELATIVE PERCENT: 85.1 % (ref 36–66)
SODIUM BLD-SCNC: 136 MMOL/L (ref 135–145)
TOTAL IMMATURE NEUTOROPHIL: 0.03 K/CU MM
TOTAL NUCLEATED RBC: 0 K/CU MM
TOTAL PROTEIN: 5.8 GM/DL (ref 6.4–8.2)
WBC # BLD: 6.8 K/CU MM (ref 4–10.5)

## 2022-06-18 PROCEDURE — 6370000000 HC RX 637 (ALT 250 FOR IP): Performed by: NURSE PRACTITIONER

## 2022-06-18 PROCEDURE — 96372 THER/PROPH/DIAG INJ SC/IM: CPT

## 2022-06-18 PROCEDURE — 82962 GLUCOSE BLOOD TEST: CPT

## 2022-06-18 PROCEDURE — 6370000000 HC RX 637 (ALT 250 FOR IP): Performed by: INTERNAL MEDICINE

## 2022-06-18 PROCEDURE — 6360000002 HC RX W HCPCS: Performed by: INTERNAL MEDICINE

## 2022-06-18 PROCEDURE — 83735 ASSAY OF MAGNESIUM: CPT

## 2022-06-18 PROCEDURE — 85025 COMPLETE CBC W/AUTO DIFF WBC: CPT

## 2022-06-18 PROCEDURE — 2580000003 HC RX 258: Performed by: INTERNAL MEDICINE

## 2022-06-18 PROCEDURE — G0378 HOSPITAL OBSERVATION PER HR: HCPCS

## 2022-06-18 PROCEDURE — 36415 COLL VENOUS BLD VENIPUNCTURE: CPT

## 2022-06-18 PROCEDURE — APPSS45 APP SPLIT SHARED TIME 31-45 MINUTES: Performed by: NURSE PRACTITIONER

## 2022-06-18 PROCEDURE — 80053 COMPREHEN METABOLIC PANEL: CPT

## 2022-06-18 RX ORDER — METOPROLOL TARTRATE 50 MG/1
50 TABLET, FILM COATED ORAL 2 TIMES DAILY
Qty: 60 TABLET | Refills: 0 | Status: SHIPPED | OUTPATIENT
Start: 2022-06-18 | End: 2022-07-28 | Stop reason: ALTCHOICE

## 2022-06-18 RX ORDER — HYDROCHLOROTHIAZIDE 25 MG/1
25 TABLET ORAL DAILY
Qty: 30 TABLET | Refills: 0 | Status: SHIPPED | OUTPATIENT
Start: 2022-06-19 | End: 2022-07-21 | Stop reason: SDUPTHER

## 2022-06-18 RX ORDER — LOSARTAN POTASSIUM 25 MG/1
25 TABLET ORAL DAILY
Status: DISCONTINUED | OUTPATIENT
Start: 2022-06-18 | End: 2022-06-18 | Stop reason: HOSPADM

## 2022-06-18 RX ORDER — ASPIRIN 81 MG/1
81 TABLET ORAL DAILY
Qty: 30 TABLET | Refills: 0 | Status: SHIPPED | OUTPATIENT
Start: 2022-06-19 | End: 2022-07-21 | Stop reason: SDUPTHER

## 2022-06-18 RX ORDER — ATORVASTATIN CALCIUM 20 MG/1
20 TABLET, FILM COATED ORAL NIGHTLY
Qty: 30 TABLET | Refills: 0 | Status: SHIPPED | OUTPATIENT
Start: 2022-06-18 | End: 2022-06-18 | Stop reason: HOSPADM

## 2022-06-18 RX ADMIN — SODIUM CHLORIDE, PRESERVATIVE FREE 10 ML: 5 INJECTION INTRAVENOUS at 07:58

## 2022-06-18 RX ADMIN — APIXABAN 5 MG: 5 TABLET, FILM COATED ORAL at 07:58

## 2022-06-18 RX ADMIN — TRIMETHOBENZAMIDE HYDROCHLORIDE 200 MG: 100 INJECTION INTRAMUSCULAR at 08:53

## 2022-06-18 RX ADMIN — INSULIN LISPRO 2 UNITS: 100 INJECTION, SOLUTION INTRAVENOUS; SUBCUTANEOUS at 09:58

## 2022-06-18 RX ADMIN — ASPIRIN 81 MG: 81 TABLET, COATED ORAL at 07:58

## 2022-06-18 RX ADMIN — INSULIN LISPRO 4 UNITS: 100 INJECTION, SOLUTION INTRAVENOUS; SUBCUTANEOUS at 12:20

## 2022-06-18 RX ADMIN — METOPROLOL TARTRATE 50 MG: 50 TABLET, FILM COATED ORAL at 07:58

## 2022-06-18 RX ADMIN — HYDROCHLOROTHIAZIDE 25 MG: 25 TABLET ORAL at 07:58

## 2022-06-18 ASSESSMENT — PAIN SCALES - GENERAL
PAINLEVEL_OUTOF10: 0

## 2022-06-18 NOTE — PROGRESS NOTES
[facility] [place]  [address], [address] [facility],Third Floor  Phone: [phone]    Fax [phone]                  Cornel De Souza MD, Raymundo Myles MD, [address] MD, Jaquelyn Hawthorn, MD Baruch Barthel, MD Zenovia Bump, MD Sharla Fridge, MD Elston Canton, MCKINLEY Vital, MCKINLEY Hooper, MCKINLEY José, MCKINLEY Burgos PA-C     Cardiology Progress Note     Today's Plan: add losartan    Admit Date:  2022    Consult reason/ Seen today for: palpitations    Subjective and  Overnight Events:  Patient states that she is feeling better. She reports that she stopped her medications because she ran out and she felt okay so she did not worry about it. She realizes that her problems probably didn't go away but they were not bothering her. She is willing to take her medications now. Chest pain no  Shortness of breath no  Palpitations no  Dizziness no  Swelling no      Assessment and Plan:  SVT/ Afib: converted back to SR on her own. On examination she is sinus abdullahi. Will continue metoprolol and eliquis, 5 mg given Weight > 60kg and creating < 1.5. She states understanding the eliquis is to prevent strokes. Recommend to folllow up as an out patient for 30 day monitor. HTN: not well controlled. Recommend losartan 25 mg daily. Telemetry Reviewed:   Sinus Bradycardia    ECHO :   Echocardiogram 2022   Summary   Left ventricular systolic function is low normal.   Ejection fraction is visually estimated at    50%. Mild left ventricular hypertrophy. mild-moderate aortic stenosis; Mean P mmHg, NANCY: 1.26 cm sq, Indexed   SV: 30.77 mL/m sq/beat. Right coronary cusp appears restricted. Severe mitral regurgitation (ERO: 0.4 cm sq, regurgitant volume: 83 ml). Mild tricuspid regurgitation; RVSP: 33 mmHg. Mild pulmonic regurgitation present. No evidence of any pericardial effusion.    Mildly dilated aortic root (4. 2cm). History of Presenting Illness:    Chief complain on admission : 80 y. o.year old who is admitted for  Chief Complaint   Patient presents with    Palpitations     states that she has been having alot os stress, states feel fatigue    Hypertension     has a history        Past medical history:    has a past medical history of Arrhythmia, Arthritis, Atrophic vaginitis, Blood transfusion, CAD (coronary artery disease), Cataracts, bilateral, CHF (congestive heart failure) ([address] COPD (chronic obstructive pulmonary disease) ([address] Depression, Essential hypertension, Fatty liver disease, nonalcoholic, GERD (gastroesophageal reflux disease), H/O 24 hour EKG monitoring, H/O cardiac catheterization, H/O cardiovascular stress test, H/O cardiovascular stress test, H/O Doppler ultrasound, H/O echocardiogram, H/O echocardiogram, Heart valve problem, History of Doppler ultrasound, HX OTHER MEDICAL, Hyperlipidemia, Mild tricuspid regurgitation, Mitral regurgitation, Nausea & vomiting, Near syncope, Osteopenia, Pulmonary hypertension ([address] Pulmonary nodules, Rectal bleeding, Supraventricular tachycardia ([address] Thyroid disease, and Type 2 diabetes mellitus ([address] Past surgical history:   has a past surgical history that includes Thyroidectomy, partial; colectomy; Cholecystectomy; Appendectomy; Hysterectomy; Breast surgery; fracture surgery; Tonsillectomy; skin biopsy; Colonoscopy (12-21-12); Pancreas surgery (11/7/2006); Thyroidectomy, partial (1999); and Diagnostic Cardiac Cath Lab Procedure (8/2005). Social History:   reports that she has never smoked. She has never used smokeless tobacco. She reports current alcohol use. She reports that she does not use drugs. Family history:  family history includes Cancer in her brother, father, sister, and son; Coronary Art Dis in her brother and daughter; Early Death in her son; Heart Attack in her brother; Heart Disease in her sister;  Heart Failure in her mother; glucagon (rDNA), dextrose, melatonin    Lab Data:  CBC:   Recent Labs     06/16/22 1758 06/17/22  0735   WBC 8.6 7.1   HGB 12.4* 11.6*   HCT 39.0 37.7   MCV 91.5 93.3    141     BMP:   Recent Labs     06/16/22 1758 06/17/22  0735    143   K 4.2 4.6    110   CO2 22 27   BUN 19 17   CREATININE 1.0 1.0     PT/INR:   Recent Labs     06/16/22 1758   PROTIME 12.9   INR 1.00     BNP:    Recent Labs     06/16/22  1758   PROBNP 3,648*     TROPONIN:   Recent Labs     06/16/22 1758   TROPONINT <0.010               All labs, medications and tests reviewed by myself , continue all other medications of all above medical condition listed as is except for changes mentioned above. Thank you very much for consult , please call with questions.     Electronically signed by MCKINLEY Bhardwaj CNP on 6/18/2022 at 7:34 AM

## 2022-06-18 NOTE — PLAN OF CARE
Problem: Discharge Planning  Goal: Discharge to home or other facility with appropriate resources  Outcome: Progressing  Flowsheets (Taken 6/18/2022 0103)  Discharge to home or other facility with appropriate resources:   Identify barriers to discharge with patient and caregiver   Identify discharge learning needs (meds, wound care, etc)   Refer to discharge planning if patient needs post-hospital services based on physician order or complex needs related to functional status, cognitive ability or social support system     Problem: ABCDS Injury Assessment  Goal: Absence of physical injury  Outcome: Progressing     Problem: Safety - Adult  Goal: Free from fall injury  Outcome: Progressing     Problem: Pain  Goal: Verbalizes/displays adequate comfort level or baseline comfort level  Outcome: Progressing

## 2022-06-18 NOTE — DISCHARGE SUMMARY
Discharge Summary    Name:  Manuel Cross /Age/Sex: 1933  (80 y.o. female)   MRN & CSN:  [id number] & [id number] Admission Date/Time: 2022  4:49 PM   Attending:  Brenna Boogie MD Discharging Physician: Brenna Boogie MD     Hospital Course:   Manuel Cross is a 80 y.o.  female  who presents with Hypertensive urgency    80years old female with history of hypertension , history of chronic heart failure, history of noncompliance with medication,  was admitted because of palpitation and hypertensive urgency, was found to have new onset paroxysmal atrial fibrillation, started on telemetry and consulted cardiologist, patient started on metoprolol 50 mg twice daily, plus hydrochlorothiazide 25 mg daily, initiated on Eliquis 5 mg twice daily and aspirin 81 mg daily as per cardiology recommendation, blood pressure is better under control, heart rate under control, echocardiogram show ejection action 50% with severe mitral regurg, patient looks euvolemic,  outpatient follow-up with cardiology for 30-day event monitor, and for possible outpatient stress test.     Type 2 diabetes mellitus: Patient not taking any medication at home, A1c 7, patient want to continue on diet and lifestyle modification and not interested to start on antidiabetic medication at this point, follow-up with the primary care. The patient expressed appropriate understanding of and agreement with the discharge recommendations, medications, and plan. Consults this admission:  IP CONSULT TO HOSPITALIST  IP CONSULT TO [address] TO CARDIOLOGY    Discharge Instruction:   Follow up appointments:  Follow-up with cardiology in 2 weeks  Primary care physician:  within 1  weeks    Diet:  diabetic diet   Activity: activity as tolerated  Disposition: Discharged to:   [x]Home, []C, []SNF, []Acute Rehab, []Hospice   Condition on discharge: Stable    Discharge Medications:        Medication List      START taking these medications    apixaban 5 MG Tabs tablet  Commonly known as: ELIQUIS  Take 1 tablet by mouth 2 times daily     aspirin 81 MG EC tablet  Take 1 tablet by mouth daily  Start taking on: June 19, 2022     hydroCHLOROthiazide 25 MG tablet  Commonly known as: HYDRODIURIL  Take 1 tablet by mouth daily  Start taking on: June 19, 2022     metoprolol tartrate 50 MG tablet  Commonly known as: LOPRESSOR  Take 1 tablet by mouth 2 times daily        CONTINUE taking these medications    clobetasol 0.05 % ointment  Commonly known as: Temovate  Apply topically 2 times daily as needed. ondansetron 4 MG disintegrating tablet  Commonly known as: ZOFRAN-ODT  Take 1 tablet by mouth every 12 hours as needed for Nausea           Where to Get Your Medications      You can get these medications from any pharmacy    Bring a paper prescription for each of these medications  · apixaban 5 MG Tabs tablet  · aspirin 81 MG EC tablet  · hydroCHLOROthiazide 25 MG tablet  · metoprolol tartrate 50 MG tablet         Objective Findings at Discharge:   BP (!) 143/58   Pulse 58   Temp 97.4 °F (36.3 °C) (Oral)   Resp 16   Ht 5' 4\" (1.626 m)   Wt 140 lb (63.5 kg)   SpO2 96%   BMI 24.03 kg/m²            PHYSICAL EXAM   GEN Awake female, sitting upright in bed in no apparent distress. Appears given age. EYES Pupils are equally round. No scleral erythema, discharge, or conjunctivitis. HENT Mucous membranes are moist. Oral pharynx without exudates, no evidence of thrush. NECK Supple, no apparent thyromegaly or masses. RESP Clear to auscultation, no wheezes, rales or rhonchi. Symmetric chest movement while on room air. CARDIO/VASC S1/S2 auscultated. Regular rate without appreciable murmurs, rubs, or gallops. No JVD or carotid bruits. Peripheral pulses equal bilaterally and palpable. No peripheral edema. GI Abdomen is soft without significant tenderness, masses, or guarding. Bowel sounds are normoactive. Rectal exam deferred.    SKIN Normal coloration, warm, dry. NEURO Cranial nerves appear grossly intact, normal speech, no lateralizing weakness. PSYCH Awake, alert, oriented x 4. Affect appropriate.     BMP/CBC  Recent Labs     06/16/22  1758 06/17/22  0735 06/18/22  0757    143 136   K 4.2 4.6 4.4    110 104   CO2 22 27 20*   BUN 19 17 22   CREATININE 1.0 1.0 1.1   WBC 8.6 7.1 6.8   HCT 39.0 37.7 37.1    141 153       IMAGING:      Discharge Time of 35  minutes    Electronically signed by Andreas Solorzano MD on 6/18/2022 at 10:04 AM

## 2022-06-20 ENCOUNTER — TELEPHONE (OUTPATIENT)
Dept: FAMILY MEDICINE CLINIC | Age: 87
End: 2022-06-20

## 2022-06-20 NOTE — TELEPHONE ENCOUNTER
[facility] Transitions Initial Follow Up Call    Outreach made within 2 business days of discharge: Yes    Patient: Ortega Savage Patient : 1933   MRN: [id number]  Reason for Admission: There are no discharge diagnoses documented for the most recent discharge. Discharge Date: 22       Spoke with: Self    Discharge department/facility: [facility]    TCM Interactive Patient Contact:  Was patient able to fill all prescriptions: Yes  Was patient instructed to bring all medications to the follow-up visit: Yes  Is patient taking all medications as directed in the discharge summary? Yes  Does patient understand their discharge instructions: Yes  Does patient have questions or concerns that need addressed prior to 7-14 day follow up office visit: no    Scheduled appointment with PCP within 7-14 days    Follow Up  No future appointments.     Zofia Yu MA

## 2022-06-23 ENCOUNTER — OFFICE VISIT (OUTPATIENT)
Dept: FAMILY MEDICINE CLINIC | Age: 87
End: 2022-06-23
Payer: MEDICARE

## 2022-06-23 VITALS
HEIGHT: 64 IN | WEIGHT: 156 LBS | SYSTOLIC BLOOD PRESSURE: 142 MMHG | OXYGEN SATURATION: 98 % | DIASTOLIC BLOOD PRESSURE: 74 MMHG | BODY MASS INDEX: 26.63 KG/M2 | HEART RATE: 57 BPM

## 2022-06-23 DIAGNOSIS — I50.9 CONGESTIVE HEART FAILURE, UNSPECIFIED HF CHRONICITY, UNSPECIFIED HEART FAILURE TYPE (HCC): ICD-10-CM

## 2022-06-23 DIAGNOSIS — E11.9 TYPE 2 DIABETES MELLITUS WITHOUT COMPLICATION, WITHOUT LONG-TERM CURRENT USE OF INSULIN (HCC): ICD-10-CM

## 2022-06-23 DIAGNOSIS — I48.0 PAROXYSMAL ATRIAL FIBRILLATION (HCC): Primary | ICD-10-CM

## 2022-06-23 DIAGNOSIS — I50.22 CHRONIC SYSTOLIC (CONGESTIVE) HEART FAILURE (HCC): ICD-10-CM

## 2022-06-23 DIAGNOSIS — N18.30 STAGE 3 CHRONIC KIDNEY DISEASE, UNSPECIFIED WHETHER STAGE 3A OR 3B CKD (HCC): ICD-10-CM

## 2022-06-23 DIAGNOSIS — Z09 HOSPITAL DISCHARGE FOLLOW-UP: ICD-10-CM

## 2022-06-23 DIAGNOSIS — F41.9 ANXIETY: ICD-10-CM

## 2022-06-23 DIAGNOSIS — R39.9 UTI SYMPTOMS: ICD-10-CM

## 2022-06-23 DIAGNOSIS — I10 PRIMARY HYPERTENSION: ICD-10-CM

## 2022-06-23 LAB
BILIRUBIN, POC: NEGATIVE
BLOOD URINE, POC: ABNORMAL
CLARITY, POC: CLEAR
COLOR, POC: YELLOW
GLUCOSE URINE, POC: NEGATIVE
KETONES, POC: NEGATIVE
LEUKOCYTE EST, POC: NEGATIVE
NITRITE, POC: NEGATIVE
PH, POC: 5.5
PROTEIN, POC: NEGATIVE
SPECIFIC GRAVITY, POC: 1.01
UROBILINOGEN, POC: ABNORMAL

## 2022-06-23 PROCEDURE — 1090F PRES/ABSN URINE INCON ASSESS: CPT | Performed by: PHYSICIAN ASSISTANT

## 2022-06-23 PROCEDURE — 1123F ACP DISCUSS/DSCN MKR DOCD: CPT | Performed by: PHYSICIAN ASSISTANT

## 2022-06-23 PROCEDURE — G8417 CALC BMI ABV UP PARAM F/U: HCPCS | Performed by: PHYSICIAN ASSISTANT

## 2022-06-23 PROCEDURE — 99214 OFFICE O/P EST MOD 30 MIN: CPT | Performed by: PHYSICIAN ASSISTANT

## 2022-06-23 PROCEDURE — 3051F HG A1C>EQUAL 7.0%<8.0%: CPT | Performed by: PHYSICIAN ASSISTANT

## 2022-06-23 PROCEDURE — 1036F TOBACCO NON-USER: CPT | Performed by: PHYSICIAN ASSISTANT

## 2022-06-23 PROCEDURE — 1111F DSCHRG MED/CURRENT MED MERGE: CPT | Performed by: PHYSICIAN ASSISTANT

## 2022-06-23 PROCEDURE — 81002 URINALYSIS NONAUTO W/O SCOPE: CPT | Performed by: PHYSICIAN ASSISTANT

## 2022-06-23 PROCEDURE — G8427 DOCREV CUR MEDS BY ELIG CLIN: HCPCS | Performed by: PHYSICIAN ASSISTANT

## 2022-06-23 RX ORDER — HYDROXYZINE HYDROCHLORIDE 10 MG/1
TABLET, FILM COATED ORAL
Qty: 30 TABLET | Refills: 1 | Status: SHIPPED | OUTPATIENT
Start: 2022-06-23 | End: 2022-07-21 | Stop reason: SDUPTHER

## 2022-06-23 ASSESSMENT — PATIENT HEALTH QUESTIONNAIRE - PHQ9
SUM OF ALL RESPONSES TO PHQ QUESTIONS 1-9: 1
1. LITTLE INTEREST OR PLEASURE IN DOING THINGS: 0
SUM OF ALL RESPONSES TO PHQ QUESTIONS 1-9: 1
SUM OF ALL RESPONSES TO PHQ9 QUESTIONS 1 & 2: 1
2. FEELING DOWN, DEPRESSED OR HOPELESS: 1

## 2022-06-23 NOTE — PROGRESS NOTES
6/23/2022    [facility]    Chief Complaint   Patient presents with    Follow-Up from [facility] 6/16 to 6/18 for hypertension and palpitations, patient reports being under a lot of stress recently, EKG came back normal    Urinary Tract Infection     patient reports concerns for a UTI, urine collected in office       HPI  History obtained from the patient and her daughter, Elder Liu. Davide Segura is a 80 y.o. female who presents today for hospital follow-up. TCM - Patient was admitted to [facility] 6/16/2022 through 6/18/2022. Patient presented with palpitations and hypertensive urgency and was found to have new onset paroxysmal atrial fibrillation. She was started on metoprolol, hydrochlorothiazide, and Eliquis, as well as aspirin. Patient was instructed to follow-up with cardiology for a 30-day Holter monitor and possibly an outpatient stress test.  She has not yet made her appointment with cardiology for this. Patient notes that her , Unknown Kehr, has been having his own health issues over the last 3 months. He fell and hit his head and also broke his hip and is currently in a nursing home, so she is living alone at the time being. She finds this very stressful and is very worried about her , as he is declining dialysis and is refusing to eat recently. Type 2 diabetes -  Hemoglobin A1c in the hospital was 7.0%. Patient would prefer to control diabetes with diet and lifestyle rather than medication. PHQ-9 Total Score: 1 (6/23/2022  2:03 PM)    REVIEW OF SYMPTOMS  Review of Systems   Constitutional: Negative for chills and fever. Respiratory: Negative for cough and shortness of breath. Gastrointestinal: Negative for diarrhea and vomiting.      PAST MEDICAL HISTORY  Past Medical History:   Diagnosis Date    Arrhythmia     Arthritis     osteoarthritis cervical and lumbar spine    Atrophic vaginitis     Blood transfusion     CAD (coronary artery disease)     Cataracts, bilateral     CHF (congestive heart failure) (HCC)     COPD (chronic obstructive pulmonary disease) (HCC)     Depression     Essential hypertension     Fatty liver disease, nonalcoholic     GERD (gastroesophageal reflux disease)     H/O 24 hour EKG monitoring 9/15/2000    9/15/2000 -  Predominant rhythm is a sinus rhythm. No significant ectopy noted.  H/O cardiac catheterization 8/4/2005 8/4/2005 - Moderate degree of stenosis of the high diag, which is a heavily calcified vessel, however, there is a large ramus vessel supplying this same area as well. Rest of vessel is w/o any significant disease. Renals are w/o any significant stenosis.  H/O cardiovascular stress test 12/2/2010, 2/28/2008 12/2/2010 - Normal pattern of perfusion in all regions. LV is normal. No inducible myocardial ischemia. EF is 66%. LVSF is normal. No ECG changes. Exercise capacity is normal.    H/O cardiovascular stress test 2/24/2015    cardiolite-normal, no ischemia, EF69%    H/O Doppler ultrasound 9/15/2000    9/15/2000 - Carotid - No hemodynamically significant stenosis.  H/O echocardiogram 12/2/2010, 9/22/2009 12/2/2010 - Difficult apical imaging due to patient COPD. LVSF is normal. EF = > 55%. Impaired LV impairment. Mild-Moderate MR. Mild TR.    H/O echocardiogram 7/15/14    EF 55-60%. No significant valvulopathy is seen.  Heart valve problem     2 leaky heart valves    History of Doppler ultrasound 10/31/2000    10/31/2000 - Peripheral - Normal study.  HX OTHER MEDICAL 1/14/2004 1/14/2004 - 48 hr Holter - Predominant rhythm is sinus rhythm. No significant ectopy is seen.     Hyperlipidemia     Mild tricuspid regurgitation     Mitral regurgitation     Nausea & vomiting     Near syncope 5/28/2019    Osteopenia     Pulmonary hypertension (HCC)     Pulmonary nodules     Rectal bleeding 12/21/2012    Supraventricular tachycardia (HCC)     Thyroid disease  Type 2 diabetes mellitus ([facility] [address]        FAMILY HISTORY  Family History   Problem Relation Age of Onset    Heart Failure Mother         CHF    Hypertension Mother     Cancer Father         Pancreatic    Hypertension Father     Heart Disease Sister         CMP    Cancer Brother         Bone    Other Brother         aneurysm    Coronary Art Dis Brother     Heart Attack Brother     Other Sister         Bone problems    Other Sister         infection    Cancer Sister         Ovarian    Cancer Son     Early Death Son         MVA    Coronary Art Dis Daughter     Hypertension Daughter        SOCIAL HISTORY  Social History     Socioeconomic History    Marital status:      Spouse name: Not on file    Number of children: Not on file    Years of education: Not on file    Highest education level: Not on file   Occupational History    Occupation: Retired   Tobacco Use    Smoking status: Never Smoker    Smokeless tobacco: Never Used   Vaping Use    Vaping Use: Never used   Substance and Sexual Activity    Alcohol use: Yes     Comment: Rare alcohol use. CAFFEINE: 1 cup coffee daily    Drug use: No    Sexual activity: Yes     Partners: Male     Comment:    Other Topics Concern    Not on file   Social History Narrative    Not on file     Social Determinants of Health     Financial Resource Strain: Low Risk     Difficulty of Paying Living Expenses: Not hard at all   Food Insecurity: No Food Insecurity    Worried About Running Out of Food in the Last Year: Never true    [address] in the Last Year: Never true   Transportation Needs:     Lack of Transportation (Medical): Not on file    Lack of Transportation (Non-Medical):  Not on file   Physical Activity:     Days of Exercise per Week: Not on file    Minutes of Exercise per Session: Not on file   Stress:     Feeling of Stress : Not on file   Social Connections:     Frequency of Communication with Friends and Family: Not on file    Frequency of Social Gatherings with Friends and Family: Not on file    Attends Church Services: Not on file    Active Member of Clubs or Organizations: Not on file    Attends Club or Organization Meetings: Not on file    Marital Status: Not on file   Intimate Partner Violence:     Fear of Current or Ex-Partner: Not on file    Emotionally Abused: Not on file    Physically Abused: Not on file    Sexually Abused: Not on file   Housing Stability:     Unable to Pay for Housing in the Last Year: Not on file    Number of [place] in the Last Year: Not on file    Unstable Housing in the Last Year: Not on file        SURGICAL HISTORY  Past Surgical History:   Procedure Laterality Date    APPENDECTOMY      BREAST SURGERY      bilateral lumpectomy    CHOLECYSTECTOMY      COLECTOMY      also head of pancreas    COLONOSCOPY  12-21-12    polyp    DIAGNOSTIC CARDIAC CATH LAB PROCEDURE  8/2005    FRACTURE SURGERY      repair of fractured nose    HYSTERECTOMY      LALA/BSO    PANCREAS SURGERY  11/7/2006    Whipple Procedure    SKIN BIOPSY      moles from right shoulder, chin, left leg    THYROIDECTOMY, PARTIAL      THYROIDECTOMY, PARTIAL  1999    TONSILLECTOMY         CURRENT MEDICATIONS  Current Outpatient Medications   Medication Sig Dispense Refill    hydrOXYzine HCl (ATARAX) 10 MG tablet Take 1 or 2 tablets nightly as needed for anxiety or insomnia.  30 tablet 1    apixaban (ELIQUIS) 5 MG TABS tablet Take 1 tablet by mouth 2 times daily 60 tablet 0    aspirin 81 MG EC tablet Take 1 tablet by mouth daily 30 tablet 0    metoprolol tartrate (LOPRESSOR) 50 MG tablet Take 1 tablet by mouth 2 times daily 60 tablet 0    hydroCHLOROthiazide (HYDRODIURIL) 25 MG tablet Take 1 tablet by mouth daily 30 tablet 0    ondansetron (ZOFRAN-ODT) 4 MG disintegrating tablet Take 1 tablet by mouth every 12 hours as needed for Nausea 15 tablet 0    clobetasol (TEMOVATE) 0.05 % ointment Apply topically 2 current regimen for now. Instructed the patient to obtain an arm blood pressure cuff and start checking her blood pressure once daily. Follow-up in 2 weeks for nursing visit for blood pressure check in 4 weeks with me. 3. Anxiety  Patient notes a lot of stress and anxiety, causing trouble sleeping. Prescribed hydroxyzine 10 mg for her to try nightly before bed. - hydrOXYzine HCl (ATARAX) 10 MG tablet; Take 1 or 2 tablets nightly as needed for anxiety or insomnia. Dispense: 30 tablet; Refill: 1    4. Stage 3 chronic kidney disease, unspecified whether stage 3a or 3b CKD ([facility] [address]  We will continue to monitor. Remaining fairly stable. 5. Chronic systolic (congestive) heart failure  Refer the patient to cardiology. 6. Congestive heart failure, unspecified HF chronicity, unspecified heart failure type [facility])   Referred the patient to cardiology. 7. Type 2 diabetes mellitus without complication, without long-term current use of insulin ([facility]  Encouraged the patient to monitor fasting blood glucose at home, with a goal of 120s or less. A1c is at 7.0%, so we will encourage her to manage diabetes with diet for now. 8. Hospital discharge follow-up  See above. - CO DISCHARGE MEDS RECONCILED W/ CURRENT OUTPATIENT MED LIST      Return in about 4 weeks (around 7/21/2022).             Electronically signed by Azar Vera PA-C on 6/23/2022

## 2022-06-23 NOTE — PATIENT INSTRUCTIONS
Make sure you schedule an appointment with cardiology:     [facility] Card[facility] - Morenita Pinedo MD  [address], [address] [facility],[address]  [phone]    Monitor FASTING blood glucose each day before breakfast. Goal is 120s or less. Monitor blood pressure. Try to get an ARM blood pressure cuff. I recommend the brand Omron.      Goal: 120s/70s

## 2022-06-24 LAB — URINE CULTURE, ROUTINE: NORMAL

## 2022-06-27 NOTE — RESULT ENCOUNTER NOTE
Please notify the patient that urine culture was also negative. She has no signs whatsoever of a UTI.

## 2022-06-30 ENCOUNTER — NURSE ONLY (OUTPATIENT)
Dept: FAMILY MEDICINE CLINIC | Age: 87
End: 2022-06-30

## 2022-06-30 ENCOUNTER — TELEPHONE (OUTPATIENT)
Dept: FAMILY MEDICINE CLINIC | Age: 87
End: 2022-06-30

## 2022-06-30 VITALS — SYSTOLIC BLOOD PRESSURE: 132 MMHG | DIASTOLIC BLOOD PRESSURE: 62 MMHG

## 2022-07-13 ENCOUNTER — TELEPHONE (OUTPATIENT)
Dept: FAMILY MEDICINE CLINIC | Age: 87
End: 2022-07-13

## 2022-07-15 NOTE — TELEPHONE ENCOUNTER
Called Dacia Morales and informed her letter is at the  to be picked up. Dacia Morales voiced understanding.

## 2022-07-21 ENCOUNTER — OFFICE VISIT (OUTPATIENT)
Dept: FAMILY MEDICINE CLINIC | Age: 87
End: 2022-07-21
Payer: MEDICARE

## 2022-07-21 VITALS
BODY MASS INDEX: 27.31 KG/M2 | DIASTOLIC BLOOD PRESSURE: 80 MMHG | WEIGHT: 160 LBS | OXYGEN SATURATION: 98 % | HEIGHT: 64 IN | HEART RATE: 60 BPM | SYSTOLIC BLOOD PRESSURE: 162 MMHG

## 2022-07-21 VITALS
OXYGEN SATURATION: 98 % | SYSTOLIC BLOOD PRESSURE: 162 MMHG | HEIGHT: 64 IN | BODY MASS INDEX: 27.31 KG/M2 | WEIGHT: 160 LBS | TEMPERATURE: 97.4 F | DIASTOLIC BLOOD PRESSURE: 80 MMHG | HEART RATE: 60 BPM

## 2022-07-21 DIAGNOSIS — F41.9 ANXIETY: ICD-10-CM

## 2022-07-21 DIAGNOSIS — Z23 NEED FOR PNEUMOCOCCAL VACCINATION: ICD-10-CM

## 2022-07-21 DIAGNOSIS — J30.2 SEASONAL ALLERGIC RHINITIS, UNSPECIFIED TRIGGER: ICD-10-CM

## 2022-07-21 DIAGNOSIS — L23.7 POISON IVY: ICD-10-CM

## 2022-07-21 DIAGNOSIS — I10 PRIMARY HYPERTENSION: Primary | ICD-10-CM

## 2022-07-21 DIAGNOSIS — Z00.00 INITIAL MEDICARE ANNUAL WELLNESS VISIT: Primary | ICD-10-CM

## 2022-07-21 DIAGNOSIS — I48.0 PAROXYSMAL ATRIAL FIBRILLATION (HCC): ICD-10-CM

## 2022-07-21 PROCEDURE — 1090F PRES/ABSN URINE INCON ASSESS: CPT | Performed by: PHYSICIAN ASSISTANT

## 2022-07-21 PROCEDURE — G8417 CALC BMI ABV UP PARAM F/U: HCPCS | Performed by: PHYSICIAN ASSISTANT

## 2022-07-21 PROCEDURE — 1123F ACP DISCUSS/DSCN MKR DOCD: CPT | Performed by: PHYSICIAN ASSISTANT

## 2022-07-21 PROCEDURE — 90732 PPSV23 VACC 2 YRS+ SUBQ/IM: CPT | Performed by: PHYSICIAN ASSISTANT

## 2022-07-21 PROCEDURE — 99214 OFFICE O/P EST MOD 30 MIN: CPT | Performed by: PHYSICIAN ASSISTANT

## 2022-07-21 PROCEDURE — 1123F ACP DISCUSS/DSCN MKR DOCD: CPT | Performed by: STUDENT IN AN ORGANIZED HEALTH CARE EDUCATION/TRAINING PROGRAM

## 2022-07-21 PROCEDURE — G0009 ADMIN PNEUMOCOCCAL VACCINE: HCPCS | Performed by: PHYSICIAN ASSISTANT

## 2022-07-21 PROCEDURE — G8427 DOCREV CUR MEDS BY ELIG CLIN: HCPCS | Performed by: PHYSICIAN ASSISTANT

## 2022-07-21 PROCEDURE — G0438 PPPS, INITIAL VISIT: HCPCS | Performed by: STUDENT IN AN ORGANIZED HEALTH CARE EDUCATION/TRAINING PROGRAM

## 2022-07-21 PROCEDURE — 1036F TOBACCO NON-USER: CPT | Performed by: PHYSICIAN ASSISTANT

## 2022-07-21 RX ORDER — HYDROXYZINE HYDROCHLORIDE 25 MG/1
25 TABLET, FILM COATED ORAL NIGHTLY PRN
Qty: 60 TABLET | Refills: 1 | Status: SHIPPED | OUTPATIENT
Start: 2022-07-21 | End: 2022-07-28

## 2022-07-21 RX ORDER — TRIAMCINOLONE ACETONIDE 1 MG/G
CREAM TOPICAL
Qty: 80 G | Refills: 0 | Status: ON HOLD
Start: 2022-07-21 | End: 2022-08-05 | Stop reason: HOSPADM

## 2022-07-21 RX ORDER — HYDROCHLOROTHIAZIDE 25 MG/1
25 TABLET ORAL DAILY
Qty: 90 TABLET | Refills: 1 | Status: SHIPPED | OUTPATIENT
Start: 2022-07-21 | End: 2023-01-17

## 2022-07-21 RX ORDER — METOPROLOL TARTRATE 50 MG/1
50 TABLET, FILM COATED ORAL 2 TIMES DAILY
Qty: 60 TABLET | Refills: 0 | Status: CANCELLED | OUTPATIENT
Start: 2022-07-21 | End: 2022-08-20

## 2022-07-21 RX ORDER — ASPIRIN 81 MG/1
81 TABLET ORAL DAILY
Qty: 90 TABLET | Refills: 1 | Status: SHIPPED | OUTPATIENT
Start: 2022-07-21 | End: 2023-01-17

## 2022-07-21 RX ORDER — FLUTICASONE PROPIONATE 50 MCG
2 SPRAY, SUSPENSION (ML) NASAL DAILY
Qty: 16 G | Refills: 5 | Status: SHIPPED | OUTPATIENT
Start: 2022-07-21

## 2022-07-21 RX ORDER — METOPROLOL SUCCINATE 50 MG/1
50 TABLET, EXTENDED RELEASE ORAL DAILY
Qty: 30 TABLET | Refills: 1 | Status: SHIPPED | OUTPATIENT
Start: 2022-07-21 | End: 2022-07-28

## 2022-07-21 RX ORDER — LOSARTAN POTASSIUM 25 MG/1
25 TABLET ORAL DAILY
Qty: 30 TABLET | Refills: 1 | Status: SHIPPED | OUTPATIENT
Start: 2022-07-21 | End: 2022-09-20

## 2022-07-21 ASSESSMENT — PATIENT HEALTH QUESTIONNAIRE - PHQ9
SUM OF ALL RESPONSES TO PHQ QUESTIONS 1-9: 1
SUM OF ALL RESPONSES TO PHQ9 QUESTIONS 1 & 2: 1
1. LITTLE INTEREST OR PLEASURE IN DOING THINGS: 0
SUM OF ALL RESPONSES TO PHQ QUESTIONS 1-9: 1
2. FEELING DOWN, DEPRESSED OR HOPELESS: 1

## 2022-07-21 ASSESSMENT — LIFESTYLE VARIABLES
HOW OFTEN DO YOU HAVE A DRINK CONTAINING ALCOHOL: MONTHLY OR LESS
HOW MANY STANDARD DRINKS CONTAINING ALCOHOL DO YOU HAVE ON A TYPICAL DAY: 1 OR 2

## 2022-07-21 NOTE — PROGRESS NOTES
Medicare Annual Wellness Visit    Dimitri Thomson is here for Medicare AWV    Assessment & Plan   Initial Medicare annual wellness visit    Recommendations for Preventive Services Due: see orders and patient instructions/AVS.  Recommended screening schedule for the next 5-10 years is provided to the patient in written form: see Patient Instructions/AVS.     No follow-ups on file. Subjective       Patient's complete Health Risk Assessment and screening values have been reviewed and are found in Flowsheets. The following problems were reviewed today and where indicated follow up appointments were made and/or referrals ordered.     Positive Risk Factor Screenings with Interventions:    Fall Risk:  Do you feel unsteady or are you worried about falling? : (!) yes (sometimes will feel unsteady, is very cautious)  2 or more falls in past year?: no  Fall with injury in past year?: no   Fall Risk Interventions:    Home safety tips provided            General Health and ACP:  General  In general, how would you say your health is?: Good  In the past 7 days, have you experienced any of the following: New or Increased Pain, New or Increased Fatigue, Loneliness, Social Isolation, Stress or Anger?: (!) Yes  Select all that apply: (!) Loneliness, Stress ('s health)  Do you get the social and emotional support that you need?: Yes  Do you have a Living Will?: Yes    Advance Directives       Power of  Living Will ACP-Advance Directive ACP-Power of Huang Kumar on 06/21/22 Filed on 06/21/22 Lai Renee          General Health Risk Interventions:  Loneliness: patient's comments regarding inadequate social support: patient states her  was recently put into a nursing home and she misses him badly  Stress: patient's comments regarding reasons for stress and/or anger: stress due to 's recent health decline     Health Habits/Nutrition:  Physical Activity: Inactive    Days of Exercise per Week: 0 days Minutes of Exercise per Session: 0 min     Have you lost any weight without trying in the past 3 months?: No  Body mass index: (!) 27.46  Have you seen the dentist within the past year?: Yes  Health Habits/Nutrition Interventions:  Inadequate physical activity:  educational materials provided to promote increased physical activity  Nutritional issues:  educational materials for healthy, well-balanced diet provided, educational materials to promote weight loss provided     Safety:  Do you have working smoke detectors?: (!) No  Do you have any tripping hazards - loose or unsecured carpets or rugs?: No  Do you have any tripping hazards - clutter in doorways, halls, or stairs?: No  Do you have either shower bars, grab bars, non-slip mats or non-slip surfaces in your shower or bathtub?: Yes  Do all of your stairways have a railing or banister?: Not Applicable  Do you always fasten your seatbelt when you are in a car?: Yes  Safety Interventions:  Home safety tips provided    ADLs:  In the past 7 days, did you need help from others to perform any of the following everyday activities: Eating, dressing, grooming, bathing, toileting, or walking/balance?: No  In the past 7 days, did you need help from others to take care of any of the following: Laundry, housekeeping, banking/finances, shopping, telephone use, food preparation, transportation, or taking medications?: (!) Yes  Select all that apply: [employer] (children)  ADL Interventions:  Patient declines any further evaluation/treatment for this issue  Patient states her children drive her where she needs to go          Objective   Vitals:    07/21/22 1441   BP: (!) 162/80   Pulse: 60   Temp: 97.4 °F (36.3 °C)   SpO2: 98%   Weight: 160 lb (72.6 kg)   Height: 5' 4\" (1.626 m)      Body mass index is 27.46 kg/m².        *PCP seeing patient after AWV and will retake BP      Allergies   Allergen Reactions    Sulfa Antibiotics Anaphylaxis    Nitrofuran Derivatives Quinapril Hcl      Cough     Prior to Visit Medications    Medication Sig Taking? Authorizing Provider   hydrOXYzine HCl (ATARAX) 10 MG tablet Take 1 or 2 tablets nightly as needed for anxiety or insomnia. Yes Jamin Martinez PA-C   apixaban (ELIQUIS) 5 MG TABS tablet Take 1 tablet by mouth 2 times daily Yes Jeanette Ventura MD   aspirin 81 MG EC tablet Take 1 tablet by mouth daily Yes Jeanette Ventura MD   metoprolol tartrate (LOPRESSOR) 50 MG tablet Take 1 tablet by mouth 2 times daily Yes Jeanette Ventura MD   hydroCHLOROthiazide (HYDRODIURIL) 25 MG tablet Take 1 tablet by mouth daily Yes Jeanette Ventura MD   ondansetron (ZOFRAN-ODT) 4 MG disintegrating tablet Take 1 tablet by mouth every 12 hours as needed for Nausea Yes MCKINLEY Quintero - CNP   clobetasol (TEMOVATE) 0.05 % ointment Apply topically 2 times daily as needed. Yes Sheldon Nicole PA-C       CareTeam (Including outside providers/suppliers regularly involved in providing care):   Patient Care Team:  Patricia Jain MD as PCP - Justin Jessica MD as PCP - [facility] Provider  Gabi Lay MD as Consulting Physician (Cardiology)     Reviewed and updated this visit:  Tobacco  Allergies  Meds  Med Hx  Surg Hx  Soc Hx  Fam Hx            I, Esequiel Araya LPN, 3/43/[birth year], performed the documented evaluation under the direct supervision of the attending physician. This encounter was performed under Saulo topete DOs, direct supervision, 7/21/2022.

## 2022-07-21 NOTE — PROGRESS NOTES
7/21/2022    [facility]    Chief Complaint   Patient presents with    Follow-up     4 week follow up for blood pressure, patient is following up with cardiology next week, BP ranging 150-170/70-90 and HR in the 60's. Poison [place]     Patient also reports poison ivy on bilateral legs     Swelling     Bilateral swelling of the legs and feet    Dizziness     Patient reports dizziness present once since last visit, she was cleaning while this happend       HPI  History obtained from the patient. Rashawn Ware is a 80 y.o. female who presents today for 4-week follow-up on hypertension    Hypertension-  Patient reports that her home blood pressure has been averaging 160s over 80s, and her heart rate averages in the 60s. She takes metoprolol 50 mg twice daily and hydrochlorothiazide 25 mg daily. Poison Ivy - Patient notes that her dog got into poison ivy and rubbed up against her legs, so she's had an itchy red rash to the front of her thighs, above her knees fro a few weeks. This rash is improving but is still mildly itchy. Swelling - Patient complains of swelling of both ankles. She does not wear compression socks. Dizziness - Patient notes one brief episode of dizziness within the last few weeks. She states that this happened after she was \"overdoing it\" while cleaning her house. Denies chest pain, shortness of breath, LOC. Health Maintenance - Patient's care gaps include PNEUMONIA VACCINE. Patient is agreeable to receiving this today. PHQ-9 Total Score: 1 (7/21/2022  2:45 PM)    REVIEW OF SYMPTOMS  Review of Systems   Constitutional: Negative for chills and fever. Respiratory: Negative for cough and shortness of breath. Gastrointestinal: Negative for diarrhea and vomiting.      PAST MEDICAL HISTORY  Past Medical History:   Diagnosis Date    Arrhythmia     Arthritis     osteoarthritis cervical and lumbar spine    Atrophic vaginitis     Blood transfusion     CAD (coronary artery disease) Cataracts, bilateral     CHF (congestive heart failure) (HCC)     COPD (chronic obstructive pulmonary disease) (HCC)     Depression     Essential hypertension     Fatty liver disease, nonalcoholic     GERD (gastroesophageal reflux disease)     H/O 24 hour EKG monitoring 9/15/2000    9/15/2000 -  Predominant rhythm is a sinus rhythm. No significant ectopy noted. H/O cardiac catheterization 8/4/2005 8/4/2005 - Moderate degree of stenosis of the high diag, which is a heavily calcified vessel, however, there is a large ramus vessel supplying this same area as well. Rest of vessel is w/o any significant disease. Renals are w/o any significant stenosis. H/O cardiovascular stress test 12/2/2010, 2/28/2008 12/2/2010 - Normal pattern of perfusion in all regions. LV is normal. No inducible myocardial ischemia. EF is 66%. LVSF is normal. No ECG changes. Exercise capacity is normal.    H/O cardiovascular stress test 2/24/2015    cardiolite-normal, no ischemia, EF69%    H/O Doppler ultrasound 9/15/2000    9/15/2000 - Carotid - No hemodynamically significant stenosis. H/O echocardiogram 12/2/2010, 9/22/2009 12/2/2010 - Difficult apical imaging due to patient COPD. LVSF is normal. EF = > 55%. Impaired LV impairment. Mild-Moderate MR. Mild TR. H/O echocardiogram 7/15/14    EF 55-60%. No significant valvulopathy is seen. Heart valve problem     2 leaky heart valves    History of Doppler ultrasound 10/31/2000    10/31/2000 - Peripheral - Normal study. HX OTHER MEDICAL 1/14/2004 1/14/2004 - 48 hr Holter - Predominant rhythm is sinus rhythm. No significant ectopy is seen.     Hyperlipidemia     Mild tricuspid regurgitation     Mitral regurgitation     Nausea & vomiting     Near syncope 5/28/2019    Osteopenia     Pulmonary hypertension (HCC)     Pulmonary nodules     Rectal bleeding 12/21/2012    Supraventricular tachycardia (HCC)     Thyroid disease     Type 2 diabetes mellitus ([address]        FAMILY CURRENT MEDICATIONS  Current Outpatient Medications   Medication Sig Dispense Refill    hydrOXYzine HCl (ATARAX) 25 MG tablet Take 1 tablet by mouth nightly as needed for Anxiety 60 tablet 1    apixaban (ELIQUIS) 5 MG TABS tablet Take 1 tablet by mouth in the morning and 1 tablet before bedtime. 60 tablet 5    aspirin 81 MG EC tablet Take 1 tablet by mouth in the morning. 90 tablet 1    hydroCHLOROthiazide (HYDRODIURIL) 25 MG tablet Take 1 tablet by mouth in the morning. 90 tablet 1    losartan (COZAAR) 25 MG tablet Take 1 tablet by mouth in the morning. 30 tablet 1    metoprolol succinate (TOPROL XL) 50 MG extended release tablet Take 1 tablet by mouth in the morning. 30 tablet 1    triamcinolone (KENALOG) 0.1 % cream Apply topically 2 times daily. 80 g 0    fluticasone (FLONASE) 50 MCG/ACT nasal spray 2 sprays by Each Nostril route in the morning. 16 g 5    metoprolol tartrate (LOPRESSOR) 50 MG tablet Take 1 tablet by mouth 2 times daily 60 tablet 0    ondansetron (ZOFRAN-ODT) 4 MG disintegrating tablet Take 1 tablet by mouth every 12 hours as needed for Nausea 15 tablet 0    clobetasol (TEMOVATE) 0.05 % ointment Apply topically 2 times daily as needed. 60 g 1     No current facility-administered medications for this visit.        ALLERGIES  Allergies   Allergen Reactions    Sulfa Antibiotics Anaphylaxis    Nitrofuran Derivatives     Quinapril Hcl      Cough       RECENT LABS    Lab Results   Component Value Date    LABA1C 7.0 (H) 06/16/2022     Lab Results   Component Value Date     06/16/2022       Lab Results   Component Value Date    CHOL 124 06/17/2022    CHOL 149 08/01/2005     Lab Results   Component Value Date    LDLCALC 64 06/17/2022    LDLCALC 73 08/01/2005       Lab Results   Component Value Date    WBC 6.8 06/18/2022    HGB 11.8 (L) 06/18/2022    HCT 37.1 06/18/2022    MCV 89.2 06/18/2022     06/18/2022       PHYSICAL EXAM  BP (!) 162/80   Pulse 60   Ht 5' 4\" (1.626 m)   Wt 160 lb (72.6 kg)   SpO2 98%   BMI 27.46 kg/m²     Physical Exam  Constitutional:       Appearance: Normal appearance. HENT:      Head: Normocephalic and atraumatic. Eyes:      Comments: EOM grossly intact. Cardiovascular:      Rate and Rhythm: Normal rate and regular rhythm. Heart sounds: No murmur heard. No friction rub. No gallop. Pulmonary:      Effort: Pulmonary effort is normal.      Breath sounds: Normal breath sounds. No wheezing, rhonchi or rales. Skin:     General: Skin is warm and dry. Neurological:      Mental Status: She is alert and oriented to person, place, and time. Comments: Cranial nerves II-XII grossly intact   Psychiatric:         Mood and Affect: Mood normal.         Behavior: Behavior normal.       ASSESSMENT & PLAN  1. Primary hypertension  Continue metoprolol 50 mg, but we will switch her from 50 mg twice daily of the immediate release to 50 mg extended release once daily. Continue hydrochlorothiazide 25 mg once daily. Start losartan 25 mg daily. Follow-up in 2 weeks for nursing visit to check blood pressure. If blood pressure is not at goal (130/70s or less), we will plan to increase her losartan from 25 mg daily up to 50 mg daily insulin until at goal.  I prefer to do this rather than increase her metoprolol because her heart rate is already in the 60s. Patient does follow with cardiology and has an appointment with Dr. Shaw on 7/28/2020  - hydroCHLOROthiazide (HYDRODIURIL) 25 MG tablet; Take 1 tablet by mouth in the morning. Dispense: 90 tablet; Refill: 1  - losartan (COZAAR) 25 MG tablet; Take 1 tablet by mouth in the morning. Dispense: 30 tablet; Refill: 1  - metoprolol succinate (TOPROL XL) 50 MG extended release tablet; Take 1 tablet by mouth in the morning. Dispense: 30 tablet; Refill: 1    2. Anxiety  Patient notes that she still having a lot of anxiety at night. She wakes up thinking about financial and family stressors and cannot get to sleep.   We will try higher dose of hydroxyzine. 25 mg instead of 10 mg.  - hydrOXYzine HCl (ATARAX) 25 MG tablet; Take 1 tablet by mouth nightly as needed for Anxiety  Dispense: 60 tablet; Refill: 1    3. Paroxysmal atrial fibrillation (HCC)  Refilled medication. Patient follows with cardiology  - apixaban (ELIQUIS) 5 MG TABS tablet; Take 1 tablet by mouth in the morning and 1 tablet before bedtime. Dispense: 60 tablet; Refill: 5  - aspirin 81 MG EC tablet; Take 1 tablet by mouth in the morning. Dispense: 90 tablet; Refill: 1    4. Need for pneumococcal vaccination  Alyssa IVORY, administered this today in the office.   - Pneumococcal, PPSV23, PNEUMOVAX 23, (age 2 yrs+), SC/IM    5. Seasonal allergic rhinitis, unspecified trigger  Prescribed Flonase for the patient to try.  - fluticasone (FLONASE) 50 MCG/ACT nasal spray; 2 sprays by Each Nostril route in the morning. Dispense: 16 g; Refill: 5    6. Poison ivy  Patient notes that the rash is improving but is still mildly itchy. Prescribed topical steroid cream for the patient. - triamcinolone (KENALOG) 0.1 % cream; Apply topically 2 times daily. Dispense: 80 g; Refill: 0      Return in about 4 weeks (around 8/18/2022) for Hypertension.             Electronically signed by Sandra Gray PA-C on 7/21/2022

## 2022-07-21 NOTE — PATIENT INSTRUCTIONS
Personalized Preventive Plan for Dimitri Thomson - 7/21/2022  Medicare offers a range of preventive health benefits. Some of the tests and screenings are paid in full while other may be subject to a deductible, co-insurance, and/or copay. Some of these benefits include a comprehensive review of your medical history including lifestyle, illnesses that may run in your family, and various assessments and screenings as appropriate. After reviewing your medical record and screening and assessments performed today your provider may have ordered immunizations, labs, imaging, and/or referrals for you. A list of these orders (if applicable) as well as your Preventive Care list are included within your After Visit Summary for your review. Other Preventive Recommendations:    A preventive eye exam performed by an eye specialist is recommended every 1-2 years to screen for glaucoma; cataracts, macular degeneration, and other eye disorders. A preventive dental visit is recommended every 6 months. Try to get at least 150 minutes of exercise per week or 10,000 steps per day on a pedometer . Order or download the FREE \"Exercise & Physical Activity: Your Everyday Guide\" from [employer]. Call 6-511.712.2747 or search [employer] online. You need 1577-7749 mg of calcium and 5133-0081 IU of vitamin D per day. It is possible to meet your calcium requirement with diet alone, but a vitamin D supplement is usually necessary to meet this goal.  When exposed to the sun, use a sunscreen that protects against both UVA and UVB radiation with an SPF of 30 or greater. Reapply every 2 to 3 hours or after sweating, drying off with a towel, or swimming. Always wear a seat belt when traveling in a car. Always wear a helmet when riding a bicycle or motorcycle. Heart-Healthy Diet   Sodium, Fat, and Cholesterol Controlled Diet       What Is a Heart Healthy Diet?    A heart-healthy diet is one that limits sodium , certain types of fat , and cholesterol . This type of diet is recommended for:   People with any form of cardiovascular disease (eg, coronary heart disease , peripheral vascular disease , previous heart attack , previous stroke )   People with risk factors for cardiovascular disease, such as high blood pressure , high cholesterol , or diabetes   Anyone who wants to lower their risk of developing cardiovascular disease   Sodium    Sodium is a mineral found in many foods. In general, most people consume much more sodium than they need. Diets high in sodium can increase blood pressure and lead to edema (water retention). On a heart-healthy diet, you should consume no more than 2,300 mg (milligrams) of sodium per dayabout the amount in one teaspoon of table salt. The foods highest in sodium include table salt (about 50% sodium), processed foods, convenience foods, and preserved foods. Cholesterol    Cholesterol is a fat-like, waxy substance in your blood. Our bodies make some cholesterol. It is also found in animal products, with the highest amounts in fatty meat, egg yolks, whole milk, cheese, shellfish, and organ meats. On a heart-healthy diet, you should limit your cholesterol intake to less than 200 mg per day. It is normal and important to have some cholesterol in your bloodstream. But too much cholesterol can cause plaque to build up within your arteries, which can eventually lead to a heart attack or stroke. The two types of cholesterol that are most commonly referred to are:   Low-density lipoprotein (LDL) cholesterol  Also known as bad cholesterol, this is the cholesterol that tends to build up along your arteries. Bad cholesterol levels are increased by eating fats that are saturated or hydrogenated. Optimal level of this cholesterol is less than 100. Over 130 starts to get risky for heart disease.    High-density lipoprotein (HDL) cholesterol  Also known as good cholesterol, this type of cholesterol actually carries cholesterol away from your arteries and may, therefore, help lower your risk of having a heart attack. You want this level to be high (ideally greater than 60). It is a risk to have a level less than 40. You can raise this good cholesterol by eating olive oil, canola oil, avocados, or nuts. Exercise raises this level, too. Fat    Fat is calorie dense and packs a lot of calories into a small amount of food. Even though fats should be limited due to their high calorie content, not all fats are bad. In fact, some fats are quite healthful. Fat can be broken down into four main types. The good-for-you fats are:   Monounsaturated fat  found in oils such as olive and canola, avocados, and nuts and natural nut butters; can decrease cholesterol levels, while keeping levels of HDL cholesterol high   Polyunsaturated fat  found in oils such as safflower, sunflower, soybean, corn, and sesame; can decrease total cholesterol and LDL cholesterol   Omega-3 fatty acids  particularly those found in fatty fish (such as salmon, trout, tuna, mackerel, herring, and sardines); can decrease risk of arrhythmias, decrease triglyceride levels, and slightly lower blood pressure   The fats that you want to limit are:   Saturated fat  found in animal products, many fast foods, and a few vegetables; increases total blood cholesterol, including LDL levels   Animal fats that are saturated include: butter, lard, whole-milk dairy products, meat fat, and poultry skin   Vegetable fats that are saturated include: hydrogenated shortening, palm oil, coconut oil, cocoa butter   Hydrogenated or trans fat  found in margarine and vegetable shortening, most shelf stable snack foods, and fried foods; increases LDL and decreases HDL     It is generally recommended that you limit your total fat for the day to less than 30% of your total calories.  If you follow an 1800-calorie heart healthy diet, for example, this would mean 60 (unsalted, dry-roasted), low-sodium peanut butter Dried peas, beans, and lentils   Any smoked, cured, salted, or canned meat, fish, or poultry (including romero, chipped beef, cold cuts, hot dogs, sausages, sardines, and anchovies) Poultry skins Breaded and/or fried fish or meats Canned peas, beans, and lentils Salted nuts   Fats and Oils   Olive oil and canola oil Low-sodium, low-fat salad dressings and mayonnaise   Butter, margarine, coconut and palm oils, romero fat   Snacks, Sweets, and Condiments   Low-sodium or unsalted versions of broths, soups, soy sauce, and condiments Pepper, herbs, and spices; vinegar, lemon, or lime juice Low-fat frozen desserts (yogurt, sherbet, fruit bars) Sugar, cocoa powder, honey, syrup, jam, and preserves Low-fat, trans-fat free cookies, cakes, and pies Humberto and animal crackers, fig bars, shobha snaps   High-fat desserts Broth, soups, gravies, and sauces, made from instant mixes or other high-sodium ingredients Salted snack foods Canned olives Meat tenderizers, seasoning salt, and most flavored vinegars   Beverages   Low-sodium carbonated beverages Tea and coffee in moderation Soy milk   Commercially softened water   Suggestions   Make whole grains, fruits, and vegetables the base of your diet. Choose heart-healthy fats such as canola, olive, and flaxseed oil, and foods high in heart-healthy fats, such as nuts, seeds, soybeans, tofu, and fish. Eat fish at least twice per week; the fish highest in omega-3 fatty acids and lowest in mercury include salmon, herring, mackerel, sardines, and canned chunk light tuna. If you eat fish less than twice per week or have high triglycerides, talk to your doctor about taking fish oil supplements. Read food labels. For products low in fat and cholesterol, look for fat free, low-fat, cholesterol free, saturated fat free, and trans fat freeAlso scan the Nutrition Facts Label, which lists saturated fat, trans fat, and cholesterol amounts. For products low in sodium, look for sodium free, very low sodium, low sodium, no added salt, and unsalted   Skip the salt when cooking or at the table; if food needs more flavor, get creative and try out different herbs and spices. Garlic and onion also add substantial flavor to foods. Trim any visible fat off meat and poultry before cooking, and drain the fat off after weiner. Use cooking methods that require little or no added fat, such as grilling, boiling, baking, poaching, broiling, roasting, steaming, stir-frying, and sauting. Avoid fast food and convenience food. They tend to be high in saturated and trans fat and have a lot of added salt. Talk to a registered dietitian for individualized diet advice. Last Reviewed: March 2011 Rachel Arana MS, MPH, RD   Updated: 3/29/2011     Heart-Healthy Diet   Sodium, Fat, and Cholesterol Controlled Diet       What Is a Heart Healthy Diet? A heart-healthy diet is one that limits sodium , certain types of fat , and cholesterol . This type of diet is recommended for:   People with any form of cardiovascular disease (eg, coronary heart disease , peripheral vascular disease , previous heart attack , previous stroke )   People with risk factors for cardiovascular disease, such as high blood pressure , high cholesterol , or diabetes   Anyone who wants to lower their risk of developing cardiovascular disease   Sodium    Sodium is a mineral found in many foods. In general, most people consume much more sodium than they need. Diets high in sodium can increase blood pressure and lead to edema (water retention). On a heart-healthy diet, you should consume no more than 2,300 mg (milligrams) of sodium per dayabout the amount in one teaspoon of table salt. The foods highest in sodium include table salt (about 50% sodium), processed foods, convenience foods, and preserved foods. Cholesterol    Cholesterol is a fat-like, waxy substance in your blood.  Our bodies make some cholesterol. It is also found in animal products, with the highest amounts in fatty meat, egg yolks, whole milk, cheese, shellfish, and organ meats. On a heart-healthy diet, you should limit your cholesterol intake to less than 200 mg per day. It is normal and important to have some cholesterol in your bloodstream. But too much cholesterol can cause plaque to build up within your arteries, which can eventually lead to a heart attack or stroke. The two types of cholesterol that are most commonly referred to are:   Low-density lipoprotein (LDL) cholesterol  Also known as bad cholesterol, this is the cholesterol that tends to build up along your arteries. Bad cholesterol levels are increased by eating fats that are saturated or hydrogenated. Optimal level of this cholesterol is less than 100. Over 130 starts to get risky for heart disease. High-density lipoprotein (HDL) cholesterol  Also known as good cholesterol, this type of cholesterol actually carries cholesterol away from your arteries and may, therefore, help lower your risk of having a heart attack. You want this level to be high (ideally greater than 60). It is a risk to have a level less than 40. You can raise this good cholesterol by eating olive oil, canola oil, avocados, or nuts. Exercise raises this level, too. Fat    Fat is calorie dense and packs a lot of calories into a small amount of food. Even though fats should be limited due to their high calorie content, not all fats are bad. In fact, some fats are quite healthful. Fat can be broken down into four main types.    The good-for-you fats are:   Monounsaturated fat  found in oils such as olive and canola, avocados, and nuts and natural nut butters; can decrease cholesterol levels, while keeping levels of HDL cholesterol high   Polyunsaturated fat  found in oils such as safflower, sunflower, soybean, corn, and sesame; can decrease total cholesterol and LDL cholesterol   Omega-3 fatty acids  particularly those found in fatty fish (such as salmon, trout, tuna, mackerel, herring, and sardines); can decrease risk of arrhythmias, decrease triglyceride levels, and slightly lower blood pressure   The fats that you want to limit are:   Saturated fat  found in animal products, many fast foods, and a few vegetables; increases total blood cholesterol, including LDL levels   Animal fats that are saturated include: butter, lard, whole-milk dairy products, meat fat, and poultry skin   Vegetable fats that are saturated include: hydrogenated shortening, palm oil, coconut oil, cocoa butter   Hydrogenated or trans fat  found in margarine and vegetable shortening, most shelf stable snack foods, and fried foods; increases LDL and decreases HDL     It is generally recommended that you limit your total fat for the day to less than 30% of your total calories. If you follow an 1800-calorie heart healthy diet, for example, this would mean 60 grams of fat or less per day. Saturated fat and trans fat in your diet raises your blood cholesterol the most, much more than dietary cholesterol does. For this reason, on a heart-healthy diet, less than 7% of your calories should come from saturated fat and ideally 0% from trans fat. On an 1800-calorie diet, this translates into less than 14 grams of saturated fat per day, leaving 46 grams of fat to come from mono- and polyunsaturated fats.    Food Choices on a Heart Healthy Diet   Food Category   Foods Recommended   Foods to Avoid   Grains   Breads and rolls without salted tops Most dry and cooked cereals Unsalted crackers and breadsticks Low-sodium or homemade breadcrumbs or stuffing All rice and pastas   Breads, rolls, and crackers with salted tops High-fat baked goods (eg, muffins, donuts, pastries) Quick breads, self-rising flour, and biscuit mixes Regular bread crumbs Instant hot cereals Commercially prepared rice, pasta, or stuffing mixes   Vegetables   Most fresh, frozen, and low-sodium canned vegetables Low-sodium and salt-free vegetable juices Canned vegetables if unsalted or rinsed   Regular canned vegetables and juices, including sauerkraut and pickled vegetables Frozen vegetables with sauces Commercially prepared potato and vegetable mixes   Fruits   Most fresh, frozen, and canned fruits All fruit juices   Fruits processed with salt or sodium   Milk   Nonfat or low-fat (1%) milk Nonfat or low-fat yogurt Cottage cheese, low-fat ricotta, cheeses labeled as low-fat and low-sodium   Whole milk Reduced-fat (2%) milk Malted and chocolate milk Full fat yogurt Most cheeses (unless low-fat and low salt) Buttermilk (no more than 1 cup per week)   Meats and Beans   Lean cuts of fresh or frozen beef, veal, lamb, or pork (look for the word loin) Fresh or frozen poultry without the skin Fresh or frozen fish and some shellfish Egg whites and egg substitutes (Limit whole eggs to three per week) Tofu Nuts or seeds (unsalted, dry-roasted), low-sodium peanut butter Dried peas, beans, and lentils   Any smoked, cured, salted, or canned meat, fish, or poultry (including romero, chipped beef, cold cuts, hot dogs, sausages, sardines, and anchovies) Poultry skins Breaded and/or fried fish or meats Canned peas, beans, and lentils Salted nuts   Fats and Oils   Olive oil and canola oil Low-sodium, low-fat salad dressings and mayonnaise   Butter, margarine, coconut and palm oils, romero fat   Snacks, Sweets, and Condiments   Low-sodium or unsalted versions of broths, soups, soy sauce, and condiments Pepper, herbs, and spices; vinegar, lemon, or lime juice Low-fat frozen desserts (yogurt, sherbet, fruit bars) Sugar, cocoa powder, honey, syrup, jam, and preserves Low-fat, trans-fat free cookies, cakes, and pies Humberto and animal crackers, fig bars, shobha snaps   High-fat desserts Broth, soups, gravies, and sauces, made from instant mixes or other high-sodium ingredients Salted snack foods Canned olives Meat tenderizers, seasoning salt, and most flavored vinegars   Beverages   Low-sodium carbonated beverages Tea and coffee in moderation Soy milk   Commercially softened water   Suggestions   Make whole grains, fruits, and vegetables the base of your diet. Choose heart-healthy fats such as canola, olive, and flaxseed oil, and foods high in heart-healthy fats, such as nuts, seeds, soybeans, tofu, and fish. Eat fish at least twice per week; the fish highest in omega-3 fatty acids and lowest in mercury include salmon, herring, mackerel, sardines, and canned chunk light tuna. If you eat fish less than twice per week or have high triglycerides, talk to your doctor about taking fish oil supplements. Read food labels. For products low in fat and cholesterol, look for fat free, low-fat, cholesterol free, saturated fat free, and trans fat freeAlso scan the Nutrition Facts Label, which lists saturated fat, trans fat, and cholesterol amounts. For products low in sodium, look for sodium free, very low sodium, low sodium, no added salt, and unsalted   Skip the salt when cooking or at the table; if food needs more flavor, get creative and try out different herbs and spices. Garlic and onion also add substantial flavor to foods. Trim any visible fat off meat and poultry before cooking, and drain the fat off after weiner. Use cooking methods that require little or no added fat, such as grilling, boiling, baking, poaching, broiling, roasting, steaming, stir-frying, and sauting. Avoid fast food and convenience food. They tend to be high in saturated and trans fat and have a lot of added salt. Talk to a registered dietitian for individualized diet advice. Last Reviewed: March 2011 Jason Todd MS, MPH, RD   Updated: 3/29/2011     Heart-Healthy Diet   Sodium, Fat, and Cholesterol Controlled Diet       What Is a Heart Healthy Diet?    A heart-healthy diet is one that limits sodium , certain types of fat , and cholesterol . This type of diet is recommended for:   People with any form of cardiovascular disease (eg, coronary heart disease , peripheral vascular disease , previous heart attack , previous stroke )   People with risk factors for cardiovascular disease, such as high blood pressure , high cholesterol , or diabetes   Anyone who wants to lower their risk of developing cardiovascular disease   Sodium    Sodium is a mineral found in many foods. In general, most people consume much more sodium than they need. Diets high in sodium can increase blood pressure and lead to edema (water retention). On a heart-healthy diet, you should consume no more than 2,300 mg (milligrams) of sodium per dayabout the amount in one teaspoon of table salt. The foods highest in sodium include table salt (about 50% sodium), processed foods, convenience foods, and preserved foods. Cholesterol    Cholesterol is a fat-like, waxy substance in your blood. Our bodies make some cholesterol. It is also found in animal products, with the highest amounts in fatty meat, egg yolks, whole milk, cheese, shellfish, and organ meats. On a heart-healthy diet, you should limit your cholesterol intake to less than 200 mg per day. It is normal and important to have some cholesterol in your bloodstream. But too much cholesterol can cause plaque to build up within your arteries, which can eventually lead to a heart attack or stroke. The two types of cholesterol that are most commonly referred to are:   Low-density lipoprotein (LDL) cholesterol  Also known as bad cholesterol, this is the cholesterol that tends to build up along your arteries. Bad cholesterol levels are increased by eating fats that are saturated or hydrogenated. Optimal level of this cholesterol is less than 100. Over 130 starts to get risky for heart disease.    High-density lipoprotein (HDL) cholesterol  Also known as good cholesterol, this type of cholesterol actually carries cholesterol away from your arteries and may, therefore, help lower your risk of having a heart attack. You want this level to be high (ideally greater than 60). It is a risk to have a level less than 40. You can raise this good cholesterol by eating olive oil, canola oil, avocados, or nuts. Exercise raises this level, too. Fat    Fat is calorie dense and packs a lot of calories into a small amount of food. Even though fats should be limited due to their high calorie content, not all fats are bad. In fact, some fats are quite healthful. Fat can be broken down into four main types. The good-for-you fats are:   Monounsaturated fat  found in oils such as olive and canola, avocados, and nuts and natural nut butters; can decrease cholesterol levels, while keeping levels of HDL cholesterol high   Polyunsaturated fat  found in oils such as safflower, sunflower, soybean, corn, and sesame; can decrease total cholesterol and LDL cholesterol   Omega-3 fatty acids  particularly those found in fatty fish (such as salmon, trout, tuna, mackerel, herring, and sardines); can decrease risk of arrhythmias, decrease triglyceride levels, and slightly lower blood pressure   The fats that you want to limit are:   Saturated fat  found in animal products, many fast foods, and a few vegetables; increases total blood cholesterol, including LDL levels   Animal fats that are saturated include: butter, lard, whole-milk dairy products, meat fat, and poultry skin   Vegetable fats that are saturated include: hydrogenated shortening, palm oil, coconut oil, cocoa butter   Hydrogenated or trans fat  found in margarine and vegetable shortening, most shelf stable snack foods, and fried foods; increases LDL and decreases HDL     It is generally recommended that you limit your total fat for the day to less than 30% of your total calories. If you follow an 1800-calorie heart healthy diet, for example, this would mean 60 grams of fat or less per day.    Saturated fat and trans fat in your diet raises your blood cholesterol the most, much more than dietary cholesterol does. For this reason, on a heart-healthy diet, less than 7% of your calories should come from saturated fat and ideally 0% from trans fat. On an 1800-calorie diet, this translates into less than 14 grams of saturated fat per day, leaving 46 grams of fat to come from mono- and polyunsaturated fats.    Food Choices on a Heart Healthy Diet   Food Category   Foods Recommended   Foods to Avoid   Grains   Breads and rolls without salted tops Most dry and cooked cereals Unsalted crackers and breadsticks Low-sodium or homemade breadcrumbs or stuffing All rice and pastas   Breads, rolls, and crackers with salted tops High-fat baked goods (eg, muffins, donuts, pastries) Quick breads, self-rising flour, and biscuit mixes Regular bread crumbs Instant hot cereals Commercially prepared rice, pasta, or stuffing mixes   Vegetables   Most fresh, frozen, and low-sodium canned vegetables Low-sodium and salt-free vegetable juices Canned vegetables if unsalted or rinsed   Regular canned vegetables and juices, including sauerkraut and pickled vegetables Frozen vegetables with sauces Commercially prepared potato and vegetable mixes   Fruits   Most fresh, frozen, and canned fruits All fruit juices   Fruits processed with salt or sodium   Milk   Nonfat or low-fat (1%) milk Nonfat or low-fat yogurt Cottage cheese, low-fat ricotta, cheeses labeled as low-fat and low-sodium   Whole milk Reduced-fat (2%) milk Malted and chocolate milk Full fat yogurt Most cheeses (unless low-fat and low salt) Buttermilk (no more than 1 cup per week)   Meats and Beans   Lean cuts of fresh or frozen beef, veal, lamb, or pork (look for the word loin) Fresh or frozen poultry without the skin Fresh or frozen fish and some shellfish Egg whites and egg substitutes (Limit whole eggs to three per week) Tofu Nuts or seeds (unsalted, dry-roasted), low-sodium peanut butter Dried peas, beans, and lentils   Any smoked, cured, salted, or canned meat, fish, or poultry (including romero, chipped beef, cold cuts, hot dogs, sausages, sardines, and anchovies) Poultry skins Breaded and/or fried fish or meats Canned peas, beans, and lentils Salted nuts   Fats and Oils   Olive oil and canola oil Low-sodium, low-fat salad dressings and mayonnaise   Butter, margarine, coconut and palm oils, romero fat   Snacks, Sweets, and Condiments   Low-sodium or unsalted versions of broths, soups, soy sauce, and condiments Pepper, herbs, and spices; vinegar, lemon, or lime juice Low-fat frozen desserts (yogurt, sherbet, fruit bars) Sugar, cocoa powder, honey, syrup, jam, and preserves Low-fat, trans-fat free cookies, cakes, and pies Humberto and animal crackers, fig bars, shobha snaps   High-fat desserts Broth, soups, gravies, and sauces, made from instant mixes or other high-sodium ingredients Salted snack foods Canned olives Meat tenderizers, seasoning salt, and most flavored vinegars   Beverages   Low-sodium carbonated beverages Tea and coffee in moderation Soy milk   Commercially softened water   Suggestions   Make whole grains, fruits, and vegetables the base of your diet. Choose heart-healthy fats such as canola, olive, and flaxseed oil, and foods high in heart-healthy fats, such as nuts, seeds, soybeans, tofu, and fish. Eat fish at least twice per week; the fish highest in omega-3 fatty acids and lowest in mercury include salmon, herring, mackerel, sardines, and canned chunk light tuna. If you eat fish less than twice per week or have high triglycerides, talk to your doctor about taking fish oil supplements. Read food labels. For products low in fat and cholesterol, look for fat free, low-fat, cholesterol free, saturated fat free, and trans fat freeAlso scan the Nutrition Facts Label, which lists saturated fat, trans fat, and cholesterol amounts.    For products low in sodium, look for sodium free, very low sodium, low sodium, no added salt, and unsalted   Skip the salt when cooking or at the table; if food needs more flavor, get creative and try out different herbs and spices. Garlic and onion also add substantial flavor to foods. Trim any visible fat off meat and poultry before cooking, and drain the fat off after weiner. Use cooking methods that require little or no added fat, such as grilling, boiling, baking, poaching, broiling, roasting, steaming, stir-frying, and sauting. Avoid fast food and convenience food. They tend to be high in saturated and trans fat and have a lot of added salt. Talk to a registered dietitian for individualized diet advice. Last Reviewed: March 2011 Nahum Keyes MS, MPH, RD   Updated: 3/29/2011     Patient information: Weight loss treatments    INTRODUCTION -- Obesity is a major international problem, and Americans are among the heaviest people in the world. The percentage of obese people in the [place] has risen steadily. Many people find that although they initially lose weight by dieting, they quickly regain the weight after the diet ends. Because it so hard to keep weight off over time, it is important to have as much information and support as possible before starting a diet. You are most likely to be successful in losing weight and keeping it off when you believe that your body weight can be controlled. STARTING A WEIGHT LOSS PROGRAM -- Some people like to talk to their doctor or nurse to get help choosing the best plan, monitoring progress, and getting advice and support along the way. To know what treatment (or combination of treatments) will work best, determine your body mass index (BMI) and waist circumference (measurement). The BMI is calculated from your height and weight.   A person with a BMI between 25 and 29.9 is considered overweight   A person with a BMI of 30 or greater is considered to be obese  A waist circumference greater than 35 inches (88 cm) in women and 40 inches (102 cm) in men increases the risk of obesity-related complications, such as heart disease and diabetes. People who are obese and who have a larger waist size may need more aggressive weight loss treatment than others. Talk to your doctor or nurse for advice. Types of treatment -- Based on your measurements and your medical history, your doctor or nurse can determine what combination of weight loss treatments would work best for you. Treatments may include changes in lifestyle, exercise, dieting, and, in some cases, weight loss medicines or weight loss surgery. Weight loss surgery, also called bariatric surgery, is reserved for people with severe obesity who have not responded to other weight loss treatments. SETTING A WEIGHT LOSS GOAL -- It is important to set a realistic weight loss goal. Your first goal should be to avoid gaining more weight and staying at your current weight (or within 5 percent). Many people have a \"dream\" weight that is difficult or impossible to achieve. People at high risk of developing diabetes who are able to lose 5 percent of their body weight and maintain this weight will reduce their risk of developing diabetes by about 50 percent and reduce their blood pressure. This is a success. Losing more than 15 percent of your body weight and staying at this weight is an extremely good result, even if you never reach your \"dream\" or \"ideal\" weight. LIFESTYLE CHANGES -- Programs that help you to change your lifestyle are usually run by psychologists or other professionals. The goals of lifestyle changes are to help you change your eating habits, become more active, and be more aware of how much you eat and exercise, helping you to make healthier choices. This type of treatment can be broken down into three steps:   The triggers that make you want to eat   Eating   What happens after you eat  Triggers to eat -- Determining what triggers you to eat involves figuring out what foods you eat and where and when you eat. To figure out what triggers you to eat, keep a record for a few days of everything you eat, the places where you eat, how often you eat, and the emotions you were feeling when you ate. For some people, the trigger is related to a certain time of day or night. For others, the trigger is related to a certain place, like sitting at a desk working. Eating -- You can change your eating habits by breaking the chain of events between the trigger for eating and eating itself. There are many ways to do this. For instance, you can:  Limit where you eat to a few places (eg, dining room)   Restrict the number of utensils (eg, only a fork) used for eating   Drink a sip of water between each bite   Chew your food a certain number of times   Get up and stop eating every few minutes  What happens after you eat -- Rewarding yourself for good eating behaviors can help you to develop better habits. This is not a reward for weight loss; instead, it is a reward for changing unhealthy behaviors. Do not use food as a reward. Some people find money, clothing, or personal care (eg, a hair cut, manicure, or massage) to be effective rewards. Treat yourself immediately after making better eating choices to reinforce the value of the good behavior. You need to have clear behavior goals, and you must have a time frame for reaching your goals. Reward small changes along the way to your final goal.  Other factors that contribute to successful weight loss -- Changing your behavior involves more than just changing unhealthy eating habits; it also involves finding people around you to support your weight loss, reducing stress, and learning to be strong when tempted by food. Establish a \"mercy\" system -- Having a friend or family member available to provide support and reinforce good behavior is very helpful. The support person needs to understand your goals.    Learn to be strong -- Learning to be strong when tempted by food is an important part of losing weight. As an example, you will need to learn how to say \"no\" and continue to say no when urged to eat at parties and social gatherings. Develop strategies for events before you go, such as eating before you go or taking low-calorie snacks and drinks with you. Develop a support system -- Having a support system is helpful when losing weight. This is why many commercial groups are successful. Family support is also essential; if your family does not support your efforts to lose weight, this can slow your progress or even keep you from losing weight. Positive thinking -- People often have conversations with themselves in their head; these conversations can be positive or negative. If you eat a piece of cake that was not planned, you may respond by thinking, \"Oh, you stupid idiot, you've blown your diet! \" and as a result, you may eat more cake. A positive thought for the same event could be, \"Well, I ate cake when it was not on my plan. Now I should do something to get back on track. \" A positive approach is much more likely to be successful than a negative one. Reduce stress -- Although stress is a part of everyday life, it can trigger uncontrolled eating in some people. It is important to find a way to get through these difficult times without eating or by eating low-calorie food, like raw vegetables. It may be helpful to imagine a relaxing place that allows you to temporarily escape from stress. With deep breaths and closed eyes, you can imagine this relaxing place for a few minutes. Self-help programs -- Self-help programs like [place] Watchers®, Overeaters Anonymous®, and Take Off Tomas (TOPS)© work for some people. As with all weight loss programs, you are most likely to be successful with these plans if you make long-term changes in how you eat. CHOOSING A DIET -- A calorie is a unit of energy found in food.  Your body needs calories to function. The goal of any diet is to burn up more calories than you eat. How quickly you lose weight depends upon several factors, such as your age, gender, and starting weight. Older people have a slower metabolism than young people, so they lose weight more slowly. Men lose more weight than women of similar height and weight when dieting because they use more energy. People who are extremely overweight lose weight more quickly than those who are only mildly overweight. Try not to drink alcohol or drinks with added sugar, and most sweets (candy, cakes, cookies), since they rarely contain important nutrients. Portion-controlled diets -- One simple way to diet is to buy packaged foods, like frozen low-calorie meals or meal-replacement canned drinks. A typical meal plan for one day may include:  A meal-replacement drink or breakfast bar for breakfast   A meal-replacement drink or a frozen low-calorie (250 to 350 calories) meal for lunch   A frozen low-calorie meal or other prepackaged, calorie-controlled meal, along with extra vegetables for dinner  This would give you 1000 to 1500 calories per day. Low-fat diet -- To reduce the amount of fat in your diet, you can:  Eat low-fat foods. Low-fat foods are those that contain less than 30 percent of calories from fat. Fat is listed on the food facts label (figure 1). Count fat grams. For a 1500 calorie diet, this would mean about 45 g or fewer of fat per day. Low-carbohydrate diet -- Low- and very-low-carbohydrate diets (eg, Atkins diet, [employer]) have become popular ways to lose weight quickly.   With a very-low-carbohydrate diet, you eat between 0 and 60 grams of carbohydrates per day (a standard diet contains 200 to 300 grams of carbohydrates)   With a low-carbohydrate diet, you eat between 60 and 130 grams of carbohydrates per day  Carbohydrates are found in fruits, vegetables, and grains (including breads, rice, pasta, and cereal), alcoholic beverages, and in dairy products. Meat and fish do not contain carbohydrates. Side effects of very-low-carbohydrate diets can include constipation, headache, bad breath, muscle cramps, diarrhea, and weakness. Mediterranean diet -- The term \"Mediterranean diet\" refers to a way of eating that is common in [place] regions around the [facility]. Although there is some variation in Mediterranean diets, there are some similarities. Most Mediterranean diets include:  A high level of monounsaturated fats (from olive or canola oil, walnuts, pecans, almonds) and a low level of saturated fats (from butter)   A high amount of vegetables, fruits, legumes, and grains (7 to 10 servings of fruits and vegetables per day)   A moderate amount of milk and dairy products, mostly in the form of cheese. Use low-fat dairy products (skim milk, fat-free yogurt, low-fat cheese). A relatively low amount of red meat and meat products. Substitute fish or poultry for red meat. For those who drink alcohol, a modest amount (mainly as red wine) may help to protect against cardiovascular disease. A modest amount is up to one (4 ounce) glass per day for women and up to two glasses per day for men. Which diet is best? -- Studies have compared different diets, including:  Very-low-carbohydrate (Atkins)   Macronutrient balance controlling glycemic load (Zone®)   Reduced-calorie (Weight Watchers®)   Very-low-fat (Ornish)  No one diet is \"best\" for weight loss. Any diet will help you to lose weight if you stick with the diet. Therefore, it is important to choose a diet that includes foods you like. Fad diets -- Fad diets often promise quick weight loss (more than 1 to 2 pounds per week) and may claim that you do not need to exercise or give up favorite foods. Some fad diets cost a lot of money, because you have to pay for seminars or pills.  Fad diets generally lack any scientific evidence that they are safe and irregular heart rhythms, or peripheral vascular disease (poor circulation in the legs). Orlistat -- Orlistat (Xenical® 120 mg capsules) is a medicine that reduces the amount of fat your body absorbs from the foods you eat. A lower-dose version is now available without a prescription (Odilon® 60 mg capsules) in many countries, including the [place]. The medicine is recommended three times per day, taken with a meal; you can skip a dose if you skip a meal or if the meal contains no fat. After one year of treatment with orlistat, the average weight loss is approximately 8 to 10 percent of initial body weight (4 percent more than in those who took a placebo). Cholesterol levels often improve, and blood pressure sometimes falls. In people with diabetes, orlistat may help control blood sugar levels. Side effects occur in 15 to 10 percent of people and may include stomach cramps, gas, diarrhea, leakage of stool, or oily stools. These problems are more likely when you take orlistat with a high-fat meal (if more than 30 percent of calories in the meal are from fat). Side effects usually improve as you learn to avoid high-fat foods. Severe liver injury has been reported rarely in patients taking orlistat, but it is not known if orlistat caused the liver problems. Diet supplements -- Diet supplements are widely used by people who are trying to lose weight, although the safety and efficacy of these supplements are often unproven. A few of the more common diet supplements are discussed below; none of these are recommended because they have not been studied carefully, and there is no proof they are safe or effective. Chitosan and wheat dextrin are ineffective for weight loss, and their use is not recommended. Ephedra, a compound related to ephedrine, is no longer available in the [place] due to safety concerns. Many nonprescription diet pills previously contained ephedra.  Although some studies have shown that ephedra helps with weight loss, there can be serious side effects (psychiatric symptoms, palpitations, and stomach upset), including death. There are not enough data about the safety and efficacy of chromium, ginseng, glucomannan, green tea, hydroxycitric acid, L carnitine, psyllium, pyruvate supplements, McDowell wort, and conjugated linoleic acid. Two supplements from [facility], Thiells Sim (also known as the Zoila Girard 15 pill) and Herbathin dietary supplement, have been shown to contain prescription drugs. Hoodia gordonii is a dietary supplement derived from a plant in [place]. It is not recommended because there is no proof that it is safe or effective. Bitter orange (Citrus aurantium) can increase your heart rate and blood pressure and is not recommended. SHOULD I HAVE SURGERY TO LOSE WEIGHT? -- Weight loss surgery is recommended ONLY for people with one of the following:  Severe obesity (body mass index above 40) (calculator 1 and calculator 2) who have not responded to diet, exercise, or weight loss medicines   Body mass index between 35 and 40, along with a serious medical problem (including diabetes, severe joint pain, or sleep apnea) that would improve with weight loss  You should be sure that you understand the potential risks and benefits of weight loss surgery. You must be motivated and willing to make lifelong changes in how you eat to reach and maintain a healthier weight after surgery. You must also be realistic about weight loss after surgery (see 'Effectiveness of weight loss surgery' below). PREPARING FOR WEIGHT LOSS SURGERY -- Most people who have weight loss surgery will meet with several specialists before surgery is scheduled. This often includes a dietitian, mental health counselor, a doctor who specializes in care of obese people, and a surgeon who performs weight loss surgery (bariatric surgeon).  You may need to work with these providers for several weeks or months before surgery. The nutritionist will explain what and how much you will be able to eat after surgery. You may also need to lose a small amount of weight before surgery. The mental health specialist will help you to cope with stress and other factors that can make it harder to lose weight or trigger you to eat   The medical doctor will determine whether you need other tests, counseling, or treatment before surgery. He or she might also help you begin a medical weight loss program so that you can lose some weight before surgery. The bariatric surgeon will meet with you to discuss the surgeries available to treat obesity. He or she will also make sure you are a good candidate for surgery. TYPES OF WEIGHT LOSS SURGERY -- There are several types of weight loss surgeries, the most common being lap banding, gastric bypass, and gastric sleeve. Lap banding -- Laparoscopic adjustable gastric banding (LAGB), or lap banding, is a surgery that uses an adjustable band around the opening to the stomach (figure 1). This reduces the amount of food that you can eat at one time. Lap banding is done through small incisions, with a laparoscope. The band can be adjusted after surgery, allowing you to eat more or less food. Adjustments to the size and tightness of the band are made by using a needle to add or remove fluid from a port (a small container under the skin that is connected to the band). Adding fluid to the band makes it tighter which restricts the amount of food you can eat and may help you to lose more weight. Lap banding is a popular choice because it is relatively simple to perform, can be adjusted or removed, and has a low risk of serious complications immediately after surgery. However, weight loss with the lap band depends on your ability to follow the program closely. You will need to prepare nutritious meals that [facility] with\" the band, not against it.  For example, the lap band will not work well if you eat or drink a large amount of liquid calories (like ice cream). The band will not help you to feel full when you eat/drink liquid calories. Weight loss ranges from 45 to 75 percent after two years. As an example, a person who is 120 pounds overweight could expect to lose approximately 54 to 90 pounds in the two years after lap banding. Gastric bypass -- Kim-en-Y gastric bypass, also called gastric bypass, helps you to lose weight by reducing the amount of food you can eat and reducing the number of calories and nutrients you absorb from the food you eat. To perform gastric bypass, a surgeon creates a small stomach pouch by dividing the stomach and attaching it to the small intestine. This helps you to lose weight in two ways: The smaller stomach can hold less food than before surgery. This causes you to feel full after eating a very small amount of food or liquid. Over time, the pouch might stretch, allowing you to eat more food. The body absorbs fewer calories, since food bypasses most of the stomach as well as the upper small intestine. This new arrangement seems to decrease your appetite and change how you break down foods by changing the release of various hormones. Gastric bypass can be performed as open surgery (through an incision on the abdomen) or laparoscopically, which uses smaller incisions and smaller instruments. Both the laparoscopic and open techniques have risks and benefits. You and your surgeon should work together to decide which surgery, if any, is right for you. Gastric bypass has a high success rate, and people lose an average of 62 to 68 percent of their excess body weight in the first year. Weight loss typically levels off after one to two years, with an overall excess weight loss between 50 and 75 percent. For a person who is 120 pounds overweight, an average of 60 to 90 pounds of weight loss would be expected.   Gastric sleeve -- Gastric sleeve, also known as sleeve gastrectomy, is a surgery that reduces the size of the stomach and makes it into a narrow tube (figure 3). The new stomach is much smaller and produces less of the hormone (ghrelin) that causes hunger, helping you feel satisfied with less food. Sleeve gastrectomy is safer than gastric bypass because the intestines are not rearranged, and there is less chance of malnutrition. It also appears to control hunger better than lap banding. It might be safer than the lap banding because no foreign materials are used. The gastric sleeve has a good success rate, and people lose an average of 33 percent of their excess body weight in the first year. For a person who is 120 pounds overweight, this would mean losing about 40 pounds in the first year. WEIGHT LOSS SURGERY COMPLICATIONS -- A variety of complications can occur with weight loss surgery. The risks of surgery depend upon which surgery you have and any medical problems you had before surgery. Some of the more common early surgical complications (one to six weeks after surgery) include:  Bleeding   Infection   Blockage or tear in the bowels   Need for further surgery  Important medical complications after surgery can include blood clots in the legs or lungs, heart attack, pneumonia, and urinary tract infection. Complications are less likely when surgery is performed in centers that are experienced in weight loss surgery. In general, centers with experience in weight loss surgery have:  Board-certified doctors and surgeons   A team of support staff (dietitians, counselors, nurses)   Long-term follow-up after surgery   Hospital staff experienced with the care of weight loss patients. This includes nurses who are trained in the care of patients immediately after surgery and anesthesiologists who are experienced in caring for the morbidly obese. EFFECTIVENESS OF WEIGHT LOSS SURGERY -- The goal of weight loss surgery is to reduce the risk of illness or death associated with obesity.  Weight loss surgery can also help you to feel and look better, reduce the amount of money you spend on medicines, and cut down on sick days. As an example, weight loss surgery can improve health problems related to obesity (diabetes, high blood pressure, high cholesterol, sleep apnea) to the point that you need less or no medicine. Finally, weight loss surgery might reduce your risk of developing heart disease, cancer, and certain infections. AFTER WEIGHT LOSS SURGERY -- You will need to stay in the hospital until your team feels that it is safe for you to leave (on average, one to three days). Do not drive if you are taking prescription pain medicine. Begin exercising as soon as possible once you have healed; most weight loss centers will design an exercise program for you. Once you are home, it is important to eat and drink exactly what your doctor and dietitian recommend. You will see your doctor, nurse, and dietitian on a regular basis after surgery to monitor your health, diet, and weight loss. You will be able to slowly increase how much you eat over time, although it will always be important to:  Eat small, frequent meals and not skip meals   Chew your food slowly and completely   Avoid eating while \"distracted\" (such as eating while watching TV)   Stop eating when you feel full   Drink liquids at least 30 minutes before or after eating   Avoid foods high in fat or sugar   Take vitamin supplements, as recommended  It can take several months to learn to listen to your body so that you know when you are hungry and when you are full. You may dislike foods you previously loved, and you may begin to prefer new foods. This can be a frustrating process for some people, so talk to your dietitian if you are having trouble. It usually takes between one and two years to lose weight after surgery.  After reaching their goal weight, some people have plastic surgery (called \"body contouring\") to remove excess skin from the body, particularly in the abdominal area. Before you decide to have weight loss surgery, you must commit to staying healthy for life. This includes following up with your healthcare team, exercising most days of the week, and eating a sensible diet every day. It can be difficult to develop new eating and exercise habits after weight loss surgery, and you will have to work hard to stick to your goals. Recovering from surgery and losing weight can be stressful and emotional, and it is important to have the support of family and friends. Working with a , therapist, or support group can help you through the ups and downs. WHERE TO GET MORE INFORMATION -- Your healthcare provider is the best source of information for questions and concerns related to your medical problem. This article will be updated as needed every four months on our Web site ([email or web address])    High-Fiber Diet     What Is Fiber? Dietary fiber is a form of carbohydrate found in plants that cannot be digested by humans. All plants contain fiber, including fruits, vegetables, grains, and legumes. Fiber is often classified into two categories: soluble and insoluble. Soluble fiber draws water into the bowel and can help slow digestion. Examples of foods that are high in soluble fiber include oatmeal, oat bran, barley, legumes (eg, beans and peas), apples, and strawberries. Insoluble fiber speeds digestion and can add bulk to the stool. Examples of foods that are high in insoluble fiber include whole-wheat products, wheat bran, cauliflower, green beans, and potatoes. Why Follow a High-Fiber Diet? A high-fiber diet is often recommended to prevent and treat constipation , hemorrhoids , diverticulitis , and irritable bowel syndrome . Eating a high-fiber diet can also help improve your cholesterol levels, lower your risk of coronary heart disease , reduce your risk of type 2 diabetes , and lower your weight.  For people with type 1 or 2 diabetes, a high-fiber diet can also help stabilize blood sugar levels. How Much Fiber Should I Eat? A high-fiber diet should contain  20-35 grams  of fiber a day. This is actually the amount recommended for the general adult population; however, most Americans eat only 15 grams of fiber per day. Digestion of Fiber   Eating a higher fiber diet than usual can take some getting used to by your body's digestive system. To avoid the side effects of sudden increases in dietary fiber (eg, gas, cramping, bloating, and diarrhea), increase fiber gradually and be sure to drink plenty of fluids every day. Tips for Increasing Fiber Intake   Whenever possible, choose whole grains over refined grains (eg, brown rice instead of white rice, whole-wheat bread instead of white bread). Include a variety of grains in your diet, such as wheat, rye, barley, oats, quinoa, and bulgur. Eat more vegetarian-based meals. Here are some ideas: black bean burgers, eggplant lasagna, and veggie tofu stir-neff. Choose high-fiber snacks, such as fruits, popcorn, whole-grain crackers, and nuts. Make whole-grain cereal or whole-grain toast part of your daily breakfast regime. When eating out, whether ordering a sandwich or dinner, ask for extra vegetables. When baking, replace part of the white flour with whole-wheat flour. Whole-wheat flour is particularly easy to incorporate into a recipe. High-Fiber Diet Eating Guide   Food Category   Foods Recommended   Notes   Grains   Whole-grain breads, muffins, bagels, or karl bread Rye bread Whole-wheat crackers or crisp breads Whole-grain or bran cereals Oatmeal, oat bran, or grits Wheat germ Whole-wheat pasta and brown rice   Read the ingredients list on food labels. Look for products that list \"whole\" as the first ingredient (eg, whole-wheat, whole oats). Choose cereals with at least 2 grams of fiber per serving.    Vegetables   All vegetables, especially asparagus, bean sprouts, broccoli, Pearson sprouts, cabbage, carrots, cauliflower, celery, corn, greens, green beans, green pepper, onions, peas, potatoes (with skin), snow peas, spinach, squash, sweet potatoes, tomatoes, zucchini   For maximum fiber intake, eat the peels of fruits and vegetablesjust be sure to wash them well first.   Fruits   All fruits, especially apples, berries, grapefruits, mangoes, nectarines, oranges, peaches, pears, dried fruits (figs, dates, prunes, raisins)   Choose raw fruits and vegetables over juice, cooked, or cannedraw fruit has more fiber. Dried fruit is also a good source of fiber. Milk   With the exception of yogurt containing inulin (a type of fiber), dairy foods provide little fiber. Add more fiber by topping your yogurt or cottage cheese with fresh fruit, whole grain or bran cereals, nuts, or seeds. Meats and Beans   All beans and peas, especially Garbanzo beans, kidney beans, lentils, lima beans, split peas, and kemp beans All nuts and seeds, especially almonds, peanuts, [place] nuts, cashews, peanut butter, walnuts, sesame and sunflower seeds All meat, poultry, fish, and eggs   Increase fiber in meat dishes by adding kemp beans, kidney beans, black-eyed peas, bran, or oatmeal. If you are following a low-fat diet, use nuts and seeds only in moderation. Fats and Oils   All in moderation   Fats and oils do not provide fiber   Snacks, Sweets, and Condiments   Fruit Nuts Popcorn, whole-wheat pretzels, or trail mix made with dried fruits, nuts, and seeds Cakes, breads, and cookies made with oatmeal or whole-wheat flour   Most snack foods do not provide much fiber. Choose snacks with at least 2 grams of fiber per serving. Last Reviewed: March 2011 Jesenia Cárdenas MS, MPH, RD   Updated: 3/29/2011     Keep Your Memory Mirna Avalos       Many factors can affect your ability to remembera hectic lifestyle, aging, stress, chronic disease, and certain medicines.  But, there are steps you can take to sharpen your mind and help preserve your memory. Challenge Your Brain   Regularly challenging your mind may help keeps it in top shape. Good mental exercises include:   Crossword puzzlesUse a dictionary if you need it; you will learn more that way. Brainteasers Try some! Crafts, such as wood working and sewing   Hobbies, such as gardening and building model airplanes   SocializingVisit old friends or join groups to meet new ones. Reading   Learning a new language   Taking a class, whether it be art history or ан[employer]рей chi   TravelingExperience the food, history, and culture of your destination   Learning to use a computer   Going to museums, the theater, or thought-provoking movies   Changing things in your daily life, such as reversing your pattern in the grocery store or brushing your teeth using your nondominant hand   Use Memory Aids   There is no need to remember every detail on your own. These memory aids can help:   Calendars and day planners   Electronic organizers to store all sorts of helpful informationThese devices can \"beep\" to remind you of appointments. A book of days to record birthdays, anniversaries, and other occasions that occur on the same date every year   Detailed \"to-do\" lists and strategically placed sticky notes   Quick \"study\" sessionsBefore a gathering, review who will be there so their names will be fresh in your mind. Establish routinesFor example, keep your keys, wallet, and umbrella in the same place all the time or take medicine with your 8:00 AM glass of juice   Live a Healthy Life   Many actions that will keep your body strong will do the same for your mind. For example:   Talk to Your Doctor About Herbs and Supplements    Malnutrition and vitamin deficiencies can impair your mental function. For example, vitamin B12 deficiency can cause a range of symptoms, including confusion. But, what if your nutritional needs are being met?  Can herbs and supplements still offer a benefit? Researchers have investigated a range of natural remedies, such as ginkgo , ginseng , and the supplement phosphatidylserine (PS). So far, though, the evidence is inconsistent as to whether these products can improve memory or thinking. If you are interested in taking herbs and supplements, talk to your doctor first because they may interact with other medicines that you are taking. Exercise Regularly    Among the many benefits of regular exercise are increased blood flow to the brain and decreased risk of certain diseases that can interfere with memory function. One study found that even moderate exercise has a beneficial effect. Examples of \"moderate\" exercise include:   Playing 18 holes of golf once a week, without a cart   Playing tennis twice a week   Walking one mile per day   Manage Stress    It can be tough to remember what is important when your mind is cluttered. Make time for relaxation. Choose activities that calm you down, and make it routine. Manage Chronic Conditions    Side effects of high blood pressure , diabetes, and heart disease can interfere with mental function. Many of the lifestyle steps discussed here can help manage these conditions. Strive to eat a healthy diet, exercise regularly, get stress under control, and follow your doctor's advice for your condition. Minimize Medications    Talk to your doctor about the medicines that you take. Some may be unnecessary. Also, healthy lifestyle habits may lower the need for certain drugs. Last Reviewed: April 2010 Donnell hTakur MD   Updated: 4/13/2010     Keeping Home a [facility]       As we get older, changes in balance, gait, strength, vision, hearing, and cognition make even the most youthful senior more prone to accidents. Falls are one of the leading health risks for older people.  This increased risk of falling is related to:   Aging process (eg, decreased muscle strength, slowed reflexes)   Higher incidence of chronic health problems (eg, arthritis, diabetes) that may limit mobility, agility or sensory awareness   Side effects of medicine (eg, dizziness, blurred vision)especially medicines like prescription pain medicines and drugs used to treat mental health conditions   Depending on the brittleness of your bones, the consequences of a fall can be serious and long lasting. Home Life   Research by the Association of Aging [facility]) shows that some home accidents among older adults can be prevented by making simple lifestyle changes and basic modifications and repairs to the home environment. Here are some lifestyle changes that experts recommend:   Have your hearing and vision checked regularly. Be sure to wear prescription glasses that are right for you. Speak to your doctor or pharmacist about the possible side effects of your medicines. A number of medicines can cause dizziness. If you have problems with sleep, talk to your doctor. Limit your intake of alcohol. If necessary, use a cane or walker to help maintain your balance. Wear supportive, rubber-soled shoes, even at home. If you live in a region that gets wintry weather, you may want to put special cleats on your shoes to prevent you from slipping on the snow and ice. Exercise regularly to help maintain muscle tone, agility, and balance. Always hold the banister when going up or down stairs. Also, use  bars when getting in or out of the bath or shower, or using the toilet. To avoid dizziness, get up slowly from a lying down position. Sit up first, dangling your legs for a minute or two before rising to a standing position. Overall Home Safety Check   According to the Consumer Product Safety Commision's \"Older Consumer Home Safety Checklist,\" it is important to check for potential hazards in each room. And remember, proper lighting is an essential factor in home safety. If you cannot see clearly, you are more likely to fall.    Important questions to ask yourself include:   Are lamp, electric, extension, and telephone cords placed out of the flow of traffic and maintained in good condition? Have frayed cords been replaced? Are all small rugs and runners slip resistant? If not, you can secure them to the floor with a special double-sided carpet tape. Are smoke detectors properly locatedone on every floor of your home and one outside of every sleeping area? Are they in good working order? Are batteries replaced at least once a year? Do you have a well-maintained carbon monoxide detector outside every sleeping are in your home? Does your furniture layout leave plenty of space to maneuver between and around chairs, tables, beds, and sofas? Are hallways, stairs and passages between rooms well lit? Can you reach a lamp without getting out of bed? Are floor surfaces well maintained? Shag rugs, high-pile carpeting, tile floors, and polished wood floors can be particularly slippery. Stairs should always have handrails and be carpeted or fitted with a non-skid tread. Is your telephone easily reachable. Is the cord safely tucked away? Room by Room   According to the Association of Aging, bathrooms and sandy are the two most potentially hazardous rooms in your home. In the Kitchen    Be sure your stove is in proper working order and always make sure burners and the oven are off before you go out or go to sleep. Keep pots on the back burners, turn handles away from the front of the stove, and keep stove clean and free of grease build-up. Kitchen ventilation systems and range exhausts should be working properly. Keep flammable objects such as towels and pot holders away from the cooking area except when in use. Make sure kitchen curtains are tied back. Move cords and appliances away from the sink and hot surfaces. If extension cords are needed, install wiring guides so they do not hang over the sink, range, or working areas.     Look for coffee pots, kettles and toaster ovens with automatic shut-offs. Keep a mop handy in the kitchen so you can wipe up spills instantly. You should also have a small fire extinguisher. Arrange your kitchen with frequently used items on lower shelves to avoid the need to stand on a stepstool to reach them. Make sure countertops are well-lit to avoid injuries while cutting and preparing food. In the Bathroom    Use a non-slip mat or decals in the tub and shower, since wet, soapy tile or porcelain surfaces are extremely slippery. Make sure bathroom rugs are non-skid or tape them firmly to the floor. Bathtubs should have at least one, preferably two, grab bars, firmly attached to structural supports in the wall. (Do not use built-in soap holders or glass shower doors as grab bars.)    Tub seats fitted with non-slip material on the legs allow you to wash sitting down. For people with limited mobility, bathtub transfer benches allow you to slide safely into the tub. Raised toilet seats and toilet safety rails are helpful for those with knee or hip problems. In the [facility]    Make sure you use a nightlight and that the area around your bed is clear of potential obstacles. Be careful with electric blankets and never go to sleep with a heating pad, which can cause serious burns even if on a low setting. Use fire-resistant mattress covers and pillows, and NEVER smoke in bed. Keep a phone next to the bed that is programmed to dial 911 at the push of a button. If you have a chronic condition, you may want to sign on with an automatic call-in service. Typically the system includes a small pendant that connects directly to an emergency medical voice-response system. You should also make arrangements to stay in contact with someonefriend, neighbor, family memberon a regular schedule.    Fire Prevention   According to the [employer] (Smoke Alarms for Every) [place] senior citizens are one of the two highest risk groups for death and serious injuries due to residential fires. When cooking, wear short-sleeved items, never a bulky long-sleeved robe. The [facility]'s Safety Checklist for Older Consumers emphasizes the importance of checking basements, garages, workshops and storage areas for fire hazards, such as volatile liquids, piles of old rags or clothing and overloaded circuits. Never smoke in bed or when lying down on a couch or recliner chair. Small portable electric or kerosene heaters are responsible for many home fires and should be used cautiously if at all. If you do use one, be sure to keep them away from flammable materials. In case of fire, make sure you have a pre-established emergency exit plan. Have a professional check your fireplace and other fuel-burning appliances yearly. Helping [employer]   Baby boomers entering the delgado years will continue to see the development of new products to help older adults live safely and independently in spite of age-related changes. Making Life More Livable  , by Kayli Augustin, lists over 1,000 products for \"living well in the mature years,\" such as bathing and mobility aids, household security devices, ergonomically designed knives and peelers, and faucet valves and knobs for temperature control. Medical supply stores and organizations are good sources of information about products that improve your quality of life and insure your safety.      Last Reviewed: November 2009 Dyanna Cranker, MD   Updated: 3/7/2011

## 2022-07-28 ENCOUNTER — NURSE ONLY (OUTPATIENT)
Dept: CARDIOLOGY CLINIC | Age: 87
End: 2022-07-28
Payer: MEDICARE

## 2022-07-28 ENCOUNTER — OFFICE VISIT (OUTPATIENT)
Dept: CARDIOLOGY CLINIC | Age: 87
End: 2022-07-28
Payer: MEDICARE

## 2022-07-28 VITALS
DIASTOLIC BLOOD PRESSURE: 70 MMHG | SYSTOLIC BLOOD PRESSURE: 136 MMHG | WEIGHT: 161 LBS | HEIGHT: 66 IN | HEART RATE: 77 BPM | BODY MASS INDEX: 25.88 KG/M2

## 2022-07-28 DIAGNOSIS — R07.9 CHEST PAIN, UNSPECIFIED TYPE: Primary | ICD-10-CM

## 2022-07-28 DIAGNOSIS — R06.02 SHORTNESS OF BREATH: ICD-10-CM

## 2022-07-28 DIAGNOSIS — R00.2 PALPITATIONS: Primary | ICD-10-CM

## 2022-07-28 DIAGNOSIS — R60.0 EDEMA OF LOWER EXTREMITY: ICD-10-CM

## 2022-07-28 DIAGNOSIS — I49.1 PAC (PREMATURE ATRIAL CONTRACTION): ICD-10-CM

## 2022-07-28 DIAGNOSIS — R42 DIZZINESS AND GIDDINESS: ICD-10-CM

## 2022-07-28 DIAGNOSIS — R00.2 PALPITATIONS: ICD-10-CM

## 2022-07-28 PROCEDURE — 1090F PRES/ABSN URINE INCON ASSESS: CPT | Performed by: INTERNAL MEDICINE

## 2022-07-28 PROCEDURE — 93000 ELECTROCARDIOGRAM COMPLETE: CPT | Performed by: INTERNAL MEDICINE

## 2022-07-28 PROCEDURE — 93228 REMOTE 30 DAY ECG REV/REPORT: CPT | Performed by: INTERNAL MEDICINE

## 2022-07-28 PROCEDURE — 99214 OFFICE O/P EST MOD 30 MIN: CPT | Performed by: INTERNAL MEDICINE

## 2022-07-28 PROCEDURE — G8428 CUR MEDS NOT DOCUMENT: HCPCS | Performed by: INTERNAL MEDICINE

## 2022-07-28 PROCEDURE — 1036F TOBACCO NON-USER: CPT | Performed by: INTERNAL MEDICINE

## 2022-07-28 PROCEDURE — G8417 CALC BMI ABV UP PARAM F/U: HCPCS | Performed by: INTERNAL MEDICINE

## 2022-07-28 PROCEDURE — 1123F ACP DISCUSS/DSCN MKR DOCD: CPT | Performed by: INTERNAL MEDICINE

## 2022-07-28 RX ORDER — HYDROXYZINE HYDROCHLORIDE 10 MG/1
10 TABLET, FILM COATED ORAL NIGHTLY
Status: ON HOLD | COMMUNITY
End: 2022-08-05 | Stop reason: HOSPADM

## 2022-07-28 RX ORDER — METOPROLOL SUCCINATE 50 MG/1
50 TABLET, EXTENDED RELEASE ORAL DAILY
Qty: 30 TABLET | Refills: 3 | Status: SHIPPED | OUTPATIENT
Start: 2022-07-28 | End: 2022-10-26

## 2022-07-28 RX ORDER — METOPROLOL SUCCINATE 50 MG/1
50 TABLET, EXTENDED RELEASE ORAL 2 TIMES DAILY
COMMUNITY
End: 2022-07-28 | Stop reason: ALTCHOICE

## 2022-07-28 NOTE — PROGRESS NOTES
30 days e-cardio monitor placed.  # [id number]. Instructed patient on how to record the event and to call monitoring center at [phone] if any problems arise. Instructed patient to disconnect the lead wires from the electrodes before bathing or showering and reattach them afterwards. Instructed patient that the electrodes should be changed every 3 days or if they no longer adhere to the skin. Patient to mail package after the monitor has ended. Patient verbalized understanding.

## 2022-07-28 NOTE — PROGRESS NOTES
Reggie Maxwell MD                                  CARDIOLOGY  NOTE        Referring Physician: Jaqui Mendez PA-C    Thank you for consultation. Chief Complaint:    Chief Complaint   Patient presents with    New Patient    Chest Pain     Tight, seconds, comes with SOB, started about 3 weeks ago not daily    Shortness of Breath    Dizziness     With position change, last seconds    Palpitations     Racing minutes 3 weeks ago not daily    Edema     Bilateral ankles legs and feet        HPI:     Vaishali Baig is a 80y.o. year old female who was evaluated in the hospital recently for hypertensive urgency. Patient has prior medical history for noncompliance in the past.  She quit taking her medication as she felt she did not needed them    However in 2020 patient was admitted  She was noted to have paroxysmal atrial fibrillation, episodes of supraventricular tachycardia, hypertensive urgency. She was started on Eliquis low-dose aspirin hydrochlorothiazide and metoprolol tartrate 50 twice daily. Patient presents for follow-up today      ECHO 2022     Left ventricular systolic function is low normal.   Ejection fraction is visually estimated at   50%. Mild left ventricular hypertrophy. mild-moderate aortic stenosis; Mean P mmHg, NANCY: 1.26 cm sq, Indexed   SV: 30.77 mL/m sq/beat. Right coronary cusp appears restricted. Severe mitral regurgitation (ERO: 0.4 cm sq, regurgitant volume: 83 ml). Mild tricuspid regurgitation; RVSP: 33 mmHg. Mild pulmonic regurgitation present. No evidence of any pericardial effusion. Mildly dilated aortic root (4.2cm). Current Outpatient Medications   Medication Sig Dispense Refill    hydrOXYzine HCl (ATARAX) 10 MG tablet Take 10 mg by mouth nightly 1-2 pills nightly as needed      metoprolol succinate (TOPROL XL) 50 MG extended release tablet Take 1 tablet by mouth in the morning.  30 tablet 3    apixaban (ELIQUIS) 5 MG TABS tablet Take 1 tablet by mouth in the morning and 1 tablet before bedtime. 60 tablet 5    aspirin 81 MG EC tablet Take 1 tablet by mouth in the morning. 90 tablet 1    hydroCHLOROthiazide (HYDRODIURIL) 25 MG tablet Take 1 tablet by mouth in the morning. 90 tablet 1    losartan (COZAAR) 25 MG tablet Take 1 tablet by mouth in the morning. 30 tablet 1    fluticasone (FLONASE) 50 MCG/ACT nasal spray 2 sprays by Each Nostril route in the morning. 16 g 5    ondansetron (ZOFRAN-ODT) 4 MG disintegrating tablet Take 1 tablet by mouth every 12 hours as needed for Nausea 15 tablet 0    triamcinolone (KENALOG) 0.1 % cream Apply topically 2 times daily. 80 g 0    clobetasol (TEMOVATE) 0.05 % ointment Apply topically 2 times daily as needed. 60 g 1     No current facility-administered medications for this visit. Allergies:     Sulfa antibiotics, Nitrofuran derivatives, and Quinapril hcl    Patient History:    Past Medical History:   Diagnosis Date    Arrhythmia     Arthritis     osteoarthritis cervical and lumbar spine    Atrophic vaginitis     Blood transfusion     CAD (coronary artery disease)     Cataracts, bilateral     CHF (congestive heart failure) (HCC)     COPD (chronic obstructive pulmonary disease) (HCC)     Depression     Essential hypertension     Fatty liver disease, nonalcoholic     GERD (gastroesophageal reflux disease)     H/O 24 hour EKG monitoring 9/15/2000    9/15/2000 -  Predominant rhythm is a sinus rhythm. No significant ectopy noted. H/O cardiac catheterization 8/4/2005 8/4/2005 - Moderate degree of stenosis of the high diag, which is a heavily calcified vessel, however, there is a large ramus vessel supplying this same area as well. Rest of vessel is w/o any significant disease. Renals are w/o any significant stenosis. H/O cardiovascular stress test 12/2/2010, 2/28/2008 12/2/2010 - Normal pattern of perfusion in all regions. LV is normal. No inducible myocardial ischemia. EF is 66%.  LVSF is normal. No ECG Bone    Other Brother         aneurysm    Coronary Art Dis Brother     Heart Attack Brother     Coronary Art Dis Daughter     Hypertension Daughter     Cancer Son     Early Death Son         MVA     Social History     Tobacco Use    Smoking status: Never    Smokeless tobacco: Never   Substance Use Topics    Alcohol use: Yes     Comment: Rare alcohol use. CAFFEINE: 1 cup coffee daily        Review of Systems:     Constitutional:  No Fever or Weight Loss   Eyes: No Decreased Vision  ENT: No Headaches, Hearing Loss or Vertigo  Cardiovascular: No Chest Pain,  No Shortness of breath, No Palpitations. No Edema   Respiratory: No cough or wheezing . No Respiratory distress   Gastrointestinal: No abdominal pain, appetite loss, blood in stools, constipation, diarrhea or heartburn  Genitourinary: No dysuria, trouble voiding, or hematuria  Musculoskeletal:  denies any new  joint aches , or pain   Integumentary: No rash or pruritis  Neurological: No TIA or stroke symptoms  Psychiatric: No anxiety or depression  Endocrine: No malaise, fatigue or temperature intolerance  Hematologic/Lymphatic: No bleeding problems, blood clots or swollen lymph nodes  Allergic/Immunologic: No nasal congestion or hives        Objective:      Physical Exam:    /70   Pulse 77   Ht 5' 6\" (1.676 m)   Wt 161 lb (73 kg)   BMI 25.99 kg/m²   Wt Readings from Last 3 Encounters:   07/28/22 161 lb (73 kg)   07/21/22 160 lb (72.6 kg)   07/21/22 160 lb (72.6 kg)     Body mass index is 25.99 kg/m². Vitals:    07/28/22 1511   BP: 136/70   Pulse: 77        General Appearance and Constitutional: Conversant, Well developed, Well nourished, No acute distress, Non-toxic appearance. HEENT:  Normocephalic, Atraumatic, Bilateral external ears normal, Oropharynx moist, No oral exudates,   Nose normal.   Neck- Normal range of motion, No tenderness, Supple  Eyes:  EOMI, Conjunctiva normal, No discharge.    Respiratory:  Normal breath sounds, No respiratory distress, No wheezing, No Rales, No Ronchi. No chest tenderness. Cardiovascular: S1-S2, no added heart sounds, + systolic  Mumurs appreciated. No gallops, rubs. No Pedal Edema   GI:  Bowel sounds normal, Soft, No tenderness,  :  No costovertebral angle tenderness   Musculoskeletal:  No gross deformities. Back- No tenderness  Integument:  Well hydrated, no rash   Lymphatic:  No lymphadenopathy noted   Neurologic:  Alert & oriented x 3, Normal motor function, normal sensory function, no focal deficits noted   Psychiatric:  Speech and behavior appropriate       Medical decision making and Data review:    DATA:    Lab Results   Component Value Date    TROPONINT <0.010 06/16/2022     BNP:    Lab Results   Component Value Date    PROBNP 3,648 (H) 06/16/2022     PT/INR:  No results found for: PTINR  Lab Results   Component Value Date    LABA1C 7.0 (H) 06/16/2022    LABA1C 7.3 (H) 05/28/2019     Lab Results   Component Value Date    CHOL 124 06/17/2022    TRIG 112 06/17/2022    HDL 38 (L) 06/17/2022    LDLCALC 64 06/17/2022     Lab Results   Component Value Date    WBC 6.8 06/18/2022    HGB 11.8 (L) 06/18/2022    HCT 37.1 06/18/2022    MCV 89.2 06/18/2022     06/18/2022     TSH:   Lab Results   Component Value Date    TSH 3.29 07/01/2020     Lab Results   Component Value Date    AST 22 06/18/2022    ALT 26 06/18/2022    BILITOT 0.4 06/18/2022    ALKPHOS 98 06/18/2022         All labs, medications and tests reviewed by myself including data and history from outside source , patient and available family . 1. Chest pain, unspecified type    2. PAC (premature atrial contraction)    3. Shortness of breath    4. Palpitations    5. Edema of lower extremity    6.  Dizziness and giddiness         Impression and Plan:      Paroxysmal atrial fibrillation  Supraventricular tachycardia  Frequent PACs  Essential hypertension    [id number] score 4  Continue with Eliquis 5 mg p.o. twice daily  Continue with aspirin 81 mg daily  Continue with hydrochlorothiazide 25 mg p.o. daily  Continue with losartan 25 mg p.o. daily  Continue with Toprol-XL 50 mg daily  Stop metoprolol tartrate 50 twice daily (patient has been taking both metoprolol's inadvertently)       Obtain 30-day event monitor    Moderate aortic stenosis  Moderate mitral regurgitation    Clinically observe, medical management  Continue with hydrochlorothiazide  Currently euvolemic        Return in about 3 months (around 10/28/2022). Counseled extensively and medication compliance urged. We discussed that for the  prevention of ASCVD our  goal is aggressive risk modification. Patient is encouraged to exercise even a brisk walk for 30 minutes  at least 3 to 4 times a week   Various goals were discussed and questions answered. Continue current medications. Appropriate prescriptions are addressed and refills ordered. Questions answered and patient verbalizes understanding. Call for any problems, questions, or concerns.

## 2022-07-28 NOTE — PATIENT INSTRUCTIONS
Please be informed that if you contact our office outside of normal business hours the physician on call cannot help with any scheduling or rescheduling issues, procedure instruction questions or any type of medication issue. We advise you for any urgent/emergency that you go to the nearest emergency room! PLEASE CALL OUR OFFICE DURING NORMAL BUSINESS HOURS    Monday - Friday   8 am to 5 pm    [place]: [phone]    [place]:  [phone]  **It is YOUR responsibilty to bring medication bottles and/or updated medication list to [address] [place] [address]. This will allow us to better serve you and all your healthcare needs**  [facility] Locations - No appointment necessary. Sites open Monday to Friday. Call your preferred location for test preparation, business hours and other information you need. [employer]. [place] [facility] [address] [place] K[place]n, [address] [facility]  Phone: [phone] [facility] [address] [facility]  [address].   [place], [address] [facility]  Phone: [phone]

## 2022-08-01 ENCOUNTER — ANESTHESIA EVENT (OUTPATIENT)
Dept: ENDOSCOPY | Age: 87
DRG: 378 | End: 2022-08-01
Payer: MEDICARE

## 2022-08-01 ENCOUNTER — ANESTHESIA (OUTPATIENT)
Dept: ENDOSCOPY | Age: 87
DRG: 378 | End: 2022-08-01
Payer: MEDICARE

## 2022-08-01 ENCOUNTER — APPOINTMENT (OUTPATIENT)
Dept: CT IMAGING | Age: 87
DRG: 378 | End: 2022-08-01
Payer: MEDICARE

## 2022-08-01 ENCOUNTER — HOSPITAL ENCOUNTER (INPATIENT)
Age: 87
LOS: 4 days | Discharge: HOME HEALTH | DRG: 378 | End: 2022-08-05
Attending: EMERGENCY MEDICINE | Admitting: HOSPITALIST
Payer: MEDICARE

## 2022-08-01 DIAGNOSIS — K92.2 GASTROINTESTINAL HEMORRHAGE, UNSPECIFIED GASTROINTESTINAL HEMORRHAGE TYPE: Primary | ICD-10-CM

## 2022-08-01 DIAGNOSIS — D64.9 ANEMIA, UNSPECIFIED TYPE: ICD-10-CM

## 2022-08-01 DIAGNOSIS — N39.0 URINARY TRACT INFECTION WITHOUT HEMATURIA, SITE UNSPECIFIED: ICD-10-CM

## 2022-08-01 PROBLEM — C7A.8 NEUROENDOCRINE CARCINOMA OF PANCREAS (HCC): Status: ACTIVE | Noted: 2022-08-01

## 2022-08-01 LAB
ALBUMIN SERPL-MCNC: 3.3 GM/DL (ref 3.4–5)
ALP BLD-CCNC: 74 IU/L (ref 40–129)
ALT SERPL-CCNC: 11 U/L (ref 10–40)
ANION GAP SERPL CALCULATED.3IONS-SCNC: 10 MMOL/L (ref 4–16)
APTT: 30.1 SECONDS (ref 25.1–37.1)
AST SERPL-CCNC: 12 IU/L (ref 15–37)
BACTERIA: ABNORMAL /HPF
BASOPHILS ABSOLUTE: 0 K/CU MM
BASOPHILS RELATIVE PERCENT: 0.2 % (ref 0–1)
BILIRUB SERPL-MCNC: 0.3 MG/DL (ref 0–1)
BILIRUBIN URINE: NEGATIVE MG/DL
BLOOD, URINE: ABNORMAL
BUN BLDV-MCNC: 42 MG/DL (ref 6–23)
CALCIUM SERPL-MCNC: 8.7 MG/DL (ref 8.3–10.6)
CHLORIDE BLD-SCNC: 107 MMOL/L (ref 99–110)
CLARITY: CLEAR
CO2: 21 MMOL/L (ref 21–32)
COLOR: YELLOW
CREAT SERPL-MCNC: 1.1 MG/DL (ref 0.6–1.1)
DIFFERENTIAL TYPE: ABNORMAL
EKG ATRIAL RATE: 85 BPM
EKG DIAGNOSIS: NORMAL
EKG P AXIS: 80 DEGREES
EKG P-R INTERVAL: 158 MS
EKG Q-T INTERVAL: 396 MS
EKG QRS DURATION: 78 MS
EKG QTC CALCULATION (BAZETT): 471 MS
EKG R AXIS: 3 DEGREES
EKG T AXIS: 56 DEGREES
EKG VENTRICULAR RATE: 85 BPM
EOSINOPHILS ABSOLUTE: 0.2 K/CU MM
EOSINOPHILS RELATIVE PERCENT: 1.6 % (ref 0–3)
GFR AFRICAN AMERICAN: 57 ML/MIN/1.73M2
GFR NON-AFRICAN AMERICAN: 47 ML/MIN/1.73M2
GLUCOSE BLD-MCNC: 139 MG/DL (ref 70–99)
GLUCOSE BLD-MCNC: 143 MG/DL (ref 70–99)
GLUCOSE BLD-MCNC: 154 MG/DL (ref 70–99)
GLUCOSE BLD-MCNC: 186 MG/DL (ref 70–99)
GLUCOSE BLD-MCNC: 201 MG/DL (ref 70–99)
GLUCOSE BLD-MCNC: 221 MG/DL (ref 70–99)
GLUCOSE, URINE: NEGATIVE MG/DL
HCT VFR BLD CALC: 22 % (ref 37–47)
HCT VFR BLD CALC: 22.8 % (ref 37–47)
HCT VFR BLD CALC: 24.7 % (ref 37–47)
HEMOGLOBIN: 7 GM/DL (ref 12.5–16)
HEMOGLOBIN: 7.3 GM/DL (ref 12.5–16)
HEMOGLOBIN: 7.7 GM/DL (ref 12.5–16)
IMMATURE NEUTROPHIL %: 0.7 % (ref 0–0.43)
INR BLD: 1.75 INDEX
KETONES, URINE: NEGATIVE MG/DL
LACTATE: 1.2 MMOL/L (ref 0.4–2)
LEUKOCYTE ESTERASE, URINE: ABNORMAL
LIPASE: 13 IU/L (ref 13–60)
LYMPHOCYTES ABSOLUTE: 1.7 K/CU MM
LYMPHOCYTES RELATIVE PERCENT: 17 % (ref 24–44)
MCH RBC QN AUTO: 29.3 PG (ref 27–31)
MCHC RBC AUTO-ENTMCNC: 31.8 % (ref 32–36)
MCV RBC AUTO: 92.1 FL (ref 78–100)
MONOCYTES ABSOLUTE: 0.7 K/CU MM
MONOCYTES RELATIVE PERCENT: 7.4 % (ref 0–4)
MUCUS: ABNORMAL HPF
NITRITE URINE, QUANTITATIVE: POSITIVE
NUCLEATED RBC %: 0 %
PDW BLD-RTO: 14.8 % (ref 11.7–14.9)
PH, URINE: 5.5 (ref 5–8)
PLATELET # BLD: 195 K/CU MM (ref 140–440)
PMV BLD AUTO: 9.7 FL (ref 7.5–11.1)
POTASSIUM SERPL-SCNC: 4.9 MMOL/L (ref 3.5–5.1)
PROTEIN UA: NEGATIVE MG/DL
PROTHROMBIN TIME: 22.7 SECONDS (ref 11.7–14.5)
RBC # BLD: 2.39 M/CU MM (ref 4.2–5.4)
RBC URINE: 7 /HPF (ref 0–6)
SEGMENTED NEUTROPHILS ABSOLUTE COUNT: 7.2 K/CU MM
SEGMENTED NEUTROPHILS RELATIVE PERCENT: 73.1 % (ref 36–66)
SODIUM BLD-SCNC: 138 MMOL/L (ref 135–145)
SPECIFIC GRAVITY UA: 1.01 (ref 1–1.03)
SQUAMOUS EPITHELIAL: <1 /HPF
TOTAL IMMATURE NEUTOROPHIL: 0.07 K/CU MM
TOTAL NUCLEATED RBC: 0 K/CU MM
TOTAL PROTEIN: 5.2 GM/DL (ref 6.4–8.2)
TRICHOMONAS: ABNORMAL /HPF
TROPONIN T: <0.01 NG/ML
UROBILINOGEN, URINE: 0.2 MG/DL (ref 0.2–1)
WBC # BLD: 9.9 K/CU MM (ref 4–10.5)
WBC UA: 14 /HPF (ref 0–5)

## 2022-08-01 PROCEDURE — 0DJ08ZZ INSPECTION OF UPPER INTESTINAL TRACT, VIA NATURAL OR ARTIFICIAL OPENING ENDOSCOPIC: ICD-10-PCS | Performed by: SPECIALIST

## 2022-08-01 PROCEDURE — 6360000002 HC RX W HCPCS: Performed by: EMERGENCY MEDICINE

## 2022-08-01 PROCEDURE — 85014 HEMATOCRIT: CPT

## 2022-08-01 PROCEDURE — 2709999900 HC NON-CHARGEABLE SUPPLY: Performed by: SPECIALIST

## 2022-08-01 PROCEDURE — 86850 RBC ANTIBODY SCREEN: CPT

## 2022-08-01 PROCEDURE — 6370000000 HC RX 637 (ALT 250 FOR IP): Performed by: SPECIALIST

## 2022-08-01 PROCEDURE — 96365 THER/PROPH/DIAG IV INF INIT: CPT

## 2022-08-01 PROCEDURE — 87077 CULTURE AEROBIC IDENTIFY: CPT

## 2022-08-01 PROCEDURE — 6370000000 HC RX 637 (ALT 250 FOR IP): Performed by: PHYSICIAN ASSISTANT

## 2022-08-01 PROCEDURE — C9113 INJ PANTOPRAZOLE SODIUM, VIA: HCPCS | Performed by: EMERGENCY MEDICINE

## 2022-08-01 PROCEDURE — 2580000003 HC RX 258: Performed by: PHYSICIAN ASSISTANT

## 2022-08-01 PROCEDURE — 96375 TX/PRO/DX INJ NEW DRUG ADDON: CPT

## 2022-08-01 PROCEDURE — 3700000001 HC ADD 15 MINUTES (ANESTHESIA): Performed by: SPECIALIST

## 2022-08-01 PROCEDURE — P9016 RBC LEUKOCYTES REDUCED: HCPCS

## 2022-08-01 PROCEDURE — 80053 COMPREHEN METABOLIC PANEL: CPT

## 2022-08-01 PROCEDURE — 86900 BLOOD TYPING SEROLOGIC ABO: CPT

## 2022-08-01 PROCEDURE — 6360000004 HC RX CONTRAST MEDICATION: Performed by: EMERGENCY MEDICINE

## 2022-08-01 PROCEDURE — 85025 COMPLETE CBC W/AUTO DIFF WBC: CPT

## 2022-08-01 PROCEDURE — 3609017100 HC EGD: Performed by: SPECIALIST

## 2022-08-01 PROCEDURE — C9113 INJ PANTOPRAZOLE SODIUM, VIA: HCPCS | Performed by: PHYSICIAN ASSISTANT

## 2022-08-01 PROCEDURE — 6360000002 HC RX W HCPCS

## 2022-08-01 PROCEDURE — 1200000000 HC SEMI PRIVATE

## 2022-08-01 PROCEDURE — 99285 EMERGENCY DEPT VISIT HI MDM: CPT

## 2022-08-01 PROCEDURE — 94761 N-INVAS EAR/PLS OXIMETRY MLT: CPT

## 2022-08-01 PROCEDURE — 36415 COLL VENOUS BLD VENIPUNCTURE: CPT

## 2022-08-01 PROCEDURE — 2580000003 HC RX 258: Performed by: EMERGENCY MEDICINE

## 2022-08-01 PROCEDURE — 87086 URINE CULTURE/COLONY COUNT: CPT

## 2022-08-01 PROCEDURE — 93010 ELECTROCARDIOGRAM REPORT: CPT | Performed by: INTERNAL MEDICINE

## 2022-08-01 PROCEDURE — 85018 HEMOGLOBIN: CPT

## 2022-08-01 PROCEDURE — 83605 ASSAY OF LACTIC ACID: CPT

## 2022-08-01 PROCEDURE — 86901 BLOOD TYPING SEROLOGIC RH(D): CPT

## 2022-08-01 PROCEDURE — 93005 ELECTROCARDIOGRAM TRACING: CPT | Performed by: EMERGENCY MEDICINE

## 2022-08-01 PROCEDURE — 74177 CT ABD & PELVIS W/CONTRAST: CPT

## 2022-08-01 PROCEDURE — 36430 TRANSFUSION BLD/BLD COMPNT: CPT

## 2022-08-01 PROCEDURE — 81001 URINALYSIS AUTO W/SCOPE: CPT

## 2022-08-01 PROCEDURE — 82962 GLUCOSE BLOOD TEST: CPT

## 2022-08-01 PROCEDURE — 3700000000 HC ANESTHESIA ATTENDED CARE: Performed by: SPECIALIST

## 2022-08-01 PROCEDURE — 2500000003 HC RX 250 WO HCPCS

## 2022-08-01 PROCEDURE — 84484 ASSAY OF TROPONIN QUANT: CPT

## 2022-08-01 PROCEDURE — 85610 PROTHROMBIN TIME: CPT

## 2022-08-01 PROCEDURE — A4216 STERILE WATER/SALINE, 10 ML: HCPCS | Performed by: PHYSICIAN ASSISTANT

## 2022-08-01 PROCEDURE — 83690 ASSAY OF LIPASE: CPT

## 2022-08-01 PROCEDURE — 85730 THROMBOPLASTIN TIME PARTIAL: CPT

## 2022-08-01 PROCEDURE — 87186 SC STD MICRODIL/AGAR DIL: CPT

## 2022-08-01 PROCEDURE — 86922 COMPATIBILITY TEST ANTIGLOB: CPT

## 2022-08-01 PROCEDURE — 6360000002 HC RX W HCPCS: Performed by: PHYSICIAN ASSISTANT

## 2022-08-01 RX ORDER — DEXTROSE MONOHYDRATE 100 MG/ML
INJECTION, SOLUTION INTRAVENOUS CONTINUOUS PRN
Status: DISCONTINUED | OUTPATIENT
Start: 2022-08-01 | End: 2022-08-05 | Stop reason: HOSPADM

## 2022-08-01 RX ORDER — SODIUM CHLORIDE 9 MG/ML
INJECTION, SOLUTION INTRAVENOUS PRN
Status: DISCONTINUED | OUTPATIENT
Start: 2022-08-01 | End: 2022-08-05 | Stop reason: HOSPADM

## 2022-08-01 RX ORDER — SODIUM CHLORIDE 0.9 % (FLUSH) 0.9 %
5-40 SYRINGE (ML) INJECTION EVERY 12 HOURS SCHEDULED
Status: DISCONTINUED | OUTPATIENT
Start: 2022-08-01 | End: 2022-08-05 | Stop reason: HOSPADM

## 2022-08-01 RX ORDER — PANTOPRAZOLE SODIUM 40 MG/10ML
80 INJECTION, POWDER, LYOPHILIZED, FOR SOLUTION INTRAVENOUS ONCE
Status: COMPLETED | OUTPATIENT
Start: 2022-08-01 | End: 2022-08-01

## 2022-08-01 RX ORDER — SUCRALFATE 1 G/1
1 TABLET ORAL EVERY 6 HOURS SCHEDULED
Status: DISCONTINUED | OUTPATIENT
Start: 2022-08-01 | End: 2022-08-05 | Stop reason: HOSPADM

## 2022-08-01 RX ORDER — ONDANSETRON 4 MG/1
4 TABLET, ORALLY DISINTEGRATING ORAL EVERY 8 HOURS PRN
Status: DISCONTINUED | OUTPATIENT
Start: 2022-08-01 | End: 2022-08-05 | Stop reason: HOSPADM

## 2022-08-01 RX ORDER — ACETAMINOPHEN 325 MG/1
650 TABLET ORAL EVERY 6 HOURS PRN
Status: DISCONTINUED | OUTPATIENT
Start: 2022-08-01 | End: 2022-08-05 | Stop reason: HOSPADM

## 2022-08-01 RX ORDER — METOPROLOL SUCCINATE 50 MG/1
50 TABLET, EXTENDED RELEASE ORAL DAILY
Status: DISCONTINUED | OUTPATIENT
Start: 2022-08-01 | End: 2022-08-05 | Stop reason: HOSPADM

## 2022-08-01 RX ORDER — SODIUM CHLORIDE 9 MG/ML
INJECTION, SOLUTION INTRAVENOUS CONTINUOUS PRN
Status: DISCONTINUED | OUTPATIENT
Start: 2022-08-01 | End: 2022-08-01 | Stop reason: SDUPTHER

## 2022-08-01 RX ORDER — LIDOCAINE HYDROCHLORIDE 20 MG/ML
INJECTION, SOLUTION EPIDURAL; INFILTRATION; INTRACAUDAL; PERINEURAL PRN
Status: DISCONTINUED | OUTPATIENT
Start: 2022-08-01 | End: 2022-08-01 | Stop reason: SDUPTHER

## 2022-08-01 RX ORDER — ONDANSETRON 2 MG/ML
4 INJECTION INTRAMUSCULAR; INTRAVENOUS EVERY 6 HOURS PRN
Status: DISCONTINUED | OUTPATIENT
Start: 2022-08-01 | End: 2022-08-05 | Stop reason: HOSPADM

## 2022-08-01 RX ORDER — ONDANSETRON 2 MG/ML
4 INJECTION INTRAMUSCULAR; INTRAVENOUS ONCE
Status: COMPLETED | OUTPATIENT
Start: 2022-08-01 | End: 2022-08-01

## 2022-08-01 RX ORDER — ACETAMINOPHEN 650 MG/1
650 SUPPOSITORY RECTAL EVERY 6 HOURS PRN
Status: DISCONTINUED | OUTPATIENT
Start: 2022-08-01 | End: 2022-08-05 | Stop reason: HOSPADM

## 2022-08-01 RX ORDER — INSULIN LISPRO 100 [IU]/ML
0-4 INJECTION, SOLUTION INTRAVENOUS; SUBCUTANEOUS NIGHTLY
Status: DISCONTINUED | OUTPATIENT
Start: 2022-08-01 | End: 2022-08-05 | Stop reason: HOSPADM

## 2022-08-01 RX ORDER — SODIUM CHLORIDE 9 MG/ML
INJECTION, SOLUTION INTRAVENOUS PRN
Status: DISCONTINUED | OUTPATIENT
Start: 2022-08-01 | End: 2022-08-02

## 2022-08-01 RX ORDER — PROPOFOL 10 MG/ML
INJECTION, EMULSION INTRAVENOUS PRN
Status: DISCONTINUED | OUTPATIENT
Start: 2022-08-01 | End: 2022-08-01 | Stop reason: SDUPTHER

## 2022-08-01 RX ORDER — SODIUM CHLORIDE 0.9 % (FLUSH) 0.9 %
5-40 SYRINGE (ML) INJECTION PRN
Status: DISCONTINUED | OUTPATIENT
Start: 2022-08-01 | End: 2022-08-05 | Stop reason: HOSPADM

## 2022-08-01 RX ORDER — FENTANYL CITRATE 50 UG/ML
25 INJECTION, SOLUTION INTRAMUSCULAR; INTRAVENOUS ONCE
Status: COMPLETED | OUTPATIENT
Start: 2022-08-01 | End: 2022-08-01

## 2022-08-01 RX ORDER — INSULIN LISPRO 100 [IU]/ML
0-4 INJECTION, SOLUTION INTRAVENOUS; SUBCUTANEOUS
Status: DISCONTINUED | OUTPATIENT
Start: 2022-08-01 | End: 2022-08-05 | Stop reason: HOSPADM

## 2022-08-01 RX ORDER — SUCRALFATE ORAL 1 G/10ML
1 SUSPENSION ORAL EVERY 6 HOURS SCHEDULED
Status: DISCONTINUED | OUTPATIENT
Start: 2022-08-01 | End: 2022-08-01 | Stop reason: CLARIF

## 2022-08-01 RX ORDER — LOSARTAN POTASSIUM 25 MG/1
25 TABLET ORAL DAILY
Status: DISCONTINUED | OUTPATIENT
Start: 2022-08-01 | End: 2022-08-05 | Stop reason: HOSPADM

## 2022-08-01 RX ADMIN — METOPROLOL SUCCINATE 50 MG: 50 TABLET, EXTENDED RELEASE ORAL at 15:38

## 2022-08-01 RX ADMIN — PANTOPRAZOLE SODIUM 80 MG: 40 INJECTION, POWDER, FOR SOLUTION INTRAVENOUS at 06:36

## 2022-08-01 RX ADMIN — ONDANSETRON 4 MG: 2 INJECTION INTRAMUSCULAR; INTRAVENOUS at 15:38

## 2022-08-01 RX ADMIN — SODIUM CHLORIDE, PRESERVATIVE FREE 10 ML: 5 INJECTION INTRAVENOUS at 21:34

## 2022-08-01 RX ADMIN — LOSARTAN POTASSIUM 25 MG: 25 TABLET, FILM COATED ORAL at 15:39

## 2022-08-01 RX ADMIN — SODIUM CHLORIDE, PRESERVATIVE FREE 10 ML: 5 INJECTION INTRAVENOUS at 15:44

## 2022-08-01 RX ADMIN — PROPOFOL 120 MG: 10 INJECTION, EMULSION INTRAVENOUS at 11:22

## 2022-08-01 RX ADMIN — SODIUM CHLORIDE, PRESERVATIVE FREE 40 MG: 5 INJECTION INTRAVENOUS at 18:59

## 2022-08-01 RX ADMIN — ONDANSETRON 4 MG: 2 INJECTION INTRAMUSCULAR; INTRAVENOUS at 04:31

## 2022-08-01 RX ADMIN — LIDOCAINE HYDROCHLORIDE 100 MG: 20 INJECTION, SOLUTION EPIDURAL; INFILTRATION; INTRACAUDAL; PERINEURAL at 11:22

## 2022-08-01 RX ADMIN — SODIUM CHLORIDE: 9 INJECTION, SOLUTION INTRAVENOUS at 11:14

## 2022-08-01 RX ADMIN — SUCRALFATE 1 G: 1 TABLET ORAL at 19:00

## 2022-08-01 RX ADMIN — CEFTRIAXONE SODIUM 1000 MG: 1 INJECTION, POWDER, FOR SOLUTION INTRAMUSCULAR; INTRAVENOUS at 06:54

## 2022-08-01 RX ADMIN — SUCRALFATE 1 G: 1 TABLET ORAL at 15:39

## 2022-08-01 RX ADMIN — IOPAMIDOL 75 ML: 755 INJECTION, SOLUTION INTRAVENOUS at 06:15

## 2022-08-01 RX ADMIN — FENTANYL CITRATE 25 MCG: 50 INJECTION, SOLUTION INTRAMUSCULAR; INTRAVENOUS at 06:34

## 2022-08-01 ASSESSMENT — PAIN - FUNCTIONAL ASSESSMENT: PAIN_FUNCTIONAL_ASSESSMENT: 0-10

## 2022-08-01 ASSESSMENT — PAIN SCALES - GENERAL
PAINLEVEL_OUTOF10: 2
PAINLEVEL_OUTOF10: 3

## 2022-08-01 ASSESSMENT — PAIN DESCRIPTION - LOCATION: LOCATION: ABDOMEN

## 2022-08-01 NOTE — ED NOTES
Medication History  [facility]    Patient Name: Blanca Hickey 5/25/1933     Medication history has been completed by: Meño Carlos CPhT    Source(s) of information: patient and insurance claims     Primary Care Physician: Gokul Quach PA-C     Pharmacy: [facility]    Allergies as of 08/01/2022 - Fully Reviewed 08/01/2022   Allergen Reaction Noted    Sulfa antibiotics Anaphylaxis 12/18/2012    Nitrofuran derivatives      Quinapril hcl  04/11/2013        Prior to Admission medications    Medication Sig Start Date End Date Taking? Authorizing Provider   hydrOXYzine HCl (ATARAX) 10 MG tablet Take 10 mg by mouth nightly 1-2 pills nightly as needed    Historical Provider, MD   metoprolol succinate (TOPROL XL) 50 MG extended release tablet Take 1 tablet by mouth in the morning. 7/28/22 10/26/22  Mikal Suárez MD   apixaban (ELIQUIS) 5 MG TABS tablet Take 1 tablet by mouth in the morning and 1 tablet before bedtime. 7/21/22 1/17/23  Gokul Quach PA-C   aspirin 81 MG EC tablet Take 1 tablet by mouth in the morning. 7/21/22 1/17/23  Gokul Quach PA-C   hydroCHLOROthiazide (HYDRODIURIL) 25 MG tablet Take 1 tablet by mouth in the morning. 7/21/22 1/17/23  Gokul Quach PA-C   losartan (COZAAR) 25 MG tablet Take 1 tablet by mouth in the morning. 7/21/22   Gokul Quach PA-C   triamcinolone (KENALOG) 0.1 % cream Apply topically 2 times daily. 7/21/22   Gokul Quach PA-C   fluticasone (FLONASE) 50 MCG/ACT nasal spray 2 sprays by Each Nostril route in the morning. Patient taking differently: 2 sprays by Each Nostril route daily as needed 7/21/22   Gokul Quach PA-C   ondansetron (ZOFRAN-ODT) 4 MG disintegrating tablet Take 1 tablet by mouth every 12 hours as needed for Nausea 5/11/22   Marielos Remedies, APRN - CNP   clobetasol (TEMOVATE) 0.05 % ointment Apply topically 2 times daily as needed.  5/10/22   Jose Lou PA-C     Comments:  Medication list reviewed with patient and insurance claims verified.   Eliquis therapy updated last dose 07/31/2022    To my knowledge the above medication history is accurate as of 8/1/2022 7:21 AM.   Miranda Vera CPhT   8/1/2022 7:21 AM

## 2022-08-01 NOTE — ANESTHESIA PRE PROCEDURE
Department of Anesthesiology  Preprocedure Note       Name:  Deysi Delgado   Age:  80 y.o.  :  1933                                          MRN:  [id number]         Date:  2022      Surgeon: Radha Gamez):  Aureliano Castellanos MD    Procedure: Procedure(s):  EGD ESOPHAGOGASTRODUODENOSCOPY    Medications prior to admission:   Prior to Admission medications    Medication Sig Start Date End Date Taking? Authorizing Provider   hydrOXYzine HCl (ATARAX) 10 MG tablet Take 10 mg by mouth nightly 1-2 pills nightly as needed    Historical Provider, MD   metoprolol succinate (TOPROL XL) 50 MG extended release tablet Take 1 tablet by mouth in the morning. 7/28/22 10/26/22  Sanaz Avery MD   apixaban (ELIQUIS) 5 MG TABS tablet Take 1 tablet by mouth in the morning and 1 tablet before bedtime. 22  Nehemiah Nieto PA-C   aspirin 81 MG EC tablet Take 1 tablet by mouth in the morning. 22  Nehemiah Nieto PA-C   hydroCHLOROthiazide (HYDRODIURIL) 25 MG tablet Take 1 tablet by mouth in the morning. 22  Nehemiah Nieto PA-C   losartan (COZAAR) 25 MG tablet Take 1 tablet by mouth in the morning. 22   Nehemiah Nieto PA-C   triamcinolone (KENALOG) 0.1 % cream Apply topically 2 times daily. 22   Nehemiah Nieto PA-C   fluticasone (FLONASE) 50 MCG/ACT nasal spray 2 sprays by Each Nostril route in the morning. Patient taking differently: 2 sprays by Each Nostril route daily as needed 22   Nehemiah Nieto PA-C   ondansetron (ZOFRAN-ODT) 4 MG disintegrating tablet Take 1 tablet by mouth every 12 hours as needed for Nausea 22   MCKINLEY Reyes - CNP   clobetasol (TEMOVATE) 0.05 % ointment Apply topically 2 times daily as needed.  5/10/22   Rin Dos Santos PA-C       Current medications:    Current Facility-Administered Medications   Medication Dose Route Frequency Provider Last Rate Last Admin    0.9 % sodium chloride infusion   IntraVENous PRN Becki Dubin, MD       Jacqueline Lobe hypertension I10    CHF (congestive heart failure) (HCC) I50.9    GERD (gastroesophageal reflux disease) K21.9    Type 2 diabetes mellitus (HCC) E11.9    Osteopenia M85.80    Pulmonary nodules R91.8    Supraventricular tachycardia (HCC) I47.1    Atrophic vaginitis N95.2    Carotid artery stenosis, asymptomatic, bilateral I65.23    Primary pancreatic neuroendocrine tumor D3A.8    Cognitive deficits R41.89    Hypertensive urgency I16.0    Chronic renal disease, stage III ([facility]) [156074] N18.30    Chronic systolic (congestive) heart failure I50.22    GI bleed K92.2       Past Medical History:        Diagnosis Date    Arrhythmia     Arthritis     osteoarthritis cervical and lumbar spine    Atrophic vaginitis     Blood transfusion     CAD (coronary artery disease)     Cataracts, bilateral     CHF (congestive heart failure) (HCC)     COPD (chronic obstructive pulmonary disease) ([facility])     Depression     Essential hypertension     Fatty liver disease, nonalcoholic     GERD (gastroesophageal reflux disease)     H/O 24 hour EKG monitoring 9/15/2000    9/15/2000 -  Predominant rhythm is a sinus rhythm. No significant ectopy noted.  H/O cardiac catheterization 8/4/2005 8/4/2005 - Moderate degree of stenosis of the high diag, which is a heavily calcified vessel, however, there is a large ramus vessel supplying this same area as well. Rest of vessel is w/o any significant disease. Renals are w/o any significant stenosis.  H/O cardiovascular stress test 12/2/2010, 2/28/2008 12/2/2010 - Normal pattern of perfusion in all regions. LV is normal. No inducible myocardial ischemia. EF is 66%. LVSF is normal. No ECG changes. Exercise capacity is normal.    H/O cardiovascular stress test 2/24/2015    cardiolite-normal, no ischemia, EF69%    H/O Doppler ultrasound 9/15/2000    9/15/2000 - Carotid - No hemodynamically significant stenosis.     H/O echocardiogram 12/2/2010, 9/22/2009 12/2/2010 - COVID19        Anesthesia Evaluation  Patient summary reviewed  Airway: Mallampati: III          Dental:    (+) poor dentition      Pulmonary:   (+) COPD:                             Cardiovascular:    (+) hypertension:, valvular problems/murmurs: MR, CAD:, CHF:, hyperlipidemia      ECG reviewed  Rhythm: regular    Echocardiogram reviewed               ROS comment: Summary   Left ventricular systolic function is low normal.   Ejection fraction is visually estimated at   50%. Mild left ventricular hypertrophy. mild-moderate aortic stenosis; Mean P mmHg, NANCY: 1.26 cm sq, Indexed   SV: 30.77 mL/m sq/beat. Right coronary cusp appears restricted. Severe mitral regurgitation (ERO: 0.4 cm sq, regurgitant volume: 83 ml). Mild tricuspid regurgitation; RVSP: 33 mmHg. Mild pulmonic regurgitation present. No evidence of any pericardial effusion. Mildly dilated aortic root (4.2cm). Signature      ------------------------------------------------------------------   Electronically signed by Martin Patel MD (Interpreting   physician) on 2022 at 12:43 PM     Neuro/Psych:               GI/Hepatic/Renal:   (+) GERD:, liver disease:,           Endo/Other:    (+) Diabetes, . Abdominal:             Vascular: Other Findings:           Anesthesia Plan      MAC     ASA 3       Induction: intravenous.                             MCKINLEY [place] - JESSICA   2022

## 2022-08-01 NOTE — ED PROVIDER NOTES
EMERGENCY DEPARTMENT ENCOUNTER    Patient: Lisette Veliz  MRN: [id number]  : 1933  Date of Evaluation: 2022  ED Provider:  Frances Ruiz MD    CHIEF COMPLAINT  Chief Complaint   Patient presents with    Abdominal Pain     Dull pain    Rectal Bleeding     Started a few days ago    Nausea       HPI  Lisette Veliz is a 80 y.o. female who presents with complaints of intermittent, moderate diffuse abdominal cramping pain with associated nausea and black bowel movements for the last 3 days. She is currently not having any abdominal pain at this time. Has been intermittent and without any known triggering or modifying factors. The abdominal cramping tends to occur during having a bowel movement. She denies any burning with urination but does report some urinary urgency. Denies any other associated symptoms or complaints or concerns.     REVIEW OF SYSTEMS    I have reviewed the nursing notes    Constitutional: negative for fever, chills, weight change, change in appetite  Neurological: negative for HA, lightheadedness, numbness, weakness  Ophthalmic: negative for vision change  ENT: negative for congestion, runny nose, sore throat  Cardiovascular: negative for chest pain, palpitations  Respiratory: negative for SOB, cough  GI: negative for diarrhea, constipation, hematemesis, hematochezia  : negative for hematuria, vaginal bleeding or discharge  Musculoskeletal: negative for myalgias, decreased ROM, joint swelling  Dermatological: negative for rash, pruritis  Endocrine: negative for temperature intolerance, diaphoresis  Hematological: negative for anemia, bleeding      PAST MEDICAL HISTORY  Past Medical History:   Diagnosis Date    Arrhythmia     Arthritis     osteoarthritis cervical and lumbar spine    Atrophic vaginitis     Blood transfusion     CAD (coronary artery disease)     Cataracts, bilateral     CHF (congestive heart failure) ([facility])     [facility] Depression     Essential hypertension     Fatty liver disease, nonalcoholic     GERD (gastroesophageal reflux disease)     H/O 24 hour EKG monitoring 9/15/2000    9/15/2000 -  Predominant rhythm is a sinus rhythm. No significant ectopy noted. H/O cardiac catheterization 8/4/2005 8/4/2005 - Moderate degree of stenosis of the high diag, which is a heavily calcified vessel, however, there is a large ramus vessel supplying this same area as well. Rest of vessel is w/o any significant disease. Renals are w/o any significant stenosis. H/O cardiovascular stress test 12/2/2010, 2/28/2008 12/2/2010 - Normal pattern of perfusion in all regions. LV is normal. No inducible myocardial ischemia. EF is 66%. LVSF is normal. No ECG changes. Exercise capacity is normal.    H/O cardiovascular stress test 2/24/2015    cardiolite-normal, no ischemia, EF69%    H/O Doppler ultrasound 9/15/2000    9/15/2000 - Carotid - No hemodynamically significant stenosis. H/O echocardiogram 12/2/2010, 9/22/2009 12/2/2010 - Difficult apical imaging due to patient COPD. LVSF is normal. EF = > 55%. Impaired LV impairment. Mild-Moderate MR. Mild TR. H/O echocardiogram 7/15/14    EF 55-60%. No significant valvulopathy is seen. Heart valve problem     2 leaky heart valves    History of Doppler ultrasound 10/31/2000    10/31/2000 - Peripheral - Normal study. HX OTHER MEDICAL 1/14/2004 1/14/2004 - 48 hr Holter - Predominant rhythm is sinus rhythm. No significant ectopy is seen.     Hyperlipidemia     Mild tricuspid regurgitation     Mitral regurgitation     Nausea & vomiting     Near syncope 5/28/2019    Osteopenia     Pulmonary hypertension (HCC)     Pulmonary nodules     Rectal bleeding 12/21/2012    Supraventricular tachycardia (HCC)     Thyroid disease     Type 2 diabetes mellitus (HCC)        CURRENT MEDICATIONS  [unfilled]    ALLERGIES  Allergies   Allergen Reactions    Sulfa Antibiotics Anaphylaxis    Nitrofuran Derivatives     Quinapril Hcl      Cough       SURGICAL HISTORY  Past Surgical History:   Procedure Laterality Date    APPENDECTOMY      BREAST SURGERY      bilateral lumpectomy    CHOLECYSTECTOMY      COLECTOMY      also head of pancreas    COLONOSCOPY  12-21-12    polyp    DIAGNOSTIC CARDIAC CATH LAB PROCEDURE  8/2005    FRACTURE SURGERY      repair of fractured nose    HYSTERECTOMY (CERVIX STATUS UNKNOWN)      LALA/BSO    PANCREAS SURGERY  11/7/2006    Whipple Procedure    SKIN BIOPSY      moles from right shoulder, chin, left leg    THYROIDECTOMY, PARTIAL      THYROIDECTOMY, PARTIAL  1999    TONSILLECTOMY         FAMILY HISTORY  Family History   Problem Relation Age of Onset    Heart Failure Mother         CHF    Hypertension Mother     Cancer Father         Pancreatic    Hypertension Father     Heart Disease Sister         CMP    Other Sister         Bone problems    Other Sister         infection    Ovarian Cancer Sister     Cancer Brother         Bone    Other Brother         aneurysm    Coronary Art Dis Brother     Heart Attack Brother     Coronary Art Dis Daughter     Hypertension Daughter     Cancer Son     Early Death Son         MVA       SOCIAL HISTORY  Social History     Socioeconomic History    Marital status:      Spouse name: None    Number of children: None    Years of education: None    Highest education level: None   Occupational History    Occupation: Retired   Tobacco Use    Smoking status: Never    Smokeless tobacco: Never   Vaping Use    Vaping Use: Never used   Substance and Sexual Activity    Alcohol use: Yes     Comment: Rare alcohol use.        CAFFEINE: 1 cup coffee daily    Drug use: No    Sexual activity: Yes     Partners: Male     Comment:      Social Determinants of Health     Financial Resource Strain: Low Risk     Difficulty of Paying Living Expenses: Not hard at all   Food Insecurity: No Food Insecurity    Worried About [email or web address] in the Last Year: Never true Ran Out of Food in the Last Year: Never true   Physical Activity: Inactive    Days of Exercise per Week: 0 days    Minutes of Exercise per Session: 0 min           **Past medical, family and social histories reviewed and verified by me**      PHYSICAL EXAM  VITAL SIGNS:   ED Triage Vitals [08/01/22 0357]   Enc Vitals Group      BP (!) 123/96      Heart Rate 84      Resp 17      Temp 98 °F (36.7 °C)      Temp Source Oral      SpO2 100 %      Weight 161 lb (73 kg)      Height 5' 6\" (1.676 m)      Head Circumference       Peak Flow       Pain Score       Pain Loc       Pain Edu? Excl. in [address]? Vitals during ED course were reviewed and are as charted. Constitutional: Minimal distress, Non-toxic appearance    Eyes: Conjunctiva normal, No discharge    HENT: Normocephalic, Atraumatic, Bilateral external ears normal, posterior oropharynx is nonerythematous and without exudate, uvula is midline, oropharynx moist    Neck: Supple, no stridor, no grossly visible or palpable masses    Cardiovascular: Regular rate and rhythm, No murmurs, No rubs, No gallops    Pulmonary/Chest:  Normal breath sounds, No respiratory distress or accessory muscle use, No wheezing, crackles or rhonchi. Abdomen:  Soft, nondistended and nonrigid, No tenderness or peritoneal signs, No masses, normal bowel sounds    Rectal (performed with nurse present as chaperone): Normal appearance of the anus. No internal or external masses noted. No evidence for fissures. Normal rectal tone. Stool is dark in color and guaiac positive    Back:  No midline point tenderness, No paraspinous muscle tenderness.   No CVA tenderness    Extremities:  No gross deformities, no edema, no tenderness    Neurologic:  Normal motor function, Normal sensory function, No focal deficits    Skin:  Warm, Dry, No erythema, No rash, No cyanosis, No mottling    Lymphatic:  No inguinal lymphadenopathy          RADIOLOGY/PROCEDURES/LABS/MEDICATIONS ADMINISTERED:    I have reviewed and interpreted all of the currently available lab results from this visit (if applicable):  Results for orders placed or performed during the hospital encounter of 08/01/22   CBC with Auto Differential   Result Value Ref Range    WBC 9.9 4.0 - 10.5 K/CU MM    RBC 2.39 (L) 4.2 - 5.4 M/CU MM    Hemoglobin 7.0 (L) 12.5 - 16.0 GM/DL    Hematocrit 22.0 (L) 37 - 47 %    MCV 92.1 78 - 100 FL    MCH 29.3 27 - 31 PG    MCHC 31.8 (L) 32.0 - 36.0 %    RDW 14.8 11.7 - 14.9 %    Platelets 681 101 - 881 K/CU MM    MPV 9.7 7.5 - 11.1 FL    Differential Type AUTOMATED DIFFERENTIAL     Segs Relative 73.1 (H) 36 - 66 %    Lymphocytes % 17.0 (L) 24 - 44 %    Monocytes % 7.4 (H) 0 - 4 %    Eosinophils % 1.6 0 - 3 %    Basophils % 0.2 0 - 1 %    Segs Absolute 7.2 K/CU MM    Lymphocytes Absolute 1.7 K/CU MM    Monocytes Absolute 0.7 K/CU MM    Eosinophils Absolute 0.2 K/CU MM    Basophils Absolute 0.0 K/CU MM    Nucleated RBC % 0.0 %    Total Nucleated RBC 0.0 K/CU MM    Total Immature Neutrophil 0.07 K/CU MM    Immature Neutrophil % 0.7 (H) 0 - 0.43 %   Comprehensive Metabolic Panel w/ Reflex to MG   Result Value Ref Range    Sodium 138 135 - 145 MMOL/L    Potassium 4.9 3.5 - 5.1 MMOL/L    Chloride 107 99 - 110 mMol/L    CO2 21 21 - 32 MMOL/L    BUN 42 (H) 6 - 23 MG/DL    Creatinine 1.1 0.6 - 1.1 MG/DL    Glucose 201 (H) 70 - 99 MG/DL    Calcium 8.7 8.3 - 10.6 MG/DL    Albumin 3.3 (L) 3.4 - 5.0 GM/DL    Total Protein 5.2 (L) 6.4 - 8.2 GM/DL    Total Bilirubin 0.3 0.0 - 1.0 MG/DL    ALT 11 10 - 40 U/L    AST 12 (L) 15 - 37 IU/L    Alkaline Phosphatase 74 40 - 129 IU/L    GFR Non- 47 (L) >60 mL/min/1.73m2    GFR  57 (L) >60 mL/min/1.73m2    Anion Gap 10 4 - 16   Protime-INR   Result Value Ref Range    Protime 22.7 (H) 11.7 - 14.5 SECONDS    INR 1.75 INDEX   APTT   Result Value Ref Range    aPTT 30.1 25.1 - 37.1 SECONDS   Lipase   Result Value Ref Range    Lipase 13 13 - 60 IU/L   Lactic Acid Result Value Ref Range    Lactate 1.2 0.4 - 2.0 mMOL/L   Troponin   Result Value Ref Range    Troponin T <0.010 <0.01 NG/ML   Urinalysis with Microscopic   Result Value Ref Range    Color, UA YELLOW YELLOW    Clarity, UA CLEAR CLEAR    Glucose, Urine NEGATIVE NEGATIVE MG/DL    Bilirubin Urine NEGATIVE NEGATIVE MG/DL    Ketones, Urine NEGATIVE NEGATIVE MG/DL    Specific Gravity, UA 1.010 1.001 - 1.035    Blood, Urine LARGE (A) NEGATIVE    pH, Urine 5.5 5.0 - 8.0    Protein, UA NEGATIVE NEGATIVE MG/DL    Urobilinogen, Urine 0.2 0.2 - 1.0 MG/DL    Nitrite Urine, Quantitative POSITIVE (A) NEGATIVE    Leukocyte Esterase, Urine SMALL (A) NEGATIVE    RBC, UA 7 (H) 0 - 6 /HPF    WBC, UA 14 (H) 0 - 5 /HPF    Bacteria, UA MANY (A) NEGATIVE /HPF    Squam Epithel, UA <1 /HPF    Mucus, UA RARE (A) NEGATIVE HPF    Trichomonas, UA NONE SEEN NONE SEEN /HPF   EKG 12 Lead   Result Value Ref Range    Ventricular Rate 85 BPM    Atrial Rate 85 BPM    P-R Interval 158 ms    QRS Duration 78 ms    Q-T Interval 396 ms    QTc Calculation (Bazett) 471 ms    P Axis 80 degrees    R Axis 3 degrees    T Axis 56 degrees    Diagnosis       Normal sinus rhythm  Septal infarct , age undetermined  Abnormal ECG  When compared with ECG of 17-JUN-2022 10:14,  Septal infarct is now present     TYPE AND SCREEN   Result Value Ref Range    ABO/Rh A POSITIVE     Antibody Screen NEGATIVE           ABNORMAL LABS:  Labs Reviewed   CBC WITH AUTO DIFFERENTIAL - Abnormal; Notable for the following components:       Result Value    RBC 2.39 (*)     Hemoglobin 7.0 (*)     Hematocrit 22.0 (*)     MCHC 31.8 (*)     Segs Relative 73.1 (*)     Lymphocytes % 17.0 (*)     Monocytes % 7.4 (*)     Immature Neutrophil % 0.7 (*)     All other components within normal limits   COMPREHENSIVE METABOLIC PANEL W/ REFLEX TO MG FOR LOW K - Abnormal; Notable for the following components:    BUN 42 (*)     Glucose 201 (*)     Albumin 3.3 (*)     Total Protein 5.2 (*)     AST 12 (*) GFR Non- 47 (*)     GFR  57 (*)     All other components within normal limits   PROTIME-INR - Abnormal; Notable for the following components:    Protime 22.7 (*)     All other components within normal limits   URINALYSIS WITH MICROSCOPIC - Abnormal; Notable for the following components:    Blood, Urine LARGE (*)     Nitrite Urine, Quantitative POSITIVE (*)     Leukocyte Esterase, Urine SMALL (*)     RBC, UA 7 (*)     WBC, UA 14 (*)     Bacteria, UA MANY (*)     Mucus, UA RARE (*)     All other components within normal limits   CULTURE, URINE   APTT   LIPASE   LACTIC ACID   TROPONIN   HEMOGLOBIN AND HEMATOCRIT   TYPE AND SCREEN   PREPARE RBC (CROSSMATCH)         IMAGING STUDIES ORDERED:  CT ABDOMEN PELVIS W IV CONTRAST  VERIFY INFORMED CONSENT  VITAL SIGNS  NURSING COMMUNICATION  ED NURSING COMMUNICATION  TRANSFUSE RED BLOOD CELLS      CT ABDOMEN PELVIS W IV CONTRAST Additional Contrast? None    (Results Pending)         MEDICATIONS ADMINISTERED:  Medications   0.9 % sodium chloride infusion (has no administration in time range)   pantoprazole (PROTONIX) injection 80 mg (has no administration in time range)   fentaNYL (SUBLIMAZE) injection 25 mcg (has no administration in time range)   cefTRIAXone (ROCEPHIN) 1,000 mg in dextrose 5 % 50 mL IVPB mini-bag (has no administration in time range)   ondansetron (ZOFRAN) injection 4 mg (4 mg IntraVENous Given 8/1/22 7515)         COURSE & MEDICAL DECISION MAKING  Last vitals: BP (!) 123/96   Pulse 84   Temp 98 °F (36.7 °C) (Oral)   Resp 17   Ht 5' 6\" (1.676 m)   Wt 161 lb (73 kg)   SpO2 100%   BMI 25.99 kg/m²     80year-old female with weekly upper GI bleed with anemia. Hemodynamically stable. Has been reporting diffuse abdominal pain. She also has evidence to suggest UTI.       Differential diagnoses considered included, but were not limited to, acute appendicitis, acute cholecystitis/cholangitis, acute hepatitis, acute pancreatitis, acute coronary syndrome, acute intra-abdominal catastrophe, acute vascular emergency, bowel obstruction, perforated bowel, incarcerated or strangulated hernia, colitis, diverticulitis, ischemic bowel, cystitis, pyelonephritis. CT of the abdomen pelvis was still pending at the end of my shift and the patient was signed out to the day attending, Dr. Julio Dhaliwal. I did personally provide the majority of the patient's care. Clinical Impression:  1. Gastrointestinal hemorrhage, unspecified gastrointestinal hemorrhage type    2. Anemia, unspecified type    3. Urinary tract infection without hematuria, site unspecified        Disposition referral (if applicable):  No follow-up provider specified. Disposition medications (if applicable):  New Prescriptions    No medications on file       ED Provider Disposition Time  DISPOSITION          Electronically signed by: Hubert Nettles M.D., 8/1/2022  6:13 AM    This dictation was created with voice recognition software. While attempts have been made to review the dictation as it is transcribed, on occasion the spoken word can be misinterpreted by the technology leading to omissions or inappropriate words, phrases or sentences.         Sheryl Gracia MD  08/02/22 5687 yes

## 2022-08-01 NOTE — PROGRESS NOTES
4 Eyes Skin Assessment     NAME:  Scott Potts  YOB: 1933  MEDICAL RECORD NUMBER:  [id number]    The patient is being assess for  Admission    I agree that 2 RN's have performed a thorough Head to Toe Skin Assessment on the patient. ALL assessment sites listed below have been assessed. Areas assessed by both nurses:    Head, Face, Ears, Shoulders, Back, Chest, Arms, Elbows, Hands, Sacrum. Buttock, Coccyx, Ischium, and Legs. Feet and Heels        Does the Patient have a Wound?  No noted wound(s) just small scabs likely from bedbugs       Nikolai Prevention initiated:  No   Wound Care Orders initiated:  No    Pressure Injury (Stage 3,4, Unstageable, DTI, NWPT, and Complex wounds) if present place referral/consult order under [de-identified] No    New and Established Ostomies if present place consult order under : No      Nurse 1 eSignature: Electronically signed by Giovanni Magaña RN on 8/1/22 at 3:32 PM EDT    **SHARE this note so that the co-signing nurse is able to place an eSignature**    Nurse 2 eSignature: Cliff Tuttle RN on 8/1/22

## 2022-08-01 NOTE — BRIEF OP NOTE
Brief Postoperative Note      Milagros Valadez is a 80 y.o. female     Pre-operative Diagnosis: ANEMIA / MELENA    Post-operative Diagnosis: MULTIPLE SUPERFICIAL  DUODENAL ULCERATIONS WITH RECENT BLEEDING    Procedure: EGD    Anesthesia: MAC    Surgeons/Assistants:Annette Hancock MD     Estimated Blood Loss: NIL    Complications: None    Specimens: WERE NOT obtained      Annie Zamorano MD   8/1/2022   11:31 AM

## 2022-08-01 NOTE — ED NOTES
Report called to [facility]. Pt states to wait to call Abby Oliveros daughter about bed being ready so she can get some sleep. Informed RN on floor about daughter being gone.         Blaire Medina RN  08/01/22 5858

## 2022-08-01 NOTE — CONSULTS
[address] [place] [address], [address] [facility]                                  CONSULTATION    PATIENT NAME: Britany Hermosillo                     :        1933  MED REC NO:   [id number]                          ROOM:  ACCOUNT NO:   [de-identified]                           ADMIT DATE: 2022  PROVIDER:     Maryam Baron MD    CONSULT DATE:  2022    PRIMARY PHYSICIAN:  Roque Bragg PA-C    CHIEF COMPLAINT:  History of melenic stools with anemia, rule out upper  GI bleeding. HISTORY OF PRESENT ILLNESS:  The patient is an 66-year-old white female,  patient of Roque Bragg, with past medical history significant for  hypertension, diabetes mellitus, hyperlipidemia, coronary artery disease  on aspirin and Eliquis, CHF, COPD, osteoarthritis, valvular heart  disease, osteopenia, thyroid disease, SVT, who presented to the  emergency room today with 1-day history of multiple melenic stools at  home. In the ER, the patient was hemodynamically stable and the blood  workup done comprised a CBC which showed a hemoglobin of 7 gm%. The  patient's last hemoglobin on 2022 was 11.8 gm%. The WBC count is  9.9 and platelet count is 200,515. The patient's INR is 1.75. The  patient took her last dose of aspirin and Eliquis yesterday. The  patient denies prior similar bleeding episodes. The patient does  complain of generalized cramping abdominal pain and nausea but no  vomiting or hematemesis. The patient is hemodynamically stable and she  was started on Protonix and was given 1 unit of packed RBC. The patient  has not had a recent EGD done but did have a colonoscopy done by Dr. Crys Woods on 2012 and was noted to have a tubular adenoma removed  from the transverse colon. The patient is hemodynamically stable. REVIEW OF SYSTEMS:  CENTRAL NERVOUS SYSTEM:  The patient denies headache or focal  sensorimotor symptoms.   CARDIOVASCULAR SYSTEM:  No history of chest pain, but the patient  complains of shortness of breath upon exertion, but no leg swelling. GENITOURINARY SYSTEM:  No history of dysuria, pyuria, or hematuria. MUSCULOSKELETAL SYSTEM:  The patient complains of generalized weakness. RESPIRATORY SYSTEM:  No history of cough, hemoptysis, fever or chills. PAST MEDICAL HISTORY:  Significant for history of hypertension, diabetes  mellitus, coronary artery disease, cardiac arrhythmia/congestive heart  failure/valvular heart disease on aspirin and Eliquis, COPD, depression,  osteoarthritis, atrial fibrillation, and thyroid disease. FAMILY HISTORY:  Significant for two sisters diagnosed with carcinoma of  the ovary/uterus and brother with carcinoma of the lung. MEDICATIONS:  Please refer to the chart (the patient is on aspirin and  Eliquis). SOCIOECONOMIC HISTORY:  No history of EtOH abuse. The patient does not  smoke cigarettes. SURGERIES:  The patient has had hysterectomy, appendectomy,  cholecystectomy, cataract surgery, tonsillectomy and adenoidectomy and  also had a Whipple procedure done around 2006 at [facility]  for \"benign tumor\" on the head of the pancreas, the patient did not  receive radiation or chemotherapy. The patient also has had colon  surgery done and partial thyroidectomy and breast surgery. ALLERGIES:  The patient is allergic to SULFA ANTIBIOTICS,  NITROFURANTOIN, and QUINAPRIL. PHYSICAL EXAMINATION:  GENERAL:  An 72-year-old white female of thin build and average  nutritional status, who is lying comfortably flat in bed, in no acute  distress. She is awake, alert, and oriented and pleasant to talk with. VITAL SIGNS:  Stable. HEENT:  Shows skull to be atraumatic. Conjunctivae are pale. NECK:  Supple. CHEST:  Clear. HEART:  S1 and S2 are normal.  ABDOMEN:  Soft, nontender, nondistended. Liver and spleen are not  palpable. Bowel sounds are present. RECTAL:  Deferred.   CNS:  Shows the patient to be awake, alert, and oriented. There are no  focal sensorimotor signs. MUSCULOSKELETAL:  Evidence of degenerative joint disease changes. LABORATORY DATA:  The labs drawn during the present hospitalization  comprised of a chem profile which is remarkable for a BUN of 42,  creatinine is 1.1. LFTs are within normal limits and CBC shows  hemoglobin of 7 gm%. IMPRESSION:  An 80-year white female with multiple comorbidities, on  aspirin and Eliquis for atrial fibrillation, presents with 1-day history  of melenic stools, cramping abdominal pain and anemia, rule out upper GI  bleeding, etiology to be determined. RECOMMENDATIONS:  1. Agree with present management with Protonix. 2.  Monitor the patient's serial H and H and transfuse on a p.r.n. basis  to keep hemoglobin above 7 gm%. 3.  We will proceed with EGD. 4.  We will hold Eliquis for now until the bleeding resolves. 5.  The case and plan have been discussed in detail with the patient and  with the RN Alem Coon as well.         Rashid Patten MD    D: 08/01/2022 10:39:39       T: 08/01/2022 10:42:02     [email or web address]  Job#: [id number]     Doc#: [id number]    CC:  Iris Liriano Pa-C

## 2022-08-01 NOTE — PROGRESS NOTES
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 Plan at that time was to get a repeat CT in about 2 years. Apparently has not been seen there since. [facility] telephone encounter 2015: MRI abdomen ordered by [facility] - pt wanted to have it done here rather than at [facility]. No follow up MRI found in records.

## 2022-08-01 NOTE — H&P
V2.0  History and Physical      Name:  Lisette Veliz /Age/Sex: 1933  (80 y.o. female)   MRN & CSN:  [id number] & [id number] Encounter Date/Time: 2022 7:45 AM EDT   Location:  ED14/ED-14 PCP: Tata Pryor PA-C       Hospital Day: 1    Assessment and Plan:   Lisette Veliz is a 80 y.o. female with a past medical history of atrial fibrillation on Eliquis, CAD, COPD, CHF, hypertension, hyperlipidemia,  thyroid disease who presents with GI bleed    Upper GI bleed   Acute anemia secondary to blood loss  - presented with multiple episodes of black stools, intermittent diffuse abdominal pain and nausea  -Admit CT A/P with no acute process  - Hgb 7.0 (down from 11.8 22), lactic acid WNL, BUN 42, creatinine 1.1  - hemodynamically stable   - on eliquis for Afib, held  - 1 u PRBCs ordered in ED   - start IV PPI BID   - trend H&H q6H, transfuse Hgb <7   - GI consulted - recommended PPI and will see in consult, NPO until GI eval     Atrial fibrillation  -Currently in NSR  -Eliquis held in setting of GI bleeding   -continue beta-blocker with hold parameters  -Monitor on telemetry    Asymptomatic bacteriuria  -UA with positive nitrates, small LE, 14 WBCs   -afebrile without leukocytosis  -No symptoms of UTI currently  -Received 1 dose of IV Rocephin in ED, hold on further antibiotics for now  -Urine culture is pending from ED    Non-insulin dependent T2DM with hyperglycemia  -Not currently on home medication  -Hemoglobin A1c 22 7.0  -Blood glucose 200 on admission  -Start low-dose SSI  -Hypoglycemia management protocol    Hypertension  -BP stable overall  -Resume home losartan and beta-blocker with hold parameters  -Hold HCTZ in setting of bleeding  - monitor BP     CAD  -Hold ASA in setting of GI bleed, does not appear to be on statin    COPD  -No signs or symptoms of acute exacerbation    Valvular disease  -Echo 2022 with EF 50%, mild to moderate aortic stenosis, severe MR, mild TR  -Monitor volume status    This patient was seen and examined in conjunction with Dr. Dutch Joyce. He was agreeable with the plan and management as dictated above. Hospital Problems             Last Modified POA    * (Principal) GI bleed 8/1/2022 Yes       Disposition:   Current Living situation: home  Expected Disposition: same  Estimated D/C: TBD    Diet No diet orders on file   DVT Prophylaxis [] Lovenox, []  Heparin, [x] SCDs, [] Ambulation,  [] Eliquis, [] Xarelto   Code Status Full code - previously DNR-CCA, discussed on admission and patient would like to be a full code for now   Surrogate Decision Maker/ POA Basia Lade,      History from:     patient      History of Present Illness:     Chief Complaint: GI bleed      Patient presented to the emergency department with complaints of black stool. Patient states this began yesterday. Since that time she has had 3 episodes of black tarry stool. She describes intermittent diffuse crampy abdominal pain associated with nausea. She has not had any episodes of vomiting. No hematochezia or bright red blood per rectum. Denies prior history of GI bleeding. She was placed on Eliquis a few months ago for new onset atrial fibrillation. She has been recently due to her  recently discharged to a nursing home. Also admits to occasional naproxen use over-the-counter. This morning she did get a little bit lightheaded and feels fatigued. She denies fever, chills, chest pain, shortness of breath, current abdominal pain, dysuria, urinary frequency.      Review of Systems:   Ten point ROS reviewed negative, unless as noted above    Objective:   No intake or output data in the 24 hours ending 08/01/22 0756   Vitals:   Vitals:    08/01/22 0630 08/01/22 0645 08/01/22 0700 08/01/22 0730   BP: (!) 149/72  (!) 127/53 (!) 137/55   Pulse: 88 94 83 84   Resp: 20 19 13 23   Temp:       TempSrc:       SpO2: 99% 100% 99% 96%   Weight:       Height:           Medications Prior to Admission     Prior to Admission medications    Medication Sig Start Date End Date Taking? Authorizing Provider   hydrOXYzine HCl (ATARAX) 10 MG tablet Take 10 mg by mouth nightly 1-2 pills nightly as needed    Historical Provider, MD   metoprolol succinate (TOPROL XL) 50 MG extended release tablet Take 1 tablet by mouth in the morning. 7/28/22 10/26/22  Sheyla Licona MD   apixaban (ELIQUIS) 5 MG TABS tablet Take 1 tablet by mouth in the morning and 1 tablet before bedtime. 7/21/22 1/17/23  Jame Gallego PA-C   aspirin 81 MG EC tablet Take 1 tablet by mouth in the morning. 7/21/22 1/17/23  Jame Gallego PA-C   hydroCHLOROthiazide (HYDRODIURIL) 25 MG tablet Take 1 tablet by mouth in the morning. 7/21/22 1/17/23  Jame Gallego PA-C   losartan (COZAAR) 25 MG tablet Take 1 tablet by mouth in the morning. 7/21/22   Jame Gallego PA-C   triamcinolone (KENALOG) 0.1 % cream Apply topically 2 times daily. 7/21/22   Jame Gallego PA-C   fluticasone (FLONASE) 50 MCG/ACT nasal spray 2 sprays by Each Nostril route in the morning. Patient taking differently: 2 sprays by Each Nostril route daily as needed 7/21/22   Jame Gallego PA-C   ondansetron (ZOFRAN-ODT) 4 MG disintegrating tablet Take 1 tablet by mouth every 12 hours as needed for Nausea 5/11/22   MCKINLEY Hedrick - CNP   clobetasol (TEMOVATE) 0.05 % ointment Apply topically 2 times daily as needed. 5/10/22   Charli Marques PA-C       Physical Exam:   GEN Awake female, sitting upright in bed in no apparent distress. Appears given age. EYES Pupils are equally round. No scleral erythema, discharge, or conjunctivitis. HENT Mucous membranes are moist.  NECK Supple, no apparent thyromegaly or masses. RESP Clear to auscultation, no wheezes, rales or rhonchi. Respirations even and unlabored on RA. CARDIO/VASC   S1/S2 auscultated. Regular rate without appreciable murmurs, rubs, or gallops. Peripheral pulses equal bilaterally and palpable.  No peripheral edema.  GI Abdomen is soft without significant tenderness, masses, or guarding.  No costovertebral angle tenderness. Choi catheter is not present. MSK No gross joint deformities. SKIN Normal coloration, warm, dry. Generalized pallor  NEURO Cranial nerves appear grossly intact, normal speech, no lateralizing weakness. PSYCH Awake, alert, oriented x 4. Affect appropriate. Past Medical History:   PMHx   Past Medical History:   Diagnosis Date    Arrhythmia     Arthritis     osteoarthritis cervical and lumbar spine    Atrophic vaginitis     Blood transfusion     CAD (coronary artery disease)     Cataracts, bilateral     CHF (congestive heart failure) (HCC)     COPD (chronic obstructive pulmonary disease) (HCC)     Depression     Essential hypertension     Fatty liver disease, nonalcoholic     GERD (gastroesophageal reflux disease)     H/O 24 hour EKG monitoring 9/15/2000    9/15/2000 -  Predominant rhythm is a sinus rhythm. No significant ectopy noted. H/O cardiac catheterization 8/4/2005 8/4/2005 - Moderate degree of stenosis of the high diag, which is a heavily calcified vessel, however, there is a large ramus vessel supplying this same area as well. Rest of vessel is w/o any significant disease. Renals are w/o any significant stenosis. H/O cardiovascular stress test 12/2/2010, 2/28/2008 12/2/2010 - Normal pattern of perfusion in all regions. LV is normal. No inducible myocardial ischemia. EF is 66%. LVSF is normal. No ECG changes. Exercise capacity is normal.    H/O cardiovascular stress test 2/24/2015    cardiolite-normal, no ischemia, EF69%    H/O Doppler ultrasound 9/15/2000    9/15/2000 - Carotid - No hemodynamically significant stenosis. H/O echocardiogram 12/2/2010, 9/22/2009 12/2/2010 - Difficult apical imaging due to patient COPD. LVSF is normal. EF = > 55%. Impaired LV impairment. Mild-Moderate MR. Mild TR. H/O echocardiogram 7/15/14    EF 55-60%.  No significant valvulopathy is seen. Heart valve problem     2 leaky heart valves    History of Doppler ultrasound 10/31/2000    10/31/2000 - Peripheral - Normal study. HX OTHER MEDICAL 1/14/2004 1/14/2004 - 48 hr Holter - Predominant rhythm is sinus rhythm. No significant ectopy is seen. Hyperlipidemia     Mild tricuspid regurgitation     Mitral regurgitation     Nausea & vomiting     Near syncope 5/28/2019    Osteopenia     Pulmonary hypertension (HCC)     Pulmonary nodules     Rectal bleeding 12/21/2012    Supraventricular tachycardia ([address]     Thyroid disease     Type 2 diabetes mellitus ([address]      PSHX:  has a past surgical history that includes Thyroidectomy, partial; colectomy; Cholecystectomy; Appendectomy; Hysterectomy; Breast surgery; fracture surgery; Tonsillectomy; skin biopsy; Colonoscopy (12-21-12); Pancreas surgery (11/7/2006); Thyroidectomy, partial (1999); and Diagnostic Cardiac Cath Lab Procedure (8/2005). Allergies: Allergies   Allergen Reactions    Sulfa Antibiotics Anaphylaxis    Nitrofuran Derivatives     Quinapril Hcl      Cough     Fam HX:  family history includes Cancer in her brother, father, and son; Coronary Art Dis in her brother and daughter; Early Death in her son; Heart Attack in her brother; Heart Disease in her sister; Heart Failure in her mother; Hypertension in her daughter, father, and mother; Other in her brother, sister, and sister; Ovarian Cancer in her sister. Soc HX:   Social History     Socioeconomic History    Marital status:      Spouse name: None    Number of children: None    Years of education: None    Highest education level: None   Occupational History    Occupation: Retired   Tobacco Use    Smoking status: Never    Smokeless tobacco: Never   Vaping Use    Vaping Use: Never used   Substance and Sexual Activity    Alcohol use: Yes     Comment: Rare alcohol use.        CAFFEINE: 1 cup coffee daily    Drug use: No    Sexual activity: Yes     Partners: Male Comment:      Social Determinants of Health     Financial Resource Strain: Low Risk     Difficulty of Paying Living Expenses: Not hard at all   Food Insecurity: No Food Insecurity    Worried About Running Out of Food in the Last Year: Never true    Ran Out of Food in the Last Year: Never true   Physical Activity: Inactive    Days of Exercise per Week: 0 days    Minutes of Exercise per Session: 0 min       Medications:   Medications:    Infusions:    sodium chloride       PRN Meds: sodium chloride, , PRN      Labs    CBC:   Recent Labs     08/01/22 0358   WBC 9.9   HGB 7.0*        BMP:    Recent Labs     08/01/22 0358      K 4.9      CO2 21   BUN 42*   CREATININE 1.1   GLUCOSE 201*     Hepatic:   Recent Labs     08/01/22 0358   AST 12*   ALT 11   BILITOT 0.3   ALKPHOS 74     Lipids:   Lab Results   Component Value Date/Time    CHOL 124 06/17/2022 07:35 AM    CHOL 149 08/01/2005 12:00 AM    HDL 38 06/17/2022 07:35 AM    TRIG 112 06/17/2022 07:35 AM     Hemoglobin A1C:   Lab Results   Component Value Date/Time    LABA1C 7.0 06/16/2022 05:15 PM     TSH:   Lab Results   Component Value Date/Time    TSH 3.29 07/01/2020 02:46 PM     Troponin:   Lab Results   Component Value Date/Time    TROPONINT <0.010 08/01/2022 03:58 AM    TROPONINT <0.010 06/16/2022 05:58 PM    TROPONINT <0.010 05/28/2019 04:16 PM     Lactic Acid: No results for input(s): LACTA in the last 72 hours. BNP: No results for input(s): PROBNP in the last 72 hours.   UA:  Lab Results   Component Value Date/Time    NITRU POSITIVE 08/01/2022 04:44 AM    COLORU YELLOW 08/01/2022 04:44 AM    PHUR 5.5 06/23/2022 02:10 PM    WBCUA 14 08/01/2022 04:44 AM    RBCUA 7 08/01/2022 04:44 AM    MUCUS RARE 08/01/2022 04:44 AM    TRICHOMONAS NONE SEEN 08/01/2022 04:44 AM    BACTERIA MANY 08/01/2022 04:44 AM    CLARITYU CLEAR 08/01/2022 04:44 AM    SPECGRAV 1.010 08/01/2022 04:44 AM    LEUKOCYTESUR SMALL 08/01/2022 04:44 AM    UROBILINOGEN 0.2 08/01/2022 04:44 AM    BILIRUBINUR NEGATIVE 08/01/2022 04:44 AM    BILIRUBINUR negative 06/23/2022 02:10 PM    BLOODU LARGE 08/01/2022 04:44 AM    GLUCOSEU negative 06/23/2022 02:10 PM    KETUA NEGATIVE 08/01/2022 04:44 AM     Urine Cultures:   Lab Results   Component Value Date/Time    LABURIN No growth at 18 to 36 hours 06/23/2022 03:42 PM     Blood Cultures: No results found for: BC  No results found for: BLOODCULT2  Organism:   Lab Results   Component Value Date/Time    ORG Escherichia coli 10/25/2021 09:51 AM       Imaging/Diagnostics Last 24 Hours   CT ABDOMEN PELVIS W IV CONTRAST Additional Contrast? None    Result Date: 8/1/2022  EXAMINATION: CT OF THE ABDOMEN AND PELVIS WITH CONTRAST 8/1/2022 6:09 am TECHNIQUE: CT of the abdomen and pelvis was performed with the administration of intravenous contrast. Multiplanar reformatted images are provided for review. Automated exposure control, iterative reconstruction, and/or weight based adjustment of the mA/kV was utilized to reduce the radiation dose to as low as reasonably achievable. COMPARISON: CT abdomen pelvis dated June 2, 2019 HISTORY: ORDERING SYSTEM PROVIDED HISTORY: diffuse abdominal pain TECHNOLOGIST PROVIDED HISTORY: Reason for exam:->diffuse abdominal pain Additional Contrast?->None Decision Support Exception - unselect if not a suspected or confirmed emergency medical condition->Emergency Medical Condition (MA) Reason for Exam: diffuse abdominal pain FINDINGS: Lower Chest: Bibasilar atelectasis is noted. Organs: There is redemonstration of pneumobilia. There has been a cholecystectomy. The spleen and adrenal glands are unremarkable. 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 There is no hydronephrosis. GI/Bowel: There is no evidence of bowel obstruction. Pelvis: The bladder is unremarkable. There is no free fluid. There has been a hysterectomy. Peritoneum/Retroperitoneum: There is no free air or lymphadenopathy.  Previously noted 1 cm mesenteric lymph node has decreased in size and measures 8 mm. Atherosclerotic vascular calcifications of the aorta are noted. Bones/Soft Tissues: No destructive osseous lesions are identified. Postsurgical changes are seen from prior Whipple procedure. No acute findings. I discussed this patient with the ED provider and Dr. Negin Loyd. I did a review of patient's medical records, lab results and imaging conducted today. I personally reviewed patient's vital signs including pulse ox and EKG.      Electronically signed by Li Gordillo PA-C on 8/1/2022 at 7:56 AM

## 2022-08-01 NOTE — ED PROVIDER NOTES
Patient signed out to me by Dr. Iglesia Zacarias,    27AUA with complaint of GI bleed- having dark stools and abdominal cramping for a few days. Hgb 7-- from 11. Awaiting transfusion, vital signs are stable. Pending CT. Plan for admit. Got protonix. No previous GI doctor. Nadia Elkins MD  08/01/22 3020        I discussed plan with patient and daughter. She was hungry and I told her she cannot have anything by mouth at this time. We did discuss that her hemoglobin had dropped 4 g, and they question me about this. She was started on Eliquis in June which is when we had her last hemoglobin results. I do suspect she likely had a slow bleed, that worsened over these last several days. Her vitals are stable, blood pressure in the 120s, heart rate in the 60s to 80s. Her BUN is 42. Her CT does not show any acute abnormalities. She has been ordered the 1 unit of blood by Dr. Iglesia Zacarias. She has not seen GI before in our system, and daughter and patient deny that she had seen one as an outpatient. I consulted GI on-call, spoke with Dr. Francis Terrazas. Plan for admission, hospitalist consult has been placed.   She remains stable at this time      Impression:  GI bleed  Anemia     Nadia Elkins MD  08/01/22 0454

## 2022-08-01 NOTE — ANESTHESIA POSTPROCEDURE EVALUATION
Department of Anesthesiology  Postprocedure Note    Patient: Janice Duncan  MRN: [id number]  YOB: 1933  Date of evaluation: 8/1/2022      Procedure Summary     Date: 08/01/22 Room / Location: [address]    Anesthesia Start: 1114 Anesthesia Stop: 8860    Procedure: EGD DIAGNOSTIC ONLY Diagnosis:       Gastrointestinal hemorrhage, unspecified gastrointestinal hemorrhage type      (Gastrointestinal hemorrhage, unspecified gastrointestinal hemorrhage type [K92.2])    Surgeons: Lacey Hay MD Responsible Provider: Rolando Muhammad MD    Anesthesia Type: MAC ASA Status: 3          Anesthesia Type: No value filed.     Arnulfo Phase I:      Arnulfo Phase II:        Anesthesia Post Evaluation    Patient location during evaluation: bedside  Patient participation: complete - patient participated  Level of consciousness: awake  Pain score: 0  Airway patency: patent  Nausea & Vomiting: no vomiting and no nausea  Complications: no  Cardiovascular status: hemodynamically stable  Respiratory status: acceptable, airway suctioned, spontaneous ventilation and room air  Hydration status: stable

## 2022-08-01 NOTE — CARE COORDINATION
Met c pt to initiate discharge planning, granddghtr at bedside as well. Pt states she lives at home alone (spouse in LTC at [place]). Pt remains fully ind with ADls, has pcp, uses no DME and has very good family support who check on her, help with meals, cleaning and transport. Pt denied needs including HHC at this time. Advised CM available if needs arise.

## 2022-08-02 LAB
ALBUMIN SERPL-MCNC: 2.7 GM/DL (ref 3.4–5)
ALP BLD-CCNC: 67 IU/L (ref 40–129)
ALT SERPL-CCNC: 9 U/L (ref 10–40)
ANION GAP SERPL CALCULATED.3IONS-SCNC: 7 MMOL/L (ref 4–16)
APTT: 27.3 SECONDS (ref 25.1–37.1)
AST SERPL-CCNC: 11 IU/L (ref 15–37)
BASOPHILS ABSOLUTE: 0 K/CU MM
BASOPHILS RELATIVE PERCENT: 0.3 % (ref 0–1)
BILIRUB SERPL-MCNC: 0.4 MG/DL (ref 0–1)
BUN BLDV-MCNC: 30 MG/DL (ref 6–23)
CALCIUM SERPL-MCNC: 8 MG/DL (ref 8.3–10.6)
CHLORIDE BLD-SCNC: 110 MMOL/L (ref 99–110)
CO2: 21 MMOL/L (ref 21–32)
CREAT SERPL-MCNC: 1.2 MG/DL (ref 0.6–1.1)
DIFFERENTIAL TYPE: ABNORMAL
EOSINOPHILS ABSOLUTE: 0.3 K/CU MM
EOSINOPHILS RELATIVE PERCENT: 3.5 % (ref 0–3)
GFR AFRICAN AMERICAN: 51 ML/MIN/1.73M2
GFR NON-AFRICAN AMERICAN: 42 ML/MIN/1.73M2
GLUCOSE BLD-MCNC: 150 MG/DL (ref 70–99)
GLUCOSE BLD-MCNC: 158 MG/DL (ref 70–99)
GLUCOSE BLD-MCNC: 163 MG/DL (ref 70–99)
GLUCOSE BLD-MCNC: 169 MG/DL (ref 70–99)
GLUCOSE BLD-MCNC: 216 MG/DL (ref 70–99)
HCT VFR BLD CALC: 19.9 % (ref 37–47)
HCT VFR BLD CALC: 21 % (ref 37–47)
HCT VFR BLD CALC: 25.7 % (ref 37–47)
HCT VFR BLD CALC: 30.7 % (ref 37–47)
HEMOGLOBIN: 6.5 GM/DL (ref 12.5–16)
HEMOGLOBIN: 6.8 GM/DL (ref 12.5–16)
HEMOGLOBIN: 8.2 GM/DL (ref 12.5–16)
HEMOGLOBIN: 9.8 GM/DL (ref 12.5–16)
IMMATURE NEUTROPHIL %: 0.8 % (ref 0–0.43)
INR BLD: 1.3 INDEX
IRON: 58 UG/DL (ref 37–145)
LIPASE: 13 IU/L (ref 13–60)
LYMPHOCYTES ABSOLUTE: 1.8 K/CU MM
LYMPHOCYTES RELATIVE PERCENT: 24.1 % (ref 24–44)
MCH RBC QN AUTO: 29.4 PG (ref 27–31)
MCHC RBC AUTO-ENTMCNC: 32.4 % (ref 32–36)
MCV RBC AUTO: 90.9 FL (ref 78–100)
MONOCYTES ABSOLUTE: 0.5 K/CU MM
MONOCYTES RELATIVE PERCENT: 6.2 % (ref 0–4)
NUCLEATED RBC %: 0 %
PCT TRANSFERRIN: 26 % (ref 10–44)
PDW BLD-RTO: 15 % (ref 11.7–14.9)
PLATELET # BLD: 144 K/CU MM (ref 140–440)
PMV BLD AUTO: 9.9 FL (ref 7.5–11.1)
POTASSIUM SERPL-SCNC: 4.7 MMOL/L (ref 3.5–5.1)
PROTHROMBIN TIME: 16.8 SECONDS (ref 11.7–14.5)
RBC # BLD: 2.31 M/CU MM (ref 4.2–5.4)
REASON FOR REJECTION: NORMAL
REJECTED TEST: NORMAL
SEGMENTED NEUTROPHILS ABSOLUTE COUNT: 4.8 K/CU MM
SEGMENTED NEUTROPHILS RELATIVE PERCENT: 65.1 % (ref 36–66)
SODIUM BLD-SCNC: 138 MMOL/L (ref 135–145)
TOTAL IMMATURE NEUTOROPHIL: 0.06 K/CU MM
TOTAL IRON BINDING CAPACITY: 227 UG/DL (ref 250–450)
TOTAL NUCLEATED RBC: 0 K/CU MM
TOTAL PROTEIN: 4.4 GM/DL (ref 6.4–8.2)
UNSATURATED IRON BINDING CAPACITY: 169 UG/DL (ref 110–370)
WBC # BLD: 7.4 K/CU MM (ref 4–10.5)

## 2022-08-02 PROCEDURE — 94761 N-INVAS EAR/PLS OXIMETRY MLT: CPT

## 2022-08-02 PROCEDURE — 82962 GLUCOSE BLOOD TEST: CPT

## 2022-08-02 PROCEDURE — C9113 INJ PANTOPRAZOLE SODIUM, VIA: HCPCS | Performed by: PHYSICIAN ASSISTANT

## 2022-08-02 PROCEDURE — 6370000000 HC RX 637 (ALT 250 FOR IP): Performed by: SPECIALIST

## 2022-08-02 PROCEDURE — 85610 PROTHROMBIN TIME: CPT

## 2022-08-02 PROCEDURE — 85025 COMPLETE CBC W/AUTO DIFF WBC: CPT

## 2022-08-02 PROCEDURE — 6360000002 HC RX W HCPCS: Performed by: PHYSICIAN ASSISTANT

## 2022-08-02 PROCEDURE — A4216 STERILE WATER/SALINE, 10 ML: HCPCS | Performed by: PHYSICIAN ASSISTANT

## 2022-08-02 PROCEDURE — 85014 HEMATOCRIT: CPT

## 2022-08-02 PROCEDURE — 2580000003 HC RX 258: Performed by: PHYSICIAN ASSISTANT

## 2022-08-02 PROCEDURE — 6370000000 HC RX 637 (ALT 250 FOR IP): Performed by: NURSE PRACTITIONER

## 2022-08-02 PROCEDURE — 86850 RBC ANTIBODY SCREEN: CPT

## 2022-08-02 PROCEDURE — 85018 HEMOGLOBIN: CPT

## 2022-08-02 PROCEDURE — 86900 BLOOD TYPING SEROLOGIC ABO: CPT

## 2022-08-02 PROCEDURE — 83550 IRON BINDING TEST: CPT

## 2022-08-02 PROCEDURE — 76937 US GUIDE VASCULAR ACCESS: CPT

## 2022-08-02 PROCEDURE — 83540 ASSAY OF IRON: CPT

## 2022-08-02 PROCEDURE — P9016 RBC LEUKOCYTES REDUCED: HCPCS

## 2022-08-02 PROCEDURE — 86901 BLOOD TYPING SEROLOGIC RH(D): CPT

## 2022-08-02 PROCEDURE — 80053 COMPREHEN METABOLIC PANEL: CPT

## 2022-08-02 PROCEDURE — 85730 THROMBOPLASTIN TIME PARTIAL: CPT

## 2022-08-02 PROCEDURE — 1200000000 HC SEMI PRIVATE

## 2022-08-02 PROCEDURE — 83690 ASSAY OF LIPASE: CPT

## 2022-08-02 PROCEDURE — 6370000000 HC RX 637 (ALT 250 FOR IP): Performed by: PHYSICIAN ASSISTANT

## 2022-08-02 PROCEDURE — 36430 TRANSFUSION BLD/BLD COMPNT: CPT

## 2022-08-02 PROCEDURE — 36415 COLL VENOUS BLD VENIPUNCTURE: CPT

## 2022-08-02 RX ORDER — SODIUM CHLORIDE 9 MG/ML
INJECTION, SOLUTION INTRAVENOUS PRN
Status: DISCONTINUED | OUTPATIENT
Start: 2022-08-02 | End: 2022-08-02

## 2022-08-02 RX ORDER — LANOLIN ALCOHOL/MO/W.PET/CERES
3 CREAM (GRAM) TOPICAL NIGHTLY PRN
Status: DISCONTINUED | OUTPATIENT
Start: 2022-08-02 | End: 2022-08-05 | Stop reason: HOSPADM

## 2022-08-02 RX ORDER — SODIUM CHLORIDE 9 MG/ML
INJECTION, SOLUTION INTRAVENOUS PRN
Status: DISCONTINUED | OUTPATIENT
Start: 2022-08-02 | End: 2022-08-05 | Stop reason: HOSPADM

## 2022-08-02 RX ADMIN — SODIUM CHLORIDE, PRESERVATIVE FREE 10 ML: 5 INJECTION INTRAVENOUS at 08:28

## 2022-08-02 RX ADMIN — SODIUM CHLORIDE, PRESERVATIVE FREE 40 MG: 5 INJECTION INTRAVENOUS at 20:48

## 2022-08-02 RX ADMIN — Medication 3 MG: at 22:27

## 2022-08-02 RX ADMIN — INSULIN LISPRO 1 UNITS: 100 INJECTION, SOLUTION INTRAVENOUS; SUBCUTANEOUS at 17:52

## 2022-08-02 RX ADMIN — LOSARTAN POTASSIUM 25 MG: 25 TABLET, FILM COATED ORAL at 08:28

## 2022-08-02 RX ADMIN — ONDANSETRON 4 MG: 2 INJECTION INTRAMUSCULAR; INTRAVENOUS at 00:17

## 2022-08-02 RX ADMIN — SODIUM CHLORIDE, PRESERVATIVE FREE 10 ML: 5 INJECTION INTRAVENOUS at 20:49

## 2022-08-02 RX ADMIN — SUCRALFATE 1 G: 1 TABLET ORAL at 11:44

## 2022-08-02 RX ADMIN — SUCRALFATE 1 G: 1 TABLET ORAL at 00:17

## 2022-08-02 RX ADMIN — ACETAMINOPHEN 650 MG: 325 TABLET ORAL at 00:17

## 2022-08-02 RX ADMIN — SUCRALFATE 1 G: 1 TABLET ORAL at 05:29

## 2022-08-02 RX ADMIN — ONDANSETRON 4 MG: 4 TABLET, ORALLY DISINTEGRATING ORAL at 11:44

## 2022-08-02 RX ADMIN — METOPROLOL SUCCINATE 50 MG: 50 TABLET, EXTENDED RELEASE ORAL at 08:28

## 2022-08-02 RX ADMIN — SUCRALFATE 1 G: 1 TABLET ORAL at 18:10

## 2022-08-02 RX ADMIN — SODIUM CHLORIDE, PRESERVATIVE FREE 40 MG: 5 INJECTION INTRAVENOUS at 06:34

## 2022-08-02 ASSESSMENT — PAIN DESCRIPTION - LOCATION: LOCATION: BACK

## 2022-08-02 ASSESSMENT — PAIN SCALES - GENERAL
PAINLEVEL_OUTOF10: 0
PAINLEVEL_OUTOF10: 3

## 2022-08-02 NOTE — CONSULTS
IV Consult complete. Nexiva 20g 1.75\" extra long PIV inserted in left FA x1 attempt with ultrasound guidance. Brisk blood return, flushes easy.

## 2022-08-02 NOTE — PROGRESS NOTES
V2.0  [facility] Hospitalist Progress Note      Name:  Aureliano Acuña /Age/Sex: 1933  (80 y.o. female)   MRN & CSN:  [id number] & [id number] Encounter Date/Time: 2022 8:44 AM EDT    Location:  [facility]/0287- PCP: Jame Gallego, [facility] Day: 2    Assessment and Plan:   Aureliano Acuña is a 80 y.o. female with a past medical history of atrial fibrillation on Eliquis, CAD, COPD, CHF, hypertension, hyperlipidemia,  thyroid disease who presents with GI bleed     #Upper GI bleed  #Acute anemia secondary to blood loss  - presented with multiple episodes of black stools, intermittent diffuse abdominal pain and nausea  -Admit CT A/P with no acute process  - Hgb 7.0 (down from 11.8 22), lactic acid WNL, BUN 42, creatinine 1.1  - hemodynamically stable  - on eliquis for Afib, held  - 1 u PRBCs ordered in ED   - start IV PPI BID  - trend H&H q6H, transfuse Hgb <7  -EGD () with multiple superficial duodenal ulcerations noted with recent  Bleeding and mild superficial gastritis.   - GI consulted - recommended PPI and will see in consult, NPO until GI eval     #Asymptomatic bacteriuria  -UA with positive nitrates, small LE, 14 WBCs  -afebrile without leukocytosis  -No symptoms of UTI currently  -Received 1 dose of IV Rocephin in ED, hold on further antibiotics for now  -Urine culture is pending from ED     Chronic medical conditions:   #Atrial fibrillation  -Currently in NSR  -Eliquis held in setting of GI bleeding  -continue beta-blocker with hold parameters  -Monitor on telemetry     Non-insulin dependent T2DM with hyperglycemia  -Not currently on home medication  -Hemoglobin A1c 22 7.0  -Blood glucose 200 on admission  -Start low-dose SSI  -Hypoglycemia management protocol     #Hypertension  -BP stable overall  -Resume home losartan and beta-blocker with hold parameters  -Hold HCTZ in setting of bleeding  - monitor BP      #CAD  -Hold ASA in setting of GI bleed, does not appear to be on statin    #COPD, not in acute exacerbation  -No signs or symptoms of acute exacerbation     #Valvular disease  -Echo 6/2022 with EF 50%, mild to moderate aortic stenosis, severe MR, mild TR  -Monitor volume status      Diet ADULT DIET; Clear Liquid   DVT Prophylaxis [] Lovenox, []  Heparin, [] SCDs, [] Ambulation,  [] Eliquis, [] Xarelto  [] Coumadin  Holding in the setting of GI bleed   Code Status Full Code   Disposition From: home  Expected Disposition: home  Estimated Date of Discharge: 24 hours  Patient requires continued admission due to GI bleed, still requiring transfusions, PT/OT eval   Surrogate Decision Maker/ PORICH Pedro Harness     Subjective:     Chief Complaint: Abdominal Pain (Dull pain), Rectal Bleeding (Started a few days ago), and Nausea       This morning, patient's hemoglobin was 6.5, she was transfused 1 unit. Overnight she also received another unit. Today she states she has been having intermittent abdominal pain. She endorses brown-colored stools. She states that she does not think that she needs to go to rehab, but her  is at  [facility] right now and likes it. She does endorse nausea and no vomiting. Review of Systems:    General: No fever, no chills, +lightheadedness  Heart: No chest pain, no palpitations  Lungs: No shortness of breath, no cough  Abdomen: + abdominal pain, no nausea, + vomiting, no constipation, no diarrhea, no melena  : No frequency, no dysuria, no urgency, no decreased urination  MSK: No lower extremity swelling  Neuro: No confusion, no weakness  Skin: No rashes    Objective:      Intake/Output Summary (Last 24 hours) at 8/2/2022 0844  Last data filed at 8/2/2022 0741  Gross per 24 hour   Intake 738.91 ml   Output --   Net 738.91 ml        Vitals:   Vitals:    08/02/22 0741   BP: (!) 136/49   Pulse: 64   Resp: 14   Temp: 97.9 °F (36.6 °C)   SpO2: 98%       Physical Exam:     General: NAD, AAO x3-4  Eyes: EOMI, no conjunctival pallor  HENT: Atraumatic, no lymphadenopathy, neck supple  CVS: Normal S1 and S2, no murmurs, RRR  Respiratory: Normal breath sounds, clear to auscultation bilaterally, no respiratory distress  GI: Normal bowel sounds, soft, nondistended, nontender  MSK: Normal ROM, no lower extremity edema  Skin: Intact, dry, warm, no rashes  Neuro: CN II to XII grossly intact  Psych: Normal mood    Medications:   Medications:    sodium chloride flush  5-40 mL IntraVENous 2 times per day    pantoprazole (PROTONIX) 40 mg injection  40 mg IntraVENous Q12H    losartan  25 mg Oral Daily    metoprolol succinate  50 mg Oral Daily    insulin lispro  0-4 Units SubCUTAneous TID WC    insulin lispro  0-4 Units SubCUTAneous Nightly    sucralfate  1 g Oral 4 times per day      Infusions:    sodium chloride      sodium chloride      sodium chloride      sodium chloride      dextrose       PRN Meds: sodium chloride, , PRN  sodium chloride, , PRN  sodium chloride, , PRN  sodium chloride flush, 5-40 mL, PRN  sodium chloride, , PRN  ondansetron, 4 mg, Q8H PRN   Or  ondansetron, 4 mg, Q6H PRN  acetaminophen, 650 mg, Q6H PRN   Or  acetaminophen, 650 mg, Q6H PRN  glucose, 4 tablet, PRN  dextrose bolus, 125 mL, PRN   Or  dextrose bolus, 250 mL, PRN  glucagon (rDNA), 1 mg, PRN  dextrose, , Continuous PRN        Labs      Recent Results (from the past 24 hour(s))   POCT Glucose    Collection Time: 08/01/22 11:00 AM   Result Value Ref Range    POC Glucose 143 (H) 70 - 99 MG/DL   POCT Glucose    Collection Time: 08/01/22 12:10 PM   Result Value Ref Range    POC Glucose 139 (H) 70 - 99 MG/DL   Hemoglobin and Hematocrit    Collection Time: 08/01/22  3:10 PM   Result Value Ref Range    Hemoglobin 7.7 (L) 12.5 - 16.0 GM/DL    Hematocrit 24.7 (L) 37 - 47 %   POCT Glucose    Collection Time: 08/01/22  4:49 PM   Result Value Ref Range    POC Glucose 221 (H) 70 - 99 MG/DL   POCT Glucose    Collection Time: 08/01/22  8:20 PM   Result Value Ref Range    POC Glucose 186 (H) 70 - 99 MG/DL unremarkable. There is no free fluid. There has been a hysterectomy. Peritoneum/Retroperitoneum: There is no free air or lymphadenopathy. Previously noted 1 cm mesenteric lymph node has decreased in size and measures 8 mm. Atherosclerotic vascular calcifications of the aorta are noted. Bones/Soft Tissues: No destructive osseous lesions are identified. Postsurgical changes are seen from prior Whipple procedure. No acute findings.      EGD    Result Date: 8/1/2022  No dictation       Electronically signed by Samy House MD on 8/2/2022 at 8:44 AM

## 2022-08-02 NOTE — OP NOTE
[address] [place] [address], [address] [place] [address]                                OPERATIVE REPORT    PATIENT NAME: Huyen Gaines                     :        1933  MED REC NO:   [id number]                          ROOM:       [id number]  ACCOUNT NO:   [de-identified]                           ADMIT DATE: 2022  PROVIDER:     Xuan Guerrero MD    DATE OF PROCEDURE:  2022    CHIEF COMPLAINT:  History of melenic stools with anemia; rule out upper  GI bleeding. PREMEDICATION:  Please refer to the anesthesiologist's notes. PROCEDURE:  The patient was placed in the left lateral decubitus  position and the video Olympus gastroscope was introduced in back of  throat and was advanced into the esophagus. ESOPHAGUS:  The mucosa of the esophagus was unremarkable. There was no  evidence of hyperemia, erosion, ulcer, stricture, Salmeron's esophagus or  mass lesion. STOMACH:  The fundus, cardia, body, antrum, lesser curvature, greater  curvature, and the pyloric regions of the stomach were examined. The  mucosa of the stomach was slightly hyperemic and a few specks of old  blood were noted scattered along the mucosa, but no erosion, ulcer or  mass lesions were seen. Gastroscope was retroflexed and the fundus and  cardia was carefully examined and no mass lesions were seen. DUODENUM:  First and second portions of the duodenum were examined and  multiple superficial ulcerations were noted in the duodenal bulb with  some recent bleeding. However, no protruding blood vessel or blood clot  was noted in the base of the ulcer. A few specks of old blood were  noted coating the duodenal mucosa. The mucosa of the second portion of  the duodenum, however, was unremarkable. POSTOPERATIVE DIAGNOSES:  1.  Multiple superficial duodenal ulcerations noted with recent  bleeding. 2.  Mild superficial gastritis. RECOMMENDATIONS:  1.   Antireflux measures. 2.  We will continue the patient on Protonix 40 mg b.i.d.  3.  We will add Carafate suspension 1 gm q.i.d.  4.  Monitor the patient's serial H and H and transfuse on a p.r.n. basis  to keep hemoglobin above 7 gm%. 5.  We will also check stool for H. pylori antigen. 6.  The case and plan have been discussed in detail with the patient and  all her questions have been answered. The patient tolerated the  procedure well. There were no postop complications and the blood loss  during the procedure was nil.         George Torres MD    D: 08/01/2022 11:33:03       T: 08/01/2022 11:35:30     [email or web address]  Job#: [id number]     Doc#: [id number]    CC:

## 2022-08-02 NOTE — PLAN OF CARE
Problem: Chronic Conditions and Co-morbidities  Goal: Patient's chronic conditions and co-morbidity symptoms are monitored and maintained or improved  Outcome: Not Progressing     Problem: Chronic Conditions and Co-morbidities  Goal: Patient's chronic conditions and co-morbidity symptoms are monitored and maintained or improved  Outcome: Not Progressing

## 2022-08-02 NOTE — PROGRESS NOTES
DOING WELL NO ABD PAIN HAD 1 BM TODAY --MELENIC  ON CLEAR LIQUIDS  VITALS STABLE   LABS NOTED HB 8.2 AFTER 2 UNITS RECEIVING 3RD UNIT PRBC NOW  WILL CPM D/W DAUGHTER YESTERDAY-CHECK WITH CARDIOLOGIST IF NEEDS TO BE RESTARTED ON ELIQUIS  WILL ADVANCE DIET IN AM F/U H/H

## 2022-08-03 LAB
ABO/RH: NORMAL
ANION GAP SERPL CALCULATED.3IONS-SCNC: 7 MMOL/L (ref 4–16)
ANTIBODY SCREEN: NEGATIVE
BASOPHILS ABSOLUTE: 0 K/CU MM
BASOPHILS RELATIVE PERCENT: 0.4 % (ref 0–1)
BUN BLDV-MCNC: 16 MG/DL (ref 6–23)
CALCIUM SERPL-MCNC: 8.1 MG/DL (ref 8.3–10.6)
CHLORIDE BLD-SCNC: 109 MMOL/L (ref 99–110)
CO2: 22 MMOL/L (ref 21–32)
COMPONENT: NORMAL
CREAT SERPL-MCNC: 1.1 MG/DL (ref 0.6–1.1)
CROSSMATCH RESULT: NORMAL
CULTURE: ABNORMAL
CULTURE: ABNORMAL
DIFFERENTIAL TYPE: ABNORMAL
EOSINOPHILS ABSOLUTE: 0.2 K/CU MM
EOSINOPHILS RELATIVE PERCENT: 3 % (ref 0–3)
GFR AFRICAN AMERICAN: 57 ML/MIN/1.73M2
GFR NON-AFRICAN AMERICAN: 47 ML/MIN/1.73M2
GLUCOSE BLD-MCNC: 134 MG/DL (ref 70–99)
GLUCOSE BLD-MCNC: 152 MG/DL (ref 70–99)
GLUCOSE BLD-MCNC: 158 MG/DL (ref 70–99)
GLUCOSE BLD-MCNC: 165 MG/DL (ref 70–99)
GLUCOSE BLD-MCNC: 175 MG/DL (ref 70–99)
HCT VFR BLD CALC: 29.3 % (ref 37–47)
HEMOGLOBIN: 9.6 GM/DL (ref 12.5–16)
IMMATURE NEUTROPHIL %: 0.7 % (ref 0–0.43)
LYMPHOCYTES ABSOLUTE: 1.2 K/CU MM
LYMPHOCYTES RELATIVE PERCENT: 16.9 % (ref 24–44)
Lab: ABNORMAL
MCH RBC QN AUTO: 29.6 PG (ref 27–31)
MCHC RBC AUTO-ENTMCNC: 32.8 % (ref 32–36)
MCV RBC AUTO: 90.4 FL (ref 78–100)
MONOCYTES ABSOLUTE: 0.4 K/CU MM
MONOCYTES RELATIVE PERCENT: 5.8 % (ref 0–4)
NUCLEATED RBC %: 0 %
PDW BLD-RTO: 14.8 % (ref 11.7–14.9)
PLATELET # BLD: 153 K/CU MM (ref 140–440)
PMV BLD AUTO: 9.6 FL (ref 7.5–11.1)
POTASSIUM SERPL-SCNC: 4.8 MMOL/L (ref 3.5–5.1)
RBC # BLD: 3.24 M/CU MM (ref 4.2–5.4)
SEGMENTED NEUTROPHILS ABSOLUTE COUNT: 5.3 K/CU MM
SEGMENTED NEUTROPHILS RELATIVE PERCENT: 73.2 % (ref 36–66)
SODIUM BLD-SCNC: 138 MMOL/L (ref 135–145)
SPECIMEN: ABNORMAL
STATUS: NORMAL
TOTAL IMMATURE NEUTOROPHIL: 0.05 K/CU MM
TOTAL NUCLEATED RBC: 0 K/CU MM
TRANSFUSION STATUS: NORMAL
UNIT DIVISION: 0
UNIT NUMBER: NORMAL
WBC # BLD: 7.3 K/CU MM (ref 4–10.5)

## 2022-08-03 PROCEDURE — 36415 COLL VENOUS BLD VENIPUNCTURE: CPT

## 2022-08-03 PROCEDURE — 82962 GLUCOSE BLOOD TEST: CPT

## 2022-08-03 PROCEDURE — 6360000002 HC RX W HCPCS: Performed by: PHYSICIAN ASSISTANT

## 2022-08-03 PROCEDURE — 85014 HEMATOCRIT: CPT

## 2022-08-03 PROCEDURE — C9113 INJ PANTOPRAZOLE SODIUM, VIA: HCPCS | Performed by: PHYSICIAN ASSISTANT

## 2022-08-03 PROCEDURE — 6370000000 HC RX 637 (ALT 250 FOR IP): Performed by: SPECIALIST

## 2022-08-03 PROCEDURE — 85025 COMPLETE CBC W/AUTO DIFF WBC: CPT

## 2022-08-03 PROCEDURE — 2580000003 HC RX 258: Performed by: PHYSICIAN ASSISTANT

## 2022-08-03 PROCEDURE — 80048 BASIC METABOLIC PNL TOTAL CA: CPT

## 2022-08-03 PROCEDURE — 94761 N-INVAS EAR/PLS OXIMETRY MLT: CPT

## 2022-08-03 PROCEDURE — 1200000000 HC SEMI PRIVATE

## 2022-08-03 PROCEDURE — 6370000000 HC RX 637 (ALT 250 FOR IP): Performed by: PHYSICIAN ASSISTANT

## 2022-08-03 PROCEDURE — A4216 STERILE WATER/SALINE, 10 ML: HCPCS | Performed by: PHYSICIAN ASSISTANT

## 2022-08-03 PROCEDURE — 85018 HEMOGLOBIN: CPT

## 2022-08-03 RX ADMIN — SODIUM CHLORIDE, PRESERVATIVE FREE 40 MG: 5 INJECTION INTRAVENOUS at 18:36

## 2022-08-03 RX ADMIN — ONDANSETRON 4 MG: 4 TABLET, ORALLY DISINTEGRATING ORAL at 21:12

## 2022-08-03 RX ADMIN — ONDANSETRON 4 MG: 4 TABLET, ORALLY DISINTEGRATING ORAL at 03:28

## 2022-08-03 RX ADMIN — SUCRALFATE 1 G: 1 TABLET ORAL at 06:30

## 2022-08-03 RX ADMIN — ACETAMINOPHEN 650 MG: 325 TABLET ORAL at 06:32

## 2022-08-03 RX ADMIN — SODIUM CHLORIDE, PRESERVATIVE FREE 10 ML: 5 INJECTION INTRAVENOUS at 06:21

## 2022-08-03 RX ADMIN — ONDANSETRON 4 MG: 4 TABLET, ORALLY DISINTEGRATING ORAL at 13:15

## 2022-08-03 RX ADMIN — SODIUM CHLORIDE, PRESERVATIVE FREE 40 MG: 5 INJECTION INTRAVENOUS at 06:20

## 2022-08-03 RX ADMIN — SUCRALFATE 1 G: 1 TABLET ORAL at 00:11

## 2022-08-03 RX ADMIN — METOPROLOL SUCCINATE 50 MG: 50 TABLET, EXTENDED RELEASE ORAL at 09:19

## 2022-08-03 RX ADMIN — SODIUM CHLORIDE, PRESERVATIVE FREE 5 ML: 5 INJECTION INTRAVENOUS at 21:12

## 2022-08-03 RX ADMIN — SODIUM CHLORIDE, PRESERVATIVE FREE 10 ML: 5 INJECTION INTRAVENOUS at 09:21

## 2022-08-03 RX ADMIN — LOSARTAN POTASSIUM 25 MG: 25 TABLET, FILM COATED ORAL at 09:19

## 2022-08-03 RX ADMIN — SUCRALFATE 1 G: 1 TABLET ORAL at 18:16

## 2022-08-03 RX ADMIN — SUCRALFATE 1 G: 1 TABLET ORAL at 13:15

## 2022-08-03 ASSESSMENT — PAIN DESCRIPTION - LOCATION
LOCATION: HEAD
LOCATION: BACK

## 2022-08-03 ASSESSMENT — PAIN SCALES - GENERAL
PAINLEVEL_OUTOF10: 4
PAINLEVEL_OUTOF10: 1

## 2022-08-03 ASSESSMENT — PAIN DESCRIPTION - DESCRIPTORS: DESCRIPTORS: PRESSURE

## 2022-08-03 ASSESSMENT — PAIN DESCRIPTION - ORIENTATION: ORIENTATION: ANTERIOR

## 2022-08-03 NOTE — PROGRESS NOTES
V2.0  [facility] Progress Note      Name:  Florian Delgado /Age/Sex: 1933  (80 y.o. female)   MRN & CSN:  [id number] & [id number] Encounter Date/Time: 8/3/2022 8:44 AM EDT    Location:  7078/5950- PCP: Griselda Hernandez, [facility] Day: 3    Assessment and Plan:   Florian Delgado is a 80 y.o. female with a past medical history of atrial fibrillation on Eliquis, CAD, COPD, CHF, hypertension, hyperlipidemia,  thyroid disease who presents with GI bleed     #Upper GI bleed  #Acute anemia secondary to blood loss  - presented with multiple episodes of black stools, intermittent diffuse abdominal pain and nausea  -CT A/P  with no acute process  - Hgb 7.0 (down from 11.8 22), lactic acid WNL, BUN 42, creatinine 1.1  - hemodynamically stable  -S/p 1U PRBC in ED  -EGD () with multiple superficial duodenal ulcerations noted with recent bleeding and mild superficial gastritis.     Plan:  Eliquis held  IV PPI BID  trend H&H daily transfuse Hgb <7  GI on consult, appreciate recommendation    #Asymptomatic bacteriuria  -UA with positive nitrates, small LE, 14 WBCs  -afebrile without leukocytosis  -No symptoms of UTI currently  -Received 1 dose of IV Rocephin in ED, hold on further antibiotics for now  -Ucx: Raoultella ornithinolytica, pansensitive    Plan:  Consult ID about whether this should be treated     Chronic medical conditions:   #Atrial fibrillation  -Currently in NSR  -Eliquis held in setting of GI bleeding  -continue beta-blocker with hold parameters  -Monitor on telemetry     Non-insulin dependent T2DM with hyperglycemia  -Not currently on home medication  -Hemoglobin A1c 22 7.0  -Blood glucose 200 on admission  -Start low-dose SSI  -Hypoglycemia management protocol     #Hypertension  -BP stable overall  -Resume home losartan and beta-blocker with hold parameters  -Hold HCTZ in setting of bleeding  - monitor BP      #CAD  -Hold ASA in setting of GI bleed, does not appear to be on statin    #COPD, not in acute exacerbation  -No signs or symptoms of acute exacerbation     #Valvular disease  -Echo 6/2022 with EF 50%, mild to moderate aortic stenosis, severe MR, mild TR  -Monitor volume status      Diet ADULT DIET; Clear Liquid   DVT Prophylaxis [] Lovenox, []  Heparin, [] SCDs, [] Ambulation,  [] Eliquis, [] Xarelto  [] Coumadin  Holding in the setting of GI bleed   Code Status Full Code   Disposition From: home  Expected Disposition: home  Estimated Date of Discharge: 24 hours  Patient requires continued admission due to GI bleed, still requiring transfusions, PT/OT eval   Surrogate Decision Maker/ ZACKARY Del Cid     Subjective:     Chief Complaint: Abdominal Pain (Dull pain), Rectal Bleeding (Started a few days ago), and Nausea       Today, patient states that she has been having some nausea without vomiting. She also endorses dark stools yesterday. Review of Systems:    General: No fever, no chills, +lightheadedness  Heart: No chest pain, no palpitations  Lungs: No shortness of breath, no cough  Abdomen: no abdominal pain, + nausea,no vomiting, no constipation, no diarrhea, + melena  : No frequency, no dysuria, no urgency, no decreased urination  MSK: No lower extremity swelling  Neuro: No confusion, no weakness  Skin: No rashes    Objective:      Intake/Output Summary (Last 24 hours) at 8/3/2022 7822  Last data filed at 8/2/2022 1911  Gross per 24 hour   Intake 639 ml   Output --   Net 639 ml        Vitals:   Vitals:    08/03/22 0845   BP: (!) 166/61   Pulse: 71   Resp:    Temp: 97.9 °F (36.6 °C)   SpO2: 100%       Physical Exam:     General: NAD, AAO  Eyes: EOMI, no conjunctival pallor  HENT: Atraumatic  Respiratory: no respiratory distress  GI: Normal bowel sounds, soft, nondistended, nontender  MSK: Normal ROM, no lower extremity edema  Skin: Intact, dry, warm, no rashes  Neuro: CN II to XII grossly intact  Psych: Normal mood    Medications:   Medications:    sodium chloride flush  5-40 mL IntraVENous 2 times per day    pantoprazole (PROTONIX) 40 mg injection  40 mg IntraVENous Q12H    losartan  25 mg Oral Daily    metoprolol succinate  50 mg Oral Daily    insulin lispro  0-4 Units SubCUTAneous TID WC    insulin lispro  0-4 Units SubCUTAneous Nightly    sucralfate  1 g Oral 4 times per day      Infusions:    sodium chloride      sodium chloride      dextrose       PRN Meds: sodium chloride, , PRN  melatonin, 3 mg, Nightly PRN  sodium chloride flush, 5-40 mL, PRN  sodium chloride, , PRN  ondansetron, 4 mg, Q8H PRN   Or  ondansetron, 4 mg, Q6H PRN  acetaminophen, 650 mg, Q6H PRN   Or  acetaminophen, 650 mg, Q6H PRN  glucose, 4 tablet, PRN  dextrose bolus, 125 mL, PRN   Or  dextrose bolus, 250 mL, PRN  glucagon (rDNA), 1 mg, PRN  dextrose, , Continuous PRN      Labs      Recent Results (from the past 24 hour(s))   POCT Glucose    Collection Time: 08/02/22 11:44 AM   Result Value Ref Range    POC Glucose 163 (H) 70 - 99 MG/DL   SPECIMEN REJECTION    Collection Time: 08/02/22  3:17 PM   Result Value Ref Range    Rejected Test [facility]     Reason for Rejection UNABLE TO PERFORM TESTING:    POCT Glucose    Collection Time: 08/02/22  5:06 PM   Result Value Ref Range    POC Glucose 216 (H) 70 - 99 MG/DL   POCT Glucose    Collection Time: 08/02/22  8:09 PM   Result Value Ref Range    POC Glucose 169 (H) 70 - 99 MG/DL   Hemoglobin and Hematocrit    Collection Time: 08/02/22  9:05 PM   Result Value Ref Range    Hemoglobin 9.8 (L) 12.5 - 16.0 GM/DL    Hematocrit 30.7 (L) 37 - 47 %   POCT Glucose    Collection Time: 08/03/22  8:06 AM   Result Value Ref Range    POC Glucose 134 (H) 70 - 99 MG/DL        Imaging/Diagnostics Last 24 Hours   CT ABDOMEN PELVIS W IV CONTRAST Additional Contrast? None    Result Date: 8/1/2022  EXAMINATION: CT OF THE ABDOMEN AND PELVIS WITH CONTRAST 8/1/2022 6:09 am TECHNIQUE: CT of the abdomen and pelvis was performed with the administration of intravenous contrast. Multiplanar reformatted images are provided for review. Automated exposure control, iterative reconstruction, and/or weight based adjustment of the mA/kV was utilized to reduce the radiation dose to as low as reasonably achievable. COMPARISON: CT abdomen pelvis dated June 2, 2019 HISTORY: ORDERING SYSTEM PROVIDED HISTORY: diffuse abdominal pain TECHNOLOGIST PROVIDED HISTORY: Reason for exam:->diffuse abdominal pain Additional Contrast?->None Decision Support Exception - unselect if not a suspected or confirmed emergency medical condition->Emergency Medical Condition (MA) Reason for Exam: diffuse abdominal pain FINDINGS: Lower Chest: Bibasilar atelectasis is noted. Organs: There is redemonstration of pneumobilia. There has been a cholecystectomy. The spleen and adrenal glands are unremarkable. 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 There is no hydronephrosis. GI/Bowel: There is no evidence of bowel obstruction. Pelvis: The bladder is unremarkable. There is no free fluid. There has been a hysterectomy. Peritoneum/Retroperitoneum: There is no free air or lymphadenopathy. Previously noted 1 cm mesenteric lymph node has decreased in size and measures 8 mm. Atherosclerotic vascular calcifications of the aorta are noted. Bones/Soft Tissues: No destructive osseous lesions are identified. Postsurgical changes are seen from prior Whipple procedure. No acute findings.      EGD    Result Date: 8/1/2022  No dictation       Electronically signed by Joselito Holliday MD on 8/3/2022 at 9:06 AM

## 2022-08-03 NOTE — CARE COORDINATION
Discussed pt in IDR- possible discharge however Dr. Ana Laura Cortez considering ID consult prior to discharge, asked if it can be ordered early today in hopes of pt being seen today. Otherwise pt presents with no discharge planning needs, plan remains return home, ind with ADLs, has good family support.

## 2022-08-04 LAB
BASOPHILS ABSOLUTE: 0 K/CU MM
BASOPHILS RELATIVE PERCENT: 0.5 % (ref 0–1)
DIFFERENTIAL TYPE: ABNORMAL
EOSINOPHILS ABSOLUTE: 0.2 K/CU MM
EOSINOPHILS RELATIVE PERCENT: 2.4 % (ref 0–3)
GLUCOSE BLD-MCNC: 132 MG/DL (ref 70–99)
GLUCOSE BLD-MCNC: 162 MG/DL (ref 70–99)
GLUCOSE BLD-MCNC: 196 MG/DL (ref 70–99)
GLUCOSE BLD-MCNC: 210 MG/DL (ref 70–99)
HCT VFR BLD CALC: 29 % (ref 37–47)
HEMOGLOBIN: 9.3 GM/DL (ref 12.5–16)
IMMATURE NEUTROPHIL %: 1 % (ref 0–0.43)
LYMPHOCYTES ABSOLUTE: 1.3 K/CU MM
LYMPHOCYTES RELATIVE PERCENT: 21.1 % (ref 24–44)
MCH RBC QN AUTO: 29.5 PG (ref 27–31)
MCHC RBC AUTO-ENTMCNC: 32.1 % (ref 32–36)
MCV RBC AUTO: 92.1 FL (ref 78–100)
MONOCYTES ABSOLUTE: 0.5 K/CU MM
MONOCYTES RELATIVE PERCENT: 7.4 % (ref 0–4)
NUCLEATED RBC %: 0 %
PDW BLD-RTO: 15.3 % (ref 11.7–14.9)
PLATELET # BLD: 156 K/CU MM (ref 140–440)
PMV BLD AUTO: 9.4 FL (ref 7.5–11.1)
RBC # BLD: 3.15 M/CU MM (ref 4.2–5.4)
SEGMENTED NEUTROPHILS ABSOLUTE COUNT: 4.2 K/CU MM
SEGMENTED NEUTROPHILS RELATIVE PERCENT: 67.6 % (ref 36–66)
TOTAL IMMATURE NEUTOROPHIL: 0.06 K/CU MM
TOTAL NUCLEATED RBC: 0 K/CU MM
WBC # BLD: 6.3 K/CU MM (ref 4–10.5)

## 2022-08-04 PROCEDURE — 82962 GLUCOSE BLOOD TEST: CPT

## 2022-08-04 PROCEDURE — 6370000000 HC RX 637 (ALT 250 FOR IP): Performed by: SPECIALIST

## 2022-08-04 PROCEDURE — 85025 COMPLETE CBC W/AUTO DIFF WBC: CPT

## 2022-08-04 PROCEDURE — 97116 GAIT TRAINING THERAPY: CPT

## 2022-08-04 PROCEDURE — 6370000000 HC RX 637 (ALT 250 FOR IP): Performed by: PHYSICIAN ASSISTANT

## 2022-08-04 PROCEDURE — APPSS60 APP SPLIT SHARED TIME 46-60 MINUTES: Performed by: NURSE PRACTITIONER

## 2022-08-04 PROCEDURE — 94761 N-INVAS EAR/PLS OXIMETRY MLT: CPT

## 2022-08-04 PROCEDURE — 97161 PT EVAL LOW COMPLEX 20 MIN: CPT

## 2022-08-04 PROCEDURE — C9113 INJ PANTOPRAZOLE SODIUM, VIA: HCPCS | Performed by: PHYSICIAN ASSISTANT

## 2022-08-04 PROCEDURE — 1200000000 HC SEMI PRIVATE

## 2022-08-04 PROCEDURE — 2580000003 HC RX 258: Performed by: PHYSICIAN ASSISTANT

## 2022-08-04 PROCEDURE — 99223 1ST HOSP IP/OBS HIGH 75: CPT | Performed by: INTERNAL MEDICINE

## 2022-08-04 PROCEDURE — 6360000002 HC RX W HCPCS: Performed by: PHYSICIAN ASSISTANT

## 2022-08-04 PROCEDURE — 36415 COLL VENOUS BLD VENIPUNCTURE: CPT

## 2022-08-04 PROCEDURE — 6370000000 HC RX 637 (ALT 250 FOR IP): Performed by: INTERNAL MEDICINE

## 2022-08-04 PROCEDURE — 6370000000 HC RX 637 (ALT 250 FOR IP): Performed by: NURSE PRACTITIONER

## 2022-08-04 RX ORDER — PANTOPRAZOLE SODIUM 40 MG/1
40 TABLET, DELAYED RELEASE ORAL
Status: DISCONTINUED | OUTPATIENT
Start: 2022-08-04 | End: 2022-08-05 | Stop reason: HOSPADM

## 2022-08-04 RX ADMIN — Medication 3 MG: at 02:09

## 2022-08-04 RX ADMIN — METOPROLOL SUCCINATE 50 MG: 50 TABLET, EXTENDED RELEASE ORAL at 08:15

## 2022-08-04 RX ADMIN — PANTOPRAZOLE SODIUM 40 MG: 40 TABLET, DELAYED RELEASE ORAL at 16:45

## 2022-08-04 RX ADMIN — SUCRALFATE 1 G: 1 TABLET ORAL at 16:45

## 2022-08-04 RX ADMIN — INSULIN LISPRO 1 UNITS: 100 INJECTION, SOLUTION INTRAVENOUS; SUBCUTANEOUS at 13:25

## 2022-08-04 RX ADMIN — SUCRALFATE 1 G: 1 TABLET ORAL at 05:18

## 2022-08-04 RX ADMIN — ONDANSETRON 4 MG: 4 TABLET, ORALLY DISINTEGRATING ORAL at 11:01

## 2022-08-04 RX ADMIN — LOSARTAN POTASSIUM 25 MG: 25 TABLET, FILM COATED ORAL at 08:15

## 2022-08-04 RX ADMIN — SUCRALFATE 1 G: 1 TABLET ORAL at 11:01

## 2022-08-04 RX ADMIN — Medication 3 MG: at 22:06

## 2022-08-04 RX ADMIN — SUCRALFATE 1 G: 1 TABLET ORAL at 00:26

## 2022-08-04 RX ADMIN — SODIUM CHLORIDE, PRESERVATIVE FREE 40 MG: 5 INJECTION INTRAVENOUS at 08:14

## 2022-08-04 ASSESSMENT — PAIN SCALES - GENERAL: PAINLEVEL_OUTOF10: 0

## 2022-08-04 NOTE — PROGRESS NOTES
statin    #COPD, not in acute exacerbation  -No signs or symptoms of acute exacerbation     #Valvular disease  -Echo 6/2022 with EF 50%, mild to moderate aortic stenosis, severe MR, mild TR  -Monitor volume status      Diet ADULT DIET; Regular   DVT Prophylaxis [] Lovenox, []  Heparin, [] SCDs, [] Ambulation,  [] Eliquis, [] Xarelto  [] Coumadin  Holding in the setting of GI bleed   Code Status Full Code   Disposition From: home  Expected Disposition: home  Estimated Date of Discharge: 8/5/2022  Patient requires continued admission due to arrange home health care   Surrogate Decision Maker/ POA Starlet Ou     Subjective:     Chief Complaint: Abdominal Pain (Dull pain), Rectal Bleeding (Started a few days ago), and Nausea       Today, patient complains of nausea that's about the same as previous days. She was able to eat her breakfast and keep it down. No other complaints this morning.           Review of Systems:    General: No fever, no chills, +lightheadedness  Heart: No chest pain, no palpitations  Lungs: No shortness of breath, no cough  Abdomen: no abdominal pain, + nausea,no vomiting, no constipation, no diarrhea, + melena  : No frequency, no dysuria, no urgency, no decreased urination  MSK: No lower extremity swelling  Neuro: No confusion, no weakness  Skin: No rashes    Objective:   No intake or output data in the 24 hours ending 08/04/22 1100       Vitals:   Vitals:    08/04/22 0815   BP: (!) 152/56   Pulse: 63   Resp: 16   Temp: 98.3 °F (36.8 °C)   SpO2: 96%       Physical Exam:     General: NAD, AAO  Eyes: EOMI, no conjunctival pallor  HENT: Atraumatic  Respiratory: no respiratory distress  GI: Normal bowel sounds, soft, nondistended, nontender  MSK: Normal ROM  Skin: Intact, dry, warm, no rashes, Slight redness from scratching on L ankle  Neuro: CN II to XII grossly intact  Psych: Normal mood    Medications:   Medications:    pantoprazole  40 mg Oral BID AC    sodium chloride flush  5-40 mL IntraVENous 2 times per day    losartan  25 mg Oral Daily    metoprolol succinate  50 mg Oral Daily    insulin lispro  0-4 Units SubCUTAneous TID     insulin lispro  0-4 Units SubCUTAneous Nightly    sucralfate  1 g Oral 4 times per day      Infusions:    sodium chloride      sodium chloride      dextrose       PRN Meds: sodium chloride, , PRN  melatonin, 3 mg, Nightly PRN  sodium chloride flush, 5-40 mL, PRN  sodium chloride, , PRN  ondansetron, 4 mg, Q8H PRN   Or  ondansetron, 4 mg, Q6H PRN  acetaminophen, 650 mg, Q6H PRN   Or  acetaminophen, 650 mg, Q6H PRN  glucose, 4 tablet, PRN  dextrose bolus, 125 mL, PRN   Or  dextrose bolus, 250 mL, PRN  glucagon (rDNA), 1 mg, PRN  dextrose, , Continuous PRN      Labs      Recent Results (from the past 24 hour(s))   POCT Glucose    Collection Time: 08/03/22 12:10 PM   Result Value Ref Range    POC Glucose 152 (H) 70 - 99 MG/DL   POCT Glucose    Collection Time: 08/03/22  4:56 PM   Result Value Ref Range    POC Glucose 158 (H) 70 - 99 MG/DL   POCT Glucose    Collection Time: 08/03/22  8:02 PM   Result Value Ref Range    POC Glucose 165 (H) 70 - 99 MG/DL   POCT Glucose    Collection Time: 08/04/22  7:24 AM   Result Value Ref Range    POC Glucose 162 (H) 70 - 99 MG/DL   CBC with Auto Differential    Collection Time: 08/04/22  9:58 AM   Result Value Ref Range    WBC 6.3 4.0 - 10.5 K/CU MM    RBC 3.15 (L) 4.2 - 5.4 M/CU MM    Hemoglobin 9.3 (L) 12.5 - 16.0 GM/DL    Hematocrit 29.0 (L) 37 - 47 %    MCV 92.1 78 - 100 FL    MCH 29.5 27 - 31 PG    MCHC 32.1 32.0 - 36.0 %    RDW 15.3 (H) 11.7 - 14.9 %    Platelets 829 976 - 198 K/CU MM    MPV 9.4 7.5 - 11.1 FL    Differential Type AUTOMATED DIFFERENTIAL     Segs Relative 67.6 (H) 36 - 66 %    Lymphocytes % 21.1 (L) 24 - 44 %    Monocytes % 7.4 (H) 0 - 4 %    Eosinophils % 2.4 0 - 3 %    Basophils % 0.5 0 - 1 %    Segs Absolute 4.2 K/CU MM    Lymphocytes Absolute 1.3 K/CU MM    Monocytes Absolute 0.5 K/CU MM    Eosinophils Absolute 0.2 K/CU MM Basophils Absolute 0.0 K/CU MM    Nucleated RBC % 0.0 %    Total Nucleated RBC 0.0 K/CU MM    Total Immature Neutrophil 0.06 K/CU MM    Immature Neutrophil % 1.0 (H) 0 - 0.43 %        Imaging/Diagnostics Last 24 Hours   CT ABDOMEN PELVIS W IV CONTRAST Additional Contrast? None    Result Date: 8/1/2022  EXAMINATION: CT OF THE ABDOMEN AND PELVIS WITH CONTRAST 8/1/2022 6:09 am TECHNIQUE: CT of the abdomen and pelvis was performed with the administration of intravenous contrast. Multiplanar reformatted images are provided for review. Automated exposure control, iterative reconstruction, and/or weight based adjustment of the mA/kV was utilized to reduce the radiation dose to as low as reasonably achievable. COMPARISON: CT abdomen pelvis dated June 2, 2019 HISTORY: ORDERING SYSTEM PROVIDED HISTORY: diffuse abdominal pain TECHNOLOGIST PROVIDED HISTORY: Reason for exam:->diffuse abdominal pain Additional Contrast?->None Decision Support Exception - unselect if not a suspected or confirmed emergency medical condition->Emergency Medical Condition (MA) Reason for Exam: diffuse abdominal pain FINDINGS: Lower Chest: Bibasilar atelectasis is noted. Organs: There is redemonstration of pneumobilia. There has been a cholecystectomy. The spleen and adrenal glands are unremarkable. 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 There is no hydronephrosis. GI/Bowel: There is no evidence of bowel obstruction. Pelvis: The bladder is unremarkable. There is no free fluid. There has been a hysterectomy. Peritoneum/Retroperitoneum: There is no free air or lymphadenopathy. Previously noted 1 cm mesenteric lymph node has decreased in size and measures 8 mm. Atherosclerotic vascular calcifications of the aorta are noted. Bones/Soft Tissues: No destructive osseous lesions are identified. Postsurgical changes are seen from prior Whipple procedure. No acute findings.      EGD    Result Date: 8/1/2022  No dictation

## 2022-08-04 NOTE — CARE COORDINATION
Met c pt to continue discharge planning- pt sitting up at edge of bed ind'ly- states has been getting up and walking around the room on her own. Pt continues to deny discharge planning needs including HHC. Pt advised CM remains available if needs arise, pt reiterates that she has good family support at home if needed.

## 2022-08-04 NOTE — PLAN OF CARE
Problem: Discharge Planning  Goal: Discharge to home or other facility with appropriate resources  8/4/2022 1004 by Sarah Carlton RN  Outcome: Adequate for Discharge  8/4/2022 0135 by Lashonda Madrid LPN  Outcome: Progressing  Flowsheets (Taken 8/3/2022 1229 by Sarah Carlton RN)  Discharge to home or other facility with appropriate resources:   Identify barriers to discharge with patient and caregiver   Identify discharge learning needs (meds, wound care, etc)   Refer to discharge planning if patient needs post-hospital services based on physician order or complex needs related to functional status, cognitive ability or social support system   Arrange for needed discharge resources and transportation as appropriate   Arrange for interpreters to assist at discharge as needed     Problem: Chronic Conditions and Co-morbidities  Goal: Patient's chronic conditions and co-morbidity symptoms are monitored and maintained or improved  8/4/2022 1004 by Sarah Carlton RN  Outcome: Adequate for Discharge  8/4/2022 0135 by Lashonda Madrid LPN  Outcome: Progressing     Problem: Safety - Adult  Goal: Free from fall injury  8/4/2022 1004 by Sarah Carlton RN  Outcome: Adequate for Discharge  8/4/2022 0135 by Lashonda Madrid LPN  Outcome: Progressing     Problem: Skin/Tissue Integrity  Goal: Absence of new skin breakdown  Description: 1. Monitor for areas of redness and/or skin breakdown  2. Assess vascular access sites hourly  3. Every 4-6 hours minimum:  Change oxygen saturation probe site  4. Every 4-6 hours:  If on nasal continuous positive airway pressure, respiratory therapy assess nares and determine need for appliance change or resting period.   8/4/2022 1004 by Sarah Carlton RN  Outcome: Adequate for Discharge  8/4/2022 0135 by Lashonda Madrid LPN  Outcome: Progressing     Problem: ABCDS Injury Assessment  Goal: Absence of physical injury  8/4/2022 1004 by Sarah Carlton RN  Outcome: Adequate for

## 2022-08-04 NOTE — CONSULTS
Consult cancelled per protocol. All scheduled IV meds/infusions have been cancelled and Physicians Note reports plan to D/C pt after ID consult. Frederick Claudio, Primary Nurse called and agrees therapeutic needs are met at this time without IV access. No other c/o or needs noted or reported.

## 2022-08-04 NOTE — PROGRESS NOTES
Physician Progress Note      PATIENT:               Roselia Marrero  [facility] #:                  [id number]  :                       1933  ADMIT DATE:       2022 3:54 AM  DISCH DATE:  RESPONDING  PROVIDER #:        Tracee Rojas          QUERY TEXT:    Hospitalists,    Patient admitted with GI bleeding, noted to have duodenal ulcers with recent   bleeding and gastritis documented in EGD report. If possible, please document   in progress notes and discharge summary the cause of the GI bleeding: The medical record reflects the following:  Risk Factors: Eliquis  Clinical Indicators: EGD: \"Multiple superficial duodenal ulcerations noted   with recent bleeding. Mild superficial gastritis. \"  ? Treatment: labs, imaging, EGD, IVF, PRBC,PPI    Thank you,  Claudy Richmond RN CDS  [phone]  Options provided:  -- GI bleeding due to duodenal ulcers  -- GI bleeding due to gastritis  -- GI bleeding due to ##please specify, please specify.   -- Other - I will add my own diagnosis  -- Disagree - Not applicable / Not valid  -- Disagree - Clinically unable to determine / Unknown  -- Refer to Clinical Documentation Reviewer    PROVIDER RESPONSE TEXT:    This patient has GI bleeding due to Duodenal ulcerations    Query created by: Roel Egan on 8/3/2022 3:46 PM      Electronically signed by:  Tracee Rojas 2022 6:07 PM

## 2022-08-04 NOTE — CONSULTS
Infectious Disease Consult Note  2022   Patient Name: Howard Hughes : 1933   Impression  Asymptomatic Bacteriuria with Urine Culture Positive with Raouletella ornithinolytica:  Afebrile, no leukocytosis  -urine culture: Raoultella ornithinolytica >100,000  -UA WBC 14, RBC 7  -CT A&P W IV Contrast: Postsurgical changes are seen from prior Whipple procedure. No acute   findings. DMII/ Thyroid Disease:  Acute GIB: Melena: DX: Duodenal Ulcers:  Dr. Drake Hair onboard  -S/p EGD: Multiple superficial duodenal ulcers with recent bleeding. Mild superficial gastritis. Allergy to sulfa (anaphylaxis), nitrofuran derivatives:  Remote Pancreatic Neuroendocrine Carcinoma s/p Whipple Procedure 2006:  CAD/ PHTN/ VHD/ HTN:  Multi-morbidity: per PMHx:  allergy to sulfa ABX, nitrofuran derivatives, s/p chollondon, jose antonio, hyster   Plan:  No ABX onboard, continue   Will not treat with ABX as patient has been and remains asymtomatic  Will sign off and not follow the patient actively, please reconsult as needed. Thank you for allowing me to consult in the care of this patient.  ------------------------  REASON FOR CONSULT: Infective syndrome \"Raoulta ornithinolytic in urine, asymptomatic\"  Requested by: Dr. Silas Valderrama is a 80 y.o.  female with a history of s/p whipple procedure at [facility]  for pancreatic neuroendocrine carcinoma, OA, CAD, COPD, CHF, depression, HTN, nonalcoholic fatty liver disease, GERD, PHTN, DMII, thyroid disease, HLD, and VHD who was admitted 2022 for further evaluation and management of generalized, dull abdominal pain with rectal bleeding which began a few days prior to admission. Her urine culture was found to have Raoultella ornithinoytic >100,000 as an incidental finding as the patient has been asymtomatic. She has had previous UTIs with E.coli on 10/25/21, 21, and 19.       Infectious diseases service was consulted to evaluate the pt, and recommend cardiovascular stress test 12/2/2010, 2/28/2008 12/2/2010 - Normal pattern of perfusion in all regions. LV is normal. No inducible myocardial ischemia. EF is 66%. LVSF is normal. No ECG changes. Exercise capacity is normal.    H/O cardiovascular stress test 2/24/2015    cardiolite-normal, no ischemia, EF69%    H/O Doppler ultrasound 9/15/2000    9/15/2000 - Carotid - No hemodynamically significant stenosis. H/O echocardiogram 12/2/2010, 9/22/2009 12/2/2010 - Difficult apical imaging due to patient COPD. LVSF is normal. EF = > 55%. Impaired LV impairment. Mild-Moderate MR. Mild TR. H/O echocardiogram 7/15/14    EF 55-60%. No significant valvulopathy is seen. Heart valve problem     2 leaky heart valves    History of Doppler ultrasound 10/31/2000    10/31/2000 - Peripheral - Normal study. HX OTHER MEDICAL 1/14/2004 1/14/2004 - 48 hr Holter - Predominant rhythm is sinus rhythm. No significant ectopy is seen.     Hyperlipidemia     Mild tricuspid regurgitation     Mitral regurgitation     Nausea & vomiting     Near syncope 5/28/2019    Osteopenia     Pulmonary hypertension (HCC)     Pulmonary nodules     Rectal bleeding 12/21/2012    Supraventricular tachycardia (HCC)     Thyroid disease     Type 2 diabetes mellitus ([address]       Past Surgical History:   Procedure Laterality Date    APPENDECTOMY      BREAST SURGERY      bilateral lumpectomy    CHOLECYSTECTOMY      COLECTOMY      also head of pancreas    COLONOSCOPY  12-21-12    polyp    DIAGNOSTIC CARDIAC CATH LAB PROCEDURE  8/2005    FRACTURE SURGERY      repair of fractured nose    HYSTERECTOMY (CERVIX STATUS UNKNOWN)      LALA/BSO    PANCREAS SURGERY  11/7/2006    Whipple Procedure    SKIN BIOPSY      moles from right shoulder, chin, left leg    THYROIDECTOMY, PARTIAL      THYROIDECTOMY, PARTIAL  1999    TONSILLECTOMY      UPPER GASTROINTESTINAL ENDOSCOPY N/A 8/1/2022    EGD DIAGNOSTIC ONLY performed by Naila Ma MD at [facility] History   Problem Relation Age of Onset    Heart Failure Mother         CHF    Hypertension Mother     Cancer Father         Pancreatic    Hypertension Father     Heart Disease Sister         CMP    Other Sister         Bone problems    Other Sister         infection    Ovarian Cancer Sister     Cancer Brother         Bone    Other Brother         aneurysm    Coronary Art Dis Brother     Heart Attack Brother     Coronary Art Dis Daughter     Hypertension Daughter     Cancer Son     Early Death Son         MVA      Infectious disease related family history - not contibutory. SOCIAL HISTORY  Social History     Tobacco Use    Smoking status: Never    Smokeless tobacco: Never   Substance Use Topics    Alcohol use: Yes     Comment: Rare alcohol use. CAFFEINE: 1 cup coffee daily      Born:[place], [place]  Lives: [address],  in [facility]  Occupation:Retired Bar Owner  No recent travel of significance. No recent unusual exposures. Pets: none   ? ALLERGIES  Allergies   Allergen Reactions    Sulfa Antibiotics Anaphylaxis    Nitrofuran Derivatives     Quinapril Hcl      Cough      MEDICATIONS  Reviewed and are per the chart/EMR. ? Antibiotics:   Present:  none    -------------------------------------------------------------------------------------------------------------------    Vital Signs:  Vitals:    08/04/22 0815   BP: (!) 152/56   Pulse: 63   Resp: 16   Temp: 98.3 °F (36.8 °C)   SpO2: 96%         Exam:    VS: noted; wt 162 lb (73.8 kg) Height 5'6\"  Gen: alert and oriented X3, no distress  Skin: no stigmata of endocarditis  HEMT: AT/NC Oropharynx pink, moist, and without lesions or exudates; dentition in good state of repair  Eyes: PERRLA, EOMI, conjunctiva pink, sclera anicteric. Neck: Supple. Trachea midline. No LAD. Chest: no distress and CTA. Good air movement. Room air. Heart: AF and no MRG. Abd: soft, non-distended, no tenderness, no hepatomegaly. Normoactive bowel sounds.   Ext: no clubbing, cyanosis, non-pitting BLE edema  Neuro: Mental status intact. CN 2-12 intact and no focal sensory or motor deficits    ? Diagnostic Studies: reviewed  8/1/22 CT Abdomen Pelvis W IV Contrast:  Impression   Postsurgical changes are seen from prior Whipple procedure. No acute   findings. ??  I have examined this patient and available medical records on this date and have made the above observations, conclusions and recommendations. Electronically signed by: Electronically signed by MCKINLEY Sotomayor CNP on 8/4/2022 at 11:05 AM

## 2022-08-04 NOTE — CONSULTS
[address], 5/25/1933, 4113/4113-A, 8/4/2022    History  [place]:  The primary encounter diagnosis was Gastrointestinal hemorrhage, unspecified gastrointestinal hemorrhage type. Diagnoses of Anemia, unspecified type and Urinary tract infection without hematuria, site unspecified were also pertinent to this visit. Patient  has a past medical history of Arrhythmia, Arthritis, Atrophic vaginitis, Blood transfusion, CAD (coronary artery disease), Cataracts, bilateral, CHF (congestive heart failure) ([address] COPD (chronic obstructive pulmonary disease) ([address] Depression, Essential hypertension, Fatty liver disease, nonalcoholic, GERD (gastroesophageal reflux disease), H/O 24 hour EKG monitoring, H/O cardiac catheterization, H/O cardiovascular stress test, H/O cardiovascular stress test, H/O Doppler ultrasound, H/O echocardiogram, H/O echocardiogram, Heart valve problem, History of Doppler ultrasound, HX OTHER MEDICAL, Hyperlipidemia, Mild tricuspid regurgitation, Mitral regurgitation, Nausea & vomiting, Near syncope, Osteopenia, Pulmonary hypertension ([address] Pulmonary nodules, Rectal bleeding, Supraventricular tachycardia ([address] Thyroid disease, and Type 2 diabetes mellitus ([address] Patient  has a past surgical history that includes Thyroidectomy, partial; colectomy; Cholecystectomy; Appendectomy; Hysterectomy; Breast surgery; fracture surgery; Tonsillectomy; skin biopsy; Colonoscopy (12-21-12); Pancreas surgery (11/7/2006); Thyroidectomy, partial (1999); Diagnostic Cardiac Cath Lab Procedure (8/2005); and Upper gastrointestinal endoscopy (N/A, 8/1/2022). Subjective:  Patient states:  \"I've been getting up and using the bathroom\". Pain:  Pt denied pain.     Communication with other providers:  Handoff to RN  Restrictions: General Precautions, Fall Risk    Home Setup/Prior level of function  Social/Functional History  Lives With: Alone ( at [place], daughter lives close by)  Type of Home: House  Home Layout: One level  Home Access: Level entry  Bathroom Shower/Tub: Tub/Shower unit, Walk-in shower  Bathroom Toilet: Standard  Bathroom Equipment: Grab bars in shower, Built-in shower seat, Tub transfer bench, [address]: BETTY/Michael Kaye, quad, Walker, rolling, Rollator  Has the patient had two or more falls in the past year or any fall with injury in the past year?: No  ADL Assistance: Independent  Homemaking Assistance: Independent  Homemaking Responsibilities: Yes  Ambulation Assistance: Independent  Transfer Assistance: Independent  Active : No (Daughter drives)  Occupation: Retired  Type of Occupation: Bar owner, [employer]  Leisure & Hobbies: Watch movies    Examination of body systems (includes body structures/functions, activity/participation limitations):  Observation:  Pt seated at EOB upon arrival and agreeable to therapy. Vision:  Wears glasses   Hearing:  WFL  Cardiopulmonary:  No O2 Needs  Cognition: oriented to name, situation and place, see OT/SLP note for further evaluation. Musculoskeletal  ROM R/L:  WFL. Strength R/L:  B LE: 4+/5, Minimal Impairment in function and endurance. Neuro:  WFL       Mobility:  Rolling L/R:  NT  Supine to sit:  NT  Transfers: Pt completed STS from/to EOB CGA. Cues provided for sequencing. Sitting balance:  Good. Standing balance:  Good-. Pt stood for ~1 minute at EOB CGA. Pt stood with upright posture. Gait: Pt ambulated 450' with no AD CGA. Pt ambulated with decreased step height bilaterally. Cues provided for pathway negotiation. Education: Pt educated on role of therapy. [facility] 6 Clicks Inpatient Mobility:  AM-PAC Inpatient Mobility Raw Score : 22    Safety: patient left seated at EOB, alarm on, call light within reach, RN notified and gait belt used. Assessment:  Pt is a 80 y.o. female admitted to the hospital for GI bleed.  Pt is typically independent with bed mobility, independent with transfers and ambulates with no AD. Pt is currently performing transfers CGA and ambulating with no ' CGA. Pt is presenting with no impairments. Pt would no longer benefit from continued acute care PT. Pt is performing close to baseline and would benefit from [address] Complexity: Low   Prognosis: Good, no significant barriers to participation at this time. Equipment: None. Recommendations for NURSING mobility: Ambulate with gait belt. Time:   Time in: 1414  Time out: 1435  Timed treatment minutes: 9  Total time: 21    Electronically signed by:    Rivera Villagran  8/4/2022, 3:48 PM    \"I, the qualified professional, was present and in the room for the entire session. The student participates in the delivery of services when the qualified practitioner is directing the service, making the skilled judgment, and is responsible for the assessment and treatment. \" Salas Moe, PT

## 2022-08-05 ENCOUNTER — TELEPHONE (OUTPATIENT)
Dept: CARDIOLOGY CLINIC | Age: 87
End: 2022-08-05

## 2022-08-05 VITALS
HEIGHT: 66 IN | OXYGEN SATURATION: 99 % | SYSTOLIC BLOOD PRESSURE: 163 MMHG | BODY MASS INDEX: 26.16 KG/M2 | TEMPERATURE: 98.2 F | WEIGHT: 162.8 LBS | DIASTOLIC BLOOD PRESSURE: 53 MMHG | HEART RATE: 57 BPM | RESPIRATION RATE: 16 BRPM

## 2022-08-05 LAB
GLUCOSE BLD-MCNC: 133 MG/DL (ref 70–99)
GLUCOSE BLD-MCNC: 249 MG/DL (ref 70–99)

## 2022-08-05 PROCEDURE — 6370000000 HC RX 637 (ALT 250 FOR IP): Performed by: SPECIALIST

## 2022-08-05 PROCEDURE — 6370000000 HC RX 637 (ALT 250 FOR IP): Performed by: STUDENT IN AN ORGANIZED HEALTH CARE EDUCATION/TRAINING PROGRAM

## 2022-08-05 PROCEDURE — 6370000000 HC RX 637 (ALT 250 FOR IP): Performed by: PHYSICIAN ASSISTANT

## 2022-08-05 PROCEDURE — 94761 N-INVAS EAR/PLS OXIMETRY MLT: CPT

## 2022-08-05 PROCEDURE — 6370000000 HC RX 637 (ALT 250 FOR IP): Performed by: INTERNAL MEDICINE

## 2022-08-05 RX ORDER — DIPHENHYDRAMINE HCL 25 MG
25 TABLET ORAL ONCE
Status: COMPLETED | OUTPATIENT
Start: 2022-08-05 | End: 2022-08-05

## 2022-08-05 RX ORDER — SUCRALFATE 1 G/1
1 TABLET ORAL 4 TIMES DAILY
Qty: 120 TABLET | Refills: 3 | Status: SHIPPED | OUTPATIENT
Start: 2022-08-05 | End: 2022-10-27 | Stop reason: SDUPTHER

## 2022-08-05 RX ORDER — PANTOPRAZOLE SODIUM 40 MG/1
40 TABLET, DELAYED RELEASE ORAL
Qty: 30 TABLET | Refills: 3 | Status: SHIPPED | OUTPATIENT
Start: 2022-08-05 | End: 2022-10-27 | Stop reason: SDUPTHER

## 2022-08-05 RX ORDER — DIPHENHYDRAMINE HYDROCHLORIDE 50 MG/ML
25 INJECTION INTRAMUSCULAR; INTRAVENOUS ONCE
Status: DISCONTINUED | OUTPATIENT
Start: 2022-08-05 | End: 2022-08-05

## 2022-08-05 RX ADMIN — SUCRALFATE 1 G: 1 TABLET ORAL at 00:20

## 2022-08-05 RX ADMIN — INSULIN LISPRO 1 UNITS: 100 INJECTION, SOLUTION INTRAVENOUS; SUBCUTANEOUS at 12:35

## 2022-08-05 RX ADMIN — PANTOPRAZOLE SODIUM 40 MG: 40 TABLET, DELAYED RELEASE ORAL at 06:29

## 2022-08-05 RX ADMIN — METOPROLOL SUCCINATE 50 MG: 50 TABLET, EXTENDED RELEASE ORAL at 09:31

## 2022-08-05 RX ADMIN — SUCRALFATE 1 G: 1 TABLET ORAL at 06:29

## 2022-08-05 RX ADMIN — DIPHENHYDRAMINE HYDROCHLORIDE 25 MG: 25 TABLET ORAL at 00:59

## 2022-08-05 RX ADMIN — LOSARTAN POTASSIUM 25 MG: 25 TABLET, FILM COATED ORAL at 09:31

## 2022-08-05 RX ADMIN — SUCRALFATE 1 G: 1 TABLET ORAL at 12:35

## 2022-08-05 RX ADMIN — ONDANSETRON 4 MG: 4 TABLET, ORALLY DISINTEGRATING ORAL at 10:20

## 2022-08-05 NOTE — PLAN OF CARE

## 2022-08-05 NOTE — PLAN OF CARE
Problem: Discharge Planning  Goal: Discharge to home or other facility with appropriate resources  8/5/2022 1418 by Nicolette Ocampo RN  Outcome: Completed  8/5/2022 1121 by Nicolette Ocampo RN  Outcome: Progressing  8/5/2022 0043 by Ham Morales RN  Outcome: Progressing     Problem: Chronic Conditions and Co-morbidities  Goal: Patient's chronic conditions and co-morbidity symptoms are monitored and maintained or improved  8/5/2022 1418 by Nicolette Ocampo RN  Outcome: Completed  8/5/2022 1121 by Nicolette Ocampo RN  Outcome: Progressing  8/5/2022 0043 by Ham Morales RN  Outcome: Progressing     Problem: Safety - Adult  Goal: Free from fall injury  8/5/2022 1418 by Nicolette Ocampo RN  Outcome: Completed  8/5/2022 1121 by Nicolette Ocampo RN  Outcome: Progressing  Flowsheets (Taken 8/5/2022 0931)  Free From Fall Injury: Instruct family/caregiver on patient safety  8/5/2022 0043 by Ham Morales RN  Outcome: Progressing     Problem: Skin/Tissue Integrity  Goal: Absence of new skin breakdown  Description: 1. Monitor for areas of redness and/or skin breakdown  2. Assess vascular access sites hourly  3. Every 4-6 hours minimum:  Change oxygen saturation probe site  4. Every 4-6 hours:  If on nasal continuous positive airway pressure, respiratory therapy assess nares and determine need for appliance change or resting period.   8/5/2022 1418 by Nicolette Ocampo RN  Outcome: Completed  8/5/2022 1121 by Nicolette Ocampo RN  Outcome: Progressing  8/5/2022 0043 by Ham Morales RN  Outcome: Progressing     Problem: ABCDS Injury Assessment  Goal: Absence of physical injury  8/5/2022 1418 by Nicolette Ocampo RN  Outcome: Completed  8/5/2022 1121 by Nicolette Ocampo RN  Outcome: Progressing  8/5/2022 0043 by Ham Morales RN  Outcome: Progressing     Problem: Pain  Goal: Verbalizes/displays adequate comfort level or baseline comfort level  8/5/2022 1418 by Nicolette Ocampo RN  Outcome: Completed  8/5/2022 1121 by Nicolette Ocampo RN  Outcome: Progressing  8/5/2022 0043 by Ham Morales RN  Outcome: Progressing

## 2022-08-05 NOTE — CARE COORDINATION
Reviewed chart and discussed in IDR, Plan is home with no needs identified. [id number] received call from pt's daughter Vinita Lennon asking about when pt is being discharged , Her understand is pt  to be discharged today. She is asking if this is true and if not why not. CM sent PS to Dr Chloe Lara.

## 2022-08-05 NOTE — TELEPHONE ENCOUNTER
Pt's daughter called states she has been in hospital due to bleeding ulcers from the Eliquis. They are looking to discharge her and wanted to find out if she could proceed with just taking Baby ASA instead of starting the Eliquis back up. She also states that the 30 day monitor that was put on her will not be completed or any accurate info due to pt can not operate it. She has no cell phone, let the battery charge run out, etc. She wanted to know if there was a different monitor that could be done for her?  Please advise

## 2022-08-05 NOTE — DISCHARGE INSTRUCTIONS
You were admitted for a GI bleed (black colored bowel movements). Your blood level has been stable and you are ready for discharge. Your blood thinner was restarted today. Please make sure if you have any black bowel movements or bloody bowel movements to please return to the emergency room.

## 2022-08-05 NOTE — TELEPHONE ENCOUNTER
Spoke with daughter, patient discharged from hospital after multiple ulcers found and several blood transfusion. Eliquis was stopped and she is only on ASA. Also unable to use/wear event monitor and will return after 2 days of monitoring.  Asked that Dr Pedro Way made aware

## 2022-08-05 NOTE — PROGRESS NOTES
Discharge education completed with pt, pt demonstrated appropriate teach back, pt has prescription called in to pharmacy, medication's and side effect education completed, pt has discharge paperwork, AVS signed, pt denies any needs or questions at this time.

## 2022-08-05 NOTE — PROGRESS NOTES
Pt wants to hold off on the eliquis at this time. I explained the benefits and risk.  She would like to hold off

## 2022-08-06 NOTE — DISCHARGE SUMMARY
V2.0  Discharge Summary    Name:  Deysi Delgado /Age/Sex: 1933 (80 y.o. female)   Admit Date: 2022  Discharge Date: 22    MRN & CSN:  [id number] & [id number] Encounter Date and Time 22 5:48 PM EDT    Attending:  No att. providers found Discharging Provider: Tyra Phan MD       Hospital Course:     Brief HPI:     Ms. Lyly Glass is an 29-year-old woman with PMH of A. fib on Eliquis, CAD, COPD, CHF, HTN, HLD and thyroid disease who presented to the ED with black stools for 1 day. Patient's aspirin and Eliquis were held. GI was consulted. Patient underwent EGD and was found to have multiple superficial duodenal ulcerations with recent bleeding as well as mild superficial gastritis. Patient was placed on Protonix 40 mg twice daily. Patient received a total of 3 units of PRBCs. Patient's hemoglobin stabilized and will be going home with home health care. Eliquis and aspirin have been restarted. Patient to follow-up with GI and primary care as outpatient. Brief Problem Based Course:   #Upper GI bleed  #Acute anemia secondary to blood loss  - presented with multiple episodes of black stools, intermittent diffuse abdominal pain and nausea  -CT A/P  with no acute process  - Hgb 7.0 (down from 11.8 22), lactic acid WNL, BUN 42, creatinine 1.1  - hemodynamically stable  -S/p 1U PRBC in ED  -EGD () with multiple superficial duodenal ulcerations noted with recent bleeding and mild superficial gastritis.   -GI was following     Plan:  Follow-up with GI as outpatient  Follow-up with primary care physician as outpatient for hemoglobin check     #Asymptomatic bacteriuria  -UA with positive nitrates, small LE, 14 WBCs  -afebrile without leukocytosis  -No symptoms of UTI currently  -Received 1 dose of IV Rocephin in ED, hold on further antibiotics for now  -Ucx: Raoultella ornithinolytica, pansensitive  ID consulted recommend not treating     Chronic medical conditions:  #Atrial CONTINUE taking these medications      apixaban 5 MG Tabs tablet  Commonly known as: ELIQUIS  Take 1 tablet by mouth in the morning and 1 tablet before bedtime. aspirin 81 MG EC tablet  Take 1 tablet by mouth in the morning. hydroCHLOROthiazide 25 MG tablet  Commonly known as: HYDRODIURIL  Take 1 tablet by mouth in the morning. losartan 25 MG tablet  Commonly known as: COZAAR  Take 1 tablet by mouth in the morning. metoprolol succinate 50 MG extended release tablet  Commonly known as: Toprol XL  Take 1 tablet by mouth in the morning.             STOP taking these medications      clobetasol 0.05 % ointment  Commonly known as: Temovate     hydrOXYzine HCl 10 MG tablet  Commonly known as: ATARAX     ondansetron 4 MG disintegrating tablet  Commonly known as: ZOFRAN-ODT     triamcinolone 0.1 % cream  Commonly known as: KENALOG               Where to Get Your Medications        These medications were sent to [address] [place] [address] [phone] - F [phone]  [address] [place] [id number]      Phone: [phone]   pantoprazole 40 MG tablet  sucralfate 1 GM tablet        Objective Findings at Discharge:   BP (!) 163/53   Pulse 57   Temp 98.2 °F (36.8 °C) (Oral)   Resp 16   Ht 5' 6\" (1.676 m)   Wt 162 lb 12.8 oz (73.8 kg)   SpO2 99%   BMI 26.28 kg/m²       Physical Exam:   General: NAD, AAO  Eyes: EOMI, no conjunctival pallor  HENT: Atraumatic  Respiratory: no respiratory distress  GI: Normal bowel sounds, soft, nondistended, nontender  MSK: Normal ROM  Skin: Intact, dry, warm, no rashes  Neuro: CN II to XII grossly intact  Psych: Normal mood  Labs and Imaging   CT ABDOMEN PELVIS W IV CONTRAST Additional Contrast? None    Result Date: 8/1/2022  EXAMINATION: CT OF THE ABDOMEN AND PELVIS WITH CONTRAST 8/1/2022 6:09 am TECHNIQUE: CT of the abdomen and pelvis was performed with the administration of intravenous contrast. Multiplanar reformatted images are provided for review. Automated exposure control, iterative reconstruction, and/or weight based adjustment of the mA/kV was utilized to reduce the radiation dose to as low as reasonably achievable. COMPARISON: CT abdomen pelvis dated June 2, 2019 HISTORY: ORDERING SYSTEM PROVIDED HISTORY: diffuse abdominal pain TECHNOLOGIST PROVIDED HISTORY: Reason for exam:->diffuse abdominal pain Additional Contrast?->None Decision Support Exception - unselect if not a suspected or confirmed emergency medical condition->Emergency Medical Condition (MA) Reason for Exam: diffuse abdominal pain FINDINGS: Lower Chest: Bibasilar atelectasis is noted. Organs: There is redemonstration of pneumobilia. There has been a cholecystectomy. The spleen and adrenal glands are unremarkable. 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 There is no hydronephrosis. GI/Bowel: There is no evidence of bowel obstruction. Pelvis: The bladder is unremarkable. There is no free fluid. There has been a hysterectomy. Peritoneum/Retroperitoneum: There is no free air or lymphadenopathy. Previously noted 1 cm mesenteric lymph node has decreased in size and measures 8 mm. Atherosclerotic vascular calcifications of the aorta are noted. Bones/Soft Tissues: No destructive osseous lesions are identified. Postsurgical changes are seen from prior Whipple procedure. No acute findings. EGD    Result Date: 8/1/2022  No dictation       CBC:   Recent Labs     08/04/22  0958   WBC 6.3   HGB 9.3*        BMP:  No results for input(s): NA, K, CL, CO2, BUN, CREATININE, GLUCOSE in the last 72 hours. Hepatic: No results for input(s): AST, ALT, ALB, BILITOT, ALKPHOS in the last 72 hours.   Lipids:   Lab Results   Component Value Date/Time    CHOL 124 06/17/2022 07:35 AM    CHOL 149 08/01/2005 12:00 AM    HDL 38 06/17/2022 07:35 AM    TRIG 112 06/17/2022 07:35 AM     Hemoglobin A1C:   Lab Results   Component Value Date/Time    LABA1C 7.0 06/16/2022 05:15 PM     TSH:   Lab Results   Component Value Date/Time    TSH 3.29 07/01/2020 02:46 PM     Troponin:   Lab Results   Component Value Date/Time    TROPONINT <0.010 08/01/2022 03:58 AM    TROPONINT <0.010 06/16/2022 05:58 PM    TROPONINT <0.010 05/28/2019 04:16 PM     Lactic Acid: No results for input(s): LACTA in the last 72 hours. BNP: No results for input(s): PROBNP in the last 72 hours.   UA:  Lab Results   Component Value Date/Time    NITRU POSITIVE 08/01/2022 04:44 AM    COLORU YELLOW 08/01/2022 04:44 AM    PHUR 5.5 06/23/2022 02:10 PM    WBCUA 14 08/01/2022 04:44 AM    RBCUA 7 08/01/2022 04:44 AM    MUCUS RARE 08/01/2022 04:44 AM    TRICHOMONAS NONE SEEN 08/01/2022 04:44 AM    BACTERIA MANY 08/01/2022 04:44 AM    CLARITYU CLEAR 08/01/2022 04:44 AM    SPECGRAV 1.010 08/01/2022 04:44 AM    LEUKOCYTESUR SMALL 08/01/2022 04:44 AM    UROBILINOGEN 0.2 08/01/2022 04:44 AM    BILIRUBINUR NEGATIVE 08/01/2022 04:44 AM    BILIRUBINUR negative 06/23/2022 02:10 PM    BLOODU LARGE 08/01/2022 04:44 AM    GLUCOSEU negative 06/23/2022 02:10 PM    KETUA NEGATIVE 08/01/2022 04:44 AM     Urine Cultures:   Lab Results   Component Value Date/Time    LABURIN No growth at 18 to 36 hours 06/23/2022 03:42 PM     Blood Cultures: No results found for: BC  No results found for: BLOODCULT2  Organism:   Lab Results   Component Value Date/Time    ORG Escherichia coli 10/25/2021 09:51 AM       Time Spent Discharging patient <30 minutes    Electronically signed by Jose E Mendoza MD on 8/6/2022 at 5:48 PM

## 2022-08-08 DIAGNOSIS — I49.1 PAC (PREMATURE ATRIAL CONTRACTION): ICD-10-CM

## 2022-08-08 DIAGNOSIS — R00.2 PALPITATIONS: ICD-10-CM

## 2022-08-08 DIAGNOSIS — R07.9 CHEST PAIN, UNSPECIFIED TYPE: ICD-10-CM

## 2022-08-08 DIAGNOSIS — R42 DIZZINESS AND GIDDINESS: ICD-10-CM

## 2022-08-08 DIAGNOSIS — R06.02 SHORTNESS OF BREATH: ICD-10-CM

## 2022-08-08 DIAGNOSIS — R60.0 EDEMA OF LOWER EXTREMITY: ICD-10-CM

## 2022-08-14 PROBLEM — R82.71 ASYMPTOMATIC BACTERIURIA: Status: ACTIVE | Noted: 2022-08-14

## 2022-09-16 ENCOUNTER — TELEPHONE (OUTPATIENT)
Dept: CARDIOLOGY CLINIC | Age: 87
End: 2022-09-16

## 2022-09-16 NOTE — TELEPHONE ENCOUNTER
Patient given results of event monitor.       30-day event monitor report, early termination  Total use 11 days    Indication palpitation  Average heart rate 77 bpm  Minimum heart rate 57 bpm  Maximum heart rate 123 bpm  No significant pauses    Conclusion:    Sinus rhythm with episodes of paroxysmal atrial fibrillation  Patient with prior established history of A. fib, clinical correlation and routine follow-up

## 2022-09-20 DIAGNOSIS — I10 PRIMARY HYPERTENSION: ICD-10-CM

## 2022-09-20 RX ORDER — LOSARTAN POTASSIUM 25 MG/1
TABLET ORAL
Qty: 90 TABLET | Refills: 1 | Status: SHIPPED | OUTPATIENT
Start: 2022-09-20 | End: 2022-11-29 | Stop reason: SDUPTHER

## 2022-10-27 RX ORDER — SUCRALFATE 1 G/1
1 TABLET ORAL 4 TIMES DAILY
Qty: 120 TABLET | Refills: 3 | Status: SHIPPED | OUTPATIENT
Start: 2022-10-27

## 2022-10-27 RX ORDER — PANTOPRAZOLE SODIUM 40 MG/1
40 TABLET, DELAYED RELEASE ORAL
Qty: 30 TABLET | Refills: 3 | Status: SHIPPED | OUTPATIENT
Start: 2022-10-27

## 2022-11-01 DIAGNOSIS — F41.9 ANXIETY: ICD-10-CM

## 2022-11-01 NOTE — TELEPHONE ENCOUNTER
[place]  7/21/22    [place]  7/27/23 (Medicare Well Check with Caitlin)    [facility] [address] [facility]       Patient's spouse passed away this October.

## 2022-11-02 RX ORDER — HYDROXYZINE HYDROCHLORIDE 25 MG/1
25 TABLET, FILM COATED ORAL NIGHTLY PRN
Qty: 60 TABLET | Refills: 1 | Status: SHIPPED | OUTPATIENT
Start: 2022-11-02 | End: 2023-01-01

## 2022-11-08 ENCOUNTER — APPOINTMENT (OUTPATIENT)
Dept: CT IMAGING | Age: 87
End: 2022-11-08
Payer: MEDICARE

## 2022-11-08 ENCOUNTER — TELEPHONE (OUTPATIENT)
Dept: FAMILY MEDICINE CLINIC | Age: 87
End: 2022-11-08

## 2022-11-08 ENCOUNTER — APPOINTMENT (OUTPATIENT)
Dept: GENERAL RADIOLOGY | Age: 87
End: 2022-11-08
Payer: MEDICARE

## 2022-11-08 ENCOUNTER — HOSPITAL ENCOUNTER (EMERGENCY)
Age: 87
Discharge: HOME | End: 2022-11-08
Attending: EMERGENCY MEDICINE
Payer: MEDICARE

## 2022-11-08 VITALS
RESPIRATION RATE: 18 BRPM | SYSTOLIC BLOOD PRESSURE: 169 MMHG | HEART RATE: 61 BPM | OXYGEN SATURATION: 96 % | WEIGHT: 160 LBS | TEMPERATURE: 97.9 F | BODY MASS INDEX: 25.82 KG/M2 | DIASTOLIC BLOOD PRESSURE: 64 MMHG

## 2022-11-08 DIAGNOSIS — S22.050A CLOSED WEDGE COMPRESSION FRACTURE OF T5 VERTEBRA, INITIAL ENCOUNTER (HCC): ICD-10-CM
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
## 2022-11-08 DIAGNOSIS — R91.1 PULMONARY NODULE: ICD-10-CM

## 2022-11-08 LAB
EKG ATRIAL RATE: 312 BPM
EKG DIAGNOSIS: NORMAL
EKG P AXIS: 65 DEGREES
EKG Q-T INTERVAL: 396 MS
EKG QRS DURATION: 94 MS
EKG QTC CALCULATION (BAZETT): 436 MS
EKG R AXIS: -13 DEGREES
EKG T AXIS: 55 DEGREES
EKG VENTRICULAR RATE: 73 BPM

## 2022-11-08 PROCEDURE — 93005 ELECTROCARDIOGRAM TRACING: CPT | Performed by: EMERGENCY MEDICINE

## 2022-11-08 PROCEDURE — 99284 EMERGENCY DEPT VISIT MOD MDM: CPT

## 2022-11-08 PROCEDURE — 70450 CT HEAD/BRAIN W/O DYE: CPT

## 2022-11-08 PROCEDURE — 71045 X-RAY EXAM CHEST 1 VIEW: CPT

## 2022-11-08 PROCEDURE — 6370000000 HC RX 637 (ALT 250 FOR IP): Performed by: EMERGENCY MEDICINE

## 2022-11-08 PROCEDURE — 93010 ELECTROCARDIOGRAM REPORT: CPT | Performed by: INTERNAL MEDICINE

## 2022-11-08 PROCEDURE — 72125 CT NECK SPINE W/O DYE: CPT

## 2022-11-08 PROCEDURE — 72128 CT CHEST SPINE W/O DYE: CPT

## 2022-11-08 RX ORDER — LOSARTAN POTASSIUM 25 MG/1
25 TABLET ORAL ONCE
Status: COMPLETED | OUTPATIENT
Start: 2022-11-08 | End: 2022-11-08

## 2022-11-08 RX ORDER — ACETAMINOPHEN 325 MG/1
650 TABLET ORAL ONCE
Status: COMPLETED | OUTPATIENT
Start: 2022-11-08 | End: 2022-11-08

## 2022-11-08 RX ORDER — ONDANSETRON 4 MG/1
4 TABLET, ORALLY DISINTEGRATING ORAL ONCE
Status: COMPLETED | OUTPATIENT
Start: 2022-11-08 | End: 2022-11-08

## 2022-11-08 RX ORDER — LIDOCAINE 50 MG/G
1 PATCH TOPICAL DAILY
Qty: 30 PATCH | Refills: 0 | Status: SHIPPED | OUTPATIENT
Start: 2022-11-08 | End: 2022-11-29

## 2022-11-08 RX ORDER — LIDOCAINE 4 G/G
1 PATCH TOPICAL ONCE
Status: DISCONTINUED | OUTPATIENT
Start: 2022-11-08 | End: 2022-11-08 | Stop reason: HOSPADM

## 2022-11-08 RX ADMIN — ACETAMINOPHEN 650 MG: 325 TABLET ORAL at 11:12

## 2022-11-08 RX ADMIN — LOSARTAN POTASSIUM 25 MG: 25 TABLET, FILM COATED ORAL at 10:40

## 2022-11-08 RX ADMIN — ONDANSETRON 4 MG: 4 TABLET, ORALLY DISINTEGRATING ORAL at 11:12

## 2022-11-08 ASSESSMENT — PAIN DESCRIPTION - LOCATION: LOCATION: SHOULDER;BACK

## 2022-11-08 ASSESSMENT — PAIN SCALES - GENERAL
PAINLEVEL_OUTOF10: 3
PAINLEVEL_OUTOF10: 4

## 2022-11-08 ASSESSMENT — PAIN - FUNCTIONAL ASSESSMENT: PAIN_FUNCTIONAL_ASSESSMENT: 0-10

## 2022-11-08 ASSESSMENT — PAIN DESCRIPTION - DESCRIPTORS: DESCRIPTORS: ACHING;DULL;DISCOMFORT

## 2022-11-08 NOTE — DISCHARGE INSTRUCTIONS
Lung Nodules    Your x-ray or CT scan shows a nodule (\"spot\") on your lung. This will require follow-up for further evaluation. Please call your Primary Care Physician (PCP) if you have already established one and you confirmed your PCP during your ER visit today. If you do not have a PCP, please call the [address] [place] [address] scheduling number to schedule your Emergency Department follow up visit. Please mention that you are scheduling an \"ER follow up visit\" for a lung nodule. Learning About Lung Nodules  What is a lung nodule? A lung nodule is a growth in the lung. A single nodule surrounded by lung tissue is called a solitary pulmonary nodule. A lung nodule might not cause any symptoms. Your doctor may have found one or more nodules on your lung when you were having a chest X-ray or CT scan. Or it may have been found during a lung cancer screening. A lung nodule may be caused by an old infection or cancer. It might also be a noncancerous growth. Lung nodules can cause a screening to give an abnormal result. Most nodules do not cause any harm. But without further tests, your doctor can't tell whether an abnormal finding is cancer, a harmless nodule, or something else. What can you expect when you have a lung nodule? Your doctor will look at several risk factors to see how likely it is that the nodule is cancer. He or she will look at: Whether you smoke or have ever smoked. Your age and your family's medical history. Whether you have ever had lung cancer. The size and shape of the nodule. Whether the nodule has changed in size. Your doctor may look at past chest X-rays or CT scans, if available, and compare them. Or you may have a series of CT scans to see if the nodule grows over time. What happens next depends on the risk of the nodule being cancer. If you have no risk factors and the nodule is small, your doctor may advise doing nothing.   If the risk is small, your doctor may schedule follow-up appointments and tests. You may have more CT scans later to see if the nodule is growing. If the nodule hasn't changed in 3 to 6 months, you may have CT scans every year. If it hasn't changed in 2 years, you may not need any more tests. If there's a higher risk of cancer, your doctor may:  Do a PET scan, which may help tell if the nodule is cancerous or not. Take a sample of tissue from the nodule for testing. This is called a biopsy. Remove the nodule with surgery. Follow-up care is a key part of your treatment and safety. Be sure to make and go to all appointments, and call your doctor if you are having problems. It's also a good idea to know your test results and keep a list of the medicines you take. What is the next step in evaluation of a lung nodule? Below are the recommended guidelines for management of pulmonary nodules, you can discuss these recommendations at your follow-up appointment:     Nodule size less than or equal to ?4 mm  In a low-risk patient, no follow-up needed. In a high-risk patient, follow-up CT at 12 months; if unchanged, no further follow-up. Nodule size equals >4-6 mm  In a low-risk patient, follow-up CT at 12 months; if unchanged, no further follow-up. In a high-risk patient, initial follow-up CT at 6-12 months then at 18-24 months if no change. Nodule size equals >6-8 mm  In a low-risk patient, initial follow-up CT at 6-12 months then at 18-24 months if no change. In a high-risk patient, initial follow-up CT at 3-6 months then at 9-12 months and 24 months if no change. Nodule size greater than >8 mm  In low-risk and high-risk patients follow-up CT at around 3, 9, and 24 months, contrast-enhanced CT, PET, and/or biopsy.

## 2022-11-08 NOTE — ED NOTES
Medication History  [facility]    Patient Name: Humble Miller 5/25/1933     Medication history has been completed by: Fernanda Torres CPhT    Source(s) of information: patient's family and insurance claims     Primary Care Physician: Paulette Deras PA-C     [facility]    Allergies as of 11/08/2022 - Fully Reviewed 11/08/2022   Allergen Reaction Noted    Sulfa antibiotics Anaphylaxis 12/18/2012    Nitrofuran derivatives      Quinapril hcl  04/11/2013        Prior to Admission medications    Medication Sig Start Date End Date Taking? Authorizing Provider   hydrOXYzine HCl (ATARAX) 25 MG tablet Take 1 tablet by mouth nightly as needed for Anxiety 11/2/22 1/1/23  SCAR Santa   sucralfate (CARAFATE) 1 GM tablet  Take 1 g by mouth 3 times daily Patient can take up to 4 times daily 10/27/22   MCKINLEY Guerrier CNP   pantoprazole (PROTONIX) 40 MG tablet Take 1 tablet by mouth 2 times daily (before meals) 10/27/22   MCKINLEY Guerrier CNP   losartan (COZAAR) 25 MG tablet TAKE ONE TABLET BY MOUTH EVERY MORNING 9/20/22 3/19/23  Amadou Glez DO   metoprolol succinate (TOPROL XL) 50 MG extended release tablet Take 1 tablet by mouth in the morning. 7/28/22 11/8/22  Kailyn May MD   aspirin 81 MG EC tablet Take 1 tablet by mouth in the morning. 7/21/22 1/17/23  Paulette Deras PA-C   hydroCHLOROthiazide (HYDRODIURIL) 25 MG tablet Take 1 tablet by mouth in the morning. 7/21/22 1/17/23  Paulette Deras PA-C     Medications removed from list (include reason, ex. noncompliance, medication cost, therapy complete etc.):   Eliquis no longer taking  Fluticasone no longer using    Comments:  Medication list reviewed with patient's family. Patient able to inform interviewer that she took no medications prior to ER visit.     To my knowledge the above medication history is accurate as of 11/8/2022 11:22 AM.   Fernanda Torres CPhT   11/8/2022 11:22 AM

## 2022-11-08 NOTE — ED PROVIDER NOTES
Triage Chief Complaint:   Fall (States slipped out of bed onto floor. Hit head on briefcase on right side of head. Denies dizziness or LOC. Not on blood thinners. ) and Chest Pain (Began after PT slipped from bed. EMS found her sitting next to the bed on the floor. 1 Nitro given en route and PT's pain went from 3 to 0. States \"discomfort\". )    [place]:  Aimee Sousa is a 80 y.o. female that presents with with complaint of pain between her shoulder blades and a little chest pain that started after she fell getting out of bed. She did hit her head on a briefcase falling out of bed but denies loss of consciousness. She denies any neck pain. She states the only pain she has is between her shoulder blades and it radiates into her's central upper chest.  She feels slightly short of breath due to the pain and states it hurts more when she takes a deep breath. She denies any lightheadedness or dizziness. No numbness, tingling or weakness. She has had a little bit of nausea but denies any vomiting. She is not on any blood thinners. She did not take any of her morning medication due to the fall. The fall occurred about 830 this morning. .    ROS:   Review of Systems   Constitutional:  Negative for chills and fever. HENT:  Negative for congestion, rhinorrhea and sore throat. Eyes:  Negative for redness and visual disturbance. Respiratory:  Positive for shortness of breath. Negative for cough. Cardiovascular:  Positive for chest pain. Negative for leg swelling. Gastrointestinal:  Negative for abdominal pain, nausea and vomiting. Genitourinary:  Negative for dysuria and frequency. Musculoskeletal:  Positive for back pain (between shoulder blades). Negative for arthralgias and neck pain. Skin:  Negative for rash and wound. Neurological:  Negative for dizziness, syncope, weakness, light-headedness, numbness and headaches. Psychiatric/Behavioral: Negative.   Negative for hallucinations and suicidal ideas. Past Medical History:   Diagnosis Date    Arrhythmia     Arthritis     osteoarthritis cervical and lumbar spine    Atrophic vaginitis     Blood transfusion     CAD (coronary artery disease)     Cataracts, bilateral     CHF (congestive heart failure) (HCC)     COPD (chronic obstructive pulmonary disease) (HCC)     Depression     Essential hypertension     Fatty liver disease, nonalcoholic     GERD (gastroesophageal reflux disease)     H/O 24 hour EKG monitoring 9/15/2000    9/15/2000 -  Predominant rhythm is a sinus rhythm. No significant ectopy noted. H/O cardiac catheterization 8/4/2005 8/4/2005 - Moderate degree of stenosis of the high diag, which is a heavily calcified vessel, however, there is a large ramus vessel supplying this same area as well. Rest of vessel is w/o any significant disease. Renals are w/o any significant stenosis. H/O cardiovascular stress test 12/2/2010, 2/28/2008 12/2/2010 - Normal pattern of perfusion in all regions. LV is normal. No inducible myocardial ischemia. EF is 66%. LVSF is normal. No ECG changes. Exercise capacity is normal.    H/O cardiovascular stress test 2/24/2015    cardiolite-normal, no ischemia, EF69%    H/O Doppler ultrasound 9/15/2000    9/15/2000 - Carotid - No hemodynamically significant stenosis. H/O echocardiogram 12/2/2010, 9/22/2009 12/2/2010 - Difficult apical imaging due to patient COPD. LVSF is normal. EF = > 55%. Impaired LV impairment. Mild-Moderate MR. Mild TR. H/O echocardiogram 7/15/14    EF 55-60%. No significant valvulopathy is seen. Heart valve problem     2 leaky heart valves    History of Doppler ultrasound 10/31/2000    10/31/2000 - Peripheral - Normal study. HX OTHER MEDICAL 1/14/2004 1/14/2004 - 48 hr Holter - Predominant rhythm is sinus rhythm. No significant ectopy is seen.     Hyperlipidemia     Mild tricuspid regurgitation     Mitral regurgitation     Nausea & vomiting     Near syncope 5/28/2019 Osteopenia     Pulmonary hypertension (HCC)     Pulmonary nodules     Rectal bleeding 12/21/2012    Supraventricular tachycardia ([facility] [address]     Thyroid disease     Type 2 diabetes mellitus ([address]      Past Surgical History:   Procedure Laterality Date    APPENDECTOMY      BREAST SURGERY      bilateral lumpectomy    CHOLECYSTECTOMY      COLECTOMY      also head of pancreas    COLONOSCOPY  12-21-12    polyp    DIAGNOSTIC CARDIAC CATH LAB PROCEDURE  8/2005    FRACTURE SURGERY      repair of fractured nose    HYSTERECTOMY (CERVIX STATUS UNKNOWN)      LALA/BSO    PANCREAS SURGERY  11/7/2006    Whipple Procedure    SKIN BIOPSY      moles from right shoulder, chin, left leg    THYROIDECTOMY, PARTIAL      THYROIDECTOMY, PARTIAL  1999    TONSILLECTOMY      UPPER GASTROINTESTINAL ENDOSCOPY N/A 8/1/2022    EGD DIAGNOSTIC ONLY performed by Justin Dougherty MD at [address] [place]. \Hospital Has a New Name and Outlook.\"" ENDOSCOPY     Family History   Problem Relation Age of Onset    Heart Failure Mother         CHF    Hypertension Mother     Cancer Father         Pancreatic    Hypertension Father     Heart Disease Sister         CMP    Other Sister         Bone problems    Other Sister         infection    Ovarian Cancer Sister     Cancer Brother         Bone    Other Brother         aneurysm    Coronary Art Dis Brother     Heart Attack Brother     Coronary Art Dis Daughter     Hypertension Daughter     Cancer Son     Early Death Son         MVA     Social History     Socioeconomic History    Marital status:      Spouse name: Not on file    Number of children: Not on file    Years of education: Not on file    Highest education level: Not on file   Occupational History    Occupation: Retired   Tobacco Use    Smoking status: Never    Smokeless tobacco: Never   Vaping Use    Vaping Use: Never used   Substance and Sexual Activity    Alcohol use: Yes     Comment: Rare alcohol use.        CAFFEINE: 1 cup coffee daily    Drug use: No    Sexual activity: Yes     Partners: Male     Comment:    Other Topics Concern    Not on file   Social History Narrative    Not on file     Social Determinants of Health     Financial Resource Strain: Not on file   Food Insecurity: Not on file   Transportation Needs: Not on file   Physical Activity: Inactive    Days of Exercise per Week: 0 days    Minutes of Exercise per Session: 0 min   Stress: Not on file   Social Connections: Not on file   Intimate Partner Violence: Not on file   Housing Stability: Not on file     No current facility-administered medications for this encounter. Current Outpatient Medications   Medication Sig Dispense Refill    lidocaine (LIDODERM) 5 % Place 1 patch onto the skin daily 12 hours on, 12 hours off. 30 patch 0    traMADol (ULTRAM) 50 MG tablet Take 0.5-1 tablets by mouth every 8 hours as needed for Pain for up to 7 days. 21 tablet 0    hydrOXYzine HCl (ATARAX) 25 MG tablet Take 1 tablet by mouth nightly as needed for Anxiety 60 tablet 1    sucralfate (CARAFATE) 1 GM tablet Take 1 tablet by mouth 4 times daily (Patient taking differently: Take 1 g by mouth 3 times daily Patient can take up to 4 times daily) 120 tablet 3    pantoprazole (PROTONIX) 40 MG tablet Take 1 tablet by mouth 2 times daily (before meals) 30 tablet 3    losartan (COZAAR) 25 MG tablet TAKE ONE TABLET BY MOUTH EVERY MORNING (Patient taking differently: Take 25 mg by mouth daily) 90 tablet 1    metoprolol succinate (TOPROL XL) 50 MG extended release tablet Take 1 tablet by mouth in the morning. 30 tablet 3    aspirin 81 MG EC tablet Take 1 tablet by mouth in the morning. 90 tablet 1    hydroCHLOROthiazide (HYDRODIURIL) 25 MG tablet Take 1 tablet by mouth in the morning.  90 tablet 1     Allergies   Allergen Reactions    Sulfa Antibiotics Anaphylaxis    Nitrofuran Derivatives     Quinapril Hcl      Cough       Nursing Notes Reviewed     Physical Exam:   ED Triage Vitals   Enc Vitals Group      BP       Pulse       Resp       Temp       Temp src       SpO2 396 ms    QTc Calculation (Bazett) 436 ms    P Axis 65 degrees    R Axis -13 degrees    T Axis 55 degrees    Diagnosis       Normal sinus rhythm      Confirmed by Cherkarine Randolph ([id number]) on 11/8/2022 6:36:34 PM        Radiographs (if obtained):  [] The following radiograph was interpreted by myself in the absence of a radiologist:  [x]Radiologist's Report Reviewed:  XR CHEST PORTABLE   Final Result   Borderline cardiomegaly and diffuse chronic interstitial opacities. Questioned subtle superimposed airspace opacity of the peripheral right mid   to lower lung. Correlate with clinical findings. CT HEAD WO CONTRAST   Final Result   No acute intracranial abnormality. CT CERVICAL SPINE WO CONTRAST   Final Result   No acute abnormality of the cervical spine. Multilevel degenerative changes   as described above. CT THORACIC SPINE WO CONTRAST   Final Result   Addendum (preliminary) 1 of 1   ADDENDUM:   There is a 0.7 cm left upper lobe small consolidative opacity versus    nodule,   for which follow-up may be performed in 6-12 months per [employer]   criteria. Additional scattered pulmonary nodules are unchanged since 2019   and can be considered benign. Final   1. Age indeterminate compression deformity of the T5 vertebral body   resulting in approximately 15-20% height loss anteriorly, new since March 2019 CT. 2.  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 EKG (if obtained): (All EKG's are interpreted by myself in the absence of a cardiologist)  Normal sinus rhythm with a rate of 73. QRS 94, QTc 436. No ST elevations or depressions. Normal T waves. Large ST segment in the septal leads. Impression: Nonspecific EKG. When compared to previous EKG from 8/1/2022, there are no significant changes. MDM:  Differential diagnoses considered include but are not limited to contusion, fracture, muscle strain, acute intracranial hemorrhage.     Patient arrives in the emergency department appearing slightly uncomfortable but in no acute distress. Her blood pressure is elevated but has not taken her morning blood pressure medication. I did give her a dose of her home blood pressure medication here in the emergency room. CT scan of her head shows no acute intracranial abnormalities. CT of her C-spine is unremarkable. Patient's EKG is not concerning for acute ischemia. The chest pain is dependent on movement and breathing which moves her back and the pain seems to radiate from her back. Chest x-ray shows borderline cardiomegaly and chronic interstitial opacities. There is a questionable airspace opacity of the peripheral right mid to lower lung. Patient does not have any cough or fever, I do not suspect pneumonia. I do not suspect acute coronary syndrome or pulmonary cause of patient's symptoms. CT scan of patient's T-spine shows age-indeterminate compression deformity of T5. This is a site of patient's pain and she is tender over this location. I suspect this is the cause of patient's symptoms. There is also a noted consolidative opacity versus nodule in the left upper lobe. This was discussed with patient and she is already following with her physician for lung nodules. I explained the importance of continued monitoring and follow-up for the nodules. Patient treated with Tylenol and lidocaine patch in the emergency department. This makes patient's symptoms tolerable. Offered stronger pain medication which patient declined. We will discharge her home in stable condition. Recommended close follow-up with her primary care physician. Plan of care explained to patient. Concerning signs and symptoms warranting a return visit to the Emergency Department were explained in detail. All questions and concerns were addressed to the patient's satisfaction.  Patient understood and agreed with plan.'    I did don appropriate PPE (including face mask, protective eye ware/safety glasses and gloves), as recommended by the health facility/national standard best practice, during my bedside interactions with the patient. The likelihood of other entities in the differential is insufficient to justify any further testing for them. This was explained to the patient. The patient was advised that persistent or worsening symptoms would requirefurther evaluation. Clinical Impression:  1. Fall, initial encounter    2. Closed wedge compression fracture of T5 vertebra, initial encounter ([facility] [address]    3. Pulmonary nodule          Lena Cedillo MD       Please note that portions of this note may have been complete with a voice recognition program.  Effortswere made to edit the dictations, but occasional words are mis-transcribed.           Lena Cedillo MD  11/11/22 [id number]

## 2022-11-08 NOTE — TELEPHONE ENCOUNTER
Patient was seen in the [facility] ER 11-8-22. Lidocaine patches 5% were ordered but now needs a PA and the ER does not do PA. Can patient also have some oral pain mediations.   Patient has a fractured L7 vertebrae   [facility] [address]   Call Apolinarcallie Bond and advise

## 2022-11-09 ENCOUNTER — OFFICE VISIT (OUTPATIENT)
Dept: FAMILY MEDICINE CLINIC | Age: 87
End: 2022-11-09
Payer: MEDICARE

## 2022-11-09 VITALS
BODY MASS INDEX: 25.91 KG/M2 | HEIGHT: 66 IN | WEIGHT: 161.2 LBS | HEART RATE: 86 BPM | SYSTOLIC BLOOD PRESSURE: 162 MMHG | DIASTOLIC BLOOD PRESSURE: 100 MMHG | OXYGEN SATURATION: 99 %

## 2022-11-09 DIAGNOSIS — S22.050A CLOSED WEDGE COMPRESSION FRACTURE OF T5 VERTEBRA, INITIAL ENCOUNTER (HCC): ICD-10-CM
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
## 2022-11-09 PROCEDURE — 1123F ACP DISCUSS/DSCN MKR DOCD: CPT | Performed by: PHYSICIAN ASSISTANT

## 2022-11-09 PROCEDURE — G8427 DOCREV CUR MEDS BY ELIG CLIN: HCPCS | Performed by: PHYSICIAN ASSISTANT

## 2022-11-09 PROCEDURE — G8484 FLU IMMUNIZE NO ADMIN: HCPCS | Performed by: PHYSICIAN ASSISTANT

## 2022-11-09 PROCEDURE — 1036F TOBACCO NON-USER: CPT | Performed by: PHYSICIAN ASSISTANT

## 2022-11-09 PROCEDURE — 99214 OFFICE O/P EST MOD 30 MIN: CPT | Performed by: PHYSICIAN ASSISTANT

## 2022-11-09 PROCEDURE — 1090F PRES/ABSN URINE INCON ASSESS: CPT | Performed by: PHYSICIAN ASSISTANT

## 2022-11-09 PROCEDURE — G8417 CALC BMI ABV UP PARAM F/U: HCPCS | Performed by: PHYSICIAN ASSISTANT

## 2022-11-09 RX ORDER — TRAMADOL HYDROCHLORIDE 50 MG/1
25-50 TABLET ORAL EVERY 8 HOURS PRN
Qty: 21 TABLET | Refills: 0 | Status: SHIPPED | OUTPATIENT
Start: 2022-11-09 | End: 2022-11-16

## 2022-11-09 NOTE — PROGRESS NOTES
11/9/2022    London Castro    Chief Complaint   Patient presents with    Follow-Up from [facility]     Fall 11-8-22 , T3 fx , knot on right side of head , pt states she is in a lot of pain, was only given Tylenol at hospital       HPI  History was obtained from patient and her daughter. Tamir Roasles is a 80 y.o. female who presents today for ER follow-up. Patient presented to ED yesterday secondary to a fall. Patient states that she slipped out of bed and onto the floor. She hit her head on a briefcase. Immediate back pain. There was no LOC. She is not anticoagulated. CT thoracic spine revealed age-indeterminate compression deformity of the T5 vertebral body resulting in approximately 15 to 20% height loss anteriorly, new since March 2019 CT scan. 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 CT cervical spine revealed no acute abnormalities. Multilevel degenerative changes. CT head revealed no acute intracranial abnormality. X-ray chest revealed borderline cardiomegaly and diffuse chronic interstitial opacities. Questionable subtle superimposed airspace opacity of the peripheral right mid to lower lung. Patient was discharged home on lidocaine patches but prior authorization is needed. She purchased patches over-the-counter instead. They are not helping. She has also been taking Tylenol twice daily which is not helping. This compression fracture is thought to be acute as patient did not have any back pain before her fall. She now complains of thoracic spine pain between her scapulas. The pain is constant and worse with deep breaths and any movement. She rates it 9/10 in severity. Sometimes it causes her to be nauseated. Blood pressure has been elevated due to the discomfort. She denies radiating pain. She denies numbness or tingling. She denies any weakness. She denies headaches, vision changes, or memory concerns.   She states that she does have a lump on the right side of her head that has significantly improved since yesterday when she fell. She denies any epistaxis. PAST MEDICAL HISTORY  Past Medical History:   Diagnosis Date    Arrhythmia     Arthritis     osteoarthritis cervical and lumbar spine    Atrophic vaginitis     Blood transfusion     CAD (coronary artery disease)     Cataracts, bilateral     CHF (congestive heart failure) (HCC)     COPD (chronic obstructive pulmonary disease) (HCC)     Depression     Essential hypertension     Fatty liver disease, nonalcoholic     GERD (gastroesophageal reflux disease)     H/O 24 hour EKG monitoring 9/15/2000    9/15/2000 -  Predominant rhythm is a sinus rhythm. No significant ectopy noted. H/O cardiac catheterization 8/4/2005 8/4/2005 - Moderate degree of stenosis of the high diag, which is a heavily calcified vessel, however, there is a large ramus vessel supplying this same area as well. Rest of vessel is w/o any significant disease. Renals are w/o any significant stenosis. H/O cardiovascular stress test 12/2/2010, 2/28/2008 12/2/2010 - Normal pattern of perfusion in all regions. LV is normal. No inducible myocardial ischemia. EF is 66%. LVSF is normal. No ECG changes. Exercise capacity is normal.    H/O cardiovascular stress test 2/24/2015    cardiolite-normal, no ischemia, EF69%    H/O Doppler ultrasound 9/15/2000    9/15/2000 - Carotid - No hemodynamically significant stenosis. H/O echocardiogram 12/2/2010, 9/22/2009 12/2/2010 - Difficult apical imaging due to patient COPD. LVSF is normal. EF = > 55%. Impaired LV impairment. Mild-Moderate MR. Mild TR. H/O echocardiogram 7/15/14    EF 55-60%. No significant valvulopathy is seen. Heart valve problem     2 leaky heart valves    History of Doppler ultrasound 10/31/2000    10/31/2000 - Peripheral - Normal study. HX OTHER MEDICAL 1/14/2004 1/14/2004 - 48 hr Holter - Predominant rhythm is sinus rhythm. No significant ectopy is seen. Hyperlipidemia     Mild tricuspid regurgitation     Mitral regurgitation     Nausea & vomiting     Near syncope 5/28/2019    Osteopenia     Pulmonary hypertension (HCC)     Pulmonary nodules     Rectal bleeding 12/21/2012    Supraventricular tachycardia (HCC)     Thyroid disease     Type 2 diabetes mellitus ([address]        FAMILY HISTORY  Family History   Problem Relation Age of Onset    Heart Failure Mother         CHF    Hypertension Mother     Cancer Father         Pancreatic    Hypertension Father     Heart Disease Sister         CMP    Other Sister         Bone problems    Other Sister         infection    Ovarian Cancer Sister     Cancer Brother         Bone    Other Brother         aneurysm    Coronary Art Dis Brother     Heart Attack Brother     Coronary Art Dis Daughter     Hypertension Daughter     Cancer Son     Early Death Son         MVA       SOCIAL HISTORY  Social History     Socioeconomic History    Marital status:      Spouse name: None    Number of children: None    Years of education: None    Highest education level: None   Occupational History    Occupation: Retired   Tobacco Use    Smoking status: Never    Smokeless tobacco: Never   Vaping Use    Vaping Use: Never used   Substance and Sexual Activity    Alcohol use: Yes     Comment: Rare alcohol use.        CAFFEINE: 1 cup coffee daily    Drug use: No    Sexual activity: Yes     Partners: Male     Comment:      Social Determinants of Health     Physical Activity: Inactive    Days of Exercise per Week: 0 days    Minutes of Exercise per Session: 0 min        SURGICAL HISTORY  Past Surgical History:   Procedure Laterality Date    APPENDECTOMY      BREAST SURGERY      bilateral lumpectomy    CHOLECYSTECTOMY      COLECTOMY      also head of pancreas    COLONOSCOPY  12-21-12    polyp    DIAGNOSTIC CARDIAC CATH LAB PROCEDURE  8/2005    FRACTURE SURGERY      repair of fractured nose    HYSTERECTOMY (CERVIX STATUS UNKNOWN)      LALA/BSO PANCREAS SURGERY  11/7/2006    [facility] Procedure    SKIN BIOPSY      moles from right shoulder, chin, left leg    THYROIDECTOMY, PARTIAL      THYROIDECTOMY, PARTIAL  1999    TONSILLECTOMY      UPPER GASTROINTESTINAL ENDOSCOPY N/A 8/1/2022    EGD DIAGNOSTIC ONLY performed by Anjnaa Hurst MD at [address] [place] [address]  Current Outpatient Medications   Medication Sig Dispense Refill    traMADol (ULTRAM) 50 MG tablet Take 0.5-1 tablets by mouth every 8 hours as needed for Pain for up to 7 days. 21 tablet 0    lidocaine (LIDODERM) 5 % Place 1 patch onto the skin daily 12 hours on, 12 hours off. 30 patch 0    hydrOXYzine HCl (ATARAX) 25 MG tablet Take 1 tablet by mouth nightly as needed for Anxiety 60 tablet 1    sucralfate (CARAFATE) 1 GM tablet Take 1 tablet by mouth 4 times daily (Patient taking differently: Take 1 g by mouth 3 times daily Patient can take up to 4 times daily) 120 tablet 3    pantoprazole (PROTONIX) 40 MG tablet Take 1 tablet by mouth 2 times daily (before meals) 30 tablet 3    losartan (COZAAR) 25 MG tablet TAKE ONE TABLET BY MOUTH EVERY MORNING (Patient taking differently: Take 25 mg by mouth daily) 90 tablet 1    metoprolol succinate (TOPROL XL) 50 MG extended release tablet Take 1 tablet by mouth in the morning. 30 tablet 3    aspirin 81 MG EC tablet Take 1 tablet by mouth in the morning. 90 tablet 1    hydroCHLOROthiazide (HYDRODIURIL) 25 MG tablet Take 1 tablet by mouth in the morning. 90 tablet 1     No current facility-administered medications for this visit. ALLERGIES  Allergies   Allergen Reactions    Sulfa Antibiotics Anaphylaxis    Nitrofuran Derivatives     Quinapril Hcl      Cough       PHYSICAL EXAM    BP (!) 162/100   Pulse 86   Ht 5' 6\" (1.676 m)   Wt 161 lb 3.2 oz (73.1 kg)   SpO2 99%   BMI 26.02 kg/m²     Constitutional:  Well developed, well nourished. Pleasant and cooperative.   HENT:  Normocephalic, small palpable tender lump right parietal region. Eyes:  conjunctiva normal, no discharge, no scleral icterus  Neck:  Normal range of motion, supple  Lymphatic:  No lymphadenopathy noted  Cardiovascular:  Normal heart rate, normal rhythm, no murmurs, gallops or rubs  Thorax & Lungs:  Normal breath sounds, no respiratory distress, no wheezing  Skin:  Warm, dry, no erythema, no rash. No ecchymosis. No abrasions or lacerations. Back: Tenderness to palpation of the thoracic spine at the level of the scapulas. No cervical spine or lumbar spine tenderness. No step-off deformities noted. No CVA tenderness  Extremities:  No edema, no tenderness, no cyanosis,  Neurologic:  Alert & oriented X 3, normal motor function, normal sensory function, no focal deficits noted  Psychiatric:  Affect normal, mood normal    ASSESSMENT & PLAN    Bo Tracy was seen today for follow-up from hospital.    Diagnoses and all orders for this visit:    Fall, subsequent encounter  -     Grady Linares MD, Orthopedic Surgery, [place]    Closed wedge compression fracture of T5 vertebra, initial encounter [facility])  -     Grady Linares MD, Orthopedic Surgery, [place]  -     traMADol (ULTRAM) 50 MG tablet; Take 0.5-1 tablets by mouth every 8 hours as needed for Pain for up to 7 days. Patient reports new onset pain in the thoracic spine secondary to a fall yesterday. CT scan at the ED did show T5 compression fracture. Lidocaine patches and Tylenol are not providing pain relief. We will do a trial of Tramadol. We discussed possible side effects of this medication in lengthy detail. Patient and her daughter both voiced understanding and wish to continue. Patient does not drive. She does not work. We discussed that she may either alternate tramadol with Tylenol or take it with tramadol to heighten the effect. Referral placed to Dr. Katelyn Chin for follow-up. ER for any sudden worsening. There are no discontinued medications. No follow-ups on file. Please note that this chart was generated using dragon dictation software. Although every effort was made to ensure the accuracy of this automated transcription, some errors in transcription may have occurred.     Electronically signed by Julieta Rosales PA-C on 11/9/2022

## 2022-11-09 NOTE — TELEPHONE ENCOUNTER
Spoke informed there are a variety of affordable lidocaine patches otc. Daphine Ohm pas is a common brand. Pt has an appt 11/9/22 2:30 pm with Thania Case will discuss oral pain med at appt.

## 2022-11-10 ENCOUNTER — CARE COORDINATION (OUTPATIENT)
Dept: CARE COORDINATION | Age: 87
End: 2022-11-10

## 2022-11-10 NOTE — CARE COORDINATION
Ambulatory Care Coordination Note  11/10/2022    ACC: Mary Acevedo, RN  Call to pt for acm outreach. Pt agreeable. Reports she is Still with a lot of pain in her shoulder between shoulder blades. Unsure If she has started tramadol or about meds as daughter manages them and sets out for her. Not currently there at this time. Reports she receives help from daughters. Family is coming over every day to check on her. Reports she spoke with ortho and Soonest aptmt was Dec. 1. Declines need for home care. Plan: will notify provider and continue to follow to support and assess for resources and advised to call with any new/worsening symptoms. Ambulatory Care Coordination Assessment    Care Coordination Protocol  Referral from Primary Care Provider: No  Week 1 - Initial Assessment     Are you able to afford your medications?: Yes  How often do you have trouble taking your medications the way you have been told to take them?: I always take them as prescribed. Ability to seek help/take action for Emergent Urgent situations i.e. fire, crime, inclement weather or health crisis. : Independent  Ability to manage Medications: Needs Assistance  Ability to maintain home (clean home, laundry): Needs Assistance  Ability to drive and/or has transportation: Dependent  Is patient able to live independently?: Yes                    Suggested Interventions and Community Resources                  Prior to Admission medications    Medication Sig Start Date End Date Taking? Authorizing Provider   traMADol (ULTRAM) 50 MG tablet Take 0.5-1 tablets by mouth every 8 hours as needed for Pain for up to 7 days.  11/9/22 11/16/22  Molina Mayen PA-C   lidocaine (LIDODERM) 5 % Place 1 patch onto the skin daily 12 hours on, 12 hours off. 11/8/22   Lawanda Chacon MD   hydrOXYzine HCl (ATARAX) 25 MG tablet Take 1 tablet by mouth nightly as needed for Anxiety 11/2/22 1/1/23  SCAR Saez   sucralfate (CARAFATE) 1 GM tablet Take 1 tablet by mouth 4 times daily  Patient taking differently: Take 1 g by mouth 3 times daily Patient can take up to 4 times daily 10/27/22   MCKINLEY Burrell CNP   pantoprazole (PROTONIX) 40 MG tablet Take 1 tablet by mouth 2 times daily (before meals) 10/27/22   MCKINLEY Burrell CNP   losartan (COZAAR) 25 MG tablet TAKE ONE TABLET BY MOUTH EVERY MORNING  Patient taking differently: Take 25 mg by mouth daily 9/20/22 3/19/23  Amadou Glez DO   metoprolol succinate (TOPROL XL) 50 MG extended release tablet Take 1 tablet by mouth in the morning. 7/28/22 11/9/22  Lexy Rodríguez MD   aspirin 81 MG EC tablet Take 1 tablet by mouth in the morning. 7/21/22 1/17/23  Zehra Bhardwaj PA-C   hydroCHLOROthiazide (HYDRODIURIL) 25 MG tablet Take 1 tablet by mouth in the morning. 7/21/22 1/17/23  Zehra Bhardwaj PA-C       Future Appointments   Date Time Provider Remi Neely   11/29/2022 12:45 PM Lexy Rodríguez MD [facility]   7/27/2023  2:30 PM [address] [facility] LPN [address] [facility]

## 2022-11-11 ASSESSMENT — ENCOUNTER SYMPTOMS
RHINORRHEA: 0
EYE REDNESS: 0
ABDOMINAL PAIN: 0
VOMITING: 0
COUGH: 0
SHORTNESS OF BREATH: 1
NAUSEA: 0
BACK PAIN: 1
SORE THROAT: 0

## 2022-11-22 ENCOUNTER — CARE COORDINATION (OUTPATIENT)
Dept: CARE COORDINATION | Age: 87
End: 2022-11-22

## 2022-11-22 SDOH — ECONOMIC STABILITY: FOOD INSECURITY: WITHIN THE PAST 12 MONTHS, YOU WORRIED THAT YOUR FOOD WOULD RUN OUT BEFORE YOU GOT MONEY TO BUY MORE.: NEVER TRUE

## 2022-11-22 SDOH — ECONOMIC STABILITY: HOUSING INSECURITY: IN THE LAST 12 MONTHS, HOW MANY PLACES HAVE YOU LIVED?: 1

## 2022-11-22 SDOH — ECONOMIC STABILITY: HOUSING INSECURITY
IN THE LAST 12 MONTHS, WAS THERE A TIME WHEN YOU DID NOT HAVE A STEADY PLACE TO SLEEP OR SLEPT IN A SHELTER (INCLUDING NOW)?: NO

## 2022-11-22 SDOH — ECONOMIC STABILITY: INCOME INSECURITY: IN THE LAST 12 MONTHS, WAS THERE A TIME WHEN YOU WERE NOT ABLE TO PAY THE MORTGAGE OR RENT ON TIME?: NO

## 2022-11-22 SDOH — ECONOMIC STABILITY: TRANSPORTATION INSECURITY
IN THE PAST 12 MONTHS, HAS LACK OF TRANSPORTATION KEPT YOU FROM MEETINGS, WORK, OR FROM GETTING THINGS NEEDED FOR DAILY LIVING?: NO

## 2022-11-22 SDOH — ECONOMIC STABILITY: FOOD INSECURITY: WITHIN THE PAST 12 MONTHS, THE FOOD YOU BOUGHT JUST DIDN'T LAST AND YOU DIDN'T HAVE MONEY TO GET MORE.: NEVER TRUE

## 2022-11-22 SDOH — ECONOMIC STABILITY: TRANSPORTATION INSECURITY
IN THE PAST 12 MONTHS, HAS THE LACK OF TRANSPORTATION KEPT YOU FROM MEDICAL APPOINTMENTS OR FROM GETTING MEDICATIONS?: NO

## 2022-11-22 ASSESSMENT — SOCIAL DETERMINANTS OF HEALTH (SDOH): HOW HARD IS IT FOR YOU TO PAY FOR THE VERY BASICS LIKE FOOD, HOUSING, MEDICAL CARE, AND HEATING?: NOT HARD AT ALL

## 2022-11-22 NOTE — CARE COORDINATION
Ambulatory Care Coordination Note  11/22/2022    ACC: Christophe Champion, RN    Call to pt for acm f/u. Assessment completed  And chf info/ed reviewed. Denies symptoms/concerns at this time. WIll continue to follow to educate and set goal at next encounter. Ambulatory Care Coordination Assessment    Care Coordination Protocol  Referral from Primary Care Provider: No  Week 1 - Initial Assessment     Are you able to afford your medications?: Yes  How often do you have trouble taking your medications the way you have been told to take them?: I always take them as prescribed. Ability to seek help/take action for Emergent Urgent situations i.e. fire, crime, inclement weather or health crisis. : Independent  Ability to ambulate to restroom: Independent  Ability handle personal hygeine needs (bathing/dressing/grooming): Independent  Ability to manage Medications: Needs Assistance  Ability to prepare Food Preparation: Needs Assistance  Ability to maintain home (clean home, laundry): Needs Assistance  Ability to drive and/or has transportation: Dependent  Ability to do shopping: Needs Assistance  Ability to manage finances: Independent  Is patient able to live independently?: Yes     Current Housing: Private Residence                 Suggested Interventions and Community Resources                  Prior to Admission medications    Medication Sig Start Date End Date Taking?  Authorizing Provider   lidocaine (LIDODERM) 5 % Place 1 patch onto the skin daily 12 hours on, 12 hours off. 11/8/22   Eli Alexis MD   hydrOXYzine HCl (ATARAX) 25 MG tablet Take 1 tablet by mouth nightly as needed for Anxiety 11/2/22 1/1/23  SCAR Leach   sucralfate (CARAFATE) 1 GM tablet Take 1 tablet by mouth 4 times daily  Patient taking differently: Take 1 g by mouth 3 times daily Patient can take up to 4 times daily 10/27/22   MCKINLEY Myers - CNP   pantoprazole (PROTONIX) 40 MG tablet Take 1 tablet by mouth 2 times daily (before meals) 10/27/22   MCKINLEY Martinez - CNP   losartan (COZAAR) 25 MG tablet TAKE ONE TABLET BY MOUTH EVERY MORNING  Patient taking differently: Take 25 mg by mouth daily 9/20/22 3/19/23  Amadou Glez,    metoprolol succinate (TOPROL XL) 50 MG extended release tablet Take 1 tablet by mouth in the morning. 7/28/22 11/9/22  Ginette Olivera MD   aspirin 81 MG EC tablet Take 1 tablet by mouth in the morning. 7/21/22 1/17/23  Savanna Lane PA-C   hydroCHLOROthiazide (HYDRODIURIL) 25 MG tablet Take 1 tablet by mouth in the morning. 7/21/22 1/17/23  Savanna Lane PA-C       Future Appointments   Date Time Provider Remi Neely   11/29/2022 12:45 PM Ginette Olivera MD [facility]   7/27/2023  2:30 PM [address] [facility] LPN [address] [facility]     ,   Congestive Heart Failure Assessment    Are you currently restricting fluids?: No Restriction  Do you understand a low sodium diet?: Yes  How many restaurant meals do you eat per week?: 1-2  Do you salt your food before tasting it?: No     No patient-reported symptoms      Symptoms:  None: Yes      Symptom course: stable  Weight trend: stable  Salt intake watch compared to last visit: stable     , and   General Assessment    Do you have any symptoms that are causing concern?: No

## 2022-11-29 ENCOUNTER — OFFICE VISIT (OUTPATIENT)
Dept: CARDIOLOGY CLINIC | Age: 87
End: 2022-11-29
Payer: MEDICARE

## 2022-11-29 VITALS
DIASTOLIC BLOOD PRESSURE: 74 MMHG | HEIGHT: 66 IN | WEIGHT: 163 LBS | SYSTOLIC BLOOD PRESSURE: 168 MMHG | HEART RATE: 64 BPM | BODY MASS INDEX: 26.2 KG/M2

## 2022-11-29 DIAGNOSIS — I47.1 SVT (SUPRAVENTRICULAR TACHYCARDIA) (HCC): ICD-10-CM

## 2022-11-29 DIAGNOSIS — I48.0 PAROXYSMAL ATRIAL FIBRILLATION (HCC): Primary | ICD-10-CM

## 2022-11-29 DIAGNOSIS — I10 PRIMARY HYPERTENSION: ICD-10-CM

## 2022-11-29 DIAGNOSIS — I35.0 NONRHEUMATIC AORTIC VALVE STENOSIS: ICD-10-CM

## 2022-11-29 DIAGNOSIS — I10 ESSENTIAL HYPERTENSION: ICD-10-CM

## 2022-11-29 DIAGNOSIS — I34.0 NONRHEUMATIC MITRAL VALVE REGURGITATION: ICD-10-CM

## 2022-11-29 PROCEDURE — 1090F PRES/ABSN URINE INCON ASSESS: CPT | Performed by: INTERNAL MEDICINE

## 2022-11-29 PROCEDURE — G8427 DOCREV CUR MEDS BY ELIG CLIN: HCPCS | Performed by: INTERNAL MEDICINE

## 2022-11-29 PROCEDURE — 99214 OFFICE O/P EST MOD 30 MIN: CPT | Performed by: INTERNAL MEDICINE

## 2022-11-29 PROCEDURE — 1036F TOBACCO NON-USER: CPT | Performed by: INTERNAL MEDICINE

## 2022-11-29 PROCEDURE — G8484 FLU IMMUNIZE NO ADMIN: HCPCS | Performed by: INTERNAL MEDICINE

## 2022-11-29 PROCEDURE — 1123F ACP DISCUSS/DSCN MKR DOCD: CPT | Performed by: INTERNAL MEDICINE

## 2022-11-29 PROCEDURE — G8417 CALC BMI ABV UP PARAM F/U: HCPCS | Performed by: INTERNAL MEDICINE

## 2022-11-29 RX ORDER — LOSARTAN POTASSIUM 50 MG/1
50 TABLET ORAL DAILY
Qty: 90 TABLET | Refills: 3 | Status: SHIPPED | OUTPATIENT
Start: 2022-11-29 | End: 2023-11-24

## 2022-11-29 NOTE — PROGRESS NOTES
Timmy Bradshaw MD                                  CARDIOLOGY  NOTE         Chief Complaint:    Chief Complaint   Patient presents with    3 Month Follow-Up     Pt states she has edema but it has been worse she states that she fell a couple of months ago in the dark and fractured her back        30-day event monitor report,  2022  early termination  Total use 11 days     Indication palpitation  Average heart rate 77 bpm  Minimum heart rate 57 bpm  Maximum heart rate 123 bpm  No significant pauses     Conclusion:     Sinus rhythm with episodes of paroxysmal atrial fibrillation  Patient with prior established history of A. fib, clinical correlation and routine follow-up        Prior HPI:     Yamini Gallegos is a 80y.o. year old female who was evaluated in the hospital recently for hypertensive urgency. Patient has prior medical history for noncompliance in the past.  She quit taking her medication as she felt she did not needed them    However in 2020 patient was admitted  She was noted to have paroxysmal atrial fibrillation, episodes of supraventricular tachycardia, hypertensive urgency. She was started on Eliquis low-dose aspirin hydrochlorothiazide and metoprolol tartrate 50 twice daily. Patient presents for follow-up today      ECHO 2022     Left ventricular systolic function is low normal.   Ejection fraction is visually estimated at   50%. Mild left ventricular hypertrophy. mild-moderate aortic stenosis; Mean P mmHg, NANCY: 1.26 cm sq, Indexed   SV: 30.77 mL/m sq/beat. Right coronary cusp appears restricted. Severe mitral regurgitation (ERO: 0.4 cm sq, regurgitant volume: 83 ml). Mild tricuspid regurgitation; RVSP: 33 mmHg. Mild pulmonic regurgitation present. No evidence of any pericardial effusion. Mildly dilated aortic root (4.2cm).         Current Outpatient Medications   Medication Sig Dispense Refill    losartan (COZAAR) 50 MG tablet Take 1 tablet by mouth daily 90 tablet 3    hydrOXYzine HCl (ATARAX) 25 MG tablet Take 1 tablet by mouth nightly as needed for Anxiety 60 tablet 1    sucralfate (CARAFATE) 1 GM tablet Take 1 tablet by mouth 4 times daily (Patient taking differently: Take 1 g by mouth 3 times daily Patient can take up to 4 times daily) 120 tablet 3    pantoprazole (PROTONIX) 40 MG tablet Take 1 tablet by mouth 2 times daily (before meals) 30 tablet 3    metoprolol succinate (TOPROL XL) 50 MG extended release tablet Take 1 tablet by mouth in the morning. 30 tablet 3    aspirin 81 MG EC tablet Take 1 tablet by mouth in the morning. 90 tablet 1    hydroCHLOROthiazide (HYDRODIURIL) 25 MG tablet Take 1 tablet by mouth in the morning. 90 tablet 1     No current facility-administered medications for this visit. Allergies:     Sulfa antibiotics, Nitrofuran derivatives, and Quinapril hcl    Patient History:    Past Medical History:   Diagnosis Date    Arrhythmia     Arthritis     osteoarthritis cervical and lumbar spine    Atrophic vaginitis     Blood transfusion     CAD (coronary artery disease)     Cataracts, bilateral     CHF (congestive heart failure) (HCC)     COPD (chronic obstructive pulmonary disease) (HCC)     Depression     Essential hypertension     Fatty liver disease, nonalcoholic     GERD (gastroesophageal reflux disease)     H/O 24 hour EKG monitoring 9/15/2000    9/15/2000 -  Predominant rhythm is a sinus rhythm. No significant ectopy noted. H/O cardiac catheterization 8/4/2005 8/4/2005 - Moderate degree of stenosis of the high diag, which is a heavily calcified vessel, however, there is a large ramus vessel supplying this same area as well. Rest of vessel is w/o any significant disease. Renals are w/o any significant stenosis. H/O cardiovascular stress test 12/2/2010, 2/28/2008 12/2/2010 - Normal pattern of perfusion in all regions. LV is normal. No inducible myocardial ischemia. EF is 66%. LVSF is normal. No ECG changes.  Exercise capacity is normal.    H/O cardiovascular stress test 2/24/2015    cardiolite-normal, no ischemia, EF69%    H/O Doppler ultrasound 9/15/2000    9/15/2000 - Carotid - No hemodynamically significant stenosis. H/O echocardiogram 12/2/2010, 9/22/2009 12/2/2010 - Difficult apical imaging due to patient COPD. LVSF is normal. EF = > 55%. Impaired LV impairment. Mild-Moderate MR. Mild TR. H/O echocardiogram 7/15/14    EF 55-60%. No significant valvulopathy is seen. Heart valve problem     2 leaky heart valves    History of Doppler ultrasound 10/31/2000    10/31/2000 - Peripheral - Normal study. HX OTHER MEDICAL 1/14/2004 1/14/2004 - 48 hr Holter - Predominant rhythm is sinus rhythm. No significant ectopy is seen.     Hyperlipidemia     Mild tricuspid regurgitation     Mitral regurgitation     Nausea & vomiting     Near syncope 5/28/2019    Osteopenia     Pulmonary hypertension (HCC)     Pulmonary nodules     Rectal bleeding 12/21/2012    Supraventricular tachycardia (HCC)     Thyroid disease     Type 2 diabetes mellitus ([address]      Past Surgical History:   Procedure Laterality Date    APPENDECTOMY      BREAST SURGERY      bilateral lumpectomy    CHOLECYSTECTOMY      COLECTOMY      also head of pancreas    COLONOSCOPY  12-21-12    polyp    DIAGNOSTIC CARDIAC CATH LAB PROCEDURE  8/2005    FRACTURE SURGERY      repair of fractured nose    HYSTERECTOMY (CERVIX STATUS UNKNOWN)      LALA/BSO    PANCREAS SURGERY  11/7/2006    [facility] Procedure    SKIN BIOPSY      moles from right shoulder, chin, left leg    THYROIDECTOMY, PARTIAL      THYROIDECTOMY, PARTIAL  1999    TONSILLECTOMY      UPPER GASTROINTESTINAL ENDOSCOPY N/A 8/1/2022    EGD DIAGNOSTIC ONLY performed by Albino Albert MD at [facility] ENDOSCOPY     Family History   Problem Relation Age of Onset    Heart Failure Mother         CHF    Hypertension Mother     Cancer Father         Pancreatic    Hypertension Father     Heart Disease Sister         CMP    Other Sister Bone problems    Other Sister         infection    Ovarian Cancer Sister     Cancer Brother         Bone    Other Brother         aneurysm    Coronary Art Dis Brother     Heart Attack Brother     Coronary Art Dis Daughter     Hypertension Daughter     Cancer Son     Early Death Son         MVA     Social History     Tobacco Use    Smoking status: Never    Smokeless tobacco: Never   Substance Use Topics    Alcohol use: Yes     Comment: Rare alcohol use. CAFFEINE: 1 cup coffee daily        Review of Systems:     Constitutional:  No Fever or Weight Loss   Eyes: No Decreased Vision  ENT: No Headaches, Hearing Loss or Vertigo  Cardiovascular: No Chest Pain,  No Shortness of breath, No Palpitations. No Edema   Respiratory: No cough or wheezing . No Respiratory distress   Gastrointestinal: No abdominal pain, appetite loss, blood in stools, constipation, diarrhea or heartburn  Genitourinary: No dysuria, trouble voiding, or hematuria  Musculoskeletal:  denies any new  joint aches , or pain   Integumentary: No rash or pruritis  Neurological: No TIA or stroke symptoms  Psychiatric: No anxiety or depression  Endocrine: No malaise, fatigue or temperature intolerance  Hematologic/Lymphatic: No bleeding problems, blood clots or swollen lymph nodes  Allergic/Immunologic: No nasal congestion or hives        Objective:      Physical Exam:    BP (!) 168/74 (Site: Right Upper Arm, Position: Sitting, Cuff Size: Medium Adult)   Pulse 64   Ht 5' 6\" (1.676 m)   Wt 163 lb (73.9 kg)   BMI 26.31 kg/m²   Wt Readings from Last 3 Encounters:   11/29/22 163 lb (73.9 kg)   11/09/22 161 lb 3.2 oz (73.1 kg)   11/08/22 160 lb (72.6 kg)     Body mass index is 26.31 kg/m². Vitals:    11/29/22 1307   BP: (!) 168/74   Pulse:         General Appearance and Constitutional: Conversant, Well developed, Well nourished, No acute distress, Non-toxic appearance.    HEENT:  Normocephalic, Atraumatic, Bilateral external ears normal, Oropharynx moist, No oral exudates,   Nose normal.   Neck- Normal range of motion, No tenderness, Supple  Eyes:  EOMI, Conjunctiva normal, No discharge. Respiratory:  Normal breath sounds, No respiratory distress, No wheezing, No Rales, No Ronchi. No chest tenderness. Cardiovascular: S1-S2, no added heart sounds, + systolic  Mumurs appreciated. No gallops, rubs. No Pedal Edema   GI:  Bowel sounds normal, Soft, No tenderness,  :  No costovertebral angle tenderness   Musculoskeletal:  No gross deformities. Back- No tenderness  Integument:  Well hydrated, no rash   Lymphatic:  No lymphadenopathy noted   Neurologic:  Alert & oriented x 3, Normal motor function, normal sensory function, no focal deficits noted   Psychiatric:  Speech and behavior appropriate       Medical decision making and Data review:    DATA:    Lab Results   Component Value Date    TROPONINT <0.010 08/01/2022     BNP:    Lab Results   Component Value Date    PROBNP 3,648 (H) 06/16/2022     PT/INR:  No results found for: PTINR  Lab Results   Component Value Date    LABA1C 7.0 (H) 06/16/2022    LABA1C 7.3 (H) 05/28/2019     Lab Results   Component Value Date    CHOL 124 06/17/2022    TRIG 112 06/17/2022    HDL 38 (L) 06/17/2022    LDLCALC 64 06/17/2022     Lab Results   Component Value Date    WBC 6.3 08/04/2022    HGB 9.3 (L) 08/04/2022    HCT 29.0 (L) 08/04/2022    MCV 92.1 08/04/2022     08/04/2022     TSH:   Lab Results   Component Value Date    TSH 3.29 07/01/2020     Lab Results   Component Value Date    AST 11 (L) 08/02/2022    ALT 9 (L) 08/02/2022    BILITOT 0.4 08/02/2022    ALKPHOS 67 08/02/2022         All labs, medications and tests reviewed by myself including data and history from outside source , patient and available family . 1. Paroxysmal atrial fibrillation ([address]    2. SVT (supraventricular tachycardia) ([address]    3. Essential hypertension    4. Nonrheumatic aortic valve stenosis    5. Nonrheumatic mitral valve regurgitation    6.  Primary hypertension           Impression and Plan:      Paroxysmal atrial fibrillation  Supraventricular tachycardia  Frequent PACs  Essential hypertension    [id number] score 4  Continue with aspirin 81 mg daily  Continue with hydrochlorothiazide 25 mg p.o. daily  Continue with losartan 25 mg p.o. daily  Continue with Toprol-XL 50 mg daily    Obtain 30-day event monitor : shows NSR with Parox AFIB episodes     Patient stopped using Eliquis due to GI bleeding as well as recent falls  Currently only on aspirin  Risk of stroke versus bleeding was discussed in detail with the patient. Patient is reluctant to be back on oral anticoagulation  Will refer patient for watchman procedure      Pressure is significantly elevated 168/74, recent readings show persistently elevated blood pressure above 160s    Increase losartan to 50 mg p.o. daily      Moderate aortic stenosis  Moderate mitral regurgitation    Clinically observe, medical management  Continue with hydrochlorothiazide  Currently euvolemic  Follow up ECHO in one year     Return in about 6 months (around 5/29/2023). Counseled extensively and medication compliance urged. We discussed that for the  prevention of ASCVD our  goal is aggressive risk modification. Patient is encouraged to exercise even a brisk walk for 30 minutes  at least 3 to 4 times a week   Various goals were discussed and questions answered. Continue current medications. Appropriate prescriptions are addressed and refills ordered. Questions answered and patient verbalizes understanding. Call for any problems, questions, or concerns.

## 2022-11-29 NOTE — PATIENT INSTRUCTIONS
Please be informed that if you contact our office outside of normal business hours the physician on call cannot help with any scheduling or rescheduling issues, procedure instruction questions or any type of medication issue. We advise you for any urgent/emergency that you go to the nearest emergency room! PLEASE CALL OUR OFFICE DURING NORMAL BUSINESS HOURS    Monday - Friday   8 am to 5 pm    [place]: [phone]    [place]:  [phone]    **It is YOUR responsibilty to bring medication bottles and/or updated medication list to [address] [place] [address]. This will allow us to better serve you and all your healthcare needs**    [facility] Locations - No appointment necessary. Sites open Monday to Friday. Call your preferred location for test preparation, business hours and other information you need. [employer] accepts [employer]. [place] [facility] [address] [facility], [address] [facility]  Phone: [phone] [facility] [address] [facility]  [address]. [facility], [address] [facility]  Phone: [phone]       Thank you for allowing us to care for you today! We want to ensure we can follow your treatment plan and we strive to give you the best outcomes and experience possible. If you ever have a life threatening emergency and call 911 - for an ambulance (EMS)   Our providers can only care for you at:   [facility] or [facility]. Even if you have someone take you or you drive yourself we can only care for you in a [facility] facility. Our providers are not setup at the other healthcare locations!

## 2022-12-01 NOTE — CONSULTS
Endocrinology   Consult Note  Dear Doctor Hannah Echavarria for the Consult     Pt. Was Admitted for :syncope and passing out spells    Reason for Consult:  Or possible borderline hypothyroidism in patient had partial thyroidectomy of left lobe      History Obtained From:  Patient/ EMR       HISTORY OF PRESENT ILLNESS:                The patient is a 80 y.o. female with significant past medical history of CAD,  PT, left sided partial thyroidectomy, distant heart failure, diabetes mellitus was admitted to hospital for dizziness and syncopal episode. iI was  consulted for possible hypothyroidism with elevated TSH and normal free T4      ROS:   Pt's ROS done in detail. Abnormal ROS are noted in Medical and Surgical History Section below: Other Medical History:        Diagnosis Date    Arrhythmia     Arthritis     osteoarthritis cervical and lumbar spine    Atrophic vaginitis     Blood transfusion     CAD (coronary artery disease)     Cataracts, bilateral     CHF (congestive heart failure) (HCC)     COPD (chronic obstructive pulmonary disease) (HCC)     Depression     Essential hypertension     Fatty liver disease, nonalcoholic     GERD (gastroesophageal reflux disease)     H/O 24 hour EKG monitoring 9/15/2000    9/15/2000 -  Predominant rhythm is a sinus rhythm. No significant ectopy noted.  H/O cardiac catheterization 8/4/2005 8/4/2005 - Moderate degree of stenosis of the high diag, which is a heavily calcified vessel, however, there is a large ramus vessel supplying this same area as well. Rest of vessel is w/o any significant disease. Renals are w/o any significant stenosis.  H/O cardiovascular stress test 12/2/2010, 2/28/2008 12/2/2010 - Normal pattern of perfusion in all regions. LV is normal. No inducible myocardial ischemia. EF is 66%. LVSF is normal. No ECG changes.  Exercise capacity is normal.    H/O cardiovascular stress test 2/24/2015    cardiolite-normal, no ischemia, EF69% Written and verbal instructions provided to patient and patient's significant other. Opportunity for questions provided, answered. Tolerating PO liquids, crackers and pudding without difficulty. Dr. King came to discuss results with patient prior to discharge.      H/O Doppler ultrasound 9/15/2000    9/15/2000 - Carotid - No hemodynamically significant stenosis.  H/O echocardiogram 12/2/2010, 9/22/2009 12/2/2010 - Difficult apical imaging due to patient COPD. LVSF is normal. EF = > 55%. Impaired LV impairment. Mild-Moderate MR. Mild TR.    H/O echocardiogram 7/15/14    EF 55-60%. No significant valvulopathy is seen.  Heart valve problem     2 leaky heart valves    History of Doppler ultrasound 10/31/2000    10/31/2000 - Peripheral - Normal study.  HX OTHER MEDICAL 1/14/2004 1/14/2004 - 48 hr Holter - Predominant rhythm is sinus rhythm. No significant ectopy is seen.  Hyperlipidemia     Mild tricuspid regurgitation     Mitral regurgitation     Nausea & vomiting     Osteopenia     Pulmonary hypertension (HCC)     Pulmonary nodules     Supraventricular tachycardia (HCC)     Thyroid disease     Type 2 diabetes mellitus ([facility] [address]      Surgical History:        Procedure Laterality Date    APPENDECTOMY      BREAST SURGERY      bilateral lumpectomy    CHOLECYSTECTOMY      COLECTOMY      also head of pancreas    COLONOSCOPY  12-21-12    polyp    DIAGNOSTIC CARDIAC CATH LAB PROCEDURE  8/2005    FRACTURE SURGERY      repair of fractured nose    HYSTERECTOMY      LALA/BSO    PANCREAS SURGERY  11/7/2006    Whipple Procedure    SKIN BIOPSY      moles from right shoulder, chin, left leg    THYROIDECTOMY, PARTIAL      THYROIDECTOMY, PARTIAL  1999    TONSILLECTOMY         Allergies:  Sulfa antibiotics;  Nitrofuran derivatives; and Quinapril hcl    Family History:       Problem Relation Age of Onset    Heart Failure Mother         CHF    Hypertension Mother     Cancer Father         Pancreatic    Hypertension Father     Heart Disease Sister         CMP    Cancer Brother         Bone    Other Brother         aneurysm    Coronary Art Dis Brother     Heart Attack Brother     Other Sister         Bone problems    Other Sister         infection    Value Date    [facility] 8.150 05/28/2019       Xr Chest Standard (2 Vw)    Result Date: 5/28/2019  EXAMINATION: TWO XRAY VIEWS OF THE CHEST 5/28/2019 9:51 am COMPARISON: 04/11/2017 HISTORY: ORDERING SYSTEM PROVIDED HISTORY: tachycardia, dizziness TECHNOLOGIST PROVIDED HISTORY: Reason for exam:->tachycardia, dizziness Ordering Physician Provided Reason for Exam:  chest pain Initial encounter FINDINGS: Lungs are hyperinflated. Diffuse osteopenia. No focal consolidation, pleural effusion or pneumothorax. The cardiomediastinal silhouette is unremarkable. No overt pulmonary edema. No acute osseous abnormality. COPD without acute cardiopulmonary findings. Xr Abdomen (kub) (single Ap View)    Result Date: 5/29/2019  EXAMINATION: ONE SUPINE XRAY VIEW(S) OF THE ABDOMEN 5/29/2019 8:22 pm COMPARISON: 12/19/2016. HISTORY: ORDERING SYSTEM PROVIDED HISTORY: nausea/vomiting TECHNOLOGIST PROVIDED HISTORY: Reason for exam:->nausea/vomiting Ordering Physician Provided Reason for Exam: nausea/vomiting Acuity: Unknown Type of Exam: Subsequent/Follow-up Additional signs and symptoms: nausea/vomiting Relevant Medical/Surgical History: nausea/vomiting FINDINGS: Nonspecific nonobstructive bowel gas pattern. No free intraperitoneal air. Mild-to-moderate teen stool throughout the colon. Vascular calcifications noted. Surgical clips within the central abdomen. No definite renal calculi. Nonspecific nonobstructive bowel gas pattern. Mild-to-moderate retained stool throughout the colon     Ct Head Wo Contrast    Result Date: 5/29/2019  EXAMINATION: CT OF THE HEAD WITHOUT CONTRAST  5/28/2019 10:11 pm TECHNIQUE: CT of the head was performed without the administration of intravenous contrast. Dose modulation, iterative reconstruction, and/or weight based adjustment of the mA/kV was utilized to reduce the radiation dose to as low as reasonably achievable. COMPARISON: None.  HISTORY: ORDERING SYSTEM PROVIDED HISTORY: SYNCOPE/FAINTING TECHNOLOGIST PROVIDED HISTORY: Has a \"code stroke\" or \"stroke alert\" been called? ->No Ordering Physician Provided Reason for Exam: Syncope/fainting Acuity: Acute Type of Exam: Initial Additional signs and symptoms: no Relevant Medical/Surgical History: none FINDINGS: BRAIN/VENTRICLES: There is no acute intracranial hemorrhage, mass effect or midline shift. No abnormal extra-axial fluid collection. The gray-white differentiation is maintained without evidence of an acute infarct. There is no evidence of hydrocephalus. Atherosclerosis of the intracranial vasculature is noted. ORBITS: The visualized portion of the orbits demonstrate no acute abnormality. SINUSES: Complete opacification of the left maxillary sinus. Scattered opacification of the remaining paranasal sinuses. The mastoid air cells are clear. SOFT TISSUES/SKULL:  No acute abnormality of the visualized skull or soft tissues. 1. No acute intracranial abnormality. 2. Atherosclerosis. 3. Scattered sinusitis. Us Head Neck Soft Tissue Thyroid    Result Date: 5/29/2019  EXAMINATION: THYROID ULTRASOUND 5/29/2019 COMPARISON: None. HISTORY: ORDERING SYSTEM PROVIDED HISTORY: THYROID DISEASE TECHNOLOGIST PROVIDED HISTORY: Ordering Physician Provided Reason for Exam: abnormal  labs, partial thyroidectomy 1999 Acuity: Acute FINDINGS: Cervical lymphadenopathy: No abnormal lymph nodes in the imaged portions of the neck. Right thyroid lobe:  4.8 x 2.4 x 1.5 cm Left thyroid lobe:  Absent Isthmus:  5 mm thickness Thyroid Gland:  Thyroid gland demonstrates heterogeneous, multinodular echotexture and normal appearing vascularity. Nodules: No completely solid thyroid nodules are present. Multifocal colloid cysts are seen. NODULE: Right 1 Size: 1 x 1.2 x 1.2 cm Location: Inferior right thyroid lobe 1. Composition:  Mixed cystic and solid (1) 2. Echogenicity:  Isoechoic (1)-cyst with nodule 3. Shape: Wider-than-tall (0) 4. Margins:  Smooth (0) 5. Echogenic foci:  None (0) ACR TI-RADS total points:  2 ACR TI-RADS risk category: TR2     Left thyroid lobectomy. Mild multinodular goiter. Benign colloid cysts as well as benign cyst with nodule (NODULE Right 1: ACR TI-RADS TR2: Recommend:  No follow-up. *) ______________________________________________________________________________ ____ Russell Chittenden TI-RADS recommendations: TR5 (>= 7 points):  FNA if >= 1 cm; follow-up if 0.5-0.9 cm in 1, 2, 3, 4, and 5 years TR4 (4-6 points):  FNA if >= 1.5 cm; follow-up if 1.0-1.4 cm in 1, 2, 3, and 5 years TR3 (3 points):  FNA if >= 2.5 cm; follow-up if 1.5-2.4 cm in 1, 3, and 5 years TR2 (2 points):  No FNA or follow-up TR1 (0 points):  No FNA or follow-up ACR TI-RADS recommends that no more than two nodules with the highest ACR TI-RADS point total should be biopsied and no more than four nodules should be followed. Vl Dup Carotid Bilateral    Result Date: 5/29/2019  EXAMINATION: ULTRASOUND EVALUATION OF THE CAROTID ARTERIES 5/29/2019 COMPARISON: None. HISTORY: ORDERING SYSTEM PROVIDED HISTORY: syncope TECHNOLOGIST PROVIDED HISTORY: Reason for exam:->syncope Ordering Physician Provided Reason for Exam: syncope, dizziness, HTN, DM, palpitations FINDINGS: RIGHT: The right common carotid artery demonstrates peak systolic velocities of 587 and 119 cm/sec in the proximal and distal segments respectively. The right internal carotid artery demonstrates the systolic velocities of 85, 87, and 127 cm/sec in the proximal, mid and distal segments respectively. The external carotid artery is patent. The vertebral artery demonstrates normal antegrade flow. Atherosclerotic plaque is present at the bifurcation. ICA/CCA ratio of 1.2. LEFT: The left common carotid artery demonstrates peak systolic velocities of 060 and 99 cm/sec in the proximal and distal segments respectively.  The left internal carotid artery demonstrates the systolic velocities of 66, 131, and 114 cm/sec in the proximal, mid and distal segments respectively. The external carotid artery is patent. The vertebral artery demonstrates normal antegrade flow. Atherosclerotic plaque is present at the bifurcation. ICA/CCA ratio of 1.1.     1. The right internal carotid artery demonstrates 50-69% stenosis by peak systolic velocity criteria. 2. The left internal carotid artery demonstrates 50-69% stenosis by peak systolic velocity criteria. 3. Bilateral vertebral arteries are patent with flow in the normal direction. 4. Atherosclerotic disease is present at the bilateral carotid bifurcation. RECOMMENDATIONS: Consider follow-up evaluation with CTA neck. Mri Brain Wo Contrast    Result Date: 5/29/2019  EXAMINATION: MRI OF THE BRAIN WITHOUT CONTRAST  5/28/2019 5:11 pm TECHNIQUE: Multiplanar multisequence MRI of the brain was performed without the administration of intravenous contrast. COMPARISON: None. HISTORY: ORDERING SYSTEM PROVIDED HISTORY: SYNCOPE/FAINTING TECHNOLOGIST PROVIDED HISTORY: Ordering Physician Provided Reason for Exam: dizziness, syncope Acuity: Acute Type of Exam: Initial FINDINGS: INTRACRANIAL STRUCTURES/VENTRICLES: There is no acute infarct. No mass effect or midline shift. No evidence of an acute intracranial hemorrhage. The ventricles and sulci are normal in size and configuration. The sellar/suprasellar regions appear unremarkable. The normal signal voids within the major intracranial vessels appear maintained. ORBITS: The visualized portion of the orbits demonstrate no acute abnormality. SINUSES: There is a large air-fluid level in the left maxillary sinus and a small air-fluid level in the right maxillary antrum. There is also opacification of several left-sided ethmoid air cells. BONES/SOFT TISSUES: The bone marrow signal intensity appears normal. The soft tissues demonstrate no acute abnormality. No acute infarct. Probable acute sinusitis.      Nm Myocardial Spect Rest Exercise Or Rx    Result Date: abnormality. Symptoms  Nausea. The patient was given an intravenous injection of Aminophylline to relieve  symptoms from Maurice Kaur. Complications  Procedure complication was none. Stress Interpretation  ECG portion of stress test is negative for ischemia by diagnostic criteria. Procedure Medications   - Lexiscan I.V. 0.4 mg admininstered @ 05/29/2019 11:05.   - Aminophylline I.V. bolus (over 15sec.) 75 mg admininstered @ 05/29/2019     11:08. Imaging Protocols   Rest                             Stress   Isotope:Sestamibi 99mTc          Isotope: Sestamibi 99mTc  Isotope dose:10.6 mCi            Isotope dose:32.5 mCi  Administration route: I.V. Administration route: I.V. Injection Date:05/29/2019 08:35  Injection Date:05/29/2019 11:05  Scan Date:05/29/2019 09:20       Scan Date:05/29/2019 11:50   Perfusion Interpretation   Normal EF 64 % with normal ventricular contractility. No infarct or ischemia noted. Imaging Results    Summed scores     - Summed stress score: 11     - Summed rest score: 6     - Summed difference score:    5   Rest ejection  Ejection fraction:64 %  EDV :67 ml  ESV :24 ml  Stroke volume :43 ml  Medical History   Accession#:  [id number]  Admission Data Admission date: 05/28/2019 Admission Time: 09:47 Hospital Status: Outpatient.       Scheduled Medicines   Medications:    [START ON 5/30/2019] amLODIPine  10 mg Oral Daily    metoprolol tartrate  25 mg Oral BID    digoxin  250 mcg Oral Daily    therapeutic multivitamin-minerals  1 tablet Oral Daily    hydrochlorothiazide  12.5 mg Oral Daily    insulin lispro  0-6 Units Subcutaneous TID WC    insulin lispro  0-3 Units Subcutaneous Nightly    sodium chloride flush  10 mL Intravenous 2 times per day    enoxaparin  40 mg Subcutaneous Daily      Infusions:    dextrose           IMPRESSION    Patient Active Problem List   Diagnosis    Rectal bleeding    CAD (coronary artery disease)    Pulmonary hypertension (HCC)    Incisional hernia, without obstruction or gangrene    Near syncope    Essential hypertension    CHF (congestive heart failure) (HCC)    GERD (gastroesophageal reflux disease)    Hyperlipidemia    Type 2 diabetes mellitus (HCC)    Osteopenia    Pulmonary nodules    Supraventricular tachycardia (HCC)    Atrophic vaginitis        Borderline hypothyroidism with high TSH but normal serum free T4      RECOMMENDATIONS:      1. Reviewed POC blood glucose . Labs and X ray results   2. Reviewed Home and Current Medicines   3. Will antithyroid  Antibodies and ultrasound of the thyroid gland  4.  consider starting on the low dose Synthroid the TSH level       Will follow with you  Again thank you for sharing pt's care with me.      Truly yours,       Yan Melchor MD

## 2022-12-05 ENCOUNTER — CARE COORDINATION (OUTPATIENT)
Dept: CARE COORDINATION | Age: 87
End: 2022-12-05

## 2022-12-05 NOTE — CARE COORDINATION
Attempted to reach patient for continued Care Coordination follow up and education. Patient was unavailable at the time of my call, and a generic voicemail message was left asking patient to return my call at [phone].

## 2022-12-06 NOTE — CARE COORDINATION
Ambulatory Care Coordination Note  12/6/2022    ACC: Beba Macpatrick    I spoke with the patient for continued Care Coordination follow up and education. Patient states she is doing fine. Breathing is at baseline. Denies edema or weight gain. We discussed Zone management tools for chronic disease(s) and patient denies current questions re: s/sx at this time. Advised patient to contact PCP office if weight gain of 3 lbs in 1 day or 5 lbs in 1 week. Daughter still helps as needed. Educated on how to identify sx's that are worse than the baseline and the importance of early symptom recognition and reporting to prevent exacerbation which may lead to ED visits and hospital admissions. I advised patient to contact PCP office if needed. No further needs at this time. Lab Results       None            Care Coordination Interventions    Referral from Primary Care Provider: No  Suggested Interventions and Community Resources          Goals Addressed    None         Prior to Admission medications    Medication Sig Start Date End Date Taking?  Authorizing Provider   losartan (COZAAR) 50 MG tablet Take 1 tablet by mouth daily 11/29/22 11/24/23  Lexy Rodríguez MD   hydrOXYzine HCl (ATARAX) 25 MG tablet Take 1 tablet by mouth nightly as needed for Anxiety 11/2/22 1/1/23  SCAR Nevarez   sucralfate (CARAFATE) 1 GM tablet Take 1 tablet by mouth 4 times daily  Patient taking differently: Take 1 g by mouth 3 times daily Patient can take up to 4 times daily 10/27/22   MCKINLEY Burrell CNP   pantoprazole (PROTONIX) 40 MG tablet Take 1 tablet by mouth 2 times daily (before meals) 10/27/22   MCKINLEY Burrell CNP   metoprolol succinate (TOPROL XL) 50 MG extended release tablet Take 1 tablet by mouth in the morning. 7/28/22 11/29/22  Lexy Rodríguez MD   aspirin 81 MG EC tablet Take 1 tablet by mouth in the morning. 7/21/22 1/17/23  [employer], PA-C   hydroCHLOROthiazide (HYDRODIURIL) 25 MG tablet Take 1

## 2022-12-08 ENCOUNTER — TELEPHONE (OUTPATIENT)
Dept: CARDIOLOGY CLINIC | Age: 87
End: 2022-12-08

## 2022-12-20 ENCOUNTER — CARE COORDINATION (OUTPATIENT)
Dept: CARE COORDINATION | Age: 87
End: 2022-12-20

## 2022-12-22 ENCOUNTER — TELEPHONE (OUTPATIENT)
Dept: CARDIAC CATH/INVASIVE PROCEDURES | Age: 87
End: 2022-12-22

## 2022-12-22 NOTE — TELEPHONE ENCOUNTER
Called the patient to set up [address] [place] [address] appointment. Referred by Dr Kong Colindres. Patient states she is not interested at this time. Also offered to reach out when we schedule our next Watchman class and she declined. Will be available in future if needed.   Electronically signed by Julius Rojas RN on 12/22/2022 at 2:35 PM [address] [facility]

## 2023-01-06 DIAGNOSIS — I10 PRIMARY HYPERTENSION: ICD-10-CM

## 2023-01-06 RX ORDER — HYDROCHLOROTHIAZIDE 25 MG/1
25 TABLET ORAL DAILY
Qty: 90 TABLET | Refills: 1 | Status: SHIPPED | OUTPATIENT
Start: 2023-01-06 | End: 2023-07-05

## 2023-01-12 ENCOUNTER — CARE COORDINATION (OUTPATIENT)
Dept: CARE COORDINATION | Age: 88
End: 2023-01-12

## 2023-01-23 ENCOUNTER — CARE COORDINATION (OUTPATIENT)
Dept: CARE COORDINATION | Age: 88
End: 2023-01-23

## 2023-01-30 ENCOUNTER — CARE COORDINATION (OUTPATIENT)
Dept: CARE COORDINATION | Age: 88
End: 2023-01-30

## 2023-02-08 ENCOUNTER — CARE COORDINATION (OUTPATIENT)
Dept: CARE COORDINATION | Age: 88
End: 2023-02-08

## 2023-03-21 RX ORDER — METOPROLOL SUCCINATE 50 MG/1
50 TABLET, EXTENDED RELEASE ORAL DAILY
Qty: 30 TABLET | Refills: 3 | Status: SHIPPED | OUTPATIENT
Start: 2023-03-21

## 2023-03-31 RX ORDER — PANTOPRAZOLE SODIUM 40 MG/1
40 TABLET, DELAYED RELEASE ORAL
Qty: 90 TABLET | Refills: 1 | Status: SHIPPED | OUTPATIENT
Start: 2023-03-31

## 2023-05-17 ENCOUNTER — TELEPHONE (OUTPATIENT)
Dept: FAMILY MEDICINE CLINIC | Age: 88
End: 2023-05-17

## 2023-05-17 NOTE — TELEPHONE ENCOUNTER
Denver Linder patients daughter called and stated. Patient is having burning when she urinates,frequent urination. Started 5-9-23. Patient has been drinking cranberry juice with no relief. Patient has not taken any OTC medications. Requesting a antibiotic to be sent to [facility].

## 2023-05-18 ENCOUNTER — TELEPHONE (OUTPATIENT)
Dept: FAMILY MEDICINE CLINIC | Age: 88
End: 2023-05-18

## 2023-05-18 DIAGNOSIS — R39.9 UTI SYMPTOMS: Primary | ICD-10-CM

## 2023-05-20 ENCOUNTER — HOSPITAL ENCOUNTER (OUTPATIENT)
Age: 88
Discharge: HOME | End: 2023-05-20

## 2023-05-20 ENCOUNTER — HOSPITAL ENCOUNTER (OUTPATIENT)
Age: 88
Setting detail: SPECIMEN
Discharge: HOME | End: 2023-05-20
Payer: MEDICARE

## 2023-05-20 PROCEDURE — 81001 URINALYSIS AUTO W/SCOPE: CPT

## 2023-05-22 ENCOUNTER — TELEPHONE (OUTPATIENT)
Dept: FAMILY MEDICINE CLINIC | Age: 88
End: 2023-05-22

## 2023-05-23 ENCOUNTER — TELEPHONE (OUTPATIENT)
Dept: FAMILY MEDICINE CLINIC | Age: 88
End: 2023-05-23

## 2023-05-23 DIAGNOSIS — N30.01 ACUTE CYSTITIS WITH HEMATURIA: Primary | ICD-10-CM

## 2023-05-23 LAB
BACTERIA: ABNORMAL /HPF
BILIRUBIN URINE: NEGATIVE MG/DL
BLOOD, URINE: ABNORMAL
CLARITY: ABNORMAL
COLOR: YELLOW
GLUCOSE, URINE: NEGATIVE MG/DL
KETONES, URINE: NEGATIVE MG/DL
LEUKOCYTE ESTERASE, URINE: ABNORMAL
MUCUS: ABNORMAL HPF
NITRITE URINE, QUANTITATIVE: POSITIVE
PH, URINE: 5 (ref 5–8)
PROTEIN UA: ABNORMAL MG/DL
RBC URINE: 22 /HPF (ref 0–6)
SPECIFIC GRAVITY UA: 1.01 (ref 1–1.03)
TRICHOMONAS: ABNORMAL /HPF
UROBILINOGEN, URINE: 0.2 MG/DL (ref 0.2–1)
WBC CLUMP: ABNORMAL /HPF
WBC UA: 1296 /HPF (ref 0–5)

## 2023-05-23 RX ORDER — CIPROFLOXACIN 250 MG/1
250 TABLET, FILM COATED ORAL 2 TIMES DAILY
Qty: 6 TABLET | Refills: 0 | Status: SHIPPED | OUTPATIENT
Start: 2023-05-23 | End: 2023-05-26

## 2023-05-23 NOTE — RESULT ENCOUNTER NOTE
Please call the patient and let them know: Her urinalysis did in fact show signs of a urinary tract infection. I sent an antibiotic called Cipro to [facility] on [address] for her. Please take this twice daily for 3 days and drink lots of water. I thought I had ordered a urine culture for this patient, but its not showing up. Would you mind calling the lab and asking them to run a culture on the specimen?

## 2023-05-23 NOTE — TELEPHONE ENCOUNTER
Please call Ma Kawasaki with UA results. If there is to be any medication called in,send to [facility].

## 2023-05-24 NOTE — TELEPHONE ENCOUNTER
[address] [place] [address] [address], [place]   5/23/2023  5:09 PM EDT Back to Top      I did call [facility] lab and spoke to Yessy. They no longer have the specimen. I talked to All also. She said Cipro usually works for her.  I advised her to call us if symptoms do not resolve in the next week

## 2023-06-19 ENCOUNTER — TELEPHONE (OUTPATIENT)
Dept: FAMILY MEDICINE CLINIC | Age: 88
End: 2023-06-19

## 2023-06-19 DIAGNOSIS — R39.9 UTI SYMPTOMS: ICD-10-CM

## 2023-06-19 DIAGNOSIS — R39.9 UTI SYMPTOMS: Primary | ICD-10-CM

## 2023-06-19 LAB
BACTERIA URNS QL MICRO: ABNORMAL /HPF
EPI CELLS #/AREA URNS AUTO: 2 /HPF (ref 0–5)
HYALINE CASTS #/AREA URNS AUTO: 0 /LPF (ref 0–8)
RBC CLUMPS #/AREA URNS AUTO: 2 /HPF (ref 0–4)
URN SPEC COLLECT METH UR: ABNORMAL
WBC #/AREA URNS AUTO: 173 /HPF (ref 0–5)

## 2023-06-19 NOTE — TELEPHONE ENCOUNTER
I would like for the patient to do another urine sample for us. We will culture it this time and test for antibiotic resistance. I placed orders for this. Please come to the lab to either give us the sample or  a collection cup.

## 2023-06-19 NOTE — TELEPHONE ENCOUNTER
PT TEST POS FOR UTI END OF MAY AND FINISHED ATB AND THOUGHT IT WAS GOOD BUT STARTED GETTING THE PAIN WHEN URINATING AND FREQUENCY.  CAN ANOTHER ATB BE CALLED IN?    [facility]

## 2023-06-21 ENCOUNTER — TELEPHONE (OUTPATIENT)
Dept: FAMILY MEDICINE CLINIC | Age: 88
End: 2023-06-21

## 2023-06-22 DIAGNOSIS — N30.00 ACUTE CYSTITIS WITHOUT HEMATURIA: Primary | ICD-10-CM

## 2023-06-22 LAB
BACTERIA UR CULT: ABNORMAL
ORGANISM: ABNORMAL

## 2023-06-22 RX ORDER — CEPHALEXIN 500 MG/1
500 CAPSULE ORAL 4 TIMES DAILY
Qty: 28 CAPSULE | Refills: 0 | Status: SHIPPED | OUTPATIENT
Start: 2023-06-22 | End: 2023-06-29

## 2023-06-22 NOTE — TELEPHONE ENCOUNTER
Please let the patient know that we received the results of urine culture and were able to determine which bacteria is causing her infection at this time. I sent a prescription for Keflex to her pharmacy ([facility]) for her. This is taken 4 times daily x7 days.

## 2023-06-23 ENCOUNTER — TELEPHONE (OUTPATIENT)
Dept: FAMILY MEDICINE CLINIC | Age: 88
End: 2023-06-23

## 2023-06-23 DIAGNOSIS — R11.0 NAUSEA: Primary | ICD-10-CM

## 2023-06-23 RX ORDER — ONDANSETRON 4 MG/1
4 TABLET, ORALLY DISINTEGRATING ORAL 3 TIMES DAILY PRN
Qty: 21 TABLET | Refills: 0 | Status: SHIPPED | OUTPATIENT
Start: 2023-06-23

## 2023-06-23 NOTE — TELEPHONE ENCOUNTER
Patient requesting nausea medicine. She completed Cipro a few weeks ago. We just started Keflex yesterday after culture and sensitivity came     back. Please advise.

## 2023-06-23 NOTE — TELEPHONE ENCOUNTER
Patient is taking the Keflex and is very nauseated. Patient is taking with food. Please send medication.  Call [facility]  and advise   [facility]

## 2023-07-10 ENCOUNTER — TELEPHONE (OUTPATIENT)
Dept: FAMILY MEDICINE CLINIC | Age: 88
End: 2023-07-10

## 2023-07-10 NOTE — TELEPHONE ENCOUNTER
Patient is doing better with the urinary issues. Has very little itching. No burning no odor and no change in color.

## 2023-07-19 ENCOUNTER — OFFICE VISIT (OUTPATIENT)
Dept: FAMILY MEDICINE CLINIC | Age: 88
End: 2023-07-19
Payer: MEDICARE

## 2023-07-19 VITALS
SYSTOLIC BLOOD PRESSURE: 125 MMHG | WEIGHT: 160 LBS | HEIGHT: 66 IN | HEART RATE: 81 BPM | OXYGEN SATURATION: 94 % | BODY MASS INDEX: 25.71 KG/M2 | DIASTOLIC BLOOD PRESSURE: 65 MMHG | RESPIRATION RATE: 16 BRPM

## 2023-07-19 DIAGNOSIS — R39.9 UTI SYMPTOMS: Primary | ICD-10-CM

## 2023-07-19 DIAGNOSIS — F43.21 UNRESOLVED GRIEF: ICD-10-CM

## 2023-07-19 PROCEDURE — G8427 DOCREV CUR MEDS BY ELIG CLIN: HCPCS | Performed by: PHYSICIAN ASSISTANT

## 2023-07-19 PROCEDURE — 99213 OFFICE O/P EST LOW 20 MIN: CPT | Performed by: PHYSICIAN ASSISTANT

## 2023-07-19 PROCEDURE — 1036F TOBACCO NON-USER: CPT | Performed by: PHYSICIAN ASSISTANT

## 2023-07-19 PROCEDURE — 1123F ACP DISCUSS/DSCN MKR DOCD: CPT | Performed by: PHYSICIAN ASSISTANT

## 2023-07-19 PROCEDURE — 1090F PRES/ABSN URINE INCON ASSESS: CPT | Performed by: PHYSICIAN ASSISTANT

## 2023-07-19 PROCEDURE — G8417 CALC BMI ABV UP PARAM F/U: HCPCS | Performed by: PHYSICIAN ASSISTANT

## 2023-07-19 RX ORDER — SERTRALINE HYDROCHLORIDE 25 MG/1
25 TABLET, FILM COATED ORAL DAILY
Qty: 90 TABLET | Refills: 0 | Status: SHIPPED | OUTPATIENT
Start: 2023-07-19

## 2023-07-19 RX ORDER — CIPROFLOXACIN 500 MG/1
500 TABLET, FILM COATED ORAL 2 TIMES DAILY
Qty: 10 TABLET | Refills: 0 | Status: SHIPPED | OUTPATIENT
Start: 2023-07-19 | End: 2023-07-24

## 2023-07-19 NOTE — PROGRESS NOTES
7/19/2023    Nimesh Patron    Chief Complaint   Patient presents with    Dysuria    Urinary Frequency     Has been having increased urinary frequency,  slight burning and pressure. Fatigue     Per family also general weakness. HPI  History was obtained from patient and her daughter. Batool Hayward is a 80 y.o. female who presents today with concerns for recurrent UTI. She has had urinary frequency, intermittent dysuria, and bladder pressure for a few days. She denies flank pain, hematuria, unusual vaginal discharge or bleeding, nausea, vomiting, fever or chills. Last month, urine culture showed growth of Klebsiella. She was treated with Keflex 500 mg 4 times daily for 7 days. 2 months ago, she was treated with Cipro 250 mg twice daily for 3 days but no urine culture was obtained. She does believe her symptoms get better but then returned. No underlying incontinence. Patient also expresses loneliness at times and sadness. She believes she is still dealing with the loss of her  who passed away last October. She shares beautiful stories about how she met him. She lives alone but she does have very involved family and has daily visits. No SI/HI. PAST MEDICAL HISTORY  Past Medical History:   Diagnosis Date    Arrhythmia     Arthritis     osteoarthritis cervical and lumbar spine    Atrophic vaginitis     Blood transfusion     CAD (coronary artery disease)     Cataracts, bilateral     CHF (congestive heart failure) (HCC)     COPD (chronic obstructive pulmonary disease) (HCC)     Depression     Essential hypertension     Fatty liver disease, nonalcoholic     GERD (gastroesophageal reflux disease)     H/O 24 hour EKG monitoring 9/15/2000    9/15/2000 -  Predominant rhythm is a sinus rhythm. No significant ectopy noted.     H/O cardiac catheterization 8/4/2005 8/4/2005 - Moderate degree of stenosis of the high diag, which is a heavily calcified vessel, however, there is a large ramus

## 2023-07-22 LAB
BACTERIA UR CULT: ABNORMAL
ORGANISM: ABNORMAL

## 2023-07-27 ENCOUNTER — TELEMEDICINE (OUTPATIENT)
Dept: FAMILY MEDICINE CLINIC | Age: 88
End: 2023-07-27
Payer: MEDICARE

## 2023-07-27 DIAGNOSIS — Z00.00 MEDICARE ANNUAL WELLNESS VISIT, SUBSEQUENT: Primary | ICD-10-CM

## 2023-07-27 PROCEDURE — 1123F ACP DISCUSS/DSCN MKR DOCD: CPT | Performed by: STUDENT IN AN ORGANIZED HEALTH CARE EDUCATION/TRAINING PROGRAM

## 2023-07-27 PROCEDURE — G0439 PPPS, SUBSEQ VISIT: HCPCS | Performed by: STUDENT IN AN ORGANIZED HEALTH CARE EDUCATION/TRAINING PROGRAM

## 2023-07-27 ASSESSMENT — PATIENT HEALTH QUESTIONNAIRE - PHQ9
2. FEELING DOWN, DEPRESSED OR HOPELESS: 1
SUM OF ALL RESPONSES TO PHQ QUESTIONS 1-9: 1
SUM OF ALL RESPONSES TO PHQ9 QUESTIONS 1 & 2: 1
1. LITTLE INTEREST OR PLEASURE IN DOING THINGS: 0

## 2023-07-27 NOTE — PROGRESS NOTES
Medicare Annual Wellness Visit    Niurka Stroud is here for Medicare AWV    Assessment & Plan   Medicare annual wellness visit, subsequent  Recommendations for Preventive Services Due: see orders and patient instructions/AVS.  Recommended screening schedule for the next 5-10 years is provided to the patient in written form: see Patient Instructions/AVS.     No follow-ups on file. Subjective       Patient's complete Health Risk Assessment and screening values have been reviewed and are found in Flowsheets. The following problems were reviewed today and where indicated follow up appointments were made and/or referrals ordered. Positive Risk Factor Screenings with Interventions:    Fall Risk:  Do you feel unsteady or are you worried about falling? : (!) yes  2 or more falls in past year?: no  Fall with injury in past year?: no     Interventions:    Patient comments: states she did fall without an injury, so she is careful. Patient declines any further evaluation or treatment            General HRA Questions:  Select all that apply: (!) Loneliness (since  passed)    Loneliness Interventions:  Patient comments: states she will get lonely since her  passed away. Patient declined any further interventions or treatment       Weight and Activity:  Physical Activity: Inactive    Days of Exercise per Week: 0 days    Minutes of Exercise per Session: 0 min     On average, how many days per week do you engage in moderate to strenuous exercise (like a brisk walk)?: 0 days  Have you lost any weight without trying in the past 3 months?: No  There is no height or weight on file to calculate BMI. Inactivity Interventions:  Patient declined any further interventions or treatment      Dentist Screen:  Have you seen the dentist within the past year?: (!) No (needs)    Intervention:  Patient comments: states she has a cavity so she does need to make a dental appointment.   Advised to schedule with their

## 2023-07-27 NOTE — PATIENT INSTRUCTIONS
82-year-old white man from [place] presents today for upper endoscopy evaluation of weight loss.  Patient also has a history of chronic diarrhea that has improved with use of Creon and lactate.  He was seen in our office by our physician assistant on 02/26/2019, and I refer the reader to that note for further details.  The patient does use ibuprofen on a regular basis.  Since his visit in February, he does not think that he has lost any more weight.  He had previously lost 20 pounds.  EGD was arranged for further evaluation of his weight loss.    ALLERGIES:  NKDA.    MEDICATIONS:    1. Amlodipine.  2. Benazepril.  3. Centrum Silver.  4. Creon.  5. Glucosamine chondroitin.  6. Ibuprofen.  7. Mirtazapine.  8. Potassium.    PAST MEDICAL HISTORY:  Hypertension, history of prostate cancer.    PAST SURGICAL HISTORY:  Laparoscopic cholecystectomy, hernia repair.    SOCIAL HISTORY:  He is , retired, former smoker.  No alcohol.    FAMILY HISTORY:  No family history of colon cancer.    PHYSICAL EXAMINATION:  VITAL SIGNS:  Stable.  Afebrile.   GENERAL:  Middle-aged appearing man in no acute distress.   HEENT:  Sclerae nonicteric.  Conjunctivae pink.  No adenopathy or thyromegaly.   CARDIOVASCULAR:  Regular rate and rhythm.   LUNGS:  Clear.   ABDOMEN:  Soft, nontender.  Bowel sounds normal.  No mass or organomegaly appreciated.   EXTREMITIES:  No clubbing, cyanosis, edema.     NEURO:  alert and oriented.  No focal deficit.    IMPRESSION:  Unintentional weight loss, possibly secondary to peptic ulcer disease or occult gastrointestinal malignancy.    RECOMMENDATIONS:    1. Will proceed with EGD.    2. Will also obtain biopsies to exclude celiac sprue.     Thank you for this dictation.        ______________________________  MD ROXANNA Maloney/  DD:  04/17/2019  Time:  08:35AM  DT:  04/17/2019  Time:  08:47AM  Job #:  [id number]    The H&P was reviewed, the patient was examined, and the following  Personalized Preventive Plan for Dennie Rust - 7/27/2023  Medicare offers a range of preventive health benefits. Some of the tests and screenings are paid in full while other may be subject to a deductible, co-insurance, and/or copay. Some of these benefits include a comprehensive review of your medical history including lifestyle, illnesses that may run in your family, and various assessments and screenings as appropriate. After reviewing your medical record and screening and assessments performed today your provider may have ordered immunizations, labs, imaging, and/or referrals for you. A list of these orders (if applicable) as well as your Preventive Care list are included within your After Visit Summary for your review. Other Preventive Recommendations:    A preventive eye exam performed by an eye specialist is recommended every 1-2 years to screen for glaucoma; cataracts, macular degeneration, and other eye disorders. A preventive dental visit is recommended every 6 months. Try to get at least 150 minutes of exercise per week or 10,000 steps per day on a pedometer . Order or download the FREE \"Exercise & Physical Activity: Your Everyday Guide\" from [employer]. Call 1-591.228.9371 or search [employer] online. You need 6408-8957 mg of calcium and 9280-4787 IU of vitamin D per day. It is possible to meet your calcium requirement with diet alone, but a vitamin D supplement is usually necessary to meet this goal.  When exposed to the sun, use a sunscreen that protects against both UVA and UVB radiation with an SPF of 30 or greater. Reapply every 2 to 3 hours or after sweating, drying off with a towel, or swimming. Always wear a seat belt when traveling in a car. Always wear a helmet when riding a bicycle or motorcycle. changes to the patients condition are noted:  ______________________________________________________________________________  ______________________________________________________________________________  ______________________________________________________________________________  [  ] No changes to the patient's condition:      ______________________________                                             ___________________  PHYSICIAN SIGNATURE                                                             DATE/TIME    cc: Ahmet Nash, MD

## 2023-07-31 ENCOUNTER — TELEPHONE (OUTPATIENT)
Dept: FAMILY MEDICINE CLINIC | Age: 88
End: 2023-07-31

## 2023-08-01 NOTE — TELEPHONE ENCOUNTER
Called pt per Mauri Marie , pt states her sx never went away . Pt and daughter reports has finished all ABX but sx still persist.  Pt was advised that we need another urine for culture , understanding voiced . Family member will stop by office in morning to  a speciman cup so that sample can be brought back same day to send to lab.

## 2023-08-02 DIAGNOSIS — R39.9 UTI SYMPTOMS: ICD-10-CM

## 2023-08-02 DIAGNOSIS — R39.9 UTI SYMPTOMS: Primary | ICD-10-CM

## 2023-08-02 LAB
BACTERIA URNS QL MICRO: ABNORMAL /HPF
EPI CELLS #/AREA URNS AUTO: 3 /HPF (ref 0–5)
HYALINE CASTS #/AREA URNS AUTO: 0 /LPF (ref 0–8)
RBC CLUMPS #/AREA URNS AUTO: 109 /HPF (ref 0–4)
URN SPEC COLLECT METH UR: ABNORMAL
WBC #/AREA URNS AUTO: 203 /HPF (ref 0–5)

## 2023-08-04 LAB — BACTERIA UR CULT: NORMAL

## 2023-08-12 DIAGNOSIS — I10 PRIMARY HYPERTENSION: ICD-10-CM

## 2023-08-14 RX ORDER — HYDROCHLOROTHIAZIDE 25 MG/1
TABLET ORAL
Qty: 90 TABLET | Refills: 1 | Status: SHIPPED | OUTPATIENT
Start: 2023-08-14

## 2023-08-15 ENCOUNTER — OFFICE VISIT (OUTPATIENT)
Dept: FAMILY MEDICINE CLINIC | Age: 88
End: 2023-08-15
Payer: MEDICARE

## 2023-08-15 VITALS
DIASTOLIC BLOOD PRESSURE: 74 MMHG | HEART RATE: 64 BPM | OXYGEN SATURATION: 96 % | WEIGHT: 160.6 LBS | TEMPERATURE: 97.8 F | SYSTOLIC BLOOD PRESSURE: 162 MMHG | BODY MASS INDEX: 25.92 KG/M2

## 2023-08-15 DIAGNOSIS — R39.9 UTI SYMPTOMS: Primary | ICD-10-CM

## 2023-08-15 DIAGNOSIS — N30.00 ACUTE CYSTITIS WITHOUT HEMATURIA: ICD-10-CM

## 2023-08-15 LAB
BILIRUBIN, POC: ABNORMAL
BLOOD URINE, POC: ABNORMAL
CLARITY, POC: ABNORMAL
COLOR, POC: YELLOW
GLUCOSE URINE, POC: ABNORMAL
KETONES, POC: ABNORMAL
LEUKOCYTE EST, POC: ABNORMAL
NITRITE, POC: POSITIVE
PH, POC: 6
PROTEIN, POC: ABNORMAL
SPECIFIC GRAVITY, POC: 1.02
UROBILINOGEN, POC: ABNORMAL

## 2023-08-15 PROCEDURE — 99213 OFFICE O/P EST LOW 20 MIN: CPT | Performed by: NURSE PRACTITIONER

## 2023-08-15 PROCEDURE — 1090F PRES/ABSN URINE INCON ASSESS: CPT | Performed by: NURSE PRACTITIONER

## 2023-08-15 PROCEDURE — G8428 CUR MEDS NOT DOCUMENT: HCPCS | Performed by: NURSE PRACTITIONER

## 2023-08-15 PROCEDURE — 1036F TOBACCO NON-USER: CPT | Performed by: NURSE PRACTITIONER

## 2023-08-15 PROCEDURE — 1123F ACP DISCUSS/DSCN MKR DOCD: CPT | Performed by: NURSE PRACTITIONER

## 2023-08-15 PROCEDURE — G8417 CALC BMI ABV UP PARAM F/U: HCPCS | Performed by: NURSE PRACTITIONER

## 2023-08-15 PROCEDURE — 81002 URINALYSIS NONAUTO W/O SCOPE: CPT | Performed by: NURSE PRACTITIONER

## 2023-08-15 RX ORDER — LACTOBACILLUS RHAMNOSUS GG 10B CELL
1 CAPSULE ORAL DAILY
COMMUNITY
Start: 2023-08-15

## 2023-08-15 RX ORDER — CIPROFLOXACIN 500 MG/1
500 TABLET, FILM COATED ORAL 2 TIMES DAILY
Qty: 14 TABLET | Refills: 0 | Status: SHIPPED | OUTPATIENT
Start: 2023-08-15 | End: 2023-08-22

## 2023-08-15 ASSESSMENT — ENCOUNTER SYMPTOMS
ABDOMINAL PAIN: 1
RESPIRATORY NEGATIVE: 1

## 2023-08-18 LAB
BACTERIA UR CULT: ABNORMAL
ORGANISM: ABNORMAL

## 2023-08-21 ENCOUNTER — TELEPHONE (OUTPATIENT)
Dept: FAMILY MEDICINE CLINIC | Age: 88
End: 2023-08-21

## 2023-08-21 NOTE — TELEPHONE ENCOUNTER
Pt was seen in clinic on 8/15 and has completed al prescribed medications, but does not seem to be feeling any better. Does pt need to be seen again for another appt or should something else be sent in to the pharmacy for her. Please advise.

## 2023-08-23 ENCOUNTER — TELEPHONE (OUTPATIENT)
Dept: FAMILY MEDICINE CLINIC | Age: 88
End: 2023-08-23

## 2023-08-23 NOTE — TELEPHONE ENCOUNTER
Nakul Grey the patients daughter called and stated that patient was seen 8-15-23 at the [address] [place] [address] for UTI. Patient was given Cipro. Patient still has frequent urination with some irritation (not josi if there is burning or itching) Patient does not look well in her face. Can patient get a referral to urology in [address] [place] [address].

## 2023-08-24 DIAGNOSIS — N39.0 RECURRENT UTI: Primary | ICD-10-CM

## 2023-08-24 RX ORDER — GRANULES FOR ORAL 3 G/1
3 POWDER ORAL ONCE
Qty: 1 EACH | Refills: 0 | Status: SHIPPED | OUTPATIENT
Start: 2023-08-24 | End: 2023-08-24

## 2023-08-24 NOTE — TELEPHONE ENCOUNTER
Please call the patient and let her know I placed a referral to urology. Please give them their information:    [facility] - Cyn Peterson, [address], [address] [facility], [address]   [phone]    I also sent a prescription for a different antibiotic called fosfomycin (Jorge Migel). This should be mixed with 3-4 ounces of COOL water and taken ONCE. Do NOT mix it with hot water or take it dry without mixing it in to anything.

## 2023-09-22 ENCOUNTER — OFFICE VISIT (OUTPATIENT)
Dept: FAMILY MEDICINE CLINIC | Age: 88
End: 2023-09-22
Payer: MEDICARE

## 2023-09-22 VITALS
SYSTOLIC BLOOD PRESSURE: 130 MMHG | WEIGHT: 161.9 LBS | OXYGEN SATURATION: 97 % | HEIGHT: 66 IN | HEART RATE: 71 BPM | BODY MASS INDEX: 26.02 KG/M2 | DIASTOLIC BLOOD PRESSURE: 78 MMHG

## 2023-09-22 DIAGNOSIS — N30.01 ACUTE CYSTITIS WITH HEMATURIA: ICD-10-CM

## 2023-09-22 DIAGNOSIS — R10.84 GENERALIZED ABDOMINAL PAIN: ICD-10-CM

## 2023-09-22 DIAGNOSIS — N39.0 RECURRENT UTI: Primary | ICD-10-CM

## 2023-09-22 DIAGNOSIS — N63.42 SUBAREOLAR MASS OF LEFT BREAST: ICD-10-CM

## 2023-09-22 LAB
ALBUMIN SERPL-MCNC: 3.7 G/DL (ref 3.4–5)
ALBUMIN/GLOB SERPL: 1.5 {RATIO} (ref 1.1–2.2)
ALP SERPL-CCNC: 98 U/L (ref 40–129)
ALT SERPL-CCNC: 12 U/L (ref 10–40)
ANION GAP SERPL CALCULATED.3IONS-SCNC: 11 MMOL/L (ref 3–16)
AST SERPL-CCNC: 14 U/L (ref 15–37)
BASOPHILS # BLD: 0 K/UL (ref 0–0.2)
BASOPHILS NFR BLD: 0.5 %
BILIRUB SERPL-MCNC: 0.3 MG/DL (ref 0–1)
BILIRUBIN, POC: NEGATIVE
BLOOD URINE, POC: ABNORMAL
BUN SERPL-MCNC: 24 MG/DL (ref 7–20)
CALCIUM SERPL-MCNC: 8.8 MG/DL (ref 8.3–10.6)
CHLORIDE SERPL-SCNC: 106 MMOL/L (ref 99–110)
CLARITY, POC: ABNORMAL
CO2 SERPL-SCNC: 24 MMOL/L (ref 21–32)
COLOR, POC: YELLOW
CREAT SERPL-MCNC: 1.3 MG/DL (ref 0.6–1.2)
DEPRECATED RDW RBC AUTO: 15.8 % (ref 12.4–15.4)
EOSINOPHIL # BLD: 0.2 K/UL (ref 0–0.6)
EOSINOPHIL NFR BLD: 2.3 %
GFR SERPLBLD CREATININE-BSD FMLA CKD-EPI: 39 ML/MIN/{1.73_M2}
GLUCOSE SERPL-MCNC: 267 MG/DL (ref 70–99)
GLUCOSE URINE, POC: NEGATIVE
HCT VFR BLD AUTO: 33.8 % (ref 36–48)
HGB BLD-MCNC: 11 G/DL (ref 12–16)
KETONES, POC: NEGATIVE
LEUKOCYTE EST, POC: ABNORMAL
LIPASE SERPL-CCNC: 12 U/L (ref 13–60)
LYMPHOCYTES # BLD: 1.4 K/UL (ref 1–5.1)
LYMPHOCYTES NFR BLD: 20.5 %
MCH RBC QN AUTO: 27.4 PG (ref 26–34)
MCHC RBC AUTO-ENTMCNC: 32.6 G/DL (ref 31–36)
MCV RBC AUTO: 84.1 FL (ref 80–100)
MONOCYTES # BLD: 0.4 K/UL (ref 0–1.3)
MONOCYTES NFR BLD: 6.1 %
NEUTROPHILS # BLD: 4.9 K/UL (ref 1.7–7.7)
NEUTROPHILS NFR BLD: 70.6 %
NITRITE, POC: NEGATIVE
PH, POC: 5.5
PLATELET # BLD AUTO: 170 K/UL (ref 135–450)
PMV BLD AUTO: 7.3 FL (ref 5–10.5)
POTASSIUM SERPL-SCNC: 4.7 MMOL/L (ref 3.5–5.1)
PROT SERPL-MCNC: 6.2 G/DL (ref 6.4–8.2)
PROTEIN, POC: 100
RBC # BLD AUTO: 4.02 M/UL (ref 4–5.2)
SODIUM SERPL-SCNC: 141 MMOL/L (ref 136–145)
SPECIFIC GRAVITY, POC: 1.02
UROBILINOGEN, POC: 0.2
WBC # BLD AUTO: 6.9 K/UL (ref 4–11)

## 2023-09-22 PROCEDURE — 1036F TOBACCO NON-USER: CPT | Performed by: STUDENT IN AN ORGANIZED HEALTH CARE EDUCATION/TRAINING PROGRAM

## 2023-09-22 PROCEDURE — 1090F PRES/ABSN URINE INCON ASSESS: CPT | Performed by: STUDENT IN AN ORGANIZED HEALTH CARE EDUCATION/TRAINING PROGRAM

## 2023-09-22 PROCEDURE — 99214 OFFICE O/P EST MOD 30 MIN: CPT | Performed by: STUDENT IN AN ORGANIZED HEALTH CARE EDUCATION/TRAINING PROGRAM

## 2023-09-22 PROCEDURE — 81002 URINALYSIS NONAUTO W/O SCOPE: CPT | Performed by: STUDENT IN AN ORGANIZED HEALTH CARE EDUCATION/TRAINING PROGRAM

## 2023-09-22 PROCEDURE — G8427 DOCREV CUR MEDS BY ELIG CLIN: HCPCS | Performed by: STUDENT IN AN ORGANIZED HEALTH CARE EDUCATION/TRAINING PROGRAM

## 2023-09-22 PROCEDURE — G8417 CALC BMI ABV UP PARAM F/U: HCPCS | Performed by: STUDENT IN AN ORGANIZED HEALTH CARE EDUCATION/TRAINING PROGRAM

## 2023-09-22 PROCEDURE — 1123F ACP DISCUSS/DSCN MKR DOCD: CPT | Performed by: STUDENT IN AN ORGANIZED HEALTH CARE EDUCATION/TRAINING PROGRAM

## 2023-09-22 RX ORDER — CEPHALEXIN 500 MG/1
500 CAPSULE ORAL 4 TIMES DAILY
Qty: 28 CAPSULE | Refills: 0 | Status: SHIPPED | OUTPATIENT
Start: 2023-09-22 | End: 2023-09-29

## 2023-09-22 NOTE — PROGRESS NOTES
9/26/2023    Nneka Nunez    Chief Complaint   Patient presents with    Breast Mass     Left breast, noticed about 1-2 weeks ago, denies any pain. Urinary Tract Infection     On going, frequency, urgency, darker in color. HPI  History was obtained from patient. Accompanied by Rima Anup is a 80 y.o. female with a PMHx as listed below who presents today for acute complaint UTI. Chronic UTIs shes been treated a lot in the past +culture Klebsiella    Pateint noiced lump left side nipple. Feels like small marble first noticed 2 weeks ago. No change color, discharge, daughter ovarian cancer    Fm Hx. Breast cancer paternal cousin paternal aunt, sister ovarian ca,   1. Recurrent UTI    2. Acute cystitis with hematuria    3. Generalized abdominal pain    4. Subareolar mass of left breast             REVIEW OF SYMPTOMS    Review of Systems   Constitutional:  Negative for chills and fatigue. HENT:  Negative for congestion and sore throat. Respiratory:  Negative for shortness of breath and wheezing. Cardiovascular:  Negative for chest pain and palpitations. Gastrointestinal:  Negative for abdominal pain and nausea. Genitourinary:  Negative for frequency and urgency. Neurological:  Negative for light-headedness. PAST MEDICAL HISTORY  Past Medical History:   Diagnosis Date    Arrhythmia     Arthritis     osteoarthritis cervical and lumbar spine    Atrophic vaginitis     Blood transfusion     CAD (coronary artery disease)     Cataracts, bilateral     CHF (congestive heart failure) (HCC)     COPD (chronic obstructive pulmonary disease) (HCC)     Depression     Essential hypertension     Fatty liver disease, nonalcoholic     GERD (gastroesophageal reflux disease)     H/O 24 hour EKG monitoring 9/15/2000    9/15/2000 -  Predominant rhythm is a sinus rhythm. No significant ectopy noted.     H/O cardiac catheterization 8/4/2005 8/4/2005 - Moderate degree of stenosis of the high diag,

## 2023-09-24 LAB
BACTERIA UR CULT: ABNORMAL
BACTERIA UR CULT: ABNORMAL
ORGANISM: ABNORMAL

## 2023-09-25 NOTE — RESULT ENCOUNTER NOTE
Please dsicuss with patient urine shows yeast infection. How has she been feeling? If symptomatic recommend we can give her nystatin cream for infection.

## 2023-09-26 RX ORDER — NYSTATIN 100000 U/G
1 CREAM TOPICAL 2 TIMES DAILY
Qty: 30 G | Refills: 0 | Status: SHIPPED | OUTPATIENT
Start: 2023-09-26 | End: 2023-10-06

## 2023-09-26 ASSESSMENT — ENCOUNTER SYMPTOMS
ABDOMINAL PAIN: 0
SHORTNESS OF BREATH: 0
NAUSEA: 0
SORE THROAT: 0
WHEEZING: 0

## 2023-10-02 ENCOUNTER — HOSPITAL ENCOUNTER (OUTPATIENT)
Dept: CT IMAGING | Age: 88
Discharge: HOME | End: 2023-10-02
Attending: STUDENT IN AN ORGANIZED HEALTH CARE EDUCATION/TRAINING PROGRAM
Payer: MEDICARE

## 2023-10-02 ENCOUNTER — HOSPITAL ENCOUNTER (OUTPATIENT)
Dept: ULTRASOUND IMAGING | Age: 88
Discharge: HOME | End: 2023-10-02
Attending: STUDENT IN AN ORGANIZED HEALTH CARE EDUCATION/TRAINING PROGRAM
Payer: MEDICARE

## 2023-10-02 DIAGNOSIS — N39.0 RECURRENT UTI: ICD-10-CM

## 2023-10-02 DIAGNOSIS — N30.01 ACUTE CYSTITIS WITH HEMATURIA: ICD-10-CM

## 2023-10-02 DIAGNOSIS — R10.84 GENERALIZED ABDOMINAL PAIN: ICD-10-CM

## 2023-10-02 PROCEDURE — 76857 US EXAM PELVIC LIMITED: CPT

## 2023-10-02 PROCEDURE — 74176 CT ABD & PELVIS W/O CONTRAST: CPT

## 2023-10-03 ENCOUNTER — TELEPHONE (OUTPATIENT)
Dept: FAMILY MEDICINE CLINIC | Age: 88
End: 2023-10-03

## 2023-10-03 NOTE — RESULT ENCOUNTER NOTE
CBC normal but I would recommend she starts iron supplement every other day. Let me know if ok wit patient and we can Rx.  Side effect constipation, darker stools

## 2023-10-03 NOTE — TELEPHONE ENCOUNTER
Spoke with patients daughter Leo Calvin about patients lab results and she seen where you recommended Iron supplements but she was wanting to know if there was a \"gentle\" kind of iron supplement that wont tear up the patient stomach?

## 2023-10-03 NOTE — TELEPHONE ENCOUNTER
Recommend ferrous gluconate but all iron tablets can be irritant to stomach.  Her blood count is low normal if she can not tolerate that is ok as well and we can monitor

## 2023-10-03 NOTE — RESULT ENCOUNTER NOTE
Please discuss with patient she has stable CKD please avoid nsaids.  We will continue diuretics for now but recommend monitoring kidney function regularly

## 2023-10-12 DIAGNOSIS — R19.7 DIARRHEA, UNSPECIFIED TYPE: Primary | ICD-10-CM

## 2023-10-12 RX ORDER — POLYETHYLENE GLYCOL 3350 17 G/17G
17 POWDER, FOR SOLUTION ORAL DAILY PRN
Qty: 510 G | Refills: 0 | Status: SHIPPED | OUTPATIENT
Start: 2023-10-12 | End: 2023-11-11

## 2023-10-12 RX ORDER — DOCUSATE SODIUM 100 MG/1
100 CAPSULE, LIQUID FILLED ORAL 2 TIMES DAILY PRN
Qty: 60 CAPSULE | Refills: 0 | Status: SHIPPED | OUTPATIENT
Start: 2023-10-12

## 2023-10-14 DIAGNOSIS — F43.21 UNRESOLVED GRIEF: ICD-10-CM

## 2023-10-17 RX ORDER — SERTRALINE HYDROCHLORIDE 25 MG/1
25 TABLET, FILM COATED ORAL DAILY
Qty: 90 TABLET | Refills: 0 | Status: SHIPPED | OUTPATIENT
Start: 2023-10-17

## 2023-10-27 ENCOUNTER — HOSPITAL ENCOUNTER (OUTPATIENT)
Dept: WOMENS IMAGING | Age: 88
Discharge: HOME | End: 2023-10-27
Attending: STUDENT IN AN ORGANIZED HEALTH CARE EDUCATION/TRAINING PROGRAM
Payer: MEDICARE

## 2023-10-27 ENCOUNTER — HOSPITAL ENCOUNTER (OUTPATIENT)
Dept: ULTRASOUND IMAGING | Age: 88
Discharge: HOME | End: 2023-10-27
Attending: STUDENT IN AN ORGANIZED HEALTH CARE EDUCATION/TRAINING PROGRAM
Payer: MEDICARE

## 2023-10-27 DIAGNOSIS — N63.42 SUBAREOLAR MASS OF LEFT BREAST: ICD-10-CM

## 2023-10-27 PROCEDURE — G0279 TOMOSYNTHESIS, MAMMO: HCPCS

## 2023-10-27 PROCEDURE — 76642 ULTRASOUND BREAST LIMITED: CPT

## 2023-11-01 ENCOUNTER — OFFICE VISIT (OUTPATIENT)
Dept: FAMILY MEDICINE CLINIC | Age: 88
End: 2023-11-01
Payer: MEDICARE

## 2023-11-01 VITALS
OXYGEN SATURATION: 99 % | BODY MASS INDEX: 25.26 KG/M2 | WEIGHT: 157.2 LBS | DIASTOLIC BLOOD PRESSURE: 76 MMHG | HEART RATE: 90 BPM | HEIGHT: 66 IN | SYSTOLIC BLOOD PRESSURE: 122 MMHG

## 2023-11-01 DIAGNOSIS — Z87.440 HISTORY OF RECURRENT UTIS: Primary | ICD-10-CM

## 2023-11-01 DIAGNOSIS — S39.012A STRAIN OF LUMBAR REGION, INITIAL ENCOUNTER: ICD-10-CM

## 2023-11-01 LAB
BILIRUBIN, POC: ABNORMAL
BLOOD URINE, POC: ABNORMAL
CLARITY, POC: ABNORMAL
COLOR, POC: YELLOW
GLUCOSE URINE, POC: ABNORMAL
KETONES, POC: ABNORMAL
LEUKOCYTE EST, POC: ABNORMAL
NITRITE, POC: ABNORMAL
PH, POC: 5.5
PROTEIN, POC: 30
SPECIFIC GRAVITY, POC: 1.02
UROBILINOGEN, POC: 0.2

## 2023-11-01 PROCEDURE — 99214 OFFICE O/P EST MOD 30 MIN: CPT | Performed by: PHYSICIAN ASSISTANT

## 2023-11-01 PROCEDURE — 81002 URINALYSIS NONAUTO W/O SCOPE: CPT | Performed by: PHYSICIAN ASSISTANT

## 2023-11-01 PROCEDURE — 1123F ACP DISCUSS/DSCN MKR DOCD: CPT | Performed by: PHYSICIAN ASSISTANT

## 2023-11-02 ENCOUNTER — TELEPHONE (OUTPATIENT)
Dept: FAMILY MEDICINE CLINIC | Age: 88
End: 2023-11-02

## 2023-11-02 LAB — BACTERIA UR CULT: NORMAL

## 2023-11-02 RX ORDER — PHENAZOPYRIDINE HYDROCHLORIDE 200 MG/1
200 TABLET, FILM COATED ORAL 3 TIMES DAILY PRN
Qty: 9 TABLET | Refills: 0 | Status: SHIPPED | OUTPATIENT
Start: 2023-11-02 | End: 2023-11-05

## 2023-11-02 NOTE — TELEPHONE ENCOUNTER
Dtr teena - requested med for urinary burning. Per kaleb rx pyridium 200 mg 1 tod x 3 days. Dtr agreed & voiced understanding.     Requested Prescriptions     Signed Prescriptions Disp Refills    phenazopyridine (PYRIDIUM) 200 MG tablet 9 tablet 0     Sig: Take 1 tablet by mouth 3 times daily as needed for Pain     Authorizing Provider: Paloma Palacio     Ordering User: Shannan Smith

## 2023-11-09 ENCOUNTER — TELEPHONE (OUTPATIENT)
Dept: FAMILY MEDICINE CLINIC | Age: 88
End: 2023-11-09

## 2023-11-09 NOTE — TELEPHONE ENCOUNTER
Kat Dalal patients daughter called and stated that patients BP has been fluctuating between 170 and 150. Does patient need to have her BP meds adjusted.   Call Kat Dalal and advise

## 2023-11-17 ENCOUNTER — HOSPITAL ENCOUNTER (OUTPATIENT)
Dept: WOMENS IMAGING | Age: 88
Discharge: HOME | End: 2023-11-17
Attending: STUDENT IN AN ORGANIZED HEALTH CARE EDUCATION/TRAINING PROGRAM
Payer: MEDICARE

## 2023-11-17 ENCOUNTER — HOSPITAL ENCOUNTER (OUTPATIENT)
Dept: ULTRASOUND IMAGING | Age: 88
Discharge: HOME | End: 2023-11-17
Payer: MEDICARE

## 2023-11-17 DIAGNOSIS — N63.42 SUBAREOLAR MASS OF LEFT BREAST: ICD-10-CM

## 2023-11-17 PROCEDURE — 77065 DX MAMMO INCL CAD UNI: CPT

## 2023-11-17 PROCEDURE — 19083 BX BREAST 1ST LESION US IMAG: CPT

## 2023-11-20 ENCOUNTER — OFFICE VISIT (OUTPATIENT)
Dept: FAMILY MEDICINE CLINIC | Age: 88
End: 2023-11-20
Payer: MEDICARE

## 2023-11-20 VITALS
HEIGHT: 66 IN | OXYGEN SATURATION: 97 % | WEIGHT: 159 LBS | DIASTOLIC BLOOD PRESSURE: 84 MMHG | SYSTOLIC BLOOD PRESSURE: 126 MMHG | BODY MASS INDEX: 25.55 KG/M2 | HEART RATE: 84 BPM

## 2023-11-20 DIAGNOSIS — D50.9 IRON DEFICIENCY ANEMIA, UNSPECIFIED IRON DEFICIENCY ANEMIA TYPE: ICD-10-CM

## 2023-11-20 DIAGNOSIS — I48.0 PAROXYSMAL ATRIAL FIBRILLATION (HCC): Primary | ICD-10-CM

## 2023-11-20 DIAGNOSIS — N18.30 STAGE 3 CHRONIC KIDNEY DISEASE, UNSPECIFIED WHETHER STAGE 3A OR 3B CKD (HCC): ICD-10-CM

## 2023-11-20 DIAGNOSIS — R73.01 IMPAIRED FASTING GLUCOSE: ICD-10-CM

## 2023-11-20 PROCEDURE — 99214 OFFICE O/P EST MOD 30 MIN: CPT | Performed by: STUDENT IN AN ORGANIZED HEALTH CARE EDUCATION/TRAINING PROGRAM

## 2023-11-20 PROCEDURE — 1123F ACP DISCUSS/DSCN MKR DOCD: CPT | Performed by: STUDENT IN AN ORGANIZED HEALTH CARE EDUCATION/TRAINING PROGRAM

## 2023-11-20 RX ORDER — METOPROLOL SUCCINATE 25 MG/1
50 TABLET, EXTENDED RELEASE ORAL DAILY
Qty: 90 TABLET | Refills: 1 | Status: SHIPPED | OUTPATIENT
Start: 2023-11-20 | End: 2024-05-18

## 2023-11-20 NOTE — PROGRESS NOTES
11/26/2023    \[facility]\"" B[facility]    Chief Complaint   Patient presents with    Follow-up       HPI  History was obtained from patient. Accompanied by Julia Conleychristine is a 80 y.o. female with a PMHx as listed below who presents today for follow up on chronic conditions. No acute complaints    Continues to get lightheaded,     Following with Urology now on estrogen cream      1. Paroxysmal atrial fibrillation (HCC)    2. Stage 3 chronic kidney disease, unspecified whether stage 3a or 3b CKD ([address])    3. Impaired fasting glucose    4. Iron deficiency anemia, unspecified iron deficiency anemia type             REVIEW OF SYMPTOMS    Review of Systems   Constitutional:  Negative for chills and fatigue. HENT:  Negative for congestion and sore throat. Respiratory:  Negative for shortness of breath and wheezing. Cardiovascular:  Negative for chest pain and palpitations. Gastrointestinal:  Negative for abdominal pain and nausea. Genitourinary:  Negative for frequency and urgency. Neurological:  Negative for light-headedness. PAST MEDICAL HISTORY  Past Medical History:   Diagnosis Date    Arrhythmia     Arthritis     osteoarthritis cervical and lumbar spine    Atrophic vaginitis     Blood transfusion     CAD (coronary artery disease)     Cataracts, bilateral     CHF (congestive heart failure) (HCC)     COPD (chronic obstructive pulmonary disease) (HCC)     Depression     Essential hypertension     Fatty liver disease, nonalcoholic     GERD (gastroesophageal reflux disease)     H/O 24 hour EKG monitoring 9/15/2000    9/15/2000 -  Predominant rhythm is a sinus rhythm. No significant ectopy noted. H/O cardiac catheterization 8/4/2005 8/4/2005 - Moderate degree of stenosis of the high diag, which is a heavily calcified vessel, however, there is a large ramus vessel supplying this same area as well. Rest of vessel is w/o any significant disease. Renals are w/o any significant stenosis.     H/O cardiovascular

## 2023-11-26 ENCOUNTER — TELEPHONE (OUTPATIENT)
Dept: FAMILY MEDICINE CLINIC | Age: 88
End: 2023-11-26

## 2023-11-26 ASSESSMENT — ENCOUNTER SYMPTOMS
SORE THROAT: 0
NAUSEA: 0
SHORTNESS OF BREATH: 0
WHEEZING: 0
ABDOMINAL PAIN: 0

## 2023-11-27 DIAGNOSIS — N63.42 SUBAREOLAR MASS OF LEFT BREAST: Primary | ICD-10-CM

## 2023-11-29 ENCOUNTER — TELEPHONE (OUTPATIENT)
Dept: CARDIOLOGY CLINIC | Age: 88
End: 2023-11-29

## 2023-11-29 NOTE — TELEPHONE ENCOUNTER
Left message for patient requesting a return call to schedule an office visit with current [facility] patient for paroxysmal atrial fibrillation per referral from Dr. Glez.

## 2023-12-04 ENCOUNTER — TELEPHONE (OUTPATIENT)
Dept: CARDIOLOGY CLINIC | Age: 88
End: 2023-12-04

## 2023-12-04 NOTE — TELEPHONE ENCOUNTER
Left message for patient requesting a return call to schedule an office visit with current [facility] patient for paroxysmal atrial fibrillation per referral from Dr. Glez.

## 2023-12-07 ENCOUNTER — TELEPHONE (OUTPATIENT)
Dept: CARDIOLOGY CLINIC | Age: 88
End: 2023-12-07

## 2023-12-07 NOTE — TELEPHONE ENCOUNTER
Left message for patient requesting a return call to schedule an office visit with current [facility] patient for paroxysmal atrial fibrillation per referral from Dr. Glez.

## 2024-04-18 DIAGNOSIS — F43.21 UNRESOLVED GRIEF: ICD-10-CM

## 2024-04-18 RX ORDER — SERTRALINE HYDROCHLORIDE 25 MG/1
25 TABLET, FILM COATED ORAL DAILY
Qty: 90 TABLET | Refills: 0 | Status: SHIPPED | OUTPATIENT
Start: 2024-04-18

## 2024-06-17 ENCOUNTER — OFFICE VISIT (OUTPATIENT)
Dept: FAMILY MEDICINE CLINIC | Age: 89
End: 2024-06-17
Payer: MEDICARE

## 2024-06-17 VITALS
HEIGHT: 64 IN | OXYGEN SATURATION: 98 % | RESPIRATION RATE: 18 BRPM | SYSTOLIC BLOOD PRESSURE: 188 MMHG | WEIGHT: 163.4 LBS | DIASTOLIC BLOOD PRESSURE: 90 MMHG | HEART RATE: 64 BPM | BODY MASS INDEX: 27.9 KG/M2

## 2024-06-17 DIAGNOSIS — L23.7 POISON IVY DERMATITIS: Primary | ICD-10-CM

## 2024-06-17 DIAGNOSIS — R60.0 PERIPHERAL EDEMA: ICD-10-CM

## 2024-06-17 DIAGNOSIS — N30.00 ACUTE CYSTITIS WITHOUT HEMATURIA: ICD-10-CM

## 2024-06-17 DIAGNOSIS — N39.0 RECURRENT UTI: ICD-10-CM

## 2024-06-17 PROCEDURE — 1036F TOBACCO NON-USER: CPT | Performed by: STUDENT IN AN ORGANIZED HEALTH CARE EDUCATION/TRAINING PROGRAM

## 2024-06-17 PROCEDURE — 1090F PRES/ABSN URINE INCON ASSESS: CPT | Performed by: STUDENT IN AN ORGANIZED HEALTH CARE EDUCATION/TRAINING PROGRAM

## 2024-06-17 PROCEDURE — 99214 OFFICE O/P EST MOD 30 MIN: CPT | Performed by: STUDENT IN AN ORGANIZED HEALTH CARE EDUCATION/TRAINING PROGRAM

## 2024-06-17 PROCEDURE — 81002 URINALYSIS NONAUTO W/O SCOPE: CPT | Performed by: STUDENT IN AN ORGANIZED HEALTH CARE EDUCATION/TRAINING PROGRAM

## 2024-06-17 PROCEDURE — G8427 DOCREV CUR MEDS BY ELIG CLIN: HCPCS | Performed by: STUDENT IN AN ORGANIZED HEALTH CARE EDUCATION/TRAINING PROGRAM

## 2024-06-17 PROCEDURE — G8417 CALC BMI ABV UP PARAM F/U: HCPCS | Performed by: STUDENT IN AN ORGANIZED HEALTH CARE EDUCATION/TRAINING PROGRAM

## 2024-06-17 PROCEDURE — 1123F ACP DISCUSS/DSCN MKR DOCD: CPT | Performed by: STUDENT IN AN ORGANIZED HEALTH CARE EDUCATION/TRAINING PROGRAM

## 2024-06-17 RX ORDER — ESTRADIOL 0.1 MG/G
1 CREAM VAGINAL
Qty: 85 G | Refills: 2 | Status: SHIPPED | OUTPATIENT
Start: 2024-06-17 | End: 2024-10-15

## 2024-06-17 RX ORDER — CEPHALEXIN 500 MG/1
500 CAPSULE ORAL 4 TIMES DAILY
Qty: 20 CAPSULE | Refills: 0 | Status: SHIPPED | OUTPATIENT
Start: 2024-06-17 | End: 2024-06-22

## 2024-06-17 RX ORDER — FUROSEMIDE 20 MG/1
20 TABLET ORAL 2 TIMES DAILY PRN
Qty: 14 TABLET | Refills: 0 | Status: SHIPPED | OUTPATIENT
Start: 2024-06-17 | End: 2024-06-25

## 2024-06-17 RX ORDER — BENZOCAINE/MENTHOL 6 MG-10 MG
LOZENGE MUCOUS MEMBRANE
Qty: 30 G | Refills: 1 | Status: SHIPPED | OUTPATIENT
Start: 2024-06-17 | End: 2024-06-24

## 2024-06-17 SDOH — ECONOMIC STABILITY: FOOD INSECURITY: WITHIN THE PAST 12 MONTHS, YOU WORRIED THAT YOUR FOOD WOULD RUN OUT BEFORE YOU GOT MONEY TO BUY MORE.: NEVER TRUE

## 2024-06-17 SDOH — ECONOMIC STABILITY: FOOD INSECURITY: WITHIN THE PAST 12 MONTHS, THE FOOD YOU BOUGHT JUST DIDN'T LAST AND YOU DIDN'T HAVE MONEY TO GET MORE.: NEVER TRUE

## 2024-06-17 SDOH — ECONOMIC STABILITY: INCOME INSECURITY: HOW HARD IS IT FOR YOU TO PAY FOR THE VERY BASICS LIKE FOOD, HOUSING, MEDICAL CARE, AND HEATING?: NOT HARD AT ALL

## 2024-06-17 ASSESSMENT — PATIENT HEALTH QUESTIONNAIRE - PHQ9
SUM OF ALL RESPONSES TO PHQ QUESTIONS 1-9: 0
1. LITTLE INTEREST OR PLEASURE IN DOING THINGS: NOT AT ALL
SUM OF ALL RESPONSES TO PHQ QUESTIONS 1-9: 0
SUM OF ALL RESPONSES TO PHQ QUESTIONS 1-9: 0
2. FEELING DOWN, DEPRESSED OR HOPELESS: NOT AT ALL
SUM OF ALL RESPONSES TO PHQ QUESTIONS 1-9: 0
SUM OF ALL RESPONSES TO PHQ9 QUESTIONS 1 & 2: 0

## 2024-06-17 NOTE — PROGRESS NOTES
6/27/2024    Annie France    Chief Complaint   Patient presents with    Foot Swelling     BT x1 week    Congestion    Back Pain     QHS bothers her       HPI  History was obtained from patient.  Annie is a 89 y.o. female with a PMHx as listed below who presents today for     Having UTI symptoms ongoing past 2 weeks    Admits to some increaed salt intake french fries    1. Poison ivy dermatitis    2. Acute cystitis without hematuria    3. Peripheral edema    4. Recurrent UTI             REVIEW OF SYMPTOMS    Review of Systems   Constitutional:  Negative for chills and fatigue.   HENT:  Negative for congestion and sore throat.    Respiratory:  Negative for shortness of breath and wheezing.    Cardiovascular:  Negative for chest pain and palpitations.   Gastrointestinal:  Negative for abdominal pain and nausea.   Genitourinary:  Negative for frequency and urgency.   Neurological:  Negative for light-headedness.       PAST MEDICAL HISTORY  Past Medical History:   Diagnosis Date    Arrhythmia     Arthritis     osteoarthritis cervical and lumbar spine    Atrophic vaginitis     Blood transfusion     CAD (coronary artery disease)     Cataracts, bilateral     CHF (congestive heart failure) (HCC)     COPD (chronic obstructive pulmonary disease) (HCC)     Depression     Essential hypertension     Fatty liver disease, nonalcoholic     GERD (gastroesophageal reflux disease)     H/O 24 hour EKG monitoring 9/15/2000    9/15/2000 -  Predominant rhythm is a sinus rhythm. No significant ectopy noted.    H/O cardiac catheterization 8/4/2005 8/4/2005 - Moderate degree of stenosis of the high diag, which is a heavily calcified vessel, however, there is a large ramus vessel supplying this same area as well. Rest of vessel is w/o any significant disease. Renals are w/o any significant stenosis.    H/O cardiovascular stress test 12/2/2010, 2/28/2008 12/2/2010 - Normal pattern of perfusion in all regions. LV is normal. No

## 2024-06-20 LAB
BACTERIA UR CULT: ABNORMAL
ORGANISM: ABNORMAL

## 2024-06-20 NOTE — RESULT ENCOUNTER NOTE
Annie's Cx. Came back positive for Klebsiella her abx (keflex) should cover, does she have any symptoms?

## 2024-06-22 DIAGNOSIS — I10 PRIMARY HYPERTENSION: ICD-10-CM

## 2024-06-23 DIAGNOSIS — I10 PRIMARY HYPERTENSION: ICD-10-CM

## 2024-06-24 RX ORDER — LOSARTAN POTASSIUM 50 MG/1
50 TABLET ORAL DAILY
Qty: 90 TABLET | Refills: 3 | OUTPATIENT
Start: 2024-06-24

## 2024-06-25 ENCOUNTER — OFFICE VISIT (OUTPATIENT)
Dept: FAMILY MEDICINE CLINIC | Age: 89
End: 2024-06-25
Payer: MEDICARE

## 2024-06-25 VITALS
DIASTOLIC BLOOD PRESSURE: 74 MMHG | BODY MASS INDEX: 26.29 KG/M2 | SYSTOLIC BLOOD PRESSURE: 132 MMHG | OXYGEN SATURATION: 97 % | HEART RATE: 71 BPM | WEIGHT: 154 LBS | HEIGHT: 64 IN

## 2024-06-25 DIAGNOSIS — N18.30 STAGE 3 CHRONIC KIDNEY DISEASE, UNSPECIFIED WHETHER STAGE 3A OR 3B CKD (HCC): ICD-10-CM

## 2024-06-25 DIAGNOSIS — E11.9 TYPE 2 DIABETES MELLITUS WITHOUT COMPLICATION, WITHOUT LONG-TERM CURRENT USE OF INSULIN (HCC): Primary | ICD-10-CM

## 2024-06-25 DIAGNOSIS — I50.22 CHRONIC SYSTOLIC (CONGESTIVE) HEART FAILURE (HCC): ICD-10-CM

## 2024-06-25 DIAGNOSIS — R60.0 BILATERAL LOWER EXTREMITY EDEMA: ICD-10-CM

## 2024-06-25 DIAGNOSIS — Z29.11 NEED FOR RSV VACCINATION: ICD-10-CM

## 2024-06-25 DIAGNOSIS — I10 PRIMARY HYPERTENSION: ICD-10-CM

## 2024-06-25 DIAGNOSIS — N63.42 SUBAREOLAR MASS OF LEFT BREAST: ICD-10-CM

## 2024-06-25 DIAGNOSIS — D50.9 IRON DEFICIENCY ANEMIA, UNSPECIFIED IRON DEFICIENCY ANEMIA TYPE: ICD-10-CM

## 2024-06-25 DIAGNOSIS — I48.0 PAROXYSMAL ATRIAL FIBRILLATION (HCC): ICD-10-CM

## 2024-06-25 DIAGNOSIS — Z23 NEED FOR SHINGLES VACCINE: ICD-10-CM

## 2024-06-25 PROCEDURE — G8427 DOCREV CUR MEDS BY ELIG CLIN: HCPCS | Performed by: PHYSICIAN ASSISTANT

## 2024-06-25 PROCEDURE — 1090F PRES/ABSN URINE INCON ASSESS: CPT | Performed by: PHYSICIAN ASSISTANT

## 2024-06-25 PROCEDURE — 1123F ACP DISCUSS/DSCN MKR DOCD: CPT | Performed by: PHYSICIAN ASSISTANT

## 2024-06-25 PROCEDURE — 1036F TOBACCO NON-USER: CPT | Performed by: PHYSICIAN ASSISTANT

## 2024-06-25 PROCEDURE — 99214 OFFICE O/P EST MOD 30 MIN: CPT | Performed by: PHYSICIAN ASSISTANT

## 2024-06-25 PROCEDURE — G8417 CALC BMI ABV UP PARAM F/U: HCPCS | Performed by: PHYSICIAN ASSISTANT

## 2024-06-25 RX ORDER — HYDROCHLOROTHIAZIDE 25 MG/1
TABLET ORAL
Qty: 90 TABLET | Refills: 1 | Status: SHIPPED | OUTPATIENT
Start: 2024-06-25

## 2024-06-25 NOTE — PROGRESS NOTES
6/28/2024    Annie France    Chief Complaint   Patient presents with    6 Month Follow-Up     Ankle swelling has improved.       HPI  History obtained from the patient.    Annie is a 89 y.o. female who presents today for 6 month follow up.    LE Edema - Patient was started on Lasix 20 mg BID prn by Dr. Glez on 6/17/24. States she lost 9 lbs and her ankles went down significantly.     UTI - Patient was recently treated for UTI by Dr. Glez with Keflex. Her symptoms of urinary urgency resolved.     Diabetes - Never taken medicine for this before.     A Fib - Patient is compliant with metoprolol 25 mg ER daily. Patient hasn't been seen by cardiology since 2022. No longer anticoagulated. She has a history of GI bleeding 2/2 gastric ulcers.     Anemia - Patient has a history of gastric ulcers and anemia.     Left Breast Retroareolar Mass - Excisional biopsy was recommended in November 2023, but they declined this at the time.     Specialists:  Cardiology - [facility]     Health Maintenance    VACCINES   Shingles vaccine  RSV vaccine - October 2024    Next tetanus booster (Tdap) will be due: 2025 (Tetanus boosters are every 10 years).     CANCER SCREENINGS  Patient had an abnormal mammogram October 2023.     Last Weight Metrics:      6/25/2024    11:10 AM 6/17/2024     2:46 PM 11/20/2023     2:33 PM 11/1/2023    11:27 AM 9/22/2023     1:24 PM 8/15/2023     1:05 PM 7/19/2023     2:11 PM   Weight Loss Metrics   Height 5' 4\" 5' 4.016\" 5' 5.984\" 5' 5.984\" 5' 6\"  5' 6\"   Weight - Scale 154 lbs 163 lbs 6 oz 159 lbs 157 lbs 3 oz 161 lbs 14 oz 160 lbs 10 oz 160 lbs   BMI (Calculated) 26.5 kg/m2 28.1 kg/m2 25.7 kg/m2 25.4 kg/m2 26.2 kg/m2 0 kg/m2 25.9 kg/m2       REVIEW OF SYMPTOMS  Review of Systems   Constitutional: Negative for chills and fever.   Respiratory: Negative for cough and shortness of breath.    Gastrointestinal: Negative for diarrhea and vomiting.     PAST MEDICAL HISTORY  Past Medical History:   Diagnosis Date

## 2024-06-25 NOTE — PATIENT INSTRUCTIONS
Take Lasix only AS NEEDED. Check out compression devices at [facility].       IMPORTANT: Please CALL Central Scheduling at [phone] to make an appointment for your MAMMOGRAM and Ultrasound     BLOOD WORK  Please come back for FASTING blood work sometime within the next TWO WEEKS.      Be sure to fast for at least 12 HOURS before having your blood work drawn. Drink plenty of WATER before coming in the DAY BEFORE and the MORNING OF coming in to get your blood drawn. This will make it easier to draw your blood. You may also have black coffee or black tea if you wish (BUT NO CREAM OR SUGAR) .     [facility]   [address], [address], [place] [address]  Monday through Friday 8:00am - 12:30pm and 1:00pm - 4:00pm     [facility]  [address], [place] [address]  Monday through Friday 7:00am - 5:00pm   Saturday 8:00am - 12:00pm     RESULTS  Keep an eye on MyChart for a result note from me within a WEEK of getting your blood work. If you do not have MyChart, we will call you with results.        VACCINES   Shingles vaccine  RSV vaccine - October 2024    Next tetanus booster (Tdap) will be due: 2025 (Tetanus boosters are every 10 years).       SHINGLES VACCINE -- Make sure to get your Shingles Vaccine at a Local Pharmacy    I highly recommend the SHINGRIX VACCINE. Unfortunately we do not currently carry this vaccine at our office, but you can get it at a local pharmacy like [facility] or [facility] or at the [facility]. No prescription needed. As of 2023, almost all insurance companies started covering the Shingles vaccine, so for Medicare Part D and 95% of private insurance companies, the Shingrix vaccine is FREE -- $0 for patients.     There are two types of shingles vaccines. The old shingles vaccine was Zostavax. Around 2017, a new, more effective shingles vaccine called Shingrix came out. Shingrix provides greater protection against Shingles and provides longer

## 2024-06-26 PROBLEM — I48.0 PAROXYSMAL ATRIAL FIBRILLATION (HCC): Status: ACTIVE | Noted: 2024-06-26

## 2024-06-27 ASSESSMENT — ENCOUNTER SYMPTOMS
NAUSEA: 0
SORE THROAT: 0
WHEEZING: 0
SHORTNESS OF BREATH: 0
ABDOMINAL PAIN: 0

## 2024-07-17 DIAGNOSIS — E11.9 TYPE 2 DIABETES MELLITUS WITHOUT COMPLICATION, WITHOUT LONG-TERM CURRENT USE OF INSULIN (HCC): ICD-10-CM

## 2024-07-17 DIAGNOSIS — I10 PRIMARY HYPERTENSION: ICD-10-CM

## 2024-07-17 DIAGNOSIS — D50.9 IRON DEFICIENCY ANEMIA, UNSPECIFIED IRON DEFICIENCY ANEMIA TYPE: ICD-10-CM

## 2024-07-17 DIAGNOSIS — F43.21 UNRESOLVED GRIEF: ICD-10-CM

## 2024-07-17 LAB
ALBUMIN SERPL-MCNC: 3.7 G/DL (ref 3.4–5)
ALBUMIN/GLOB SERPL: 1.4 {RATIO} (ref 1.1–2.2)
ALP SERPL-CCNC: 97 U/L (ref 40–129)
ALT SERPL-CCNC: 16 U/L (ref 10–40)
ANION GAP SERPL CALCULATED.3IONS-SCNC: 13 MMOL/L (ref 3–16)
AST SERPL-CCNC: 18 U/L (ref 15–37)
BASOPHILS # BLD: 0 K/UL (ref 0–0.2)
BASOPHILS NFR BLD: 0.4 %
BILIRUB SERPL-MCNC: 0.4 MG/DL (ref 0–1)
BUN SERPL-MCNC: 22 MG/DL (ref 7–20)
CALCIUM SERPL-MCNC: 9.2 MG/DL (ref 8.3–10.6)
CHLORIDE SERPL-SCNC: 99 MMOL/L (ref 99–110)
CO2 SERPL-SCNC: 23 MMOL/L (ref 21–32)
CREAT SERPL-MCNC: 1.2 MG/DL (ref 0.6–1.2)
DEPRECATED RDW RBC AUTO: 16.1 % (ref 12.4–15.4)
EOSINOPHIL # BLD: 0.2 K/UL (ref 0–0.6)
EOSINOPHIL NFR BLD: 2.7 %
EST. AVERAGE GLUCOSE BLD GHB EST-MCNC: 182.9 MG/DL
FERRITIN SERPL IA-MCNC: 49.1 NG/ML (ref 15–150)
GFR SERPLBLD CREATININE-BSD FMLA CKD-EPI: 43 ML/MIN/{1.73_M2}
GLUCOSE SERPL-MCNC: 237 MG/DL (ref 70–99)
HBA1C MFR BLD: 8 %
HCT VFR BLD AUTO: 34.7 % (ref 36–48)
HGB BLD-MCNC: 11.2 G/DL (ref 12–16)
IRON SATN MFR SERPL: 19 % (ref 15–50)
IRON SERPL-MCNC: 66 UG/DL (ref 37–145)
LYMPHOCYTES # BLD: 2.4 K/UL (ref 1–5.1)
LYMPHOCYTES NFR BLD: 25.7 %
MCH RBC QN AUTO: 28.1 PG (ref 26–34)
MCHC RBC AUTO-ENTMCNC: 32.4 G/DL (ref 31–36)
MCV RBC AUTO: 86.7 FL (ref 80–100)
MONOCYTES # BLD: 0.6 K/UL (ref 0–1.3)
MONOCYTES NFR BLD: 6.5 %
NEUTROPHILS # BLD: 5.9 K/UL (ref 1.7–7.7)
NEUTROPHILS NFR BLD: 64.7 %
PLATELET # BLD AUTO: 182 K/UL (ref 135–450)
PMV BLD AUTO: 8.5 FL (ref 5–10.5)
POTASSIUM SERPL-SCNC: 4.2 MMOL/L (ref 3.5–5.1)
PROT SERPL-MCNC: 6.3 G/DL (ref 6.4–8.2)
RBC # BLD AUTO: 4 M/UL (ref 4–5.2)
SODIUM SERPL-SCNC: 135 MMOL/L (ref 136–145)
TIBC SERPL-MCNC: 339 UG/DL (ref 260–445)
WBC # BLD AUTO: 9.2 K/UL (ref 4–11)

## 2024-07-18 DIAGNOSIS — E11.9 TYPE 2 DIABETES MELLITUS WITHOUT COMPLICATION, WITHOUT LONG-TERM CURRENT USE OF INSULIN (HCC): Primary | ICD-10-CM

## 2024-07-18 PROBLEM — N18.32 STAGE 3B CHRONIC KIDNEY DISEASE (HCC): Status: ACTIVE | Noted: 2022-06-23

## 2024-07-18 RX ORDER — SERTRALINE HYDROCHLORIDE 25 MG/1
25 TABLET, FILM COATED ORAL DAILY
Qty: 90 TABLET | Refills: 0 | Status: SHIPPED | OUTPATIENT
Start: 2024-07-18

## 2024-07-18 NOTE — RESULT ENCOUNTER NOTE
Please call:Thanks for getting your blood work done.  Unfortunately your A1c has gone up significantly.  It went from 7.0% up to 8.0%.  Sadly this means that we need to get you started on diabetic medication.  When your A1c is higher than 7.0%, it starts to be hard on your kidneys, nerves, redness, and blood vessels, putting you at risk for many different complications.  I will send a prescription for metformin to the pharmacy for you. You should take it nightly with dinner. Please return to the lab for blood work to recheck a few things in 2 weeks.  No need to fast this time.    Please work on eating fewer carbohydrates like bread, pasta, rice, potatoes, etc. and sugar such as in desserts, candy, soda, etc.    Please schedule a follow-up appointment with our office in 6 weeks

## 2024-07-24 DIAGNOSIS — I10 PRIMARY HYPERTENSION: ICD-10-CM

## 2024-07-24 RX ORDER — LOSARTAN POTASSIUM 50 MG/1
50 TABLET ORAL DAILY
Qty: 90 TABLET | Refills: 3 | Status: SHIPPED | OUTPATIENT
Start: 2024-07-24 | End: 2025-07-19

## 2024-08-01 NOTE — TELEPHONE ENCOUNTER
I would like patient's losartan to be increased to 100mg [Home] : at home, [unfilled] , at the time of the visit. [Medical Office: ([facility])___] : at the medical office located in  [Patient] : the patient [This encounter was initiated by telehealth (audio with video) and converted to telephone (audio only) due to technical difficulties.] : This encounter was initiated by telehealth (audio with video) and converted to telephone (audio only) due to technical difficulties.

## 2024-08-06 ENCOUNTER — OFFICE VISIT (OUTPATIENT)
Dept: CARDIOLOGY CLINIC | Age: 89
End: 2024-08-06
Payer: MEDICARE

## 2024-08-06 VITALS
HEIGHT: 67 IN | HEART RATE: 60 BPM | SYSTOLIC BLOOD PRESSURE: 134 MMHG | DIASTOLIC BLOOD PRESSURE: 66 MMHG | WEIGHT: 151 LBS | BODY MASS INDEX: 23.7 KG/M2

## 2024-08-06 DIAGNOSIS — I38 VALVULAR HEART DISEASE: ICD-10-CM

## 2024-08-06 DIAGNOSIS — I10 ESSENTIAL HYPERTENSION: ICD-10-CM

## 2024-08-06 DIAGNOSIS — R06.02 SHORTNESS OF BREATH: ICD-10-CM

## 2024-08-06 DIAGNOSIS — I35.0 NONRHEUMATIC AORTIC VALVE STENOSIS: ICD-10-CM

## 2024-08-06 DIAGNOSIS — I47.10 SVT (SUPRAVENTRICULAR TACHYCARDIA) (HCC): ICD-10-CM

## 2024-08-06 DIAGNOSIS — I48.0 PAROXYSMAL ATRIAL FIBRILLATION (HCC): Primary | ICD-10-CM

## 2024-08-06 PROCEDURE — 1123F ACP DISCUSS/DSCN MKR DOCD: CPT | Performed by: INTERNAL MEDICINE

## 2024-08-06 PROCEDURE — G8420 CALC BMI NORM PARAMETERS: HCPCS | Performed by: INTERNAL MEDICINE

## 2024-08-06 PROCEDURE — 1090F PRES/ABSN URINE INCON ASSESS: CPT | Performed by: INTERNAL MEDICINE

## 2024-08-06 PROCEDURE — 1036F TOBACCO NON-USER: CPT | Performed by: INTERNAL MEDICINE

## 2024-08-06 PROCEDURE — 99214 OFFICE O/P EST MOD 30 MIN: CPT | Performed by: INTERNAL MEDICINE

## 2024-08-06 PROCEDURE — G8427 DOCREV CUR MEDS BY ELIG CLIN: HCPCS | Performed by: INTERNAL MEDICINE

## 2024-08-06 PROCEDURE — 93000 ELECTROCARDIOGRAM COMPLETE: CPT | Performed by: INTERNAL MEDICINE

## 2024-08-06 NOTE — PROGRESS NOTES
losartan (COZAAR) 50 MG tablet Take 1 tablet by mouth daily 90 tablet 3    sertraline (ZOLOFT) 25 MG tablet TAKE 1 TABLET BY MOUTH EVERY DAY 90 tablet 0    metFORMIN (GLUCOPHAGE) 500 MG tablet Take 1 tablet by mouth Daily with supper 30 tablet 1    hydroCHLOROthiazide (HYDRODIURIL) 25 MG tablet TAKE 1 TABLET BY MOUTH EVERY DAY 90 tablet 1    furosemide (LASIX) 20 MG tablet Take 1 tablet by mouth 2 times daily as needed (edema) 14 tablet 0    estradiol (ESTRACE VAGINAL) 0.1 MG/GM vaginal cream Place 1 g vaginally every 72 hours 85 g 2    metoprolol succinate (TOPROL XL) 25 MG extended release tablet Take 2 tablets by mouth daily 90 tablet 1     No current facility-administered medications for this visit.       Allergies:     Sulfa antibiotics, Nitrofuran derivatives, and Quinapril hcl    Patient History:    Past Medical History:   Diagnosis Date    Arrhythmia     Arthritis     osteoarthritis cervical and lumbar spine    Atrophic vaginitis     Blood transfusion     CAD (coronary artery disease)     Cataracts, bilateral     CHF (congestive heart failure) (HCC)     COPD (chronic obstructive pulmonary disease) (HCC)     Depression     Essential hypertension     Fatty liver disease, nonalcoholic     GERD (gastroesophageal reflux disease)     H/O 24 hour EKG monitoring 9/15/2000    9/15/2000 -  Predominant rhythm is a sinus rhythm. No significant ectopy noted.    H/O cardiac catheterization 8/4/2005 8/4/2005 - Moderate degree of stenosis of the high diag, which is a heavily calcified vessel, however, there is a large ramus vessel supplying this same area as well. Rest of vessel is w/o any significant disease. Renals are w/o any significant stenosis.    H/O cardiovascular stress test 12/2/2010, 2/28/2008 12/2/2010 - Normal pattern of perfusion in all regions. LV is normal. No inducible myocardial ischemia. EF is 66%. LVSF is normal. No ECG changes. Exercise capacity is normal.    H/O cardiovascular stress test

## 2024-08-08 ENCOUNTER — TELEMEDICINE (OUTPATIENT)
Dept: FAMILY MEDICINE CLINIC | Age: 89
End: 2024-08-08
Payer: MEDICARE

## 2024-08-08 DIAGNOSIS — Z00.00 MEDICARE ANNUAL WELLNESS VISIT, SUBSEQUENT: Primary | ICD-10-CM

## 2024-08-08 PROCEDURE — 1123F ACP DISCUSS/DSCN MKR DOCD: CPT | Performed by: STUDENT IN AN ORGANIZED HEALTH CARE EDUCATION/TRAINING PROGRAM

## 2024-08-08 PROCEDURE — G0439 PPPS, SUBSEQ VISIT: HCPCS | Performed by: STUDENT IN AN ORGANIZED HEALTH CARE EDUCATION/TRAINING PROGRAM

## 2024-08-08 ASSESSMENT — PATIENT HEALTH QUESTIONNAIRE - PHQ9
1. LITTLE INTEREST OR PLEASURE IN DOING THINGS: NOT AT ALL
SUM OF ALL RESPONSES TO PHQ QUESTIONS 1-9: 0
SUM OF ALL RESPONSES TO PHQ9 QUESTIONS 1 & 2: 0
2. FEELING DOWN, DEPRESSED OR HOPELESS: NOT AT ALL
SUM OF ALL RESPONSES TO PHQ QUESTIONS 1-9: 0

## 2024-08-08 ASSESSMENT — LIFESTYLE VARIABLES
HOW MANY STANDARD DRINKS CONTAINING ALCOHOL DO YOU HAVE ON A TYPICAL DAY: PATIENT DOES NOT DRINK
HOW OFTEN DO YOU HAVE A DRINK CONTAINING ALCOHOL: NEVER

## 2024-08-08 NOTE — PATIENT INSTRUCTIONS
Personalized Preventive Plan for Annie France - 8/8/2024  Medicare offers a range of preventive health benefits. Some of the tests and screenings are paid in full while other may be subject to a deductible, co-insurance, and/or copay.    Some of these benefits include a comprehensive review of your medical history including lifestyle, illnesses that may run in your family, and various assessments and screenings as appropriate.    After reviewing your medical record and screening and assessments performed today your provider may have ordered immunizations, labs, imaging, and/or referrals for you.  A list of these orders (if applicable) as well as your Preventive Care list are included within your After Visit Summary for your review.    Other Preventive Recommendations:    A preventive eye exam performed by an eye specialist is recommended every 1-2 years to screen for glaucoma; cataracts, macular degeneration, and other eye disorders.  A preventive dental visit is recommended every 6 months.  Try to get at least 150 minutes of exercise per week or 10,000 steps per day on a pedometer .  Order or download the FREE \"Exercise & Physical Activity: Your Everyday Guide\" from [employer]. Call 1-594.197.1070 or search [employer] online.  You need 4636-7070 mg of calcium and 3448-4616 IU of vitamin D per day. It is possible to meet your calcium requirement with diet alone, but a vitamin D supplement is usually necessary to meet this goal.  When exposed to the sun, use a sunscreen that protects against both UVA and UVB radiation with an SPF of 30 or greater. Reapply every 2 to 3 hours or after sweating, drying off with a towel, or swimming.  Always wear a seat belt when traveling in a car. Always wear a helmet when riding a bicycle or motorcycle.

## 2024-08-08 NOTE — PROGRESS NOTES
Caitlin Funes LPN, 8/8/2024, performed the documented evaluation under the direct supervision of the attending physician.  Annie Román, was evaluated through a synchronous (real-time) audio encounter. The patient (or guardian if applicable) is aware that this is a billable service, which includes applicable co-pays. This Virtual Visit was conducted with patient's (and/or legal guardian's) consent. Patient identification was verified, and a caregiver was present when appropriate.   The patient was located at Home: [address] [place] [address]  Provider was located at Facility (Appt Dept): [address] [place] [address]  [id number]  [place] [address]  Confirm you are appropriately licensed, registered, or certified to deliver care in the state where the patient is located as indicated above. If you are not or unsure, please re-schedule the visit: Yes, I confirm.          This encounter was performed under my, Gisella Mackay MD’s, direct supervision, 8/8/2024.

## 2024-08-10 DIAGNOSIS — E11.9 TYPE 2 DIABETES MELLITUS WITHOUT COMPLICATION, WITHOUT LONG-TERM CURRENT USE OF INSULIN (HCC): ICD-10-CM

## 2024-08-25 DIAGNOSIS — I48.0 PAROXYSMAL ATRIAL FIBRILLATION (HCC): ICD-10-CM

## 2024-08-26 DIAGNOSIS — I48.0 PAROXYSMAL ATRIAL FIBRILLATION (HCC): ICD-10-CM

## 2024-08-26 RX ORDER — METOPROLOL SUCCINATE 25 MG/1
50 TABLET, EXTENDED RELEASE ORAL DAILY
Qty: 180 TABLET | Refills: 1 | Status: SHIPPED | OUTPATIENT
Start: 2024-08-26 | End: 2025-02-22

## 2024-08-26 RX ORDER — METOPROLOL SUCCINATE 25 MG/1
50 TABLET, EXTENDED RELEASE ORAL DAILY
Qty: 90 TABLET | Refills: 1 | OUTPATIENT
Start: 2024-08-26

## 2024-08-30 ENCOUNTER — TELEPHONE (OUTPATIENT)
Dept: CARDIOLOGY CLINIC | Age: 89
End: 2024-08-30

## 2024-08-30 NOTE — TELEPHONE ENCOUNTER
8/30/24 ECHO      Left Ventricle: Normal left ventricular systolic function with a visually estimated EF of 55 - 60%. Left ventricle size is normal. Normal wall thickness. Normal wall motion. Normal diastolic function.    Aortic Valve: Mildly calcified cusps. Mild stenosis of the aortic valve. AV mean gradient is 14 mmHg. AV area by continuity VTI is 1.6 cm2.    Mitral Valve: Moderate regurgitation.    Tricuspid Valve: Mild regurgitation. Mildly elevated RVSP, consistent with mild pulmonary hypertension. The estimated RVSP is 35 mmHg.    Pulmonic Valve: Mild regurgitation.    Left Atrium: Left atrium is mildly dilated.    Pericardium: No pericardial effusion.    Image quality is good    PTS DAUGHTER NOTIFIED OF RESULTS AND ADVISED OF NEXT APPT

## 2024-11-06 ENCOUNTER — HOSPITAL ENCOUNTER (EMERGENCY)
Age: 89
Discharge: HOME | End: 2024-11-06
Attending: STUDENT IN AN ORGANIZED HEALTH CARE EDUCATION/TRAINING PROGRAM
Payer: MEDICARE

## 2024-11-06 VITALS
HEART RATE: 102 BPM | TEMPERATURE: 98.2 F | RESPIRATION RATE: 20 BRPM | WEIGHT: 151 LBS | DIASTOLIC BLOOD PRESSURE: 78 MMHG | SYSTOLIC BLOOD PRESSURE: 109 MMHG | HEIGHT: 67 IN | OXYGEN SATURATION: 97 % | BODY MASS INDEX: 23.7 KG/M2

## 2024-11-06 DIAGNOSIS — N30.01 ACUTE CYSTITIS WITH HEMATURIA: Primary | ICD-10-CM

## 2024-11-06 LAB
ANION GAP SERPL CALCULATED.3IONS-SCNC: 15 MMOL/L (ref 9–17)
BASOPHILS # BLD: 0.03 K/UL
BASOPHILS NFR BLD: 0 % (ref 0–1)
BILIRUB UR QL STRIP: NEGATIVE
BUN SERPL-MCNC: 30 MG/DL (ref 7–20)
CALCIUM SERPL-MCNC: 9.2 MG/DL (ref 8.3–10.6)
CHLORIDE SERPL-SCNC: 101 MMOL/L (ref 99–110)
CLARITY UR: CLEAR
CO2 SERPL-SCNC: 20 MMOL/L (ref 21–32)
COLOR UR: YELLOW
CREAT SERPL-MCNC: 1.3 MG/DL (ref 0.6–1.2)
EKG DIAGNOSIS: NORMAL
EKG Q-T INTERVAL: 282 MS
EKG QRS DURATION: 94 MS
EKG QTC CALCULATION (BAZETT): 415 MS
EKG R AXIS: -39 DEGREES
EKG T AXIS: 141 DEGREES
EKG VENTRICULAR RATE: 130 BPM
EOSINOPHIL # BLD: 0.13 K/UL
EOSINOPHILS RELATIVE PERCENT: 2 % (ref 0–3)
ERYTHROCYTE [DISTWIDTH] IN BLOOD BY AUTOMATED COUNT: 13.5 % (ref 11.7–14.9)
GFR, ESTIMATED: 37 ML/MIN/1.73M2
GLUCOSE SERPL-MCNC: 248 MG/DL (ref 74–99)
GLUCOSE UR STRIP-MCNC: 100 MG/DL
HCT VFR BLD AUTO: 33.4 % (ref 37–47)
HGB BLD-MCNC: 10.6 G/DL (ref 12.5–16)
HGB UR QL STRIP.AUTO: ABNORMAL
IMM GRANULOCYTES # BLD AUTO: 0.03 K/UL
IMM GRANULOCYTES NFR BLD: 0 %
KETONES UR STRIP-MCNC: NEGATIVE MG/DL
LEUKOCYTE ESTERASE UR QL STRIP: ABNORMAL
LYMPHOCYTES NFR BLD: 1.23 K/UL
LYMPHOCYTES RELATIVE PERCENT: 18 % (ref 24–44)
MCH RBC QN AUTO: 29.6 PG (ref 27–31)
MCHC RBC AUTO-ENTMCNC: 31.7 G/DL (ref 32–36)
MCV RBC AUTO: 93.3 FL (ref 78–100)
MONOCYTES NFR BLD: 0.45 K/UL
MONOCYTES NFR BLD: 7 % (ref 0–4)
NEUTROPHILS NFR BLD: 72 % (ref 36–66)
NEUTS SEG NFR BLD: 4.89 K/UL
NITRITE UR QL STRIP: POSITIVE
PH UR STRIP: 6.5 [PH] (ref 5–8)
PLATELET # BLD AUTO: 158 K/UL (ref 140–440)
PMV BLD AUTO: 9.6 FL (ref 7.5–11.1)
POTASSIUM SERPL-SCNC: 3.7 MMOL/L (ref 3.5–5.1)
PROT UR STRIP-MCNC: >=300 MG/DL
RBC # BLD AUTO: 3.58 M/UL (ref 4.2–5.4)
RBC #/AREA URNS HPF: 3203 /HPF (ref 0–2)
SODIUM SERPL-SCNC: 136 MMOL/L (ref 136–145)
SP GR UR STRIP: 1.01 (ref 1–1.03)
TRICHOMONAS #/AREA URNS HPF: ABNORMAL /[HPF]
TROPONIN I SERPL HS-MCNC: 26 NG/L (ref 0–14)
UROBILINOGEN UR STRIP-ACNC: 1 EU/DL (ref 0–1)
WBC #/AREA URNS HPF: 7 /HPF (ref 0–5)
WBC OTHER # BLD: 6.8 K/UL (ref 4–10.5)

## 2024-11-06 PROCEDURE — 96374 THER/PROPH/DIAG INJ IV PUSH: CPT

## 2024-11-06 PROCEDURE — 93005 ELECTROCARDIOGRAM TRACING: CPT | Performed by: STUDENT IN AN ORGANIZED HEALTH CARE EDUCATION/TRAINING PROGRAM

## 2024-11-06 PROCEDURE — 2580000003 HC RX 258: Performed by: STUDENT IN AN ORGANIZED HEALTH CARE EDUCATION/TRAINING PROGRAM

## 2024-11-06 PROCEDURE — 2500000003 HC RX 250 WO HCPCS: Performed by: STUDENT IN AN ORGANIZED HEALTH CARE EDUCATION/TRAINING PROGRAM

## 2024-11-06 PROCEDURE — 6360000002 HC RX W HCPCS: Performed by: STUDENT IN AN ORGANIZED HEALTH CARE EDUCATION/TRAINING PROGRAM

## 2024-11-06 PROCEDURE — 93010 ELECTROCARDIOGRAM REPORT: CPT | Performed by: INTERNAL MEDICINE

## 2024-11-06 PROCEDURE — 84484 ASSAY OF TROPONIN QUANT: CPT

## 2024-11-06 PROCEDURE — 99284 EMERGENCY DEPT VISIT MOD MDM: CPT

## 2024-11-06 PROCEDURE — 81001 URINALYSIS AUTO W/SCOPE: CPT

## 2024-11-06 PROCEDURE — 96361 HYDRATE IV INFUSION ADD-ON: CPT

## 2024-11-06 PROCEDURE — 96375 TX/PRO/DX INJ NEW DRUG ADDON: CPT

## 2024-11-06 PROCEDURE — 85025 COMPLETE CBC W/AUTO DIFF WBC: CPT

## 2024-11-06 PROCEDURE — 80048 BASIC METABOLIC PNL TOTAL CA: CPT

## 2024-11-06 PROCEDURE — 6370000000 HC RX 637 (ALT 250 FOR IP): Performed by: STUDENT IN AN ORGANIZED HEALTH CARE EDUCATION/TRAINING PROGRAM

## 2024-11-06 RX ORDER — PHENAZOPYRIDINE HYDROCHLORIDE 100 MG/1
200 TABLET, FILM COATED ORAL ONCE
Status: COMPLETED | OUTPATIENT
Start: 2024-11-06 | End: 2024-11-06

## 2024-11-06 RX ORDER — CEPHALEXIN 500 MG/1
500 CAPSULE ORAL 2 TIMES DAILY
Qty: 14 CAPSULE | Refills: 0 | Status: SHIPPED | OUTPATIENT
Start: 2024-11-06 | End: 2024-11-13

## 2024-11-06 RX ORDER — METOPROLOL SUCCINATE 50 MG/1
50 TABLET, EXTENDED RELEASE ORAL DAILY
Status: DISCONTINUED | OUTPATIENT
Start: 2024-11-06 | End: 2024-11-06 | Stop reason: HOSPADM

## 2024-11-06 RX ORDER — 0.9 % SODIUM CHLORIDE 0.9 %
500 INTRAVENOUS SOLUTION INTRAVENOUS ONCE
Status: COMPLETED | OUTPATIENT
Start: 2024-11-06 | End: 2024-11-06

## 2024-11-06 RX ORDER — PHENAZOPYRIDINE HYDROCHLORIDE 200 MG/1
200 TABLET, FILM COATED ORAL 3 TIMES DAILY PRN
Qty: 15 TABLET | Refills: 0 | Status: SHIPPED | OUTPATIENT
Start: 2024-11-06 | End: 2024-11-11

## 2024-11-06 RX ORDER — PHENAZOPYRIDINE HYDROCHLORIDE 100 MG/1
200 TABLET, FILM COATED ORAL ONCE
Status: DISCONTINUED | OUTPATIENT
Start: 2024-11-06 | End: 2024-11-06

## 2024-11-06 RX ORDER — METOPROLOL TARTRATE 1 MG/ML
2.5 INJECTION, SOLUTION INTRAVENOUS ONCE
Status: COMPLETED | OUTPATIENT
Start: 2024-11-06 | End: 2024-11-06

## 2024-11-06 RX ADMIN — CEFTRIAXONE 1000 MG: 1 INJECTION, POWDER, FOR SOLUTION INTRAMUSCULAR; INTRAVENOUS at 17:05

## 2024-11-06 RX ADMIN — METOPROLOL SUCCINATE 50 MG: 50 TABLET, EXTENDED RELEASE ORAL at 17:06

## 2024-11-06 RX ADMIN — PHENAZOPYRIDINE 200 MG: 100 TABLET ORAL at 17:54

## 2024-11-06 RX ADMIN — SODIUM CHLORIDE 500 ML: 9 INJECTION, SOLUTION INTRAVENOUS at 16:33

## 2024-11-06 RX ADMIN — METOPROLOL TARTRATE 2.5 MG: 5 INJECTION INTRAVENOUS at 16:27

## 2024-11-06 ASSESSMENT — PAIN SCALES - GENERAL
PAINLEVEL_OUTOF10: 4
PAINLEVEL_OUTOF10: 10
PAINLEVEL_OUTOF10: 0

## 2024-11-06 ASSESSMENT — PAIN DESCRIPTION - ORIENTATION: ORIENTATION: RIGHT;LEFT

## 2024-11-06 ASSESSMENT — PAIN DESCRIPTION - DESCRIPTORS: DESCRIPTORS: ACHING

## 2024-11-06 NOTE — ED PROVIDER NOTES
Patient and multiple family members are all comfortable with the plan and agreeable.      Diagnosis and Disposition     CLINICAL IMPRESSION:  1. Acute cystitis with hematuria        Disposition: Discharge    Patient condition at time of disposition: Stable    Disposition medications (if applicable):  New Prescriptions    No medications on file       DISCHARGE NOTE:   Pt has been reexamined. Patient has no new complaints, changes, or physical findings.  Care plan outlined and precautions discussed.  Results were reviewed with the patient. All medications were reviewed with the patient. All of pt's questions and concerns were addressed.  Alarm symptoms and return precautions associated with chief complaint and evaluation were reviewed with the patient in detail.  The patient demonstrated adequate understanding.  Patient was given strict return precautions.  Patient agrees with plans for discharge home.      Voice Dictation Disclaimer     Comment: Please note this report has been produced using speech recognition software and may contain errors related to that system including errors in grammar, punctuation, and spelling, as well as words and phrases that may be inappropriate.  Efforts were made to edit the dictations.      Carri Barajas D.O.,   11/06/24 [id number]

## 2024-11-06 NOTE — DISCHARGE INSTRUCTIONS
Call and schedule follow-up appointment with your PCP this week.  Return to emergency department if you develop any new or worsening symptoms.

## 2024-12-30 ENCOUNTER — TELEPHONE (OUTPATIENT)
Dept: FAMILY MEDICINE CLINIC | Age: 88
End: 2024-12-30

## 2024-12-30 DIAGNOSIS — R39.89 SUSPECTED UTI: Primary | ICD-10-CM

## 2024-12-30 DIAGNOSIS — R82.90 ABNORMAL FINDING ON URINALYSIS: ICD-10-CM

## 2024-12-30 RX ORDER — CEPHALEXIN 500 MG/1
500 CAPSULE ORAL DAILY
Qty: 10 CAPSULE | Refills: 0 | Status: SHIPPED | OUTPATIENT
Start: 2024-12-30

## 2024-12-30 NOTE — TELEPHONE ENCOUNTER
To Doctor on Call for Dr. Glez-    Patient is showing signs of a UTI------could she have her daughter come in an  a sterilized cup to collect a specimen and bring it back.    Please advise.

## 2024-12-31 LAB
BACTERIA URNS QL MICRO: ABNORMAL /HPF
BILIRUB UR QL STRIP.AUTO: NEGATIVE
CLARITY UR: ABNORMAL
COLOR UR: YELLOW
EPI CELLS #/AREA URNS AUTO: 4 /HPF (ref 0–5)
GLUCOSE UR STRIP.AUTO-MCNC: NEGATIVE MG/DL
HGB UR QL STRIP.AUTO: ABNORMAL
HYALINE CASTS #/AREA URNS AUTO: 0 /LPF (ref 0–8)
KETONES UR STRIP.AUTO-MCNC: NEGATIVE MG/DL
LEUKOCYTE ESTERASE UR QL STRIP.AUTO: ABNORMAL
NITRITE UR QL STRIP.AUTO: NEGATIVE
PH UR STRIP.AUTO: 5.5 [PH] (ref 5–8)
PROT UR STRIP.AUTO-MCNC: 30 MG/DL
RBC CLUMPS #/AREA URNS AUTO: 76 /HPF (ref 0–4)
SP GR UR STRIP.AUTO: 1.02 (ref 1–1.03)
UA DIPSTICK W REFLEX MICRO PNL UR: YES
URN SPEC COLLECT METH UR: ABNORMAL
UROBILINOGEN UR STRIP-ACNC: 0.2 E.U./DL
WBC #/AREA URNS AUTO: 1296 /HPF (ref 0–5)

## 2025-01-02 LAB
BACTERIA UR CULT: ABNORMAL
BACTERIA UR CULT: ABNORMAL
ORGANISM: ABNORMAL

## 2025-01-31 ENCOUNTER — OFFICE VISIT (OUTPATIENT)
Dept: FAMILY MEDICINE CLINIC | Age: 89
End: 2025-01-31
Payer: MEDICARE

## 2025-01-31 VITALS
SYSTOLIC BLOOD PRESSURE: 144 MMHG | OXYGEN SATURATION: 98 % | HEIGHT: 64 IN | WEIGHT: 164 LBS | BODY MASS INDEX: 28 KG/M2 | DIASTOLIC BLOOD PRESSURE: 68 MMHG | HEART RATE: 62 BPM

## 2025-01-31 DIAGNOSIS — E87.5 HYPERKALEMIA: Primary | ICD-10-CM

## 2025-01-31 DIAGNOSIS — I25.10 CORONARY ARTERY DISEASE INVOLVING NATIVE CORONARY ARTERY OF NATIVE HEART WITHOUT ANGINA PECTORIS: Chronic | ICD-10-CM

## 2025-01-31 DIAGNOSIS — N30.00 ACUTE CYSTITIS WITHOUT HEMATURIA: Primary | ICD-10-CM

## 2025-01-31 DIAGNOSIS — E11.9 TYPE 2 DIABETES MELLITUS WITHOUT COMPLICATION, WITHOUT LONG-TERM CURRENT USE OF INSULIN (HCC): ICD-10-CM

## 2025-01-31 DIAGNOSIS — E11.65 TYPE 2 DIABETES MELLITUS WITH HYPERGLYCEMIA, WITHOUT LONG-TERM CURRENT USE OF INSULIN (HCC): ICD-10-CM

## 2025-01-31 DIAGNOSIS — C7A.8 NEUROENDOCRINE CARCINOMA OF PANCREAS (HCC): ICD-10-CM

## 2025-01-31 DIAGNOSIS — I50.22 CHRONIC SYSTOLIC (CONGESTIVE) HEART FAILURE (HCC): ICD-10-CM

## 2025-01-31 DIAGNOSIS — N18.32 STAGE 3B CHRONIC KIDNEY DISEASE (HCC): ICD-10-CM

## 2025-01-31 DIAGNOSIS — I48.0 PAROXYSMAL ATRIAL FIBRILLATION (HCC): ICD-10-CM

## 2025-01-31 LAB
BILIRUBIN, POC: NEGATIVE
BLOOD URINE, POC: ABNORMAL
CLARITY, POC: ABNORMAL
COLOR, POC: YELLOW
GLUCOSE URINE, POC: NEGATIVE MG/DL
KETONES, POC: NEGATIVE MG/DL
LEUKOCYTE EST, POC: ABNORMAL
NITRITE, POC: NEGATIVE
PH, POC: 5.5
PROTEIN, POC: NEGATIVE MG/DL
SPECIFIC GRAVITY, POC: 1.02
UROBILINOGEN, POC: 0.2 MG/DL

## 2025-01-31 PROCEDURE — 1160F RVW MEDS BY RX/DR IN RCRD: CPT | Performed by: STUDENT IN AN ORGANIZED HEALTH CARE EDUCATION/TRAINING PROGRAM

## 2025-01-31 PROCEDURE — 1123F ACP DISCUSS/DSCN MKR DOCD: CPT | Performed by: STUDENT IN AN ORGANIZED HEALTH CARE EDUCATION/TRAINING PROGRAM

## 2025-01-31 PROCEDURE — 81002 URINALYSIS NONAUTO W/O SCOPE: CPT | Performed by: STUDENT IN AN ORGANIZED HEALTH CARE EDUCATION/TRAINING PROGRAM

## 2025-01-31 PROCEDURE — 1036F TOBACCO NON-USER: CPT | Performed by: STUDENT IN AN ORGANIZED HEALTH CARE EDUCATION/TRAINING PROGRAM

## 2025-01-31 PROCEDURE — 99214 OFFICE O/P EST MOD 30 MIN: CPT | Performed by: STUDENT IN AN ORGANIZED HEALTH CARE EDUCATION/TRAINING PROGRAM

## 2025-01-31 PROCEDURE — G8427 DOCREV CUR MEDS BY ELIG CLIN: HCPCS | Performed by: STUDENT IN AN ORGANIZED HEALTH CARE EDUCATION/TRAINING PROGRAM

## 2025-01-31 PROCEDURE — 1090F PRES/ABSN URINE INCON ASSESS: CPT | Performed by: STUDENT IN AN ORGANIZED HEALTH CARE EDUCATION/TRAINING PROGRAM

## 2025-01-31 PROCEDURE — 1159F MED LIST DOCD IN RCRD: CPT | Performed by: STUDENT IN AN ORGANIZED HEALTH CARE EDUCATION/TRAINING PROGRAM

## 2025-01-31 PROCEDURE — 3051F HG A1C>EQUAL 7.0%<8.0%: CPT | Performed by: STUDENT IN AN ORGANIZED HEALTH CARE EDUCATION/TRAINING PROGRAM

## 2025-01-31 PROCEDURE — G8417 CALC BMI ABV UP PARAM F/U: HCPCS | Performed by: STUDENT IN AN ORGANIZED HEALTH CARE EDUCATION/TRAINING PROGRAM

## 2025-01-31 RX ORDER — CIPROFLOXACIN 250 MG/1
250 TABLET, FILM COATED ORAL 2 TIMES DAILY
Qty: 14 TABLET | Refills: 0 | Status: SHIPPED | OUTPATIENT
Start: 2025-01-31 | End: 2025-02-07

## 2025-01-31 SDOH — ECONOMIC STABILITY: FOOD INSECURITY: WITHIN THE PAST 12 MONTHS, YOU WORRIED THAT YOUR FOOD WOULD RUN OUT BEFORE YOU GOT MONEY TO BUY MORE.: NEVER TRUE

## 2025-01-31 SDOH — ECONOMIC STABILITY: FOOD INSECURITY: WITHIN THE PAST 12 MONTHS, THE FOOD YOU BOUGHT JUST DIDN'T LAST AND YOU DIDN'T HAVE MONEY TO GET MORE.: NEVER TRUE

## 2025-01-31 ASSESSMENT — PATIENT HEALTH QUESTIONNAIRE - PHQ9
SUM OF ALL RESPONSES TO PHQ9 QUESTIONS 1 & 2: 0
SUM OF ALL RESPONSES TO PHQ QUESTIONS 1-9: 0
1. LITTLE INTEREST OR PLEASURE IN DOING THINGS: NOT AT ALL
2. FEELING DOWN, DEPRESSED OR HOPELESS: NOT AT ALL
SUM OF ALL RESPONSES TO PHQ QUESTIONS 1-9: 0

## 2025-01-31 NOTE — PROGRESS NOTES
bilateral lumpectomy    CHOLECYSTECTOMY      COLECTOMY      also head of pancreas    COLONOSCOPY  12-21-12    polyp    DIAGNOSTIC CARDIAC CATH LAB PROCEDURE  8/2005    FRACTURE SURGERY      repair of fractured nose    HYSTERECTOMY (CERVIX STATUS UNKNOWN)      LALA/BSO    PANCREAS SURGERY  11/7/2006    [facility] Procedure    SKIN BIOPSY      moles from right shoulder, chin, left leg    THYROIDECTOMY, PARTIAL      THYROIDECTOMY, PARTIAL  1999    TONSILLECTOMY      UPPER GASTROINTESTINAL ENDOSCOPY N/A 8/1/2022    EGD DIAGNOSTIC ONLY performed by Annette Hancock MD at [facility] ENDOSCOPY    US BREAST BIOPSY W LOC DEVICE 1ST LESION LEFT Left 11/17/2023    US BREAST BIOPSY W LOC DEVICE 1ST LESION LEFT 11/17/2023 Carol Brooks MD [facility]                 CURRENT MEDICATIONS  Current Outpatient Medications   Medication Sig Dispense Refill    metoprolol succinate (TOPROL XL) 25 MG extended release tablet Take 2 tablets by mouth daily 180 tablet 1    metFORMIN (GLUCOPHAGE) 500 MG tablet Take 1 tablet by mouth Daily with supper 90 tablet 1    losartan (COZAAR) 50 MG tablet Take 1 tablet by mouth daily 90 tablet 3    sertraline (ZOLOFT) 25 MG tablet TAKE 1 TABLET BY MOUTH EVERY DAY 90 tablet 0    hydroCHLOROthiazide (HYDRODIURIL) 25 MG tablet TAKE 1 TABLET BY MOUTH EVERY DAY 90 tablet 1    furosemide (LASIX) 20 MG tablet Take 1 tablet by mouth 2 times daily as needed (edema) 14 tablet 0    estradiol (ESTRACE VAGINAL) 0.1 MG/GM vaginal cream Place 1 g vaginally every 72 hours 85 g 2    cephALEXin (KEFLEX) 500 MG capsule Take 1 capsule by mouth daily (Patient not taking: Reported on 1/31/2025) 10 capsule 0     No current facility-administered medications for this visit.       ALLERGIES  Allergies   Allergen Reactions    Sulfa Antibiotics Anaphylaxis    Nitrofuran Derivatives     Quinapril Hcl      Cough       PHYSICAL EXAM    BP (!) 144/68 (Site: Left Upper Arm, Position: Sitting, Cuff Size: Medium Adult)   Pulse 62   Ht 1.613 m (5'

## 2025-02-01 LAB
ANION GAP SERPL CALCULATED.3IONS-SCNC: 9 MMOL/L (ref 3–16)
BUN SERPL-MCNC: 44 MG/DL (ref 7–20)
CALCIUM SERPL-MCNC: 9.9 MG/DL (ref 8.3–10.6)
CHLORIDE SERPL-SCNC: 105 MMOL/L (ref 99–110)
CHOLEST SERPL-MCNC: 150 MG/DL (ref 0–199)
CO2 SERPL-SCNC: 23 MMOL/L (ref 21–32)
CREAT SERPL-MCNC: 1.4 MG/DL (ref 0.6–1.2)
EST. AVERAGE GLUCOSE BLD GHB EST-MCNC: 162.8 MG/DL
GFR SERPLBLD CREATININE-BSD FMLA CKD-EPI: 35 ML/MIN/{1.73_M2}
GLUCOSE SERPL-MCNC: 245 MG/DL (ref 70–99)
HBA1C MFR BLD: 7.3 %
HDLC SERPL-MCNC: 47 MG/DL (ref 40–60)
LDLC SERPL CALC-MCNC: 68 MG/DL
POTASSIUM SERPL-SCNC: 5.4 MMOL/L (ref 3.5–5.1)
SODIUM SERPL-SCNC: 137 MMOL/L (ref 136–145)
T4 FREE SERPL-MCNC: 1.2 NG/DL (ref 0.9–1.8)
TRIGL SERPL-MCNC: 175 MG/DL (ref 0–150)
TSH SERPL DL<=0.005 MIU/L-ACNC: 8.66 UIU/ML (ref 0.27–4.2)
VLDLC SERPL CALC-MCNC: 35 MG/DL

## 2025-02-06 NOTE — RESULT ENCOUNTER NOTE
Annie's labs were normal except her potassium was a little on the higher end. Is she currently taking potassium supplements. I recommend we repeat labs within the next week. We may need to make some adjustments to her medications.

## 2025-02-07 NOTE — RESULT ENCOUNTER NOTE
K+ is only mildly high recommend we repeat before make adjustments. But one of her medications losartan can cause. She has some CKD that could be the cause as well. Recommend repeat labs before making adjustments.

## 2025-02-08 ASSESSMENT — ENCOUNTER SYMPTOMS
SORE THROAT: 0
WHEEZING: 0
ABDOMINAL PAIN: 0
NAUSEA: 0
SHORTNESS OF BREATH: 0

## 2025-02-14 DIAGNOSIS — E87.5 HYPERKALEMIA: ICD-10-CM

## 2025-02-15 LAB
ANION GAP SERPL CALCULATED.3IONS-SCNC: 10 MMOL/L (ref 3–16)
BUN SERPL-MCNC: 43 MG/DL (ref 7–20)
CALCIUM SERPL-MCNC: 9.9 MG/DL (ref 8.3–10.6)
CHLORIDE SERPL-SCNC: 103 MMOL/L (ref 99–110)
CO2 SERPL-SCNC: 24 MMOL/L (ref 21–32)
CREAT SERPL-MCNC: 1.4 MG/DL (ref 0.6–1.2)
GFR SERPLBLD CREATININE-BSD FMLA CKD-EPI: 35 ML/MIN/{1.73_M2}
GLUCOSE SERPL-MCNC: 225 MG/DL (ref 70–99)
MAGNESIUM SERPL-MCNC: 1.7 MG/DL (ref 1.8–2.4)
POTASSIUM SERPL-SCNC: 5 MMOL/L (ref 3.5–5.1)
SODIUM SERPL-SCNC: 137 MMOL/L (ref 136–145)

## 2025-02-18 NOTE — RESULT ENCOUNTER NOTE
Labs show potassium back to normal. Her glucose is elevated. We will monitor. Please schedule f/u appt sometime this summer before august

## 2025-02-26 ENCOUNTER — HOSPITAL ENCOUNTER (EMERGENCY)
Age: 89
Discharge: HOME | End: 2025-02-27
Attending: EMERGENCY MEDICINE
Payer: MEDICARE

## 2025-02-26 DIAGNOSIS — S42.402A CLOSED FRACTURE OF LEFT ELBOW, INITIAL ENCOUNTER: Primary | ICD-10-CM

## 2025-02-26 PROCEDURE — 99284 EMERGENCY DEPT VISIT MOD MDM: CPT

## 2025-02-26 ASSESSMENT — PAIN DESCRIPTION - LOCATION: LOCATION: ELBOW

## 2025-02-26 ASSESSMENT — PAIN SCALES - GENERAL: PAINLEVEL_OUTOF10: 4

## 2025-02-26 ASSESSMENT — PAIN DESCRIPTION - ORIENTATION: ORIENTATION: LEFT

## 2025-02-26 ASSESSMENT — PAIN DESCRIPTION - DESCRIPTORS: DESCRIPTORS: ACHING

## 2025-02-27 ENCOUNTER — APPOINTMENT (OUTPATIENT)
Dept: GENERAL RADIOLOGY | Age: 89
End: 2025-02-27
Payer: MEDICARE

## 2025-02-27 ENCOUNTER — TELEPHONE (OUTPATIENT)
Dept: FAMILY MEDICINE CLINIC | Age: 89
End: 2025-02-27

## 2025-02-27 VITALS
OXYGEN SATURATION: 97 % | HEART RATE: 75 BPM | TEMPERATURE: 97.1 F | SYSTOLIC BLOOD PRESSURE: 204 MMHG | DIASTOLIC BLOOD PRESSURE: 86 MMHG | RESPIRATION RATE: 16 BRPM

## 2025-02-27 DIAGNOSIS — N30.01 ACUTE CYSTITIS WITH HEMATURIA: Primary | ICD-10-CM

## 2025-02-27 LAB
ANION GAP SERPL CALCULATED.3IONS-SCNC: 15 MMOL/L (ref 9–17)
BACTERIA URNS QL MICRO: ABNORMAL
BASOPHILS # BLD: 0.05 K/UL
BASOPHILS NFR BLD: 0 % (ref 0–1)
BILIRUB UR QL STRIP: NEGATIVE
BUN SERPL-MCNC: 40 MG/DL (ref 7–20)
CALCIUM SERPL-MCNC: 9.7 MG/DL (ref 8.3–10.6)
CHLORIDE SERPL-SCNC: 106 MMOL/L (ref 99–110)
CLARITY UR: ABNORMAL
CO2 SERPL-SCNC: 17 MMOL/L (ref 21–32)
COLOR UR: YELLOW
CREAT SERPL-MCNC: 1.5 MG/DL (ref 0.6–1.2)
EOSINOPHIL # BLD: 0.03 K/UL
EOSINOPHILS RELATIVE PERCENT: 0 % (ref 0–3)
EPI CELLS #/AREA URNS HPF: 2 /HPF
ERYTHROCYTE [DISTWIDTH] IN BLOOD BY AUTOMATED COUNT: 13.7 % (ref 11.7–14.9)
GFR, ESTIMATED: 32 ML/MIN/1.73M2
GLUCOSE SERPL-MCNC: 259 MG/DL (ref 74–99)
GLUCOSE UR STRIP-MCNC: NEGATIVE MG/DL
HCT VFR BLD AUTO: 34.1 % (ref 37–47)
HGB BLD-MCNC: 9.9 G/DL (ref 12.5–16)
HGB UR QL STRIP.AUTO: ABNORMAL
IMM GRANULOCYTES # BLD AUTO: 0.16 K/UL
IMM GRANULOCYTES NFR BLD: 1 %
KETONES UR STRIP-MCNC: NEGATIVE MG/DL
LEUKOCYTE ESTERASE UR QL STRIP: ABNORMAL
LYMPHOCYTES NFR BLD: 1.41 K/UL
LYMPHOCYTES RELATIVE PERCENT: 9 % (ref 24–44)
MCH RBC QN AUTO: 24.8 PG (ref 27–31)
MCHC RBC AUTO-ENTMCNC: 29 G/DL (ref 32–36)
MCV RBC AUTO: 85.5 FL (ref 78–100)
MONOCYTES NFR BLD: 0.93 K/UL
MONOCYTES NFR BLD: 6 % (ref 0–4)
NEUTROPHILS NFR BLD: 83 % (ref 36–66)
NEUTS SEG NFR BLD: 12.68 K/UL
NITRITE UR QL STRIP: NEGATIVE
PH UR STRIP: 5.5 [PH] (ref 5–8)
PLATELET # BLD AUTO: 240 K/UL (ref 140–440)
PMV BLD AUTO: 9.8 FL (ref 7.5–11.1)
POTASSIUM SERPL-SCNC: 5 MMOL/L (ref 3.5–5.1)
PROT UR STRIP-MCNC: 30 MG/DL
RBC # BLD AUTO: 3.99 M/UL (ref 4.2–5.4)
RBC #/AREA URNS HPF: 159 /HPF (ref 0–2)
SODIUM SERPL-SCNC: 138 MMOL/L (ref 136–145)
SP GR UR STRIP: 1.02 (ref 1–1.03)
UROBILINOGEN UR STRIP-ACNC: 0.2 EU/DL (ref 0–1)
WBC #/AREA URNS HPF: 85 /HPF (ref 0–5)
WBC OTHER # BLD: 15.3 K/UL (ref 4–10.5)

## 2025-02-27 PROCEDURE — 80048 BASIC METABOLIC PNL TOTAL CA: CPT

## 2025-02-27 PROCEDURE — 6370000000 HC RX 637 (ALT 250 FOR IP): Performed by: PHYSICIAN ASSISTANT

## 2025-02-27 PROCEDURE — 81001 URINALYSIS AUTO W/SCOPE: CPT

## 2025-02-27 PROCEDURE — 73070 X-RAY EXAM OF ELBOW: CPT

## 2025-02-27 PROCEDURE — 87086 URINE CULTURE/COLONY COUNT: CPT

## 2025-02-27 PROCEDURE — 85025 COMPLETE CBC W/AUTO DIFF WBC: CPT

## 2025-02-27 RX ORDER — CEPHALEXIN 500 MG/1
500 CAPSULE ORAL 2 TIMES DAILY
Qty: 14 CAPSULE | Refills: 0 | Status: SHIPPED | OUTPATIENT
Start: 2025-02-27 | End: 2025-03-06

## 2025-02-27 RX ORDER — HYDROCODONE BITARTRATE AND ACETAMINOPHEN 5; 325 MG/1; MG/1
1 TABLET ORAL ONCE
Status: COMPLETED | OUTPATIENT
Start: 2025-02-27 | End: 2025-02-27

## 2025-02-27 RX ORDER — FENTANYL CITRATE 50 UG/ML
50 INJECTION, SOLUTION INTRAMUSCULAR; INTRAVENOUS
Status: DISCONTINUED | OUTPATIENT
Start: 2025-02-27 | End: 2025-02-27 | Stop reason: HOSPADM

## 2025-02-27 RX ORDER — ACETAMINOPHEN 325 MG/1
650 TABLET ORAL ONCE
Status: DISCONTINUED | OUTPATIENT
Start: 2025-02-27 | End: 2025-02-27 | Stop reason: HOSPADM

## 2025-02-27 RX ADMIN — HYDROCODONE BITARTRATE AND ACETAMINOPHEN 1 TABLET: 5; 325 TABLET ORAL at 01:35

## 2025-02-27 ASSESSMENT — PAIN SCALES - GENERAL: PAINLEVEL_OUTOF10: 7

## 2025-02-27 ASSESSMENT — PAIN DESCRIPTION - DESCRIPTORS: DESCRIPTORS: ACHING

## 2025-02-27 ASSESSMENT — PAIN DESCRIPTION - LOCATION: LOCATION: ARM

## 2025-02-27 NOTE — ED TRIAGE NOTES
Pt to the ED via EMS with c/o falling this evening. Pt states that her dog was excited to see her, and tripped her up while she was putting her coat away. Pt states that she landed on her bottom, and left elbow. Pt has swelling to the left elbow, rating her pain a 4/10

## 2025-02-27 NOTE — TELEPHONE ENCOUNTER
Re:    Microscopic Urinalysis       Patient was told [facility] ER that she has an infection but nothing was called in for her.    Could you call in an antibiotic for patient to [facility] on [address]

## 2025-02-27 NOTE — TELEPHONE ENCOUNTER
Is she currently on keflex? Will send as I think she discontinued her maintenance. Awaiting urine cx. results

## 2025-02-27 NOTE — ED PROVIDER NOTES
Emergency Department Encounter  Location: [facility] EMERGENCY DEPARTMENT    Patient: Annie France  MRN: [id number]  : 1933  Date of evaluation: 2025  ED Provider: Kevin Nunez MD    0230:  Annie France was checked out to me by Iglesia Adrian. Please see his/her initial documentation for details of the patient's initial ED presentation, physical exam and completed studies.    In brief, Annie France is a 89 y.o. female that presented to the emergency department with fall and left elbow pain.    I have reviewed and interpreted all of the currently available lab results and diagnostics from this visit:  Results for orders placed or performed during the hospital encounter of 25   CBC with Auto Differential   Result Value Ref Range    WBC 15.3 (H) 4.0 - 10.5 k/uL    RBC 3.99 (L) 4.20 - 5.40 m/uL    Hemoglobin 9.9 (L) 12.5 - 16.0 g/dL    Hematocrit 34.1 (L) 37.0 - 47.0 %    MCV 85.5 78.0 - 100.0 fL    MCH 24.8 (L) 27.0 - 31.0 pg    MCHC 29.0 (L) 32.0 - 36.0 g/dL    RDW 13.7 11.7 - 14.9 %    Platelets 240 140 - 440 k/uL    MPV 9.8 7.5 - 11.1 fL    Neutrophils % 83 (H) 36 - 66 %    Lymphocytes % 9 (L) 24 - 44 %    Monocytes % 6 (H) 0 - 4 %    Eosinophils % 0 0 - 3 %    Basophils % 0 0 - 1 %    Immature Granulocytes % 1 (H) 0 %    Neutrophils Absolute 12.68 k/uL    Lymphocytes Absolute 1.41 k/uL    Monocytes Absolute 0.93 k/uL    Eosinophils Absolute 0.03 k/uL    Basophils Absolute 0.05 k/uL    Immature Granulocytes Absolute 0.16 k/uL   Basic Metabolic Panel   Result Value Ref Range    Sodium 138 136 - 145 mmol/L    Potassium 5.0 3.5 - 5.1 mmol/L    Chloride 106 99 - 110 mmol/L    CO2 17 (L) 21 - 32 mmol/L    Anion Gap 15 9 - 17 mmol/L    Glucose 259 (H) 74 - 99 mg/dL    BUN 40 (H) 7 - 20 mg/dL    Creatinine 1.5 (H) 0.6 - 1.2 mg/dL    Est, Glom Filt Rate 32 (L) >60 mL/min/1.73m2    Calcium 9.7 8.3 - 10.6 mg/dL   Urinalysis   Result Value Ref Range    Color, UA Yellow Yellow

## 2025-02-27 NOTE — TELEPHONE ENCOUNTER
Spoke with patients daughter Tanja and she states the patient is not currently on Keflex. Tanja was informed that Dr. Glez sent another round of Keflex in for the patient and verbalized understanding.

## 2025-02-28 ENCOUNTER — CARE COORDINATION (OUTPATIENT)
Dept: CARE COORDINATION | Age: 89
End: 2025-02-28

## 2025-02-28 ENCOUNTER — OFFICE VISIT (OUTPATIENT)
Dept: ORTHOPEDIC SURGERY | Age: 89
End: 2025-02-28

## 2025-02-28 VITALS — HEART RATE: 70 BPM | OXYGEN SATURATION: 96 % | TEMPERATURE: 98.3 F

## 2025-02-28 DIAGNOSIS — S42.422A CLOSED DISPLACED COMMINUTED SUPRACONDYLAR FRACTURE OF LEFT HUMERUS WITHOUT INTERCONDYLAR FRACTURE, INITIAL ENCOUNTER: Primary | ICD-10-CM

## 2025-02-28 LAB
MICROORGANISM SPEC CULT: NORMAL
SERVICE CMNT-IMP: NORMAL
SPECIMEN DESCRIPTION: NORMAL

## 2025-02-28 RX ORDER — TRAMADOL HYDROCHLORIDE 50 MG/1
50 TABLET ORAL EVERY 8 HOURS PRN
Qty: 20 TABLET | Refills: 0 | Status: SHIPPED | OUTPATIENT
Start: 2025-02-28 | End: 2025-03-06 | Stop reason: SDUPTHER

## 2025-02-28 SDOH — ECONOMIC STABILITY: HOUSING INSECURITY: IN THE LAST 12 MONTHS, WAS THERE A TIME WHEN YOU WERE NOT ABLE TO PAY THE MORTGAGE OR RENT ON TIME?: NO

## 2025-02-28 SDOH — ECONOMIC STABILITY: FOOD INSECURITY: WITHIN THE PAST 12 MONTHS, YOU WORRIED THAT YOUR FOOD WOULD RUN OUT BEFORE YOU GOT THE MONEY TO BUY MORE.: NEVER TRUE

## 2025-02-28 SDOH — SOCIAL STABILITY: SOCIAL NETWORK: HOW OFTEN DO YOU ATTEND MEETINGS OF THE CLUBS OR ORGANIZATIONS YOU BELONG TO?: NEVER

## 2025-02-28 SDOH — HEALTH STABILITY: PHYSICAL HEALTH: ON AVERAGE, HOW MANY MINUTES DO YOU ENGAGE IN EXERCISE AT THIS LEVEL?: 0 MIN

## 2025-02-28 SDOH — SOCIAL STABILITY: SOCIAL NETWORK: HOW OFTEN DO YOU GET TOGETHER WITH FRIENDS OR RELATIVES?: MORE THAN THREE TIMES A WEEK

## 2025-02-28 SDOH — ECONOMIC STABILITY: FOOD INSECURITY: WITHIN THE PAST 12 MONTHS, THE FOOD YOU BOUGHT JUST DIDN'T LAST AND YOU DIDN'T HAVE MONEY TO GET MORE.: NEVER TRUE

## 2025-02-28 SDOH — ECONOMIC STABILITY: FOOD INSECURITY: HOW HARD IS IT FOR YOU TO PAY FOR THE VERY BASICS LIKE FOOD, HOUSING, MEDICAL CARE, AND HEATING?: NOT HARD AT ALL

## 2025-02-28 SDOH — SOCIAL STABILITY: SOCIAL NETWORK
DO YOU BELONG TO ANY CLUBS OR ORGANIZATIONS SUCH AS CHURCH GROUPS, UNIONS, FRATERNAL OR ATHLETIC GROUPS, OR SCHOOL GROUPS?: NO

## 2025-02-28 SDOH — SOCIAL STABILITY: SOCIAL INSECURITY: ARE YOU MARRIED, WIDOWED, DIVORCED, SEPARATED, NEVER MARRIED, OR LIVING WITH A PARTNER?: WIDOWED

## 2025-02-28 SDOH — SOCIAL STABILITY: SOCIAL NETWORK
IN A TYPICAL WEEK, HOW MANY TIMES DO YOU TALK ON THE PHONE WITH FAMILY, FRIENDS, OR NEIGHBORS?: MORE THAN THREE TIMES A WEEK

## 2025-02-28 SDOH — SOCIAL STABILITY: SOCIAL NETWORK: HOW OFTEN DO YOU ATTEND CHURCH OR RELIGIOUS SERVICES?: NEVER

## 2025-02-28 SDOH — HEALTH STABILITY: MENTAL HEALTH
DO YOU FEEL STRESS - TENSE, RESTLESS, NERVOUS, OR ANXIOUS, OR UNABLE TO SLEEP AT NIGHT BECAUSE YOUR MIND IS TROUBLED ALL THE TIME - THESE DAYS?: ONLY A LITTLE

## 2025-02-28 SDOH — HEALTH STABILITY: PHYSICAL HEALTH: ON AVERAGE, HOW MANY DAYS PER WEEK DO YOU ENGAGE IN MODERATE TO STRENUOUS EXERCISE (LIKE A BRISK WALK)?: 0 DAYS

## 2025-02-28 SDOH — ECONOMIC STABILITY: TRANSPORTATION INSECURITY: IN THE PAST 12 MONTHS, HAS LACK OF TRANSPORTATION KEPT YOU FROM MEDICAL APPOINTMENTS OR FROM GETTING MEDICATIONS?: NO

## 2025-02-28 ASSESSMENT — ACTIVITIES OF DAILY LIVING (ADL): LACK_OF_TRANSPORTATION: NO

## 2025-02-28 NOTE — PROGRESS NOTES
Patient seen in office today for: ______________, pain started _________ and is located ____________.    DOI:  DOS:  Date of last injection:     Patient reports /10 pain.  RICE and medication are effective to alleviate pain and reduce swelling. Pain worsened by: Patient reports painful ROM & weight bearing.     Right handed  
to light.   Pulmonary:      Effort: Pulmonary effort is normal.   Musculoskeletal:      Left shoulder: No tenderness. Normal range of motion. Normal strength.      Right elbow: No swelling, deformity, effusion or lacerations. Normal range of motion. No radial head, medial epicondyle, lateral epicondyle or olecranon process tenderness.      Left wrist: No swelling, deformity, effusion or tenderness. Normal range of motion.      Cervical back: Normal range of motion.      Comments: Left upper extremity:  Splint was removed.  Skin is intact no lesions or wounds.  Moderate soft tissue swelling and severe tenderness to palpation overlying the elbow with significant at the distal humerus.  Normal alignment of the elbow with no obvious deformity.  No pain with gentle range of motion during limited flexion and extension.  Sensation and motor functions intact median, ulnar, radial nerve distribution.  2+ radial pulse brisk cap refill present.   Skin:     General: Skin is warm and dry.   Neurological:      Mental Status: She is alert and oriented to person, place, and time.   Psychiatric:         Mood and Affect: Mood normal.         Behavior: Behavior normal.            Diagnostic testing:  X-ray images were reviewed by myself and discussed with the patient:     FINDINGS:  2 images were provided. There is a fracture involving the distal humerus at the   epicondyle level. There is bony overlap. This gives rise to limitation of the   distal bones of the elbow. Fractures of the proximal forearm cannot be excluded.   Soft tissue swelling is noted at the level of the elbow.     IMPRESSION:  1.  There is a mildly displaced transverse fracture at the epicondyle level of the distal   humerus.    Office Procedures:  No orders of the defined types were placed in this encounter.      Assessment and Plan   left elbow comminuted impacted mildly displaced distal humerus fracture    I reviewed the x-rays with the patient explained that

## 2025-02-28 NOTE — CARE COORDINATION
Ambulatory Care Coordination Note     2/28/2025 11:28 AM     ACM outreach attempt by this AC today to offer care management services. ACM was unable to reach the family, Tanja (daughter)   by telephone today;   left voice message requesting a return phone call to this ACM.  Also left My Chart Message.      ACM: Urszula Garcia RN     Care Summary Note: ACM outreach today for Care Management.     PCP/Specialist follow up:   Future Appointments         Provider Specialty Dept Phone    3/11/2025 10:30 AM Ham Galvez MD Orthopedic Surgery [phone]    3/12/2025 2:45 PM Rahsaun Lemus MD Cardiology [phone]    7/28/2025 1:15 PM Amadou Glez,  Family Medicine [phone]    8/21/2025 2:00 PM SRMX FPS AWV LPN Family Medicine [phone]            Follow Up:   Plan for next ACM outreach in approximately 1-2 days  to complete:  - outreach attempt to offer care management services.

## 2025-02-28 NOTE — PATIENT INSTRUCTIONS
Continue weight-bearing as tolerated.  Continue range of motion exercises as instructed.  Ice and elevate as needed.  Tylenol or Motrin for pain.  Please check in with your primary care physician.   Follow up in this office in 2 weeks.    We are committed to providing you the best care possible.     If you receive a survey after visiting one of our offices, please take time to share your experience concerning your physician office visit.  These surveys are confidential and no health information about you is shared.     We are eager to improve for you and we are counting on your feedback to help make that happen. If you felt my care was outstanding, please mention me by name: Judy FRIAS      Patent

## 2025-02-28 NOTE — CARE COORDINATION
physical: weakness and cognitive  Plan for overcoming my barriers: ACM  and PCP will offer support and assistance when possible.   Confidence: 5/10  Anticipated Goal Completion Date: 5/25/2025               PCP/Specialist follow up:   Future Appointments         Provider Specialty Dept Phone    3/11/2025 10:30 AM Ham Galvez MD Orthopedic Surgery [phone]    3/12/2025 2:45 PM Rashaun Lemus MD Cardiology [phone]    7/28/2025 1:15 PM Amadou Glez DO Family Medicine [phone]    8/21/2025 2:00 PM SR[facility] FPS AWV LPN [facility] [phone]            Follow Up:   Plan for next ACM outreach in approximately 2 weeks to complete:  - disease specific assessments  - goal progression.   Patient family Tanja  is agreeable to this plan.

## 2025-03-05 ENCOUNTER — TELEPHONE (OUTPATIENT)
Dept: FAMILY MEDICINE CLINIC | Age: 89
End: 2025-03-05

## 2025-03-05 DIAGNOSIS — G31.84 MILD COGNITIVE IMPAIRMENT: ICD-10-CM

## 2025-03-05 DIAGNOSIS — R53.81 PHYSICAL DEBILITY: Primary | ICD-10-CM

## 2025-03-05 ASSESSMENT — ENCOUNTER SYMPTOMS
WHEEZING: 0
EYE REDNESS: 0
CHEST TIGHTNESS: 0
SHORTNESS OF BREATH: 0
VOMITING: 0
COLOR CHANGE: 0
EYE PAIN: 0

## 2025-03-05 NOTE — TELEPHONE ENCOUNTER
To Dr. Glez-    Medical supply office does not have:    Flex Hand Balls    Medical Alarm Box    ----------------------------------------------------    Needs office notes for Bedside Commode (they do have this)    ---------------------------------------------------------------------------    Guard Rails are out of pocket (they do have)

## 2025-03-06 ENCOUNTER — OFFICE VISIT (OUTPATIENT)
Dept: FAMILY MEDICINE CLINIC | Age: 89
End: 2025-03-06
Payer: MEDICARE

## 2025-03-06 VITALS
OXYGEN SATURATION: 97 % | SYSTOLIC BLOOD PRESSURE: 132 MMHG | HEART RATE: 73 BPM | HEIGHT: 64 IN | BODY MASS INDEX: 28 KG/M2 | WEIGHT: 164 LBS | DIASTOLIC BLOOD PRESSURE: 64 MMHG

## 2025-03-06 DIAGNOSIS — S42.402A CLOSED FRACTURE OF DISTAL END OF LEFT HUMERUS, UNSPECIFIED FRACTURE MORPHOLOGY, INITIAL ENCOUNTER: Primary | ICD-10-CM

## 2025-03-06 DIAGNOSIS — R11.0 NAUSEA IN ADULT: ICD-10-CM

## 2025-03-06 DIAGNOSIS — R60.0 PERIPHERAL EDEMA: ICD-10-CM

## 2025-03-06 PROCEDURE — 99213 OFFICE O/P EST LOW 20 MIN: CPT | Performed by: PHYSICIAN ASSISTANT

## 2025-03-06 PROCEDURE — 1090F PRES/ABSN URINE INCON ASSESS: CPT | Performed by: PHYSICIAN ASSISTANT

## 2025-03-06 PROCEDURE — 1159F MED LIST DOCD IN RCRD: CPT | Performed by: PHYSICIAN ASSISTANT

## 2025-03-06 PROCEDURE — G8427 DOCREV CUR MEDS BY ELIG CLIN: HCPCS | Performed by: PHYSICIAN ASSISTANT

## 2025-03-06 PROCEDURE — G8417 CALC BMI ABV UP PARAM F/U: HCPCS | Performed by: PHYSICIAN ASSISTANT

## 2025-03-06 PROCEDURE — 1123F ACP DISCUSS/DSCN MKR DOCD: CPT | Performed by: PHYSICIAN ASSISTANT

## 2025-03-06 PROCEDURE — 1036F TOBACCO NON-USER: CPT | Performed by: PHYSICIAN ASSISTANT

## 2025-03-06 RX ORDER — ONDANSETRON 8 MG/1
8 TABLET, ORALLY DISINTEGRATING ORAL EVERY 8 HOURS PRN
Qty: 15 TABLET | Refills: 0 | Status: SHIPPED | OUTPATIENT
Start: 2025-03-06

## 2025-03-06 RX ORDER — TRAMADOL HYDROCHLORIDE 50 MG/1
50 TABLET ORAL EVERY 8 HOURS PRN
Qty: 15 TABLET | Refills: 0 | Status: SHIPPED | OUTPATIENT
Start: 2025-03-06 | End: 2025-03-11

## 2025-03-06 NOTE — TELEPHONE ENCOUNTER
START OF CARE FOR PHY THY 3/6  CAN AN ORDER FOR OT TAJ  SAW SS 5/6 AND SHE WANTED PT TO START BACK ON FUROSEMIDE BUT ONT SENT IN TO PHARM.

## 2025-03-06 NOTE — PROGRESS NOTES
Call her with the phone number for PDI   And print her order for MRI REPORT RECEIVED FROM [facility].

## 2025-03-06 NOTE — PROGRESS NOTES
3/6/2025    Annie France    Chief Complaint   Patient presents with    Follow-Up from Hospital     Left elbow fx post fall  - pt accompanied by dtr Tanja. on 2/26/25 pt fell in the hallway at home. Pt laid on the floor for 4 1/2 hours before calling squad. Went to ER via squad found to have a left elbow fx. Pt followed up with ortho Dr Galvez.        HPI  History was obtained from patient and her daughter, Tanja.  Annie is a 89 y.o. female who presents today for ED follow-up.  She suffered a mechanical fall at home a couple weeks ago.  Her dog was excited to see her and caused her to trip.  She did suffer a fracture of left elbow and has already established with Dr. Galvez outpatient.  She is putting off any surgery at this time.  She typically wears a sling but does not have it on in office.  She is using an Ace wrap.  Tramadol effectively manages her pain, taking twice daily.  Is requesting refill.    IMPRESSION:  1.  There is a mildly displaced transverse fracture at the epicondyle level of the distal   humerus.      Patient has had some nausea since last night.  Not related to use of tramadol.  No abdominal pain, vomiting, bowel changes, or fevers.    PAST MEDICAL HISTORY  Past Medical History:   Diagnosis Date    Arrhythmia     Arthritis     osteoarthritis cervical and lumbar spine    Atrophic vaginitis     Blood transfusion     CAD (coronary artery disease)     Cataracts, bilateral     CHF (congestive heart failure) (HCC)     COPD (chronic obstructive pulmonary disease) (HCC)     Depression     Essential hypertension     Fatty liver disease, nonalcoholic     GERD (gastroesophageal reflux disease)     H/O 24 hour EKG monitoring 9/15/2000    9/15/2000 -  Predominant rhythm is a sinus rhythm. No significant ectopy noted.    H/O cardiac catheterization 8/4/2005 8/4/2005 - Moderate degree of stenosis of the high diag, which is a heavily calcified vessel, however, there is a large ramus vessel supplying this

## 2025-03-07 RX ORDER — POTASSIUM CHLORIDE 1500 MG/1
20 TABLET, EXTENDED RELEASE ORAL DAILY PRN
Qty: 90 TABLET | Refills: 1 | Status: SHIPPED | OUTPATIENT
Start: 2025-03-07

## 2025-03-07 RX ORDER — FUROSEMIDE 20 MG/1
20 TABLET ORAL 2 TIMES DAILY PRN
Qty: 60 TABLET | Refills: 2 | Status: SHIPPED | OUTPATIENT
Start: 2025-03-07

## 2025-03-07 NOTE — TELEPHONE ENCOUNTER
Spoke with patients daughter and she is going to call around to other medical equipment companies to see if they have these items available. Says she already paid out of pocket for the bed side commode.

## 2025-03-07 NOTE — TELEPHONE ENCOUNTER
Daughter notified and verbalized understanding. Medications pended. Left vm for [facility] to return call.

## 2025-03-09 NOTE — ED PROVIDER NOTES
Triage Chief Complaint:   Fall (/) and Elbow Pain (25mcg fentanyl given en route by EMS)    [place]:  Today in the ED I had the pleasure of caring for Annie France who is a 89 y.o. female that presents today to the ED for left-sided elbow pain.  Context this patient suffered a mechanical fall injuring her left elbow.  No head trauma.  No syncope.  No neck pain.  Elbow pain is rated 6/10 did get fentanyl and route here to the emergency department..    ROS:  REVIEW OF SYSTEMS    At least 10 systems reviewed      All other review of systems are negative  See HPI and nursing notes for additional information       Past Medical History:   Diagnosis Date    Arrhythmia     Arthritis     osteoarthritis cervical and lumbar spine    Atrophic vaginitis     Blood transfusion     CAD (coronary artery disease)     Cataracts, bilateral     CHF (congestive heart failure) (HCC)     COPD (chronic obstructive pulmonary disease) (HCC)     Depression     Essential hypertension     Fatty liver disease, nonalcoholic     GERD (gastroesophageal reflux disease)     H/O 24 hour EKG monitoring 9/15/2000    9/15/2000 -  Predominant rhythm is a sinus rhythm. No significant ectopy noted.    H/O cardiac catheterization 8/4/2005 8/4/2005 - Moderate degree of stenosis of the high diag, which is a heavily calcified vessel, however, there is a large ramus vessel supplying this same area as well. Rest of vessel is w/o any significant disease. Renals are w/o any significant stenosis.    H/O cardiovascular stress test 12/2/2010, 2/28/2008 12/2/2010 - Normal pattern of perfusion in all regions. LV is normal. No inducible myocardial ischemia. EF is 66%. LVSF is normal. No ECG changes. Exercise capacity is normal.    H/O cardiovascular stress test 2/24/2015    cardiolite-normal, no ischemia, EF69%    H/O Doppler ultrasound 9/15/2000    9/15/2000 - Carotid - No hemodynamically significant stenosis.    H/O echocardiogram 12/2/2010, 9/22/2009 12/2/2010

## 2025-03-11 ENCOUNTER — OFFICE VISIT (OUTPATIENT)
Dept: ORTHOPEDIC SURGERY | Age: 89
End: 2025-03-11

## 2025-03-11 VITALS — OXYGEN SATURATION: 95 % | HEART RATE: 74 BPM | HEIGHT: 63 IN | BODY MASS INDEX: 29.05 KG/M2

## 2025-03-11 DIAGNOSIS — S42.422A CLOSED DISPLACED COMMINUTED SUPRACONDYLAR FRACTURE OF LEFT HUMERUS WITHOUT INTERCONDYLAR FRACTURE, INITIAL ENCOUNTER: Primary | ICD-10-CM

## 2025-03-11 PROCEDURE — 99024 POSTOP FOLLOW-UP VISIT: CPT | Performed by: ORTHOPAEDIC SURGERY

## 2025-03-11 NOTE — PROGRESS NOTES
Pt returns to the office for follow up left elbow fracture. Pt states she her swelling has went down and bruising is going away. Pt states she is wearing her sling everyday all day with no breaks. Pt states she is currently in physical therapy for her left elbow once a week. Pt states her pain has decreased rating it 1/10 today.

## 2025-03-13 ENCOUNTER — CARE COORDINATION (OUTPATIENT)
Dept: CARE COORDINATION | Age: 89
End: 2025-03-13

## 2025-03-13 NOTE — CARE COORDINATION
Ambulatory Care Coordination Note     3/13/2025 3:44 PM     Patient Current Location:  Home: [address] [place] [address]     ACM contacted the patient by telephone. Verified name and  with patient as identifiers.         ACM: Urszula Garcia RN     Utilization: Patient has not had any utilization since our last call.     Care Summary Note: ACM outreach to patient for Care Management.  Patient is doing well with her therapy for her broken elbow, no surgery per orthopedic surgeon.  Patient states her pain is under control at this time.  Patient stated her medications are all the same with no changes. Patient states she does not have any edema in her extremities and her weight is stable at this time.  Patient does not have any SOB at this time.  Patient blood sugars are WNL with no s/sx of hypo/hyperglycemia.  Patient states she is staying with her daughter and she treats her like a queen, but she does miss her house and her little dog.  Patient wants to go back home but she known's she is not able at this time.  Patient is advised to continue to monitor s/sx and report any changes to appropriate site of care.  Patient is also advised of red flag symptoms and if any occur to go to the ED for evaluation.      Assessments Completed:   Diabetes Assessment    Medic Alert ID: No  Meal Planning: None   How often do you test your blood sugar?: Other (Comment: unknown)   Do you have barriers with adherence to non-pharmacologic self-management interventions? (Nutrition/Exercise/Self-Monitoring): Yes   Have you ever had to go to the ED for symptoms of low blood sugar?: No       No patient-reported symptoms   Do you have hyperglycemia symptoms?: No   Do you have hypoglycemia symptoms?: No   Blood Sugar Monitoring Regimen: Once a Day   Blood Sugar Trends: No Change         ,   Congestive Heart Failure Assessment    Are you currently restricting fluids?: No Restriction  Do you understand a low sodium diet?:

## 2025-03-13 NOTE — PROGRESS NOTES
3/11/2025   Chief Complaint   Patient presents with    Follow-up    Injury     Left elbow fracture. DOI: 2/26/25        History of Present Illness:                             Annie France is a 89 y.o. female who returns today for follow-up of her left elbow fracture.  She has done reasonably well in her sling and nonweightbearing.  No new concerns or problems.    Pt returns to the office for follow up left elbow fracture. Pt states she her swelling has went down and bruising is going away. Pt states she is wearing her sling everyday all day with no breaks. Pt states she is currently in physical therapy for her left elbow once a week. Pt states her pain has decreased rating it 1/10 today.       Medical History  Patient's medications, allergies, past medical, surgical, social and family histories were reviewed and updated as appropriate.      Review of Systems                                            Examination:  General Exam:  Vitals: Pulse 74   Ht 1.6 m (5' 3\")   SpO2 95%   BMI 29.05 kg/m²    Physical Exam     Left upper extremity:  There is persistent severe tenderness to palpation of the distal humerus.  No gross rotational or angular deformity or malalignment of the elbow or forearm or wrist.  Sensation motor functions intact median, ulnar, radial nerve distribution.  Mild soft tissue swelling swelling and resolving ecchymosis present at the arm and elbow.    Diagnostic testing:  X-rays reviewed in office, I independently reviewed the films in the office today:     3 views of the left elbow show evidence of impacted transverse comminuted supracondylar distal humerus fracture.  Maintained alignment of the elbow joint.  There is displacement of the medial lateral epicondyles with split and widening.     Impression: Healing impacted comminuted distal humerus fracture    Office Procedures:  No orders of the defined types were placed in this encounter.      Assessment and Plan  1.  Left comminuted

## 2025-03-28 ENCOUNTER — OFFICE VISIT (OUTPATIENT)
Dept: FAMILY MEDICINE CLINIC | Age: 89
End: 2025-03-28
Payer: MEDICARE

## 2025-03-28 VITALS
HEART RATE: 90 BPM | WEIGHT: 149.8 LBS | RESPIRATION RATE: 16 BRPM | OXYGEN SATURATION: 98 % | SYSTOLIC BLOOD PRESSURE: 90 MMHG | HEIGHT: 63 IN | DIASTOLIC BLOOD PRESSURE: 60 MMHG | BODY MASS INDEX: 26.54 KG/M2

## 2025-03-28 DIAGNOSIS — R10.84 GENERALIZED ABDOMINAL PAIN: Primary | ICD-10-CM

## 2025-03-28 DIAGNOSIS — I95.1 ORTHOSTATIC HYPOTENSION: ICD-10-CM

## 2025-03-28 DIAGNOSIS — N18.32 STAGE 3B CHRONIC KIDNEY DISEASE (HCC): ICD-10-CM

## 2025-03-28 DIAGNOSIS — F43.21 UNRESOLVED GRIEF: ICD-10-CM

## 2025-03-28 DIAGNOSIS — K92.2 GASTROINTESTINAL HEMORRHAGE, UNSPECIFIED GASTROINTESTINAL HEMORRHAGE TYPE: ICD-10-CM

## 2025-03-28 DIAGNOSIS — Z60.5 AGEISM: ICD-10-CM

## 2025-03-28 DIAGNOSIS — R11.0 NAUSEA: ICD-10-CM

## 2025-03-28 LAB
ALBUMIN SERPL-MCNC: 3.8 G/DL (ref 3.4–5)
ALBUMIN/GLOB SERPL: 1.2 {RATIO} (ref 1.1–2.2)
ALP SERPL-CCNC: 152 U/L (ref 40–129)
ALT SERPL-CCNC: 6 U/L (ref 10–40)
ANION GAP SERPL CALCULATED.3IONS-SCNC: 13 MMOL/L (ref 3–16)
AST SERPL-CCNC: 16 U/L (ref 15–37)
BILIRUB SERPL-MCNC: 0.3 MG/DL (ref 0–1)
BUN SERPL-MCNC: 36 MG/DL (ref 7–20)
CALCIUM SERPL-MCNC: 9.7 MG/DL (ref 8.3–10.6)
CHLORIDE SERPL-SCNC: 103 MMOL/L (ref 99–110)
CO2 SERPL-SCNC: 18 MMOL/L (ref 21–32)
CREAT SERPL-MCNC: 1.8 MG/DL (ref 0.6–1.2)
DEPRECATED RDW RBC AUTO: 18.2 % (ref 12.4–15.4)
GFR SERPLBLD CREATININE-BSD FMLA CKD-EPI: 26 ML/MIN/{1.73_M2}
GLUCOSE SERPL-MCNC: 210 MG/DL (ref 70–99)
HCT VFR BLD AUTO: 33.4 % (ref 36–48)
HGB BLD-MCNC: 10.4 G/DL (ref 12–16)
MCH RBC QN AUTO: 24.8 PG (ref 26–34)
MCHC RBC AUTO-ENTMCNC: 31.3 G/DL (ref 31–36)
MCV RBC AUTO: 79.2 FL (ref 80–100)
PLATELET # BLD AUTO: 296 K/UL (ref 135–450)
PMV BLD AUTO: 7.7 FL (ref 5–10.5)
POTASSIUM SERPL-SCNC: 4.2 MMOL/L (ref 3.5–5.1)
PROT SERPL-MCNC: 6.9 G/DL (ref 6.4–8.2)
RBC # BLD AUTO: 4.22 M/UL (ref 4–5.2)
SODIUM SERPL-SCNC: 134 MMOL/L (ref 136–145)
WBC # BLD AUTO: 9.5 K/UL (ref 4–11)

## 2025-03-28 PROCEDURE — 1090F PRES/ABSN URINE INCON ASSESS: CPT | Performed by: FAMILY MEDICINE

## 2025-03-28 PROCEDURE — 99214 OFFICE O/P EST MOD 30 MIN: CPT | Performed by: FAMILY MEDICINE

## 2025-03-28 PROCEDURE — G8417 CALC BMI ABV UP PARAM F/U: HCPCS | Performed by: FAMILY MEDICINE

## 2025-03-28 PROCEDURE — 1123F ACP DISCUSS/DSCN MKR DOCD: CPT | Performed by: FAMILY MEDICINE

## 2025-03-28 PROCEDURE — 1036F TOBACCO NON-USER: CPT | Performed by: FAMILY MEDICINE

## 2025-03-28 PROCEDURE — 1159F MED LIST DOCD IN RCRD: CPT | Performed by: FAMILY MEDICINE

## 2025-03-28 PROCEDURE — G8427 DOCREV CUR MEDS BY ELIG CLIN: HCPCS | Performed by: FAMILY MEDICINE

## 2025-03-28 RX ORDER — ONDANSETRON 4 MG/1
4 TABLET, ORALLY DISINTEGRATING ORAL 3 TIMES DAILY PRN
Qty: 21 TABLET | Refills: 0 | Status: SHIPPED | OUTPATIENT
Start: 2025-03-28

## 2025-03-28 RX ORDER — SERTRALINE HYDROCHLORIDE 25 MG/1
25 TABLET, FILM COATED ORAL DAILY
Qty: 90 TABLET | Refills: 0 | Status: SHIPPED | OUTPATIENT
Start: 2025-03-28 | End: 2025-03-28

## 2025-03-28 RX ORDER — PANTOPRAZOLE SODIUM 40 MG/1
40 TABLET, DELAYED RELEASE ORAL
Qty: 30 TABLET | Refills: 0 | Status: SHIPPED | OUTPATIENT
Start: 2025-03-28

## 2025-03-28 RX ORDER — SERTRALINE HYDROCHLORIDE 25 MG/1
25 TABLET, FILM COATED ORAL DAILY
Qty: 90 TABLET | Refills: 0 | Status: SHIPPED | OUTPATIENT
Start: 2025-03-28

## 2025-03-28 SDOH — SOCIAL STABILITY - SOCIAL INSECURITY: TARGET OF PERCEIVED OR REAL ADVERSE DISCRIMINATION AND PERSECUTION: Z60.5

## 2025-03-28 ASSESSMENT — ENCOUNTER SYMPTOMS
WHEEZING: 0
TROUBLE SWALLOWING: 0
DIARRHEA: 0
CHEST TIGHTNESS: 0
BLOOD IN STOOL: 0
SHORTNESS OF BREATH: 0
NAUSEA: 0
ABDOMINAL PAIN: 1
EYE PAIN: 0
VOMITING: 0

## 2025-03-28 NOTE — PROGRESS NOTES
3/28/2025    Annie France    Chief Complaint   Patient presents with    Abdominal Pain     Dull abdominal pain. Bowel movement was tarry today. Four days ago had fresh red blood after bowel movement. Hasn't been able to eat. 3 on pain scale. Took pepto in pill form and beano. Did not help. Has not been gassy or belching.     Skin Problem     Bright red itchy spot on left breast. X 1 week. Used arnica gel and calamine lotion.     Blood Pressure Check     Yesterday physical therapist was there. She stood up to begin her session her heart rate up and bp dropped. She became dizzy. She does have Super ventricular tachycardia.        HPI  History was obtained from the patient and daughter.  Annie is a 89 y.o. female who presents today with history of bright red blood in stool few days ago.  She has had some dark stools but she has been taking some Pepto-Bismol.  She is has a little bit of nausea no diarrhea stools have been present and soft.  No fever does have a past history of peptic disease.  She had an episode of near syncope when she stood up for therapy.  Today patient did show significant drop in pressure and slight increase in heart rate with standing.  Currently does not have any evidence of bruising or increased paleness at this point.  Abdominal pain is mid epigastric area.  She is currently staying with her daughter in [facility].    REVIEW OF SYMPTOMS    Review of Systems   Constitutional:  Negative for activity change and fatigue.   HENT:  Negative for congestion, hearing loss, mouth sores and trouble swallowing.    Eyes:  Negative for pain and visual disturbance.   Respiratory:  Negative for chest tightness, shortness of breath and wheezing.    Cardiovascular:  Negative for chest pain and palpitations.        Patient with orthostasis by history and documented in office today.  Does have history of A-fib not on anticoagulation.   Gastrointestinal:  Positive for abdominal pain. Negative for blood

## 2025-03-31 ENCOUNTER — RESULTS FOLLOW-UP (OUTPATIENT)
Dept: FAMILY MEDICINE CLINIC | Age: 89
End: 2025-03-31

## 2025-03-31 NOTE — TELEPHONE ENCOUNTER
----- Message from Dr. Chidi Leonardo MD sent at 3/31/2025  7:27 AM EDT -----  Inform patient/daughter kidney filtration decreased a bit probably because she was not feeling well and not drinking much fluids.  Alkaline phosphatase was up a bit.  CBC shows stable hemoglobin.  Plan: Proceed with CT scan as we discussed.  How is her lightheadedness when she sits or stands up?  She is holding a couple of her meds (Lasix and losartan).

## 2025-04-01 ENCOUNTER — OFFICE VISIT (OUTPATIENT)
Dept: ORTHOPEDIC SURGERY | Age: 89
End: 2025-04-01

## 2025-04-01 VITALS — OXYGEN SATURATION: 98 % | BODY MASS INDEX: 26.54 KG/M2 | HEART RATE: 76 BPM | HEIGHT: 63 IN

## 2025-04-01 DIAGNOSIS — S42.422A CLOSED DISPLACED COMMINUTED SUPRACONDYLAR FRACTURE OF LEFT HUMERUS WITHOUT INTERCONDYLAR FRACTURE, INITIAL ENCOUNTER: Primary | ICD-10-CM

## 2025-04-01 DIAGNOSIS — I48.0 PAROXYSMAL ATRIAL FIBRILLATION (HCC): ICD-10-CM

## 2025-04-01 PROCEDURE — 99024 POSTOP FOLLOW-UP VISIT: CPT | Performed by: ORTHOPAEDIC SURGERY

## 2025-04-01 RX ORDER — METOPROLOL SUCCINATE 25 MG/1
50 TABLET, EXTENDED RELEASE ORAL DAILY
Qty: 180 TABLET | Refills: 1 | Status: SHIPPED | OUTPATIENT
Start: 2025-04-01

## 2025-04-01 NOTE — PROGRESS NOTES
Pt returns to the office for follow up on injury to left elbow. DOI: 2/26/25.  Pt states she has remained in her sling for over a month. Pt states she wears her brace all day, everyday. Pt states she has not noticed a difference in her left elbow. Pt states as long as she doesn't move she is okay. Pt states there is a lot of swelling in her left elbow. Pt states she uses OTC pain medication to help with her pain. Pt denies any new injuries or falls.

## 2025-04-01 NOTE — TELEPHONE ENCOUNTER
Appt 5/22/25  Lv 3/28/25    Requested Prescriptions     Signed Prescriptions Disp Refills    metoprolol succinate (TOPROL XL) 25 MG extended release tablet 180 tablet 1     Sig: TAKE 2 TABLETS BY MOUTH EVERY DAY     Authorizing Provider: SAVANA TAVERA     Ordering User: MG ESPARZA

## 2025-04-01 NOTE — PATIENT INSTRUCTIONS
Follow up in 1 month  Remain using sling    In physical therapy you may begin to use light use of arm, gentle range of motion of upper extremities.  No lifting pushing or pulling more than 1 pound

## 2025-04-03 ENCOUNTER — CARE COORDINATION (OUTPATIENT)
Dept: CARE COORDINATION | Age: 89
End: 2025-04-03

## 2025-04-03 NOTE — PROGRESS NOTES
4/1/2025   Chief Complaint   Patient presents with    Follow-up    Injury     Left elbow fx DOI: 2/26/25        History of Present Illness:                             Annie France is a 89 y.o. female who returns today for follow-up of her left elbow fracture.  She has been doing reasonably well with immobilization in her sling.  Patient still has swelling and stiffness at the elbow.  She denies any setbacks or problems.    Pt returns to the office for follow up on injury to left elbow. DOI: 2/26/25.  Pt states she has remained in her sling for over a month. Pt states she wears her brace all day, everyday. Pt states she has not noticed a difference in her left elbow. Pt states as long as she doesn't move she is okay. Pt states there is a lot of swelling in her left elbow. Pt states she uses OTC pain medication to help with her pain. Pt denies any new injuries or falls.       Medical History  Patient's medications, allergies, past medical, surgical, social and family histories were reviewed and updated as appropriate.      Review of Systems                                            Examination:  General Exam:  Vitals: Pulse 76   Ht 1.6 m (5' 3\")   SpO2 98%   BMI 26.54 kg/m²    Physical Exam     Left upper extremity:  There is improved but persistent moderate tenderness to palpation along the elbow and distal humerus with improved mild soft tissue swelling at the distal humerus and elbow.  Normal alignment of the arm and elbow.  No instability with gentle passive motion during elbow flexion and extension with elbow flexion of 130 degrees and extension to approximately 45 degrees.  Smooth painless passive motion with forearm supination and pronation.    Sensation and motor function is intact throughout the hand and fingers in the median, ulnar, radial nerve distributions      Diagnostic testing:  X-rays reviewed in office, I independently reviewed the films in the office today:   XR ELBOW LEFT

## 2025-04-03 NOTE — CARE COORDINATION
Ambulatory Care Coordination Note     4/3/2025 9:21 AM     ACM outreach attempt by this ACM today to perform care management follow up . ACM was unable to reach the patient by telephone today;   left voice message requesting a return phone call to this ACM.     ACM: Urszula Garcia RN     Care Summary Note: ACM out reach for the patient and Care Management.     PCP/Specialist follow up:   Future Appointments         Provider Specialty Dept Phone    4/16/2025 1:15 PM Rashaun Lemus MD Cardiology [phone]    5/1/2025 1:50 PM Ham Galvez MD Orthopedic Surgery [phone]    5/22/2025 2:45 PM Amadou Glez,  Family Medicine [phone]    8/21/2025 2:00 PM [facility] LPN Family Medicine [phone]            Follow Up:   Plan for next ACM outreach in approximately 2 weeks to complete:  - disease specific assessments  - goal progression.

## 2025-04-04 RX ORDER — CLOTRIMAZOLE AND BETAMETHASONE DIPROPIONATE 10; .64 MG/G; MG/G
CREAM TOPICAL
Qty: 15 G | Refills: 0 | Status: SHIPPED | OUTPATIENT
Start: 2025-04-04

## 2025-04-07 ENCOUNTER — RESULTS FOLLOW-UP (OUTPATIENT)
Dept: FAMILY MEDICINE CLINIC | Age: 89
End: 2025-04-07

## 2025-04-07 DIAGNOSIS — R91.1 LUNG NODULE: ICD-10-CM

## 2025-04-07 DIAGNOSIS — I95.1 ORTHOSTATIC HYPOTENSION: Primary | ICD-10-CM

## 2025-04-07 DIAGNOSIS — R10.84 GENERALIZED ABDOMINAL PAIN: ICD-10-CM

## 2025-04-11 NOTE — PROGRESS NOTES
MRN: [id number]  Name: Annie France  : 1933    Insurance: Payor: [employer] /  /  /      Phone #: [phone]  Provider: Rashaun Lemus MD     Date of Visit: 2025    Reason for visit:  6 mo Recent Hospitalization Date:    Reason for Hospitalization:    Last EK/24  Type of Device:       Vitals BP HR O2% WT HT ORTHO BP LYING ORTHO BP SITTING ORTHO BP SITTING   Today's Findings           Patients work up- Check List     Testing Last Date Completed Date Expected  ([facility]) Additional Notes    MA to document For provider to complete Either MA or Provider    Carotid Duplex  STAT 1 WK 6 MTH       THIS WK 2 WK 1 YEAR     Cardiac CTA  STAT 1 WK 6 MTH       THIS WK 2 WK 1 YEAR     Cardiac CT Calcium scoring  STAT 1 WK 6 MTH       THIS WK 2 WK 1 YEAR     CTA Chest, Abdomen & Pelvis  STAT 1 WK 6 MTH       THIS WK 2 WK 1 YEAR     CT Chest IV w/ Contrast  STAT 1 WK 6 MTH       THIS WK 2 WK 1 YEAR     CT Chest w/o Contrast  STAT 1 WK 6 MTH       THIS WK 2 WK 1 YEAR     CXR  STAT 1 WK 6 MTH       THIS WK 2 WK 1 YEAR     ECHO 2024 Stress Complete Limited     MRI- Cardiac  STAT 1 WK 6 MTH       THIS WK 2 WK 1 YEAR     MUGA Scan  STAT 1 WK 6 MTH       THIS WK 2 WK 1 YEAR     Nuclear Stress  Lexiscan Cardiolite     PFT  STAT 1 WK 6 MTH       THIS WK 2 WK 1 YEAR     Treadmill Stress Test  STAT 1 WK 6 MTH       THIS WK 2 WK 1 YEAR     Vascular Duplex  Lower: Right Left Bilat       Upper: Right Left Bilat     Other Test Not Listed:    Monitors Last Date Completed Day's Request/Ordered     Holter  Short term 24 hours 48 hours      Long term 3 days 7 days 14 days   Event   (1-30 days)      Procedures Last Date Performed Procedure Details Date Expected   Additional Notes    ASD Closure        Carotid Angio        Cardioversion        Heart Cath  R L R&L      Peripheral Angio  R L      PFO Closure        PTCA/PCI        DESMOND        DESMOND/Cardioversion        Venogram        Tilt Table        Other Type of

## 2025-04-15 ENCOUNTER — TELEPHONE (OUTPATIENT)
Dept: CARDIOLOGY CLINIC | Age: 89
End: 2025-04-15

## 2025-04-17 ENCOUNTER — CARE COORDINATION (OUTPATIENT)
Dept: CARE COORDINATION | Age: 89
End: 2025-04-17

## 2025-04-17 NOTE — CARE COORDINATION
Ambulatory Care Coordination Note     2025 1:24 PM     Patient Current Location:  Home: [address] [place], [place] [id number]     ACM contacted the patient by telephone. Verified name and  with patient as identifiers.         ACM: Urszula Garcia RN     Challenges to be reviewed by the provider   Additional needs identified to be addressed with provider No  none               Method of communication with provider: none.    Utilization: Patient has not had any utilization since our last call.     Care Summary Note: ACM outreach to patient for Care Management.  Patient is doing well continues to wear the sling on her arm but healing well per Ortho. Patient does not have any issues or concerns for the PCP at this time.  Patient continues to monitor her blood sugars with no concerning symptoms noted. Patient does not have any BLE edema, weight gain, SOB noted. Patient states no signs or symptoms no HA, blurred vision, slurred speech, numbness/tingling, CP or Palpitations. Patient advised by the ACM to continue to monitor s/sx and report any changes to appropriate site of care.  Patient is also advised of the red flag s/sx and to go to the ED for evaluation if any should occur.  Patient VU.  Reviewed medications with this patient at this time.     Offered patient enrollment in the Remote Patient Monitoring (RPM) program for in-home monitoring: Yes, but did not enroll at this time: limited patient ability to navigate RPM/equipment.     Assessments Completed:   Diabetes Assessment    Medic Alert ID: No  Meal Planning: None   How often do you test your blood sugar?: Other (Comment: unknown)   Do you have barriers with adherence to non-pharmacologic self-management interventions? (Nutrition/Exercise/Self-Monitoring): Yes   Have you ever had to go to the ED for symptoms of low blood sugar?: No       No patient-reported symptoms   Do you have hyperglycemia symptoms?: No   Do you have hypoglycemia symptoms?: No

## 2025-04-19 DIAGNOSIS — R10.84 GENERALIZED ABDOMINAL PAIN: ICD-10-CM

## 2025-04-21 RX ORDER — PANTOPRAZOLE SODIUM 40 MG/1
40 TABLET, DELAYED RELEASE ORAL
Qty: 90 TABLET | Refills: 1 | Status: SHIPPED | OUTPATIENT
Start: 2025-04-21

## 2025-05-15 ENCOUNTER — TELEPHONE (OUTPATIENT)
Dept: FAMILY MEDICINE CLINIC | Age: 89
End: 2025-05-15

## 2025-05-15 ENCOUNTER — CARE COORDINATION (OUTPATIENT)
Dept: CARE COORDINATION | Age: 89
End: 2025-05-15

## 2025-05-15 DIAGNOSIS — R11.0 NAUSEA: Primary | ICD-10-CM

## 2025-05-15 RX ORDER — ONDANSETRON 4 MG/1
4 TABLET, ORALLY DISINTEGRATING ORAL 3 TIMES DAILY PRN
Qty: 90 TABLET | Refills: 0 | Status: SHIPPED | OUTPATIENT
Start: 2025-05-15 | End: 2025-05-15

## 2025-05-15 RX ORDER — ONDANSETRON 4 MG/1
4 TABLET, ORALLY DISINTEGRATING ORAL 3 TIMES DAILY PRN
Qty: 90 TABLET | Refills: 0 | Status: ON HOLD | OUTPATIENT
Start: 2025-05-15 | End: 2025-06-14

## 2025-05-15 NOTE — TELEPHONE ENCOUNTER
Left  for Tanja to return call. Spoke with Parris from [facility] and was told they need an order specifying what site/gauge and [facility] demographics. Parris informed me the order has to be authorized through insurance first so HC may not receive the supplies until tomorrow if its approved, and it probably won't be until Saturday when a home care nurse could come out to the patients house to give the IV fluids.

## 2025-05-15 NOTE — CARE COORDINATION
Ambulatory Care Coordination Note     5/15/2025 10:05 AM     Patient Current Location:  Home: [address] [place], [place] [id number]     ACM contacted the family Tanja by telephone. Verified name and  with family Tanja as identifiers.         ACM: Urszula Garcia RN     Challenges to be reviewed by the provider   Additional needs identified to be addressed with provider Yes  Talked with patient family this morning, patient has not been able to keep fluids down and is now vomiting even water up, and they stated urine is very dark. Advised them to get her to the ED to be evaluated, for need of fluids possible dehydration. This is an FYI.                Method of communication with provider: chart routing.    Utilization: Patient has not had any utilization since our last call.     Care Summary Note: ACM outreach to the patient for Care Management.  Her daughter Tanja answered the phone and stated her mother the patient had been vomiting, for a few days and can't keep even water down and her urine was bad.  Tanja asked for a nurse to come out and give her some fluids, I advised that was not possible ACM advised Tanja to take her mother to the ED for evaluation.  ACM sent PCP a FYI note to advise him of what was done.      Offered patient enrollment in the Remote Patient Monitoring (RPM) program for in-home monitoring: Patient is not eligible for RPM program because: patient family refused at earlier date.  .     Assessments Completed:   General Assessment    Do you have any symptoms that are causing concern?: Yes  Progression since Onset: Gradually Worsening  Reported Symptoms: Fatigue, Weakness, Vomiting          Medications Reviewed:   Completed during a previous call     Advance Care Planning:   Reviewed during previous call      Care Planning:   Not completed during this call    PCP/Specialist follow up:   Future Appointments         Provider Specialty Dept Phone    2025 2:45 PM Amadou Glez DO

## 2025-05-15 NOTE — TELEPHONE ENCOUNTER
[facility] CAN'T GET OVER TO HER QUICK. CAN YOU PUT ORDER IN TO THE INFUSION DEPT FOR SOME IV FLUIDS? HOPEFULLY TODAY        PT HAS HAD DARK URINE FOR DFEW DAYS AND WHEN SHE DRINKS H2O SHE VOMITS IT BACK UP. CAN YOU ORDER SN TO COME OUT WITH A IV OF FLUIDS?  SHE HAS CM

## 2025-05-16 ENCOUNTER — APPOINTMENT (OUTPATIENT)
Dept: GENERAL RADIOLOGY | Age: 89
DRG: 872 | End: 2025-05-16
Payer: MEDICARE

## 2025-05-16 ENCOUNTER — APPOINTMENT (OUTPATIENT)
Dept: CT IMAGING | Age: 89
DRG: 872 | End: 2025-05-16
Payer: MEDICARE

## 2025-05-16 ENCOUNTER — CARE COORDINATION (OUTPATIENT)
Dept: CARE COORDINATION | Age: 89
End: 2025-05-16

## 2025-05-16 ENCOUNTER — HOSPITAL ENCOUNTER (INPATIENT)
Age: 89
LOS: 5 days | Discharge: NURSING, REHAB OR LONG-TERM CARE | DRG: 872 | End: 2025-05-21
Attending: STUDENT IN AN ORGANIZED HEALTH CARE EDUCATION/TRAINING PROGRAM | Admitting: HOSPITALIST
Payer: MEDICARE

## 2025-05-16 DIAGNOSIS — I24.9 ACUTE CORONARY SYNDROME (HCC): ICD-10-CM

## 2025-05-16 DIAGNOSIS — I48.91 ATRIAL FIBRILLATION WITH RVR (HCC): ICD-10-CM

## 2025-05-16 DIAGNOSIS — N39.0 ACUTE UTI: ICD-10-CM

## 2025-05-16 DIAGNOSIS — A41.9 SEPTICEMIA (HCC): Primary | ICD-10-CM

## 2025-05-16 DIAGNOSIS — R10.84 GENERALIZED ABDOMINAL PAIN: ICD-10-CM

## 2025-05-16 LAB
ALBUMIN SERPL-MCNC: 3.9 G/DL (ref 3.4–5)
ALBUMIN/GLOB SERPL: 1.5 {RATIO} (ref 1.1–2.2)
ALP SERPL-CCNC: 98 U/L (ref 40–129)
ALT SERPL-CCNC: 12 U/L (ref 10–40)
ANION GAP SERPL CALCULATED.3IONS-SCNC: 28 MMOL/L (ref 9–17)
AST SERPL-CCNC: 23 U/L (ref 15–37)
BACTERIA URNS QL MICRO: ABNORMAL
BASOPHILS # BLD: 0 K/UL
BASOPHILS NFR BLD: 0 % (ref 0–1)
BILIRUB SERPL-MCNC: 0.5 MG/DL (ref 0–1)
BILIRUB UR QL STRIP: ABNORMAL
BUN SERPL-MCNC: 64 MG/DL (ref 7–20)
CALCIUM SERPL-MCNC: 10.3 MG/DL (ref 8.3–10.6)
CHARACTER UR: ABNORMAL
CHLORIDE SERPL-SCNC: 88 MMOL/L (ref 99–110)
CLARITY UR: ABNORMAL
CO2 SERPL-SCNC: 17 MMOL/L (ref 21–32)
COLOR UR: YELLOW
CREAT SERPL-MCNC: 2.2 MG/DL (ref 0.6–1.2)
EKG ATRIAL RATE: 166 BPM
EKG DIAGNOSIS: NORMAL
EKG Q-T INTERVAL: 304 MS
EKG QRS DURATION: 94 MS
EKG QTC CALCULATION (BAZETT): 496 MS
EKG R AXIS: -52 DEGREES
EKG T AXIS: 116 DEGREES
EKG VENTRICULAR RATE: 160 BPM
EOSINOPHIL # BLD: 0 K/UL
EOSINOPHILS RELATIVE PERCENT: 0 % (ref 0–3)
EPI CELLS #/AREA URNS HPF: 27 /HPF
ERYTHROCYTE [DISTWIDTH] IN BLOOD BY AUTOMATED COUNT: 18.6 % (ref 11.7–14.9)
GFR, ESTIMATED: 21 ML/MIN/1.73M2
GLUCOSE BLD-MCNC: 143 MG/DL (ref 74–99)
GLUCOSE SERPL-MCNC: 197 MG/DL (ref 74–99)
GLUCOSE UR STRIP-MCNC: NEGATIVE MG/DL
HCT VFR BLD AUTO: 50.3 % (ref 37–47)
HGB BLD-MCNC: 15.6 G/DL (ref 12.5–16)
HGB UR QL STRIP.AUTO: ABNORMAL
KETONES UR STRIP-MCNC: 15 MG/DL
LACTATE BLDV-SCNC: 2.2 MMOL/L (ref 0.4–2)
LACTATE BLDV-SCNC: 2.2 MMOL/L (ref 0.4–2)
LACTATE BLDV-SCNC: 3 MMOL/L (ref 0.4–2)
LEUKOCYTE ESTERASE UR QL STRIP: ABNORMAL
LIPASE SERPL-CCNC: 35 U/L (ref 13–60)
LYMPHOCYTES NFR BLD: 5.48 K/UL
LYMPHOCYTES RELATIVE PERCENT: 27 % (ref 24–44)
MAGNESIUM SERPL-MCNC: 2.1 MG/DL (ref 1.8–2.4)
MCH RBC QN AUTO: 24.3 PG (ref 27–31)
MCHC RBC AUTO-ENTMCNC: 31 G/DL (ref 32–36)
MCV RBC AUTO: 78.2 FL (ref 78–100)
MONOCYTES NFR BLD: 1.22 K/UL
MONOCYTES NFR BLD: 6 % (ref 0–5)
NEUTROPHILS NFR BLD: 67 % (ref 36–66)
NEUTS SEG NFR BLD: 13.6 K/UL
NITRITE UR QL STRIP: NEGATIVE
PH UR STRIP: 5.5 [PH] (ref 5–8)
PLATELET # BLD AUTO: 214 K/UL (ref 140–440)
PLATELET ESTIMATE: NORMAL
PMV BLD AUTO: 10 FL (ref 7.5–11.1)
POTASSIUM SERPL-SCNC: 4 MMOL/L (ref 3.5–5.1)
PROT SERPL-MCNC: 6.4 G/DL (ref 6.4–8.2)
PROT UR STRIP-MCNC: 100 MG/DL
RBC # BLD AUTO: 6.43 M/UL (ref 4.2–5.4)
RBC # BLD: ABNORMAL 10*6/UL
RBC # BLD: ABNORMAL 10*6/UL
RBC #/AREA URNS HPF: 109 /HPF (ref 0–2)
SODIUM SERPL-SCNC: 134 MMOL/L (ref 136–145)
SP GR UR STRIP: 1.02 (ref 1–1.03)
TROPONIN I SERPL HS-MCNC: 52 NG/L (ref 0–14)
TROPONIN I SERPL HS-MCNC: 59 NG/L (ref 0–14)
TROPONIN I SERPL HS-MCNC: 68 NG/L (ref 0–14)
UROBILINOGEN UR STRIP-ACNC: 1 EU/DL (ref 0–1)
WBC # BLD: ABNORMAL 10*3/UL
WBC #/AREA URNS HPF: 2120 /HPF (ref 0–5)
WBC OTHER # BLD: 20.3 K/UL (ref 4–10.5)

## 2025-05-16 PROCEDURE — 87186 SC STD MICRODIL/AGAR DIL: CPT

## 2025-05-16 PROCEDURE — 6360000002 HC RX W HCPCS: Performed by: STUDENT IN AN ORGANIZED HEALTH CARE EDUCATION/TRAINING PROGRAM

## 2025-05-16 PROCEDURE — 81001 URINALYSIS AUTO W/SCOPE: CPT

## 2025-05-16 PROCEDURE — 87040 BLOOD CULTURE FOR BACTERIA: CPT

## 2025-05-16 PROCEDURE — 71045 X-RAY EXAM CHEST 1 VIEW: CPT

## 2025-05-16 PROCEDURE — 36415 COLL VENOUS BLD VENIPUNCTURE: CPT

## 2025-05-16 PROCEDURE — 6370000000 HC RX 637 (ALT 250 FOR IP): Performed by: HOSPITALIST

## 2025-05-16 PROCEDURE — 84484 ASSAY OF TROPONIN QUANT: CPT

## 2025-05-16 PROCEDURE — 2500000003 HC RX 250 WO HCPCS: Performed by: STUDENT IN AN ORGANIZED HEALTH CARE EDUCATION/TRAINING PROGRAM

## 2025-05-16 PROCEDURE — 85025 COMPLETE CBC W/AUTO DIFF WBC: CPT

## 2025-05-16 PROCEDURE — 99285 EMERGENCY DEPT VISIT HI MDM: CPT

## 2025-05-16 PROCEDURE — 93005 ELECTROCARDIOGRAM TRACING: CPT | Performed by: STUDENT IN AN ORGANIZED HEALTH CARE EDUCATION/TRAINING PROGRAM

## 2025-05-16 PROCEDURE — 87077 CULTURE AEROBIC IDENTIFY: CPT

## 2025-05-16 PROCEDURE — 80053 COMPREHEN METABOLIC PANEL: CPT

## 2025-05-16 PROCEDURE — 6360000002 HC RX W HCPCS: Performed by: HOSPITALIST

## 2025-05-16 PROCEDURE — 2580000003 HC RX 258: Performed by: HOSPITALIST

## 2025-05-16 PROCEDURE — 96374 THER/PROPH/DIAG INJ IV PUSH: CPT

## 2025-05-16 PROCEDURE — 74176 CT ABD & PELVIS W/O CONTRAST: CPT

## 2025-05-16 PROCEDURE — 83605 ASSAY OF LACTIC ACID: CPT

## 2025-05-16 PROCEDURE — 93010 ELECTROCARDIOGRAM REPORT: CPT | Performed by: INTERNAL MEDICINE

## 2025-05-16 PROCEDURE — 93005 ELECTROCARDIOGRAM TRACING: CPT | Performed by: HOSPITALIST

## 2025-05-16 PROCEDURE — 83690 ASSAY OF LIPASE: CPT

## 2025-05-16 PROCEDURE — 1200000000 HC SEMI PRIVATE

## 2025-05-16 PROCEDURE — 87086 URINE CULTURE/COLONY COUNT: CPT

## 2025-05-16 PROCEDURE — 2500000003 HC RX 250 WO HCPCS: Performed by: HOSPITALIST

## 2025-05-16 PROCEDURE — 96375 TX/PRO/DX INJ NEW DRUG ADDON: CPT

## 2025-05-16 PROCEDURE — 2580000003 HC RX 258: Performed by: STUDENT IN AN ORGANIZED HEALTH CARE EDUCATION/TRAINING PROGRAM

## 2025-05-16 PROCEDURE — 83735 ASSAY OF MAGNESIUM: CPT

## 2025-05-16 PROCEDURE — 82962 GLUCOSE BLOOD TEST: CPT

## 2025-05-16 PROCEDURE — 51702 INSERT TEMP BLADDER CATH: CPT

## 2025-05-16 RX ORDER — METOPROLOL SUCCINATE 50 MG/1
50 TABLET, EXTENDED RELEASE ORAL DAILY
Status: DISCONTINUED | OUTPATIENT
Start: 2025-05-16 | End: 2025-05-21 | Stop reason: HOSPADM

## 2025-05-16 RX ORDER — SODIUM CHLORIDE 9 MG/ML
INJECTION, SOLUTION INTRAVENOUS CONTINUOUS
Status: DISPENSED | OUTPATIENT
Start: 2025-05-16 | End: 2025-05-17

## 2025-05-16 RX ORDER — ENOXAPARIN SODIUM 100 MG/ML
30 INJECTION SUBCUTANEOUS DAILY
Status: DISCONTINUED | OUTPATIENT
Start: 2025-05-16 | End: 2025-05-21 | Stop reason: HOSPADM

## 2025-05-16 RX ORDER — 0.9 % SODIUM CHLORIDE 0.9 %
1000 INTRAVENOUS SOLUTION INTRAVENOUS ONCE
Status: COMPLETED | OUTPATIENT
Start: 2025-05-16 | End: 2025-05-16

## 2025-05-16 RX ORDER — INSULIN LISPRO 100 [IU]/ML
0-4 INJECTION, SOLUTION INTRAVENOUS; SUBCUTANEOUS
Status: DISCONTINUED | OUTPATIENT
Start: 2025-05-16 | End: 2025-05-21 | Stop reason: HOSPADM

## 2025-05-16 RX ORDER — METOPROLOL TARTRATE 1 MG/ML
2.5 INJECTION, SOLUTION INTRAVENOUS EVERY 6 HOURS PRN
Status: DISCONTINUED | OUTPATIENT
Start: 2025-05-16 | End: 2025-05-21 | Stop reason: HOSPADM

## 2025-05-16 RX ORDER — SODIUM CHLORIDE 0.9 % (FLUSH) 0.9 %
5-40 SYRINGE (ML) INJECTION EVERY 12 HOURS SCHEDULED
Status: DISCONTINUED | OUTPATIENT
Start: 2025-05-16 | End: 2025-05-21 | Stop reason: HOSPADM

## 2025-05-16 RX ORDER — ONDANSETRON 2 MG/ML
4 INJECTION INTRAMUSCULAR; INTRAVENOUS ONCE
Status: COMPLETED | OUTPATIENT
Start: 2025-05-16 | End: 2025-05-16

## 2025-05-16 RX ORDER — ONDANSETRON 4 MG/1
4 TABLET, ORALLY DISINTEGRATING ORAL EVERY 8 HOURS PRN
Status: DISCONTINUED | OUTPATIENT
Start: 2025-05-16 | End: 2025-05-21 | Stop reason: HOSPADM

## 2025-05-16 RX ORDER — ACETAMINOPHEN 650 MG/1
650 SUPPOSITORY RECTAL EVERY 6 HOURS PRN
Status: DISCONTINUED | OUTPATIENT
Start: 2025-05-16 | End: 2025-05-21 | Stop reason: HOSPADM

## 2025-05-16 RX ORDER — SODIUM CHLORIDE 9 MG/ML
INJECTION, SOLUTION INTRAVENOUS PRN
Status: DISCONTINUED | OUTPATIENT
Start: 2025-05-16 | End: 2025-05-21 | Stop reason: HOSPADM

## 2025-05-16 RX ORDER — METOPROLOL TARTRATE 1 MG/ML
2.5 INJECTION, SOLUTION INTRAVENOUS ONCE
Status: COMPLETED | OUTPATIENT
Start: 2025-05-16 | End: 2025-05-16

## 2025-05-16 RX ORDER — SODIUM CHLORIDE 0.9 % (FLUSH) 0.9 %
5-40 SYRINGE (ML) INJECTION PRN
Status: DISCONTINUED | OUTPATIENT
Start: 2025-05-16 | End: 2025-05-21 | Stop reason: HOSPADM

## 2025-05-16 RX ORDER — ONDANSETRON 2 MG/ML
4 INJECTION INTRAMUSCULAR; INTRAVENOUS EVERY 6 HOURS PRN
Status: DISCONTINUED | OUTPATIENT
Start: 2025-05-16 | End: 2025-05-21 | Stop reason: HOSPADM

## 2025-05-16 RX ORDER — 0.9 % SODIUM CHLORIDE 0.9 %
1040 INTRAVENOUS SOLUTION INTRAVENOUS ONCE
Status: COMPLETED | OUTPATIENT
Start: 2025-05-16 | End: 2025-05-16

## 2025-05-16 RX ORDER — PANTOPRAZOLE SODIUM 40 MG/1
40 TABLET, DELAYED RELEASE ORAL
Status: CANCELLED | OUTPATIENT
Start: 2025-05-17

## 2025-05-16 RX ORDER — ACETAMINOPHEN 325 MG/1
650 TABLET ORAL EVERY 6 HOURS PRN
Status: DISCONTINUED | OUTPATIENT
Start: 2025-05-16 | End: 2025-05-21 | Stop reason: HOSPADM

## 2025-05-16 RX ADMIN — ONDANSETRON 4 MG: 2 INJECTION INTRAMUSCULAR; INTRAVENOUS at 16:17

## 2025-05-16 RX ADMIN — SODIUM CHLORIDE, PRESERVATIVE FREE 10 ML: 5 INJECTION INTRAVENOUS at 20:31

## 2025-05-16 RX ADMIN — ONDANSETRON 4 MG: 2 INJECTION INTRAMUSCULAR; INTRAVENOUS at 22:31

## 2025-05-16 RX ADMIN — SODIUM CHLORIDE 1000 ML: 0.9 INJECTION, SOLUTION INTRAVENOUS at 16:11

## 2025-05-16 RX ADMIN — WATER 1000 MG: 1 INJECTION INTRAMUSCULAR; INTRAVENOUS; SUBCUTANEOUS at 17:16

## 2025-05-16 RX ADMIN — SODIUM CHLORIDE: 0.9 INJECTION, SOLUTION INTRAVENOUS at 20:31

## 2025-05-16 RX ADMIN — METOPROLOL TARTRATE 2.5 MG: 5 INJECTION INTRAVENOUS at 17:16

## 2025-05-16 RX ADMIN — SODIUM CHLORIDE 1040 ML: 0.9 INJECTION, SOLUTION INTRAVENOUS at 17:13

## 2025-05-16 RX ADMIN — ENOXAPARIN SODIUM 30 MG: 100 INJECTION SUBCUTANEOUS at 22:21

## 2025-05-16 ASSESSMENT — LIFESTYLE VARIABLES
HOW OFTEN DO YOU HAVE A DRINK CONTAINING ALCOHOL: NEVER
HOW MANY STANDARD DRINKS CONTAINING ALCOHOL DO YOU HAVE ON A TYPICAL DAY: PATIENT DOES NOT DRINK

## 2025-05-16 ASSESSMENT — PAIN - FUNCTIONAL ASSESSMENT: PAIN_FUNCTIONAL_ASSESSMENT: NONE - DENIES PAIN

## 2025-05-16 NOTE — ED PROVIDER NOTES
deficit.  Normal strength.  No gross deformities.    Neuro: Alert and oriented x 3.  No focal motor or sensory deficits.    Skin: Warm, dry, intact.  No rashes or lesions.      Diagnostic Study Results     Labs:  Results for orders placed or performed during the hospital encounter of 05/16/25   CBC with Auto Differential   Result Value Ref Range    WBC 20.3 (H) 4.0 - 10.5 k/uL    RBC 6.43 (H) 4.20 - 5.40 m/uL    Hemoglobin 15.6 12.5 - 16.0 g/dL    Hematocrit 50.3 (H) 37.0 - 47.0 %    MCV 78.2 78.0 - 100.0 fL    MCH 24.3 (L) 27.0 - 31.0 pg    MCHC 31.0 (L) 32.0 - 36.0 g/dL    RDW 18.6 (H) 11.7 - 14.9 %    Platelets 214 140 - 440 k/uL    MPV 10.0 7.5 - 11.1 fL    Neutrophils % 67 (H) 36 - 66 %    Lymphocytes % 27 24 - 44 %    Monocytes % 6 (H) 0 - 5 %    Eosinophils % 0 0 - 3 %    Basophils % 0 0 - 1 %    Neutrophils Absolute 13.60 k/uL    Lymphocytes Absolute 5.48 k/uL    Monocytes Absolute 1.22 k/uL    Eosinophils Absolute 0.00 k/uL    Basophils Absolute 0.00 k/uL    WBC Morphology 1+ TOXIC GRANULATION     RBC Morphology 1+ ANISOCYTOSIS     RBC Morphology 1+ MICROCYTOSIS     Platelet Estimate Normal    Troponin Now and Q 1 Hour   Result Value Ref Range    Troponin, High Sensitivity 59 (HH) 0 - 14 ng/L   Urinalysis with Reflex to Culture    Specimen: Urine   Result Value Ref Range    Color, UA Yellow Yellow    Turbidity UA Cloudy (A) Clear    Glucose, Ur NEGATIVE NEGATIVE mg/dL    Bilirubin, Urine MODERATE (A) NEGATIVE    Ketones, Urine 15 (A) NEGATIVE mg/dL    Specific Gravity, UA 1.025 1.005 - 1.030    Urine Hgb LARGE (A) NEGATIVE    pH, Urine 5.5 5.0 - 8.0    Protein,  (A) NEGATIVE mg/dL    Urobilinogen, Urine 1.0 0.0 - 1.0 EU/dL    Nitrite, Urine NEGATIVE NEGATIVE    Leukocyte Esterase, Urine MODERATE (A) NEGATIVE   Magnesium   Result Value Ref Range    Magnesium 2.1 1.8 - 2.4 mg/dL   Comprehensive Metabolic Panel   Result Value Ref Range    Sodium 134 (L) 136 - 145 mmol/L    Potassium 4.0 3.5 - 5.1 mmol/L

## 2025-05-16 NOTE — ED NOTES
to patient COPD. LVSF is normal. EF = > 55%. Impaired LV impairment. Mild-Moderate MR. Mild TR.    H/O echocardiogram 7/15/14    EF 55-60%. No significant valvulopathy is seen.    Heart valve problem     2 leaky heart valves    History of Doppler ultrasound 10/31/2000    10/31/2000 - Peripheral - Normal study.    HX OTHER MEDICAL 1/14/2004 1/14/2004 - 48 hr Holter - Predominant rhythm is sinus rhythm. No significant ectopy is seen.    Hyperlipidemia     Mild tricuspid regurgitation     Mitral regurgitation     Nausea & vomiting     Near syncope 5/28/2019    Osteopenia     Pulmonary hypertension (HCC)     Pulmonary nodules     Rectal bleeding 12/21/2012    Supraventricular tachycardia     Thyroid disease     Type 2 diabetes mellitus (HCC)        Assessment    Vitals:    Level of Consciousness: Responds to voice (1)   Vitals:    05/16/25 1546 05/16/25 1551 05/16/25 1624 05/16/25 1634   BP:  108/78  103/78   Pulse: (!) 152 (!) 153 (!) 138 (!) 133   Resp: 18 20 18 13   Temp: 97.7 °F (36.5 °C)      TempSrc: Oral      SpO2: 99% 96% 100%    Weight: 68 kg (150 lb)      Height: 1.6 m (5' 3\")          PO Status: Regular    O2 Flow Rate: O2 Device: None (Room air)      Cardiac Rhythm:     Last documented pain medication administered:     NIH Score: NIH       Active LDA's:   Peripheral IV 05/16/25 Right Antecubital (Active)   Site Assessment Clean, dry & intact 05/16/25 1610   Line Status Blood return noted;Infusing 05/16/25 1610   Phlebitis Assessment No symptoms 05/16/25 1610   Infiltration Assessment 0 05/16/25 1610   Dressing Status New dressing applied;Clean, dry & intact 05/16/25 1610   Dressing Type Transparent 05/16/25 1610       Pertinent or High Risk Medications/Drips: no   If Yes, please provide details:       Blood Product Administration: no  If Yes, please provide details:     Recommendation    Incomplete orders   Additional Comments:    If any further questions, please call Sending RN at [phone]    Electronically

## 2025-05-16 NOTE — TELEPHONE ENCOUNTER
Spoke with Tanja and informed her of what home care said. Tanja states patient is tolerating some fluids after zofran but the patient's urine is still dark. I informed Tanja if she did not want to wait for home care it would probably be best to take her to the hospital as we can't send an order to the infusion center for IV fluids. Tanja states the patient would not want to go to the ED, I advised Tanja it would be best given that the patient is not tolerating fluids well and she has dark urine as those are concerns for dehydration. Tanja states she will talk to her sister and attempt to talk the patient into going to the ED.

## 2025-05-16 NOTE — H&P
V2.0  History and Physical      Name:  Annie France /Age/Sex: 1933  (89 y.o. female)   MRN & CSN:  [id number] & [id number] Encounter Date/Time: 2025 6:26 PM EDT   Location:  ED15/ED-15 PCP: Amadou Glez DO       Hospital Day: 1    Assessment and Plan:   Annie France is a 89 y.o. female who presents with Sepsis due to urinary tract infection (HCC)    Sepsis due to UTI-tachycardic and leukocytosis.    Blood cultures.  On IV fluids, however must be judicious with history of CHF.  UTI-UA had leukocyte esterase, but urine sample was not clean on UA with elevated epithelial cells, however ED provider states they saw pus with insertion of Choi catheter.  On Rocephin.  Urine culture pending.  A-fib with RVR-elevated heart rate likely due to dehydration and inability to take beta-blocker at home.  Heart rate appears to be improving with IV fluids.  PRN metoprolol until patient can take oral metoprolol.  Cardiology consult.  JERZY on CKD 3-creatinine 2.2.  Likely prerenal from nausea and vomiting and poor oral intake.  UA showed RBC.  May be traumatic from catheter, or may be from UTI.  Recheck UA once UTI and kidney function have improved.  Elevated troponin-likely due to demand from A-fib with RVR and JERZY and sepsis.  Patient denies any chest pain.  Repeat EKG now that heart rate has improved.  Check serial troponins.  Nausea and vomiting-likely due to UTI.  ED ordered CT abdomen pelvis which is pending at the time of admission.  Patient does not have any signs of acute abdomen on exam.  DM2-hold home metformin.  Placed on sliding scale insulin.    CHF  COPD  Pulmonary hypertension  CAD  HTN  Left elbow fracture-nonoperative    Patient is admitted as inpatient.    Comment: Please note this report has been produced using speech recognition software and may contain errors related to that system including errors in grammar, punctuation, and spelling, as well as words and phrases that may be inappropriate.

## 2025-05-16 NOTE — CARE COORDINATION
Ambulatory Care Coordination Note     5/16/2025 10:55 AM     Patient outreach attempt by this ACM today to perform care management follow up . ACM was unable to reach the patient by telephone today;   left voice message requesting a return phone call to this ACM.     ACM: Urszula Garcia RN     Care Summary Note: ACM outreach to the patient for Care Management FU.     PCP/Specialist follow up:   Future Appointments         Provider Specialty Dept Phone    5/22/2025 2:45 PM Amadou Glez DO Family Medicine [phone]    8/21/2025 2:00 PM [facility] LPN Family Medicine [phone]            Follow Up:   Plan for next ACM outreach in approximately 1 week to complete:  - disease specific assessments  - goal progression.

## 2025-05-17 PROBLEM — E44.0 MODERATE MALNUTRITION: Status: ACTIVE | Noted: 2025-05-17

## 2025-05-17 LAB
ALBUMIN SERPL-MCNC: 3 G/DL (ref 3.4–5)
ALBUMIN/GLOB SERPL: 1.3 {RATIO} (ref 1.1–2.2)
ALP SERPL-CCNC: 73 U/L (ref 40–129)
ALT SERPL-CCNC: <5 U/L (ref 10–40)
ANION GAP SERPL CALCULATED.3IONS-SCNC: 21 MMOL/L (ref 9–17)
AST SERPL-CCNC: 14 U/L (ref 15–37)
BASOPHILS # BLD: 0.03 K/UL
BASOPHILS NFR BLD: 0 % (ref 0–1)
BILIRUB SERPL-MCNC: 0.2 MG/DL (ref 0–1)
BUN SERPL-MCNC: 47 MG/DL (ref 7–20)
CALCIUM SERPL-MCNC: 8.3 MG/DL (ref 8.3–10.6)
CHLORIDE SERPL-SCNC: 100 MMOL/L (ref 99–110)
CO2 SERPL-SCNC: 15 MMOL/L (ref 21–32)
CREAT SERPL-MCNC: 1.7 MG/DL (ref 0.6–1.2)
EKG ATRIAL RATE: 82 BPM
EKG ATRIAL RATE: 97 BPM
EKG DIAGNOSIS: NORMAL
EKG DIAGNOSIS: NORMAL
EKG Q-T INTERVAL: 352 MS
EKG Q-T INTERVAL: 364 MS
EKG QRS DURATION: 92 MS
EKG QRS DURATION: 98 MS
EKG QTC CALCULATION (BAZETT): 467 MS
EKG QTC CALCULATION (BAZETT): 471 MS
EKG R AXIS: -51 DEGREES
EKG R AXIS: -52 DEGREES
EKG T AXIS: 109 DEGREES
EKG T AXIS: 113 DEGREES
EKG VENTRICULAR RATE: 108 BPM
EKG VENTRICULAR RATE: 99 BPM
EOSINOPHIL # BLD: 0.03 K/UL
EOSINOPHILS RELATIVE PERCENT: 0 % (ref 0–3)
ERYTHROCYTE [DISTWIDTH] IN BLOOD BY AUTOMATED COUNT: 18.1 % (ref 11.7–14.9)
GFR, ESTIMATED: 28 ML/MIN/1.73M2
GLUCOSE BLD-MCNC: 123 MG/DL (ref 74–99)
GLUCOSE BLD-MCNC: 132 MG/DL (ref 74–99)
GLUCOSE BLD-MCNC: 135 MG/DL (ref 74–99)
GLUCOSE BLD-MCNC: 150 MG/DL (ref 74–99)
GLUCOSE SERPL-MCNC: 135 MG/DL (ref 74–99)
HCT VFR BLD AUTO: 43.7 % (ref 37–47)
HGB BLD-MCNC: 13 G/DL (ref 12.5–16)
IMM GRANULOCYTES # BLD AUTO: 0.07 K/UL
IMM GRANULOCYTES NFR BLD: 1 %
LACTATE BLDV-SCNC: 2 MMOL/L (ref 0.4–2)
LACTATE BLDV-SCNC: 2.2 MMOL/L (ref 0.4–2)
LYMPHOCYTES NFR BLD: 2.09 K/UL
LYMPHOCYTES RELATIVE PERCENT: 20 % (ref 24–44)
MAGNESIUM SERPL-MCNC: 1.9 MG/DL (ref 1.8–2.4)
MCH RBC QN AUTO: 24.4 PG (ref 27–31)
MCHC RBC AUTO-ENTMCNC: 29.7 G/DL (ref 32–36)
MCV RBC AUTO: 82.1 FL (ref 78–100)
MONOCYTES NFR BLD: 0.43 K/UL
MONOCYTES NFR BLD: 4 % (ref 0–5)
NEUTROPHILS NFR BLD: 75 % (ref 36–66)
NEUTS SEG NFR BLD: 7.8 K/UL
PLATELET # BLD AUTO: 143 K/UL (ref 140–440)
PMV BLD AUTO: 10.2 FL (ref 7.5–11.1)
POTASSIUM SERPL-SCNC: 3.1 MMOL/L (ref 3.5–5.1)
PROCALCITONIN SERPL-MCNC: 0.27 NG/ML
PROT SERPL-MCNC: 5.3 G/DL (ref 6.4–8.2)
RBC # BLD AUTO: 5.32 M/UL (ref 4.2–5.4)
SODIUM SERPL-SCNC: 136 MMOL/L (ref 136–145)
TROPONIN I SERPL HS-MCNC: 63 NG/L (ref 0–14)
TROPONIN I SERPL HS-MCNC: 63 NG/L (ref 0–14)
WBC OTHER # BLD: 10.5 K/UL (ref 4–10.5)

## 2025-05-17 PROCEDURE — 83735 ASSAY OF MAGNESIUM: CPT

## 2025-05-17 PROCEDURE — 84484 ASSAY OF TROPONIN QUANT: CPT

## 2025-05-17 PROCEDURE — 84145 PROCALCITONIN (PCT): CPT

## 2025-05-17 PROCEDURE — 6370000000 HC RX 637 (ALT 250 FOR IP): Performed by: STUDENT IN AN ORGANIZED HEALTH CARE EDUCATION/TRAINING PROGRAM

## 2025-05-17 PROCEDURE — 1200000000 HC SEMI PRIVATE

## 2025-05-17 PROCEDURE — 80053 COMPREHEN METABOLIC PANEL: CPT

## 2025-05-17 PROCEDURE — 83605 ASSAY OF LACTIC ACID: CPT

## 2025-05-17 PROCEDURE — 2500000003 HC RX 250 WO HCPCS: Performed by: HOSPITALIST

## 2025-05-17 PROCEDURE — 97535 SELF CARE MNGMENT TRAINING: CPT

## 2025-05-17 PROCEDURE — 2580000003 HC RX 258: Performed by: NURSE PRACTITIONER

## 2025-05-17 PROCEDURE — 76937 US GUIDE VASCULAR ACCESS: CPT

## 2025-05-17 PROCEDURE — 6360000002 HC RX W HCPCS: Performed by: HOSPITALIST

## 2025-05-17 PROCEDURE — 99223 1ST HOSP IP/OBS HIGH 75: CPT | Performed by: INTERNAL MEDICINE

## 2025-05-17 PROCEDURE — 97166 OT EVAL MOD COMPLEX 45 MIN: CPT

## 2025-05-17 PROCEDURE — 6370000000 HC RX 637 (ALT 250 FOR IP): Performed by: HOSPITALIST

## 2025-05-17 PROCEDURE — 82962 GLUCOSE BLOOD TEST: CPT

## 2025-05-17 PROCEDURE — 97530 THERAPEUTIC ACTIVITIES: CPT

## 2025-05-17 PROCEDURE — 36415 COLL VENOUS BLD VENIPUNCTURE: CPT

## 2025-05-17 PROCEDURE — 6360000002 HC RX W HCPCS: Performed by: STUDENT IN AN ORGANIZED HEALTH CARE EDUCATION/TRAINING PROGRAM

## 2025-05-17 PROCEDURE — 93005 ELECTROCARDIOGRAM TRACING: CPT | Performed by: STUDENT IN AN ORGANIZED HEALTH CARE EDUCATION/TRAINING PROGRAM

## 2025-05-17 PROCEDURE — 93010 ELECTROCARDIOGRAM REPORT: CPT | Performed by: INTERNAL MEDICINE

## 2025-05-17 PROCEDURE — 2580000003 HC RX 258: Performed by: HOSPITALIST

## 2025-05-17 PROCEDURE — 2580000003 HC RX 258: Performed by: STUDENT IN AN ORGANIZED HEALTH CARE EDUCATION/TRAINING PROGRAM

## 2025-05-17 PROCEDURE — 94761 N-INVAS EAR/PLS OXIMETRY MLT: CPT

## 2025-05-17 PROCEDURE — 85025 COMPLETE CBC W/AUTO DIFF WBC: CPT

## 2025-05-17 PROCEDURE — 6360000002 HC RX W HCPCS: Performed by: NURSE PRACTITIONER

## 2025-05-17 RX ORDER — PROMETHAZINE HYDROCHLORIDE 25 MG/ML
6.25 INJECTION, SOLUTION INTRAMUSCULAR; INTRAVENOUS EVERY 6 HOURS PRN
Status: DISCONTINUED | OUTPATIENT
Start: 2025-05-17 | End: 2025-05-21 | Stop reason: HOSPADM

## 2025-05-17 RX ORDER — POLYETHYLENE GLYCOL 3350 17 G/17G
17 POWDER, FOR SOLUTION ORAL 2 TIMES DAILY
Status: DISCONTINUED | OUTPATIENT
Start: 2025-05-17 | End: 2025-05-21 | Stop reason: HOSPADM

## 2025-05-17 RX ORDER — PROCHLORPERAZINE EDISYLATE 5 MG/ML
5 INJECTION INTRAMUSCULAR; INTRAVENOUS ONCE
Status: COMPLETED | OUTPATIENT
Start: 2025-05-17 | End: 2025-05-17

## 2025-05-17 RX ORDER — SODIUM CHLORIDE 9 MG/ML
1000 INJECTION, SOLUTION INTRAVENOUS CONTINUOUS
Status: DISCONTINUED | OUTPATIENT
Start: 2025-05-17 | End: 2025-05-18

## 2025-05-17 RX ORDER — SENNOSIDES A AND B 8.6 MG/1
1 TABLET, FILM COATED ORAL 2 TIMES DAILY
Status: DISCONTINUED | OUTPATIENT
Start: 2025-05-17 | End: 2025-05-21 | Stop reason: HOSPADM

## 2025-05-17 RX ORDER — 0.9 % SODIUM CHLORIDE 0.9 %
250 INTRAVENOUS SOLUTION INTRAVENOUS ONCE
Status: COMPLETED | OUTPATIENT
Start: 2025-05-17 | End: 2025-05-17

## 2025-05-17 RX ADMIN — PROMETHAZINE HYDROCHLORIDE 6.25 MG: 25 INJECTION, SOLUTION INTRAMUSCULAR; INTRAVENOUS at 15:14

## 2025-05-17 RX ADMIN — SODIUM CHLORIDE, PRESERVATIVE FREE 10 ML: 5 INJECTION INTRAVENOUS at 22:11

## 2025-05-17 RX ADMIN — PROCHLORPERAZINE EDISYLATE 5 MG: 5 INJECTION INTRAMUSCULAR; INTRAVENOUS at 04:00

## 2025-05-17 RX ADMIN — ONDANSETRON 4 MG: 2 INJECTION INTRAMUSCULAR; INTRAVENOUS at 10:06

## 2025-05-17 RX ADMIN — SODIUM CHLORIDE: 0.9 INJECTION, SOLUTION INTRAVENOUS at 09:04

## 2025-05-17 RX ADMIN — SODIUM CHLORIDE 1500 MG: 0.9 INJECTION, SOLUTION INTRAVENOUS at 22:10

## 2025-05-17 RX ADMIN — MEROPENEM 500 MG: 500 INJECTION, POWDER, FOR SOLUTION INTRAVENOUS at 23:44

## 2025-05-17 RX ADMIN — METOPROLOL SUCCINATE 50 MG: 50 TABLET, EXTENDED RELEASE ORAL at 09:36

## 2025-05-17 RX ADMIN — ENOXAPARIN SODIUM 30 MG: 100 INJECTION SUBCUTANEOUS at 09:36

## 2025-05-17 RX ADMIN — WATER 1000 MG: 1 INJECTION INTRAMUSCULAR; INTRAVENOUS; SUBCUTANEOUS at 16:26

## 2025-05-17 RX ADMIN — SODIUM CHLORIDE 250 ML: 9 INJECTION, SOLUTION INTRAVENOUS at 01:44

## 2025-05-17 RX ADMIN — METOPROLOL TARTRATE 2.5 MG: 5 INJECTION INTRAVENOUS at 10:27

## 2025-05-18 LAB
ANION GAP SERPL CALCULATED.3IONS-SCNC: 19 MMOL/L (ref 9–17)
BASOPHILS # BLD: 0.03 K/UL
BASOPHILS NFR BLD: 0 % (ref 0–1)
BUN SERPL-MCNC: 40 MG/DL (ref 7–20)
CALCIUM SERPL-MCNC: 8.2 MG/DL (ref 8.3–10.6)
CHLORIDE SERPL-SCNC: 104 MMOL/L (ref 99–110)
CO2 SERPL-SCNC: 18 MMOL/L (ref 21–32)
CREAT SERPL-MCNC: 1.6 MG/DL (ref 0.6–1.2)
DATE LAST DOSE: NORMAL
EOSINOPHIL # BLD: 0.08 K/UL
EOSINOPHILS RELATIVE PERCENT: 1 % (ref 0–3)
ERYTHROCYTE [DISTWIDTH] IN BLOOD BY AUTOMATED COUNT: 17.5 % (ref 11.7–14.9)
GFR, ESTIMATED: 30 ML/MIN/1.73M2
GLUCOSE BLD-MCNC: 110 MG/DL (ref 74–99)
GLUCOSE BLD-MCNC: 110 MG/DL (ref 74–99)
GLUCOSE BLD-MCNC: 124 MG/DL (ref 74–99)
GLUCOSE BLD-MCNC: 152 MG/DL (ref 74–99)
GLUCOSE SERPL-MCNC: 137 MG/DL (ref 74–99)
HCT VFR BLD AUTO: 39.6 % (ref 37–47)
HGB BLD-MCNC: 11.5 G/DL (ref 12.5–16)
IMM GRANULOCYTES # BLD AUTO: 0.09 K/UL
IMM GRANULOCYTES NFR BLD: 1 %
LYMPHOCYTES NFR BLD: 1.5 K/UL
LYMPHOCYTES RELATIVE PERCENT: 14 % (ref 24–44)
MAGNESIUM SERPL-MCNC: 1.6 MG/DL (ref 1.8–2.4)
MCH RBC QN AUTO: 24 PG (ref 27–31)
MCHC RBC AUTO-ENTMCNC: 29 G/DL (ref 32–36)
MCV RBC AUTO: 82.5 FL (ref 78–100)
MICROORGANISM SPEC CULT: ABNORMAL
MICROORGANISM SPEC CULT: ABNORMAL
MONOCYTES NFR BLD: 0.51 K/UL
MONOCYTES NFR BLD: 5 % (ref 0–5)
NEUTROPHILS NFR BLD: 79 % (ref 36–66)
NEUTS SEG NFR BLD: 8.5 K/UL
PLATELET # BLD AUTO: 128 K/UL (ref 140–440)
PMV BLD AUTO: 10.3 FL (ref 7.5–11.1)
POTASSIUM SERPL-SCNC: 3 MMOL/L (ref 3.5–5.1)
RBC # BLD AUTO: 4.8 M/UL (ref 4.2–5.4)
SODIUM SERPL-SCNC: 141 MMOL/L (ref 136–145)
SPECIMEN DESCRIPTION: ABNORMAL
TME LAST DOSE: NORMAL H
VANCOMYCIN DOSE: NORMAL MG
VANCOMYCIN SERPL-MCNC: 17.6 UG/ML (ref 10–20)
WBC OTHER # BLD: 10.7 K/UL (ref 4–10.5)

## 2025-05-18 PROCEDURE — 6360000002 HC RX W HCPCS: Performed by: HOSPITALIST

## 2025-05-18 PROCEDURE — 83735 ASSAY OF MAGNESIUM: CPT

## 2025-05-18 PROCEDURE — 80048 BASIC METABOLIC PNL TOTAL CA: CPT

## 2025-05-18 PROCEDURE — 6370000000 HC RX 637 (ALT 250 FOR IP): Performed by: STUDENT IN AN ORGANIZED HEALTH CARE EDUCATION/TRAINING PROGRAM

## 2025-05-18 PROCEDURE — 6360000002 HC RX W HCPCS: Performed by: STUDENT IN AN ORGANIZED HEALTH CARE EDUCATION/TRAINING PROGRAM

## 2025-05-18 PROCEDURE — 82962 GLUCOSE BLOOD TEST: CPT

## 2025-05-18 PROCEDURE — 87040 BLOOD CULTURE FOR BACTERIA: CPT

## 2025-05-18 PROCEDURE — 99233 SBSQ HOSP IP/OBS HIGH 50: CPT | Performed by: INTERNAL MEDICINE

## 2025-05-18 PROCEDURE — 80202 ASSAY OF VANCOMYCIN: CPT

## 2025-05-18 PROCEDURE — 2580000003 HC RX 258: Performed by: STUDENT IN AN ORGANIZED HEALTH CARE EDUCATION/TRAINING PROGRAM

## 2025-05-18 PROCEDURE — 6370000000 HC RX 637 (ALT 250 FOR IP): Performed by: HOSPITALIST

## 2025-05-18 PROCEDURE — 1200000000 HC SEMI PRIVATE

## 2025-05-18 PROCEDURE — 2500000003 HC RX 250 WO HCPCS: Performed by: HOSPITALIST

## 2025-05-18 PROCEDURE — 94761 N-INVAS EAR/PLS OXIMETRY MLT: CPT

## 2025-05-18 PROCEDURE — 36415 COLL VENOUS BLD VENIPUNCTURE: CPT

## 2025-05-18 PROCEDURE — 85025 COMPLETE CBC W/AUTO DIFF WBC: CPT

## 2025-05-18 RX ORDER — CIPROFLOXACIN 500 MG/1
500 TABLET, FILM COATED ORAL
Status: DISCONTINUED | OUTPATIENT
Start: 2025-05-18 | End: 2025-05-20

## 2025-05-18 RX ADMIN — METOPROLOL SUCCINATE 50 MG: 50 TABLET, EXTENDED RELEASE ORAL at 10:07

## 2025-05-18 RX ADMIN — POLYETHYLENE GLYCOL (3350) 17 G: 17 POWDER, FOR SOLUTION ORAL at 10:06

## 2025-05-18 RX ADMIN — VANCOMYCIN HYDROCHLORIDE 1000 MG: 1 INJECTION, POWDER, LYOPHILIZED, FOR SOLUTION INTRAVENOUS at 12:01

## 2025-05-18 RX ADMIN — ENOXAPARIN SODIUM 30 MG: 100 INJECTION SUBCUTANEOUS at 10:06

## 2025-05-18 RX ADMIN — SODIUM CHLORIDE 1000 ML: 0.9 INJECTION, SOLUTION INTRAVENOUS at 03:18

## 2025-05-18 RX ADMIN — CIPROFLOXACIN HYDROCHLORIDE 500 MG: 500 TABLET, FILM COATED ORAL at 11:47

## 2025-05-18 RX ADMIN — SODIUM CHLORIDE, PRESERVATIVE FREE 10 ML: 5 INJECTION INTRAVENOUS at 20:48

## 2025-05-18 RX ADMIN — SODIUM CHLORIDE, PRESERVATIVE FREE 10 ML: 5 INJECTION INTRAVENOUS at 08:29

## 2025-05-18 RX ADMIN — ONDANSETRON 4 MG: 2 INJECTION INTRAMUSCULAR; INTRAVENOUS at 08:28

## 2025-05-18 RX ADMIN — ONDANSETRON 4 MG: 2 INJECTION INTRAMUSCULAR; INTRAVENOUS at 17:14

## 2025-05-18 RX ADMIN — SENNOSIDES 8.6 MG: 8.6 TABLET, FILM COATED ORAL at 10:07

## 2025-05-18 NOTE — CARE COORDINATION
Notified in IDR that pt will most likely need SNF.    LSW reviewed chart and met with pt to initiate dc planning.  Pt reported PTA living indp alone receiving assistance PRN from her children.  LSW reviewed OT rec for SNF and pt in agreement.  Pt gave permission for LSW to contact family to review dc plans.  LSW left SNF list at bedside for review.  LSW LVM for dtr Tanja.  LSW was able to reach dtr Pete, who reported pt's children have been providing 24 hr care the past several wks.  Pete in agreement to SNF and requested referral to 1) [facility] 2) [facility].  LSW encouraged Pete to choose a 3rd opt in case those SNFs are full.  LSW reviewed ins precert process and time frame.    LSW LVM for Sarah with [facility] requesting return call to verify bed availability.  LSW updated therapy WB requesting PT eval.  Electronically signed by MARY King on 5/18/2025 at 3:33 PM

## 2025-05-18 NOTE — PLAN OF CARE
Problem: Chronic Conditions and Co-morbidities  Goal: Patient's chronic conditions and co-morbidity symptoms are monitored and maintained or improved  Outcome: Progressing     Problem: Discharge Planning  Goal: Discharge to home or other facility with appropriate resources  Outcome: Progressing     Problem: Safety - Adult  Goal: Free from fall injury  Outcome: Progressing     Problem: ABCDS Injury Assessment  Goal: Absence of physical injury  Outcome: Progressing     Problem: Nutrition Deficit:  Goal: Optimize nutritional status  5/17/2025 2301 by Carola Rios RN  Outcome: Progressing  5/17/2025 1006 by Denisa Pryor, RD, LD  Flowsheets (Taken 5/17/2025 1006)  Nutrient intake appropriate for improving, restoring, or maintaining nutritional needs:   Assess nutritional status and recommend course of action   Monitor oral intake, labs, and treatment plans   Recommend appropriate diets, oral nutritional supplements, and vitamin/mineral supplements

## 2025-05-19 ENCOUNTER — APPOINTMENT (OUTPATIENT)
Dept: NON INVASIVE DIAGNOSTICS | Age: 89
DRG: 872 | End: 2025-05-19
Attending: INTERNAL MEDICINE
Payer: MEDICARE

## 2025-05-19 LAB
ACB COMPLEX DNA BLD POS QL NAA+NON-PROBE: NOT DETECTED
ANION GAP SERPL CALCULATED.3IONS-SCNC: 19 MMOL/L (ref 9–17)
B FRAGILIS DNA BLD POS QL NAA+NON-PROBE: NOT DETECTED
BASOPHILS # BLD: 0.03 K/UL
BASOPHILS NFR BLD: 0 % (ref 0–1)
BUN SERPL-MCNC: 35 MG/DL (ref 7–20)
C ALBICANS DNA BLD POS QL NAA+NON-PROBE: NOT DETECTED
C AURIS DNA BLD POS QL NAA+NON-PROBE: NOT DETECTED
C GATTII+NEOFOR DNA BLD POS QL NAA+N-PRB: NOT DETECTED
C GLABRATA DNA BLD POS QL NAA+NON-PROBE: NOT DETECTED
C KRUSEI DNA BLD POS QL NAA+NON-PROBE: NOT DETECTED
C PARAP DNA BLD POS QL NAA+NON-PROBE: NOT DETECTED
C TROPICLS DNA BLD POS QL NAA+NON-PROBE: NOT DETECTED
CALCIUM SERPL-MCNC: 8.2 MG/DL (ref 8.3–10.6)
CHLORIDE SERPL-SCNC: 103 MMOL/L (ref 99–110)
CO2 SERPL-SCNC: 15 MMOL/L (ref 21–32)
CREAT SERPL-MCNC: 1.5 MG/DL (ref 0.6–1.2)
DATE LAST DOSE: ABNORMAL
E CLOAC COMP DNA BLD POS NAA+NON-PROBE: NOT DETECTED
E COLI DNA BLD POS QL NAA+NON-PROBE: NOT DETECTED
E FAECALIS DNA BLD POS QL NAA+NON-PROBE: NOT DETECTED
E FAECIUM DNA BLD POS QL NAA+NON-PROBE: NOT DETECTED
ECHO AO ROOT DIAM: 3.4 CM
ECHO AO ROOT INDEX: 2.11 CM/M2
ECHO AV AREA PEAK VELOCITY: 0.8 CM2
ECHO AV AREA VTI: 0.8 CM2
ECHO AV AREA/BSA PEAK VELOCITY: 0.5 CM2/M2
ECHO AV AREA/BSA VTI: 0.5 CM2/M2
ECHO AV MEAN GRADIENT: 9 MMHG
ECHO AV MEAN VELOCITY: 1.4 M/S
ECHO AV PEAK GRADIENT: 14 MMHG
ECHO AV PEAK VELOCITY: 1.9 M/S
ECHO AV VELOCITY RATIO: 0.32
ECHO AV VTI: 32.7 CM
ECHO BSA: 1.62 M2
ECHO EST RA PRESSURE: 3 MMHG
ECHO IVC PROX: 1.7 CM
ECHO LA AREA 4C: 12.6 CM2
ECHO LA DIAMETER INDEX: 1.43 CM/M2
ECHO LA DIAMETER: 2.3 CM
ECHO LA MAJOR AXIS: 3.8 CM
ECHO LA TO AORTIC ROOT RATIO: 0.68
ECHO LA VOL MOD A4C: 34 ML (ref 22–52)
ECHO LA VOLUME INDEX MOD A4C: 21 ML/M2 (ref 16–34)
ECHO LV EDV A4C: 37 ML
ECHO LV EDV INDEX A4C: 23 ML/M2
ECHO LV EF PHYSICIAN: 45 %
ECHO LV EJECTION FRACTION A4C: 46 %
ECHO LV ESV A4C: 20 ML
ECHO LV ESV INDEX A4C: 12 ML/M2
ECHO LV FRACTIONAL SHORTENING: 13 % (ref 28–44)
ECHO LV INTERNAL DIMENSION DIASTOLE INDEX: 1.93 CM/M2
ECHO LV INTERNAL DIMENSION DIASTOLIC: 3.1 CM (ref 3.9–5.3)
ECHO LV INTERNAL DIMENSION SYSTOLIC INDEX: 1.68 CM/M2
ECHO LV INTERNAL DIMENSION SYSTOLIC: 2.7 CM
ECHO LV IVSD: 1 CM (ref 0.6–0.9)
ECHO LV MASS 2D: 86.2 G (ref 67–162)
ECHO LV MASS INDEX 2D: 53.5 G/M2 (ref 43–95)
ECHO LV POSTERIOR WALL DIASTOLIC: 1 CM (ref 0.6–0.9)
ECHO LV RELATIVE WALL THICKNESS RATIO: 0.65
ECHO LVOT AREA: 2.5 CM2
ECHO LVOT AV VTI INDEX: 0.32
ECHO LVOT DIAM: 1.8 CM
ECHO LVOT MEAN GRADIENT: 1 MMHG
ECHO LVOT PEAK GRADIENT: 1 MMHG
ECHO LVOT PEAK VELOCITY: 0.6 M/S
ECHO LVOT STROKE VOLUME INDEX: 16.6 ML/M2
ECHO LVOT SV: 26.7 ML
ECHO LVOT VTI: 10.5 CM
ECHO RIGHT VENTRICULAR SYSTOLIC PRESSURE (RVSP): 23 MMHG
ECHO RV MID DIMENSION: 3.8 CM
ECHO TV REGURGITANT MAX VELOCITY: 2.25 M/S
ECHO TV REGURGITANT PEAK GRADIENT: 20 MMHG
ENTEROBACTERALES DNA BLD POS NAA+N-PRB: NOT DETECTED
EOSINOPHIL # BLD: 0.08 K/UL
EOSINOPHILS RELATIVE PERCENT: 1 % (ref 0–3)
ERYTHROCYTE [DISTWIDTH] IN BLOOD BY AUTOMATED COUNT: 17.7 % (ref 11.7–14.9)
GFR, ESTIMATED: 32 ML/MIN/1.73M2
GLUCOSE BLD-MCNC: 113 MG/DL (ref 74–99)
GLUCOSE BLD-MCNC: 188 MG/DL (ref 74–99)
GLUCOSE SERPL-MCNC: 140 MG/DL (ref 74–99)
GP B STREP DNA BLD POS QL NAA+NON-PROBE: NOT DETECTED
HAEM INFLU DNA BLD POS QL NAA+NON-PROBE: NOT DETECTED
HCT VFR BLD AUTO: 38.8 % (ref 37–47)
HGB BLD-MCNC: 11.3 G/DL (ref 12.5–16)
IMM GRANULOCYTES # BLD AUTO: 0.05 K/UL
IMM GRANULOCYTES NFR BLD: 1 %
K OXYTOCA DNA BLD POS QL NAA+NON-PROBE: NOT DETECTED
KLEBSIELLA SP DNA BLD POS QL NAA+NON-PRB: NOT DETECTED
KLEBSIELLA SP DNA BLD POS QL NAA+NON-PRB: NOT DETECTED
L MONOCYTOG DNA BLD POS QL NAA+NON-PROBE: NOT DETECTED
LYMPHOCYTES NFR BLD: 1.29 K/UL
LYMPHOCYTES RELATIVE PERCENT: 16 % (ref 24–44)
MAGNESIUM SERPL-MCNC: 1.6 MG/DL (ref 1.8–2.4)
MCH RBC QN AUTO: 24 PG (ref 27–31)
MCHC RBC AUTO-ENTMCNC: 29.1 G/DL (ref 32–36)
MCV RBC AUTO: 82.6 FL (ref 78–100)
MICROORGANISM SPEC CULT: ABNORMAL
MICROORGANISM/AGENT SPEC: ABNORMAL
MONOCYTES NFR BLD: 0.43 K/UL
MONOCYTES NFR BLD: 5 % (ref 0–5)
N MEN DNA BLD POS QL NAA+NON-PROBE: NOT DETECTED
NEUTROPHILS NFR BLD: 77 % (ref 36–66)
NEUTS SEG NFR BLD: 6.25 K/UL
P AERUGINOSA DNA BLD POS NAA+NON-PROBE: NOT DETECTED
PLATELET # BLD AUTO: 106 K/UL (ref 140–440)
PMV BLD AUTO: 10.4 FL (ref 7.5–11.1)
POTASSIUM SERPL-SCNC: 3.2 MMOL/L (ref 3.5–5.1)
PROTEUS SP DNA BLD POS QL NAA+NON-PROBE: NOT DETECTED
RBC # BLD AUTO: 4.7 M/UL (ref 4.2–5.4)
S AUREUS DNA BLD POS QL NAA+NON-PROBE: NOT DETECTED
S AUREUS+CONS DNA BLD POS NAA+NON-PROBE: DETECTED
S EPIDERMIDIS DNA BLD POS QL NAA+NON-PRB: NOT DETECTED
S LUGDUNENSIS DNA BLD POS QL NAA+NON-PRB: NOT DETECTED
S MALTOPHILIA DNA BLD POS QL NAA+NON-PRB: NOT DETECTED
S MARCESCENS DNA BLD POS NAA+NON-PROBE: NOT DETECTED
S PNEUM DNA BLD POS QL NAA+NON-PROBE: NOT DETECTED
S PYO DNA BLD POS QL NAA+NON-PROBE: ABNORMAL
SALMONELLA DNA BLD POS QL NAA+NON-PROBE: NOT DETECTED
SERVICE CMNT-IMP: ABNORMAL
SODIUM SERPL-SCNC: 137 MMOL/L (ref 136–145)
SPECIMEN DESCRIPTION: ABNORMAL
STREPTOCOCCUS DNA BLD POS NAA+NON-PROBE: NOT DETECTED
TME LAST DOSE: ABNORMAL H
VANCOMYCIN DOSE: ABNORMAL MG
VANCOMYCIN SERPL-MCNC: 22.1 UG/ML (ref 10–20)
WBC OTHER # BLD: 8.1 K/UL (ref 4–10.5)

## 2025-05-19 PROCEDURE — 36415 COLL VENOUS BLD VENIPUNCTURE: CPT

## 2025-05-19 PROCEDURE — 6370000000 HC RX 637 (ALT 250 FOR IP): Performed by: HOSPITALIST

## 2025-05-19 PROCEDURE — 6360000002 HC RX W HCPCS: Performed by: STUDENT IN AN ORGANIZED HEALTH CARE EDUCATION/TRAINING PROGRAM

## 2025-05-19 PROCEDURE — 1200000000 HC SEMI PRIVATE

## 2025-05-19 PROCEDURE — 80048 BASIC METABOLIC PNL TOTAL CA: CPT

## 2025-05-19 PROCEDURE — 82962 GLUCOSE BLOOD TEST: CPT

## 2025-05-19 PROCEDURE — 2500000003 HC RX 250 WO HCPCS: Performed by: HOSPITALIST

## 2025-05-19 PROCEDURE — 6360000002 HC RX W HCPCS: Performed by: HOSPITALIST

## 2025-05-19 PROCEDURE — 93306 TTE W/DOPPLER COMPLETE: CPT | Performed by: INTERNAL MEDICINE

## 2025-05-19 PROCEDURE — 6370000000 HC RX 637 (ALT 250 FOR IP): Performed by: STUDENT IN AN ORGANIZED HEALTH CARE EDUCATION/TRAINING PROGRAM

## 2025-05-19 PROCEDURE — 94761 N-INVAS EAR/PLS OXIMETRY MLT: CPT

## 2025-05-19 PROCEDURE — 97535 SELF CARE MNGMENT TRAINING: CPT

## 2025-05-19 PROCEDURE — 92610 EVALUATE SWALLOWING FUNCTION: CPT

## 2025-05-19 PROCEDURE — 93306 TTE W/DOPPLER COMPLETE: CPT

## 2025-05-19 PROCEDURE — 85025 COMPLETE CBC W/AUTO DIFF WBC: CPT

## 2025-05-19 PROCEDURE — 97162 PT EVAL MOD COMPLEX 30 MIN: CPT

## 2025-05-19 PROCEDURE — 80202 ASSAY OF VANCOMYCIN: CPT

## 2025-05-19 PROCEDURE — 83735 ASSAY OF MAGNESIUM: CPT

## 2025-05-19 PROCEDURE — 97110 THERAPEUTIC EXERCISES: CPT

## 2025-05-19 RX ORDER — POTASSIUM CHLORIDE 1500 MG/1
40 TABLET, EXTENDED RELEASE ORAL ONCE
Status: DISCONTINUED | OUTPATIENT
Start: 2025-05-19 | End: 2025-05-20

## 2025-05-19 RX ORDER — MAGNESIUM SULFATE IN WATER 40 MG/ML
2000 INJECTION, SOLUTION INTRAVENOUS ONCE
Status: COMPLETED | OUTPATIENT
Start: 2025-05-19 | End: 2025-05-19

## 2025-05-19 RX ORDER — POTASSIUM CHLORIDE 7.45 MG/ML
10 INJECTION INTRAVENOUS
Status: DISPENSED | OUTPATIENT
Start: 2025-05-19 | End: 2025-05-19

## 2025-05-19 RX ADMIN — MAGNESIUM SULFATE HEPTAHYDRATE 2000 MG: 40 INJECTION, SOLUTION INTRAVENOUS at 11:01

## 2025-05-19 RX ADMIN — SENNOSIDES 8.6 MG: 8.6 TABLET, FILM COATED ORAL at 10:54

## 2025-05-19 RX ADMIN — ONDANSETRON 4 MG: 4 TABLET, ORALLY DISINTEGRATING ORAL at 11:16

## 2025-05-19 RX ADMIN — PROMETHAZINE HYDROCHLORIDE 6.25 MG: 25 INJECTION, SOLUTION INTRAMUSCULAR; INTRAVENOUS at 00:56

## 2025-05-19 RX ADMIN — SODIUM CHLORIDE, PRESERVATIVE FREE 10 ML: 5 INJECTION INTRAVENOUS at 10:57

## 2025-05-19 RX ADMIN — CIPROFLOXACIN HYDROCHLORIDE 500 MG: 500 TABLET, FILM COATED ORAL at 15:48

## 2025-05-19 RX ADMIN — ENOXAPARIN SODIUM 30 MG: 100 INJECTION SUBCUTANEOUS at 10:55

## 2025-05-19 RX ADMIN — POTASSIUM CHLORIDE 10 MEQ: 7.46 INJECTION, SOLUTION INTRAVENOUS at 17:30

## 2025-05-19 RX ADMIN — SENNOSIDES 8.6 MG: 8.6 TABLET, FILM COATED ORAL at 22:35

## 2025-05-19 RX ADMIN — POTASSIUM CHLORIDE 10 MEQ: 7.46 INJECTION, SOLUTION INTRAVENOUS at 15:50

## 2025-05-19 RX ADMIN — POLYETHYLENE GLYCOL (3350) 17 G: 17 POWDER, FOR SOLUTION ORAL at 10:54

## 2025-05-19 RX ADMIN — METOPROLOL SUCCINATE 50 MG: 50 TABLET, EXTENDED RELEASE ORAL at 10:53

## 2025-05-19 RX ADMIN — INSULIN LISPRO 1 UNITS: 100 INJECTION, SOLUTION INTRAVENOUS; SUBCUTANEOUS at 22:36

## 2025-05-19 ASSESSMENT — PAIN DESCRIPTION - LOCATION: LOCATION: ELBOW

## 2025-05-19 ASSESSMENT — PAIN SCALES - GENERAL: PAINLEVEL_OUTOF10: 3

## 2025-05-19 ASSESSMENT — PAIN - FUNCTIONAL ASSESSMENT: PAIN_FUNCTIONAL_ASSESSMENT: ACTIVITIES ARE NOT PREVENTED

## 2025-05-19 ASSESSMENT — PAIN DESCRIPTION - ORIENTATION: ORIENTATION: LEFT

## 2025-05-19 ASSESSMENT — PAIN DESCRIPTION - DESCRIPTORS: DESCRIPTORS: DULL

## 2025-05-19 NOTE — DISCHARGE INSTR - COC
therapy.  Ventilator:    - No ventilator support    Rehab Therapies: Physical Therapy and Occupational Therapy  Weight Bearing Status/Restrictions: No weight bearing restrictions  Other Medical Equipment (for information only, NOT a DME order):  {EQUIPMENT:[id number]}  Other Treatments:         Prognosis: Good    Condition at Discharge: Stable    Rehab Potential (if transferring to Rehab): Good    Recommended Labs or Other Treatments After Discharge:   - BMP, CBC  - monitor BP  - encourage oral intake  - oral supplements      Physician Certification: I certify the above information and transfer of Annie France  is necessary for the continuing treatment of the diagnosis listed and that she requires Skilled Nursing Facility for greater 30 days.     Update Admission H&P: No change in H&P    PHYSICIAN SIGNATURE:  Electronically signed by Bharathi Mayo MD on 5/21/25 at 10:43 AM EDT

## 2025-05-19 NOTE — CARE COORDINATION
Reviewed chart and pt's 1st choice if [facility].  Referral Called/faxed to [place] at .  Backup plan is [facility].  CM will continue to follow.

## 2025-05-20 LAB
ANION GAP SERPL CALCULATED.3IONS-SCNC: 14 MMOL/L (ref 9–17)
BASOPHILS # BLD: 0.03 K/UL
BASOPHILS NFR BLD: 0 % (ref 0–1)
BUN SERPL-MCNC: 31 MG/DL (ref 7–20)
CALCIUM SERPL-MCNC: 8.5 MG/DL (ref 8.3–10.6)
CHLORIDE SERPL-SCNC: 105 MMOL/L (ref 99–110)
CO2 SERPL-SCNC: 20 MMOL/L (ref 21–32)
CREAT SERPL-MCNC: 1.4 MG/DL (ref 0.6–1.2)
EOSINOPHIL # BLD: 0.09 K/UL
EOSINOPHILS RELATIVE PERCENT: 1 % (ref 0–3)
ERYTHROCYTE [DISTWIDTH] IN BLOOD BY AUTOMATED COUNT: 17.6 % (ref 11.7–14.9)
GFR, ESTIMATED: 36 ML/MIN/1.73M2
GLUCOSE BLD-MCNC: 151 MG/DL (ref 74–99)
GLUCOSE BLD-MCNC: 155 MG/DL (ref 74–99)
GLUCOSE BLD-MCNC: 185 MG/DL (ref 74–99)
GLUCOSE BLD-MCNC: 210 MG/DL (ref 74–99)
GLUCOSE SERPL-MCNC: 172 MG/DL (ref 74–99)
HCT VFR BLD AUTO: 34 % (ref 37–47)
HGB BLD-MCNC: 10.3 G/DL (ref 12.5–16)
IMM GRANULOCYTES # BLD AUTO: 0.05 K/UL
IMM GRANULOCYTES NFR BLD: 1 %
LYMPHOCYTES NFR BLD: 1.27 K/UL
LYMPHOCYTES RELATIVE PERCENT: 18 % (ref 24–44)
MAGNESIUM SERPL-MCNC: 2.1 MG/DL (ref 1.8–2.4)
MCH RBC QN AUTO: 24.7 PG (ref 27–31)
MCHC RBC AUTO-ENTMCNC: 30.3 G/DL (ref 32–36)
MCV RBC AUTO: 81.5 FL (ref 78–100)
MONOCYTES NFR BLD: 0.5 K/UL
MONOCYTES NFR BLD: 7 % (ref 0–5)
NEUTROPHILS NFR BLD: 73 % (ref 36–66)
NEUTS SEG NFR BLD: 5.33 K/UL
PLATELET # BLD AUTO: 103 K/UL (ref 140–440)
PMV BLD AUTO: 10.5 FL (ref 7.5–11.1)
POTASSIUM SERPL-SCNC: 3.2 MMOL/L (ref 3.5–5.1)
RBC # BLD AUTO: 4.17 M/UL (ref 4.2–5.4)
SODIUM SERPL-SCNC: 139 MMOL/L (ref 136–145)
WBC OTHER # BLD: 7.3 K/UL (ref 4–10.5)

## 2025-05-20 PROCEDURE — 2500000003 HC RX 250 WO HCPCS: Performed by: HOSPITALIST

## 2025-05-20 PROCEDURE — 82962 GLUCOSE BLOOD TEST: CPT

## 2025-05-20 PROCEDURE — 6360000002 HC RX W HCPCS: Performed by: HOSPITALIST

## 2025-05-20 PROCEDURE — 6370000000 HC RX 637 (ALT 250 FOR IP): Performed by: HOSPITALIST

## 2025-05-20 PROCEDURE — 1200000000 HC SEMI PRIVATE

## 2025-05-20 PROCEDURE — 80048 BASIC METABOLIC PNL TOTAL CA: CPT

## 2025-05-20 PROCEDURE — 76937 US GUIDE VASCULAR ACCESS: CPT

## 2025-05-20 PROCEDURE — 36415 COLL VENOUS BLD VENIPUNCTURE: CPT

## 2025-05-20 PROCEDURE — 85025 COMPLETE CBC W/AUTO DIFF WBC: CPT

## 2025-05-20 PROCEDURE — 92526 ORAL FUNCTION THERAPY: CPT

## 2025-05-20 PROCEDURE — 94761 N-INVAS EAR/PLS OXIMETRY MLT: CPT

## 2025-05-20 PROCEDURE — 83735 ASSAY OF MAGNESIUM: CPT

## 2025-05-20 PROCEDURE — 51798 US URINE CAPACITY MEASURE: CPT

## 2025-05-20 PROCEDURE — 6370000000 HC RX 637 (ALT 250 FOR IP): Performed by: STUDENT IN AN ORGANIZED HEALTH CARE EDUCATION/TRAINING PROGRAM

## 2025-05-20 RX ORDER — CIPROFLOXACIN 500 MG/1
500 TABLET, FILM COATED ORAL EVERY 24 HOURS
Status: DISCONTINUED | OUTPATIENT
Start: 2025-05-20 | End: 2025-05-21 | Stop reason: HOSPADM

## 2025-05-20 RX ORDER — CIPROFLOXACIN 2 MG/ML
400 INJECTION, SOLUTION INTRAVENOUS EVERY 24 HOURS
Status: DISCONTINUED | OUTPATIENT
Start: 2025-05-20 | End: 2025-05-21 | Stop reason: HOSPADM

## 2025-05-20 RX ORDER — BISACODYL 10 MG
10 SUPPOSITORY, RECTAL RECTAL DAILY PRN
Status: DISCONTINUED | OUTPATIENT
Start: 2025-05-20 | End: 2025-05-21 | Stop reason: HOSPADM

## 2025-05-20 RX ADMIN — ENOXAPARIN SODIUM 30 MG: 100 INJECTION SUBCUTANEOUS at 11:36

## 2025-05-20 RX ADMIN — BISACODYL 10 MG: 10 SUPPOSITORY RECTAL at 11:36

## 2025-05-20 RX ADMIN — SENNOSIDES 8.6 MG: 8.6 TABLET, FILM COATED ORAL at 20:53

## 2025-05-20 RX ADMIN — SODIUM CHLORIDE, PRESERVATIVE FREE 10 ML: 5 INJECTION INTRAVENOUS at 11:38

## 2025-05-20 RX ADMIN — INSULIN LISPRO 1 UNITS: 100 INJECTION, SOLUTION INTRAVENOUS; SUBCUTANEOUS at 20:53

## 2025-05-20 RX ADMIN — CIPROFLOXACIN HYDROCHLORIDE 500 MG: 500 TABLET, FILM COATED ORAL at 16:35

## 2025-05-20 RX ADMIN — SODIUM CHLORIDE, PRESERVATIVE FREE 10 ML: 5 INJECTION INTRAVENOUS at 20:53

## 2025-05-20 RX ADMIN — POTASSIUM BICARBONATE 40 MEQ: 782 TABLET, EFFERVESCENT ORAL at 11:36

## 2025-05-20 NOTE — CONSULTS
Consult completed. Nexiva 20g 1.75\" peripheral IV initiated into right forearm x 1 attempt using ultrasound guided technique. Brisk blood return, flushes without resistance. Labs collected and sent at this time.  Patient tolerated well. Primary nurse RAMON Srivastava notified.     Consult the Vascular Access Team for questions, concerns, or change in patient's condition.      
Consult completed. Nexiva 20g 1.75\" peripheral IV initiated into right forearm x 1 attempt using ultrasound guided technique. Brisk blood return, flushes without resistance. Patient tolerated well. Primary nurse Dennis notified.     Consult the Vascular Access Team for questions, concerns, or change in patient's condition.     
RN, OT    Restrictions: general precautions, fall risk    Home Setup/Prior level of function  Social/Functional History  Lives With: Family  Type of Home: House  Home Layout: One level  Bathroom Shower/Tub: Tub/Shower unit  Bathroom Toilet: Standard  Bathroom Accessibility: Accessible  Has the patient had two or more falls in the past year or any fall with injury in the past year?: No  Prior Level of Assist for ADLs: Independent  Prior Level of Assist for Homemaking: Needs assistance  Prior Level of Assist for Ambulation: Independent household ambulator, with or without device  Prior Level of Assist for Transfers: Independent    Examination of body systems (includes body structures/functions, activity/participation limitations):  Observation:  pt supine in bed with OT present upon arrival and agreeable to therapy  Vision:  Wears glasses  Hearing:  WFL  Cardiopulmonary:  no O2 needs  Cognition: WFL, see OT/SLP note for further evaluation.    Musculoskeletal  ROM R/L:  WFL.    Strength R/L:  3-/5, moderate impairment in function and endurance.    Neuro:  WFL      Mobility:  Rolling L/R:  SBA  Supine to sit:  min A with assist at trunk and cues for sequencing  Sit to supine: mod A with assist at bilateral LEs and trunk. Cues provided for sequencing  Transfers: pt completed STS to/from EOB min A with cues for sequencing  Sitting balance:  good, pt sat EOB CGA progressing to SBA.    Standing balance:  fair-, pt stood with unilateral HHA CGA ~10 seconds.    Gait: NT     [facility] 6 Clicks Inpatient Mobility:  AM-PAC Inpatient Mobility Raw Score : 12    Safety: patient left supine in bed with alarm on, visitor and OT present, call light within reach, RN notified, gait belt used.    Assessment:  Pt is a 89 y.o. female admitted to the hospital for sepsis due to urinary tract infection. Pt is typically independent with all ambulation and transfers without a device. Pt is currently performing bed mobility mod A, transferring min 
aorta. Lymphatic system: No pathologically enlarged lymph nodes are seen. Bowel: The stomach is normally distended with no focal wall abnormality. The duodenum is normal in caliber along its course. No focal abnormality is seen. The small bowel is normal caliber. There is no focal stricture or dilatation. The colon is normal in caliber. No focal abnormality is seen. The appendix is not visualized. There is no evidence of pericecal inflammation. Peritoneal structures: No evidence of free air or free fluid. No masses. The omentum and small bowel mesentery are normal.  Retroperitoneum: No focal retroperitoneal abnormality is seen. Abdominal wall:  The visualized portions of the abdominal wall are within normal  limits. CT PELVIS: Urinary bladder: The bladder is minimally distended with a Choi catheter noted within the lumen as well as air. Soft tissues: No other significant abnormalities in the pelvic structures. No free fluid or lymphadenopathy. The ischiorectal fossa and inguinal regions are normal. Bones: Degenerative changes are noted throughout the visualized spine. A compression deformity is noted at T12. IMPRESSION: 1.  No acute intra-abdominal or pelvic process. 2.  Ancillary findings as described above. This dictation was created with voice recognition software.  While attempts have  been made to review the dictation as it is transcribed, on occasion the spoken word can be misinterpreted by the technology leading to omissions or inappropriate words, phrases or sentences.  Dictated and Electronically Signed By: Gisella Morgan [facility] 5/16/2025 20:15        XR CHEST PORTABLE  Result Date: 5/16/2025  PORTABLE CHEST Clinical History: Chest Pain DATE OF SERVICE:5/16/2025 16:25 Comparison: 8/11/2022 Findings: Single AP portable chest is obtained which demonstrates no evidence of acute infiltrate, consolidation  or pleural effusion. Cardiac silhouette within normal  limits. Degenerative

## 2025-05-20 NOTE — CARE COORDINATION
Submitted precert via [facility] portal/ pending at this time for [place]  Electronic PAS completed    Certificate Information  Reference Number  [id number]  Status  PENDED    Review Reason 1  Requires Medical Review

## 2025-05-20 NOTE — PLAN OF CARE
Problem: Chronic Conditions and Co-morbidities  Goal: Patient's chronic conditions and co-morbidity symptoms are monitored and maintained or improved  Outcome: Progressing     Problem: Discharge Planning  Goal: Discharge to home or other facility with appropriate resources  Outcome: Progressing     Problem: Safety - Adult  Goal: Free from fall injury  Outcome: Progressing     Problem: ABCDS Injury Assessment  Goal: Absence of physical injury  Outcome: Progressing     Problem: Nutrition Deficit:  Goal: Optimize nutritional status  Outcome: Progressing     Problem: Skin/Tissue Integrity  Goal: Skin integrity remains intact  Description: 1.  Monitor for areas of redness and/or skin breakdown2.  Assess vascular access sites hourly3.  Every 4-6 hours minimum:  Change oxygen saturation probe site4.  Every 4-6 hours:  If on nasal continuous positive airway pressure, respiratory therapy assess nares and determine need for appliance change or resting period  Outcome: Progressing

## 2025-05-21 VITALS
WEIGHT: 138.23 LBS | BODY MASS INDEX: 24.49 KG/M2 | RESPIRATION RATE: 18 BRPM | DIASTOLIC BLOOD PRESSURE: 66 MMHG | TEMPERATURE: 97.7 F | SYSTOLIC BLOOD PRESSURE: 115 MMHG | HEIGHT: 63 IN | OXYGEN SATURATION: 96 % | HEART RATE: 91 BPM

## 2025-05-21 LAB
ANION GAP SERPL CALCULATED.3IONS-SCNC: 14 MMOL/L (ref 9–17)
BUN SERPL-MCNC: 28 MG/DL (ref 7–20)
CALCIUM SERPL-MCNC: 9.4 MG/DL (ref 8.3–10.6)
CHLORIDE SERPL-SCNC: 106 MMOL/L (ref 99–110)
CO2 SERPL-SCNC: 19 MMOL/L (ref 21–32)
CREAT SERPL-MCNC: 1.3 MG/DL (ref 0.6–1.2)
GFR, ESTIMATED: 39 ML/MIN/1.73M2
GLUCOSE BLD-MCNC: 154 MG/DL (ref 74–99)
GLUCOSE BLD-MCNC: 157 MG/DL (ref 74–99)
GLUCOSE SERPL-MCNC: 164 MG/DL (ref 74–99)
MAGNESIUM SERPL-MCNC: 1.9 MG/DL (ref 1.8–2.4)
POTASSIUM SERPL-SCNC: 3.2 MMOL/L (ref 3.5–5.1)
SODIUM SERPL-SCNC: 139 MMOL/L (ref 136–145)

## 2025-05-21 PROCEDURE — 94761 N-INVAS EAR/PLS OXIMETRY MLT: CPT

## 2025-05-21 PROCEDURE — 51798 US URINE CAPACITY MEASURE: CPT

## 2025-05-21 PROCEDURE — 80048 BASIC METABOLIC PNL TOTAL CA: CPT

## 2025-05-21 PROCEDURE — 2500000003 HC RX 250 WO HCPCS: Performed by: HOSPITALIST

## 2025-05-21 PROCEDURE — 6360000002 HC RX W HCPCS: Performed by: HOSPITALIST

## 2025-05-21 PROCEDURE — 6370000000 HC RX 637 (ALT 250 FOR IP): Performed by: STUDENT IN AN ORGANIZED HEALTH CARE EDUCATION/TRAINING PROGRAM

## 2025-05-21 PROCEDURE — 6360000002 HC RX W HCPCS

## 2025-05-21 PROCEDURE — 82962 GLUCOSE BLOOD TEST: CPT

## 2025-05-21 PROCEDURE — 83735 ASSAY OF MAGNESIUM: CPT

## 2025-05-21 PROCEDURE — 36415 COLL VENOUS BLD VENIPUNCTURE: CPT

## 2025-05-21 PROCEDURE — 6370000000 HC RX 637 (ALT 250 FOR IP): Performed by: HOSPITALIST

## 2025-05-21 RX ORDER — POTASSIUM CHLORIDE 7.45 MG/ML
10 INJECTION INTRAVENOUS
Status: DISPENSED | OUTPATIENT
Start: 2025-05-21 | End: 2025-05-21

## 2025-05-21 RX ORDER — CIPROFLOXACIN 500 MG/1
500 TABLET, FILM COATED ORAL EVERY 24 HOURS
Qty: 4 TABLET | Refills: 0
Start: 2025-05-21 | End: 2025-05-25

## 2025-05-21 RX ORDER — PANTOPRAZOLE SODIUM 40 MG/1
40 TABLET, DELAYED RELEASE ORAL
Qty: 30 TABLET | Refills: 0
Start: 2025-05-21 | End: 2025-06-20

## 2025-05-21 RX ADMIN — POTASSIUM CHLORIDE 10 MEQ: 7.46 INJECTION, SOLUTION INTRAVENOUS at 09:02

## 2025-05-21 RX ADMIN — SODIUM CHLORIDE, PRESERVATIVE FREE 10 ML: 5 INJECTION INTRAVENOUS at 09:04

## 2025-05-21 RX ADMIN — SENNOSIDES 8.6 MG: 8.6 TABLET, FILM COATED ORAL at 09:03

## 2025-05-21 RX ADMIN — POTASSIUM CHLORIDE 10 MEQ: 7.46 INJECTION, SOLUTION INTRAVENOUS at 07:40

## 2025-05-21 RX ADMIN — POTASSIUM CHLORIDE 10 MEQ: 7.46 INJECTION, SOLUTION INTRAVENOUS at 05:00

## 2025-05-21 RX ADMIN — METOPROLOL SUCCINATE 50 MG: 50 TABLET, EXTENDED RELEASE ORAL at 09:03

## 2025-05-21 RX ADMIN — ACETAMINOPHEN 650 MG: 325 TABLET ORAL at 06:53

## 2025-05-21 RX ADMIN — ENOXAPARIN SODIUM 30 MG: 100 INJECTION SUBCUTANEOUS at 09:03

## 2025-05-21 ASSESSMENT — PAIN DESCRIPTION - ORIENTATION: ORIENTATION: LEFT

## 2025-05-21 ASSESSMENT — PAIN DESCRIPTION - DESCRIPTORS: DESCRIPTORS: DULL

## 2025-05-21 ASSESSMENT — PAIN SCALES - GENERAL: PAINLEVEL_OUTOF10: 3

## 2025-05-21 ASSESSMENT — PAIN DESCRIPTION - LOCATION: LOCATION: ARM

## 2025-05-21 NOTE — DISCHARGE SUMMARY
ACCESS TEAM  IP CONSULT TO DIETITIAN  IP CONSULT TO VASCULAR ACCESS TEAM      Discharge Instruction:   Handoff to PCP:     Follow up appointments:   Primary care physician: Amadou Glez DO      Diet:  regular diet    Activity: activity as tolerated  Disposition: Discharged to:    []Home, []C, [x]SNF, []Acute Rehab, []Hospice   Condition on discharge: Stable    Discharge Medications:        Medication List        START taking these medications      ciprofloxacin 500 MG tablet  Commonly known as: CIPRO  Take 1 tablet by mouth every 24 hours for 4 doses            CONTINUE taking these medications      metFORMIN 500 MG tablet  Commonly known as: GLUCOPHAGE  Take 1 tablet by mouth Daily with supper     metoprolol succinate 25 MG extended release tablet  Commonly known as: TOPROL XL  TAKE 2 TABLETS BY MOUTH EVERY DAY     pantoprazole 40 MG tablet  Commonly known as: PROTONIX  Take 1 tablet by mouth every morning (before breakfast)            STOP taking these medications      clotrimazole-betamethasone 1-0.05 % cream  Commonly known as: LOTRISONE     furosemide 20 MG tablet  Commonly known as: Lasix     hydroCHLOROthiazide 25 MG tablet  Commonly known as: HYDRODIURIL     losartan 50 MG tablet  Commonly known as: COZAAR     potassium chloride 20 MEQ extended release tablet  Commonly known as: KLOR-CON M            ASK your doctor about these medications      ondansetron 4 MG disintegrating tablet  Commonly known as: ZOFRAN-ODT  Take 1 tablet by mouth 3 times daily as needed for Nausea or Vomiting     sertraline 25 MG tablet  Commonly known as: ZOLOFT  Take 1 tablet by mouth daily               Where to Get Your Medications        Information about where to get these medications is not yet available    Ask your nurse or doctor about these medications  ciprofloxacin 500 MG tablet  pantoprazole 40 MG tablet         Objective Findings at Discharge:       BMP/CBC  Recent Labs     05/19/25  0324 05/20/25  0201

## 2025-05-21 NOTE — CARE COORDINATION
Discharge order noted. Transport arranged with [facility] for 12:30pm pickup. CM updated primary KATHERINE Alvarez with transport time.

## 2025-05-21 NOTE — PLAN OF CARE
Problem: Chronic Conditions and Co-morbidities  Goal: Patient's chronic conditions and co-morbidity symptoms are monitored and maintained or improved  5/21/2025 1226 by Christa Bautista RN  Outcome: Adequate for Discharge  5/21/2025 0137 by Rocio Glynn LPN  Outcome: Progressing     Problem: Discharge Planning  Goal: Discharge to home or other facility with appropriate resources  5/21/2025 1226 by Christa Bautista RN  Outcome: Adequate for Discharge  5/21/2025 0137 by Rocio Glynn LPN  Outcome: Progressing     Problem: Safety - Adult  Goal: Free from fall injury  5/21/2025 1226 by Christa Bautista RN  Outcome: Adequate for Discharge  5/21/2025 0137 by Rocio Glynn LPN  Outcome: Progressing     Problem: ABCDS Injury Assessment  Goal: Absence of physical injury  5/21/2025 1226 by Christa Bautista RN  Outcome: Adequate for Discharge  5/21/2025 0137 by Rocio Glynn LPN  Outcome: Progressing     Problem: Nutrition Deficit:  Goal: Optimize nutritional status  5/21/2025 1226 by Christa Bautista RN  Outcome: Adequate for Discharge  5/21/2025 0137 by Rocio Glynn LPN  Outcome: Progressing     Problem: Skin/Tissue Integrity  Goal: Skin integrity remains intact  Description: 1.  Monitor for areas of redness and/or skin breakdown2.  Assess vascular access sites hourly3.  Every 4-6 hours minimum:  Change oxygen saturation probe site4.  Every 4-6 hours:  If on nasal continuous positive airway pressure, respiratory therapy assess nares and determine need for appliance change or resting period  5/21/2025 1226 by Christa Bautista RN  Outcome: Adequate for Discharge  Flowsheets (Taken 5/21/2025 1226)  Skin Integrity Remains Intact: Monitor for areas of redness and/or skin breakdown  5/21/2025 0137 by Rocio Glynn LPN  Outcome: Progressing

## 2025-05-21 NOTE — CARE COORDINATION
Reviewed chart, and spoke with Tanna ARCEO, prior auth still pending for [facility].  UPdated pt/daughter.  CM will continue to follow.      1245 Pt discharged to , set up with [facility] for 1230.  Updated charge nurse Mala,  pt/daughter and [place] at .

## 2025-05-21 NOTE — PROGRESS NOTES
Cardiology Progress Note     Admit Date:  5/16/2025    Consult reason/ Seen today for :       Subjective and  Overnight Events : Continues to be in A-fib but rate controlled she has no new complaints but tired and lethargic      Chief complain on admission : 89 y.o.year old who is admitted for  Chief Complaint   Patient presents with    Dehydration     Patient arrives via EMS due to not eating for the past couple of days and family is concerned for dehydration.      Assessment / Plan:  Continue metoprolol with rate control strategy  Atrial fibrillation with rate control plan she is not a candidate for anticoagulation due to history of GI bleeding and fall risk refused watchman continue rate control strategy.  sHe may get tachycardic when infected/dehydrated except  Cardiac stress test back in 2019 showed no ischemia  Continue Toprol-XL 50 mg daily  Elevated Troponin : in setting of renal failure ,  acute infection, Doubt ACS most probably Demand ischemia related leak, since troponin are flat and she does not want aggressive therapy continue medical management  Get echo   to evaluate for wall motion abnormality and severe mitral regurgitation  DVT prophylaxis if no contraindication  6.   Dyslipidemia: continue statins   Discussed with primary team, hosp  Call with questions we will sign  Discussed with primary team, hospitalist service, bedside nursing staff and family  Past medical history:    has a past medical history of Arrhythmia, Arthritis, Atrophic vaginitis, Blood transfusion, CAD (coronary artery disease), Cataracts, bilateral, CHF (congestive heart failure) ([facility]), COPD (chronic obstructive pulmonary disease) (HCC), Depression, Essential hypertension, Fatty liver disease, nonalcoholic, GERD (gastroesophageal reflux disease), H/O 24 hour EKG monitoring, H/O cardiac catheterization, H/O cardiovascular stress test, H/O cardiovascular 
    V2.0  [facility] Hospitalist Progress Note      Name:  Annie France /Age/Sex: 1933  (89 y.o. female)   MRN & CSN:  [id number] & [id number] Encounter Date/Time: 2025 4:44 PM EDT    Location:  [address] [place] [address]-A PCP: Amadou Glez DO       Hospital Day: 2      Subjective:     Chief Complaint:  Dehydration (Patient arrives via EMS due to not eating for the past couple of days and family is concerned for dehydration.)     Patient still complains of nausea and vomiting.  Her heart rate reached 150s during episode  Patient has not had bowel movement for few days but unable to tolerate bowel regimen         Assessment and Plan:   Sepsis likely due to UT  Came w tachycardia and leukocytosis.      Received IVF; will be judicious with history of CHF.  UA UA suggestive of UTI but with elevated epithelial cells however ED provider noted pus upon insertion of Choi catheter  Urine culture growing Enterobacter cloace complex. Blood cx  staph PCR not identified  -Change ABX to meropenem given preliminary urine culture growing Enterobacter complex  -Added vancomycin to cover for possible bacteremia  -Await repeat blood cultures  -Await urine culture sensitivities    A-fib with RVR  Suspect from dehydration, inability to take home beta-blockers, and infection  - c/w gentle IVF  - continue home metoprolol  - continue metoprolol PRN IV  - not on anticoagulation due to history of GI bleed and falls      JERZY on CKD 3  Came w Cr 2.2 down to 1.7 w IVF.  Likely prerenal from nausea and vomiting and poor oral intake.    - IVF as above  - avoid nephrotooxins    Elevated troponin-likely due to demand from A-fib with RVR and JERZY and sepsis.  Patient denies any chest pain.        Nausea and vomiting  Unclear etiology could be from constipation versus UTI  CT abdomen unremarkable  - bowel regimen as tolerated  -Antiemetics  -As above    DM2-hold home metformin.  Placed on sliding scale insulin.     CHF  COPD  Pulmonary 
  Physician Progress Note      PATIENT:               HERBER BRAND  [facility] #:                  [id number]  :                       1933  ADMIT DATE:       2025 3:27 PM  DISCH DATE:  RESPONDING  PROVIDER #:        Bharathi Mayo MD          QUERY TEXT:    Please clarify the patient?s nutritional status: Moderate malnutrition    Malnutrition Status:  Moderate malnutrition (25 0953)  Context:  Social/Environmental Circumstances (reduced appetite, altered taste)  Findings of the 6 clinical characteristics of malnutrition:  Energy Intake:  Less than 75% estimated energy requirements for 3 months or   longer  Weight Loss:  10% over 6 months  Body Fat Loss:  Mild body fat loss Buccal region, Orbital  Muscle Mass Loss:  Mild muscle mass loss Clavicles (pectoralis & deltoids),   Temples (temporalis), Thigh (quadraceps), Calf (gastrocnemius)    Nutrition Assessment:  Admit with sepsis from UTI, dehydration, reduced intake. Currently on clear   liquid, carb controlled diet. Has not yet had much at meals. Family in room   reporting hx of decreased intake for past months with change in appetite and   taste. Recently consuming mostly soft foods-cottage cheese, fruit, pudding,   gelatin. Family tried oral nutrition supplements but patient not too   interested. Agrreable to trying fortifed gelatin with clear liquid diet and   frozen supplement as diet advances. Meeting criteria for moderate malnutrition   at this time. Will follow at high nutrition risk wit    The clinical indicators include:  Options provided:  -- Protein calorie malnutrition moderate  -- Other - I will add my own diagnosis  -- Disagree - Not applicable / Not valid  -- Disagree - Clinically unable to determine / Unknown  -- Refer to Clinical Documentation Reviewer    PROVIDER RESPONSE TEXT:    This patient has moderate protein calorie malnutrition.    Query created by: Dee Singleton on 2025 2:31 PM      Electronically signed by:  Bharathi Mayo MD 
 This nurse called and gave report to nurse at [facility].   
4 Eyes Skin Assessment     NAME:  Annie France  YOB: 1933  MEDICAL RECORD NUMBER:  [id number]    The patient is being assessed for  Admission    I agree that at least one RN has performed a thorough Head to Toe Skin Assessment on the patient. ALL assessment sites listed below have been assessed.      Areas assessed by both nurses:    Head, Face, Ears, Shoulders, Back, Chest, Arms, Elbows, Hands, Sacrum. Buttock, Coccyx, Ischium, Legs. Feet and Heels, and Under Medical Devices         Does the Patient have a Wound? No noted wound(s)    Skin tear right wrist       Nikolai Prevention initiated by RN: Yes  Wound Care Orders initiated by RN: No    Pressure Injury (Stage 3,4, Unstageable, DTI, NWPT, and Complex wounds) if present, place Wound referral order by RN under : No    New Ostomies, if present place, Ostomy referral order under : No     Nurse 1 eSignature: Electronically signed by Carola Rios RN on 5/16/25 at 10:39 PM EDT    **SHARE this note so that the co-signing nurse can place an eSignature**    Nurse 2 eSignature: Electronically signed by BISI BLOCK LPN on 5/17/25 at 3:51 AM EDT  
[facility]  DEPARTMENT OF SPEECH/LANGUAGE PATHOLOGY  DAILY PROGRESS NOTE  Annie France  5/20/2025  [id number]  Septicemia (HCC) [A41.9]  Acute UTI [N39.0]  Atrial fibrillation with RVR (HCC) [I48.91]  Sepsis due to urinary tract infection (HCC) [A41.9, N39.0]  Allergies   Allergen Reactions    Sulfa Antibiotics Anaphylaxis    Nitrofuran Derivatives     Quinapril Hcl      Cough         Pt was seen this date for dysphagia treatment.       IMPRESSION AND RECOMMENDATIONS: Annie France was seen today for dysphagia tx and diet tolerance monitoring. Pt seen for initial BSE yesterday 5/19/25, which recommended a soft and bite sized diet/thin liquids. Annie France was positioned upright in bed for the current visit with untouched lunch tray at bedside. Pt awake/alert, agreeable to very limited intake. She is on room air. Basin with liquid at bedside noted. RAMON Rodrigez reports pt refused all meds and breakfast this AM.     Annie France consumed trials of thin liquid via tsp (ice cream) x2 during this visit. Attempted trials of thin liquid via straw, puree, and soft solid. Pt presents with evidence of oral dysphagia characterized by reduced oral acceptance and prolonged bolus manipulation. Pt expectorated trials of thin water x2, apple juice x1, and lemonade x1 into basin following manipulation d/t taste aversion. Pt declined trials from lunch tray, agreeable to minimal trials with ice cream. Mild cough noted throughout visit. No overt s/sx of penetration/aspiration noted with any consistency. SLP provided education on recommendations to continue soft and bite sized/thin liquid diet and encouraged PO intake. RAMON Rodrigez notified. SLP contacted RD to see if there is potential for Magic Cup to be added on.     Recommend Annie France consume a soft and bite sized diet and thin liquids with medications PO as tolerated provided use of strict aspiration precautions. Pt requires set-up/feeding 
Cardiology    Atrial fibrillation  Heart rate is controlled  She is not a candidate for anticoagulation due to history of GI bleed and high fall risk  Has refused watchman in the past  Recommend for rate control only  Continue Toprol-XL 50 mg daily    Elevated troponin in the setting of renal failure and acute infection  Echo tomorrow    DVT prophylaxis if no contraindication  Dyslipidemia    Sepsis per primary team    Electrolyte disturbance  Potassium 3.0 today  Magnesium 1.6  Recommend to replete per protocol  Recommend to keep potassium 4.0-4.5 magnesium 2.0-2.2  
Comprehensive Nutrition Assessment    Type and Reason for Visit:  Initial, Positive nutrition screen (reported weight loss 2-13#, reduced appetite/intake)    Nutrition Recommendations/Plan:   Continue carb controlled diet   Advance from clear liquids as soon as possible, consider soft and bite sized diet as needed when able to resume solid diet  Will offer fortified gelatin with clear liquid diet   Assist/encourage intake  Please document all PO intakes in I/O   Will continue to follow up during stay      Malnutrition Assessment:  Malnutrition Status:  Moderate malnutrition (05/17/25 0953)    Context:  Social/Environmental Circumstances (reduced appetite, altered taste)     Findings of the 6 clinical characteristics of malnutrition:  Energy Intake:  Less than 75% estimated energy requirements for 3 months or longer  Weight Loss:  10% over 6 months     Body Fat Loss:  Mild body fat loss Buccal region, Orbital   Muscle Mass Loss:  Mild muscle mass loss Clavicles (pectoralis & deltoids), Temples (temporalis), Thigh (quadraceps), Calf (gastrocnemius)    Nutrition Assessment:    Admit with sepsis from UTI, dehydration, reduced intake. Currently on clear liquid, carb controlled diet. Has not yet had much at meals. Family in room reporting hx of decreased intake for past months with change in appetite and taste. Recently consuming mostly soft foods-cottage cheese, fruit, pudding, gelatin. Family tried oral nutrition supplements but patient not too interested. Agrreable to trying fortifed gelatin with clear liquid diet and frozen supplement as diet advances. Meeting criteria for moderate malnutrition at this time. Will follow at high nutrition risk with concern for adequate intake.    Nutrition Related Findings:    resting in bed, hx fall with elbow fracture , DM, CAD, COPD, CHF     missing some teeth some difficulty with tough meats Wound Type: Skin Tears       Current Nutrition Intake & Therapies:    Average Meal Intake: 
Facility/Department: [address]   CLINICAL BEDSIDE SWALLOW EVALUATION    NAME: Annie France  : 1933  MRN: [id number]    ADMISSION DATE: 2025  ADMITTING DIAGNOSIS: has CAD (coronary artery disease); Pulmonary hypertension (HCC); Incisional hernia, without obstruction or gangrene; Essential hypertension; CHF (congestive heart failure) (HCC); GERD (gastroesophageal reflux disease); Type 2 diabetes mellitus (HCC); Osteopenia; Pulmonary nodules; Supraventricular tachycardia; Atrophic vaginitis; Carotid artery stenosis, asymptomatic, bilateral; Primary pancreatic neuroendocrine tumor (HCC); Cognitive deficits; Hypertensive urgency; Stage 3b chronic kidney disease (HCC); Chronic systolic (congestive) heart failure; GI bleed; Neuroendocrine carcinoma of pancreas (HCC); Asymptomatic bacteriuria; Paroxysmal atrial fibrillation (HCC); Type 2 diabetes mellitus with hyperglycemia (E11.65); Closed displaced comminuted supracondylar fracture without intercondylar fracture of left humerus; Ageism; Sepsis due to urinary tract infection (HCC); and Moderate malnutrition on their problem list.  ONSET DATE: this admission     Recent Chest Xray/CT of Chest: CT of the abdomen/pelvis on 25 reveals the followin.  No acute intra-abdominal or pelvic process.  2.  Ancillary findings as described above.    Date of Eval: 2025  Evaluating Therapist: Niesha Hansen SLP    Impression: Annie France was seen today for a clinical bedside swallow evaluation following admission to [facility] with sepsis d/t UTI.  Relevant medical hx includes CHF, CKD 3, COPD, CAD, HTN, DM2. RAMON Valenzuela reports pt with reduced PO intake. Refusing meds this AM.     Annie France was positioned upright in bed for the current visit. Pt cooperative and agreeable to limited PO trials. On room air. Pt denies difficulty chewing/swallowing. Pt states \"everything tastes bad.\" States family bought in [employer] today, which she ate, however, unable to verify. Pt 
Nutrition Note    RECOMMENDATIONS  PO Diet: Continue current diet order.  If PO intake continues to be poor, consider liberalizing diet order to remove CCC-4 modifier  ONS: Change from Gelatein BID to Magic Cup TID      ASSESSMENT   Met with pt today.  Poor PO intake/poor appetite.  She doesn't remember if she is having her Gelatein supplement at meals.  RN said she didnt eat anything at lunch today.  SLP asked to add Magic Cups.  Given generally well controlled BGs d/t poor PO intake, Magic Cups at meals seems reasonable to add to potentially stimulate appetite/PO intake.  Pt denies N/V.  Continue to monitor and if PO intake continues to be poor, consider diet liberalization and potentially nutrition support pending pt/family preferences.      Malnutrition Status: Moderate malnutrition  Social/Environmental Circumstances (reduced appetite, altered taste)  Findings of the 6 clinical characteristics of malnutrition:  Energy Intake:  Less than 75% estimated energy requirements for 3 months or longer  Weight Loss:  10% over 6 months     Body Fat Loss:  Mild body fat loss Buccal region, Orbital   Muscle Mass Loss:  Mild muscle mass loss Clavicles (pectoralis & deltoids), Temples (temporalis), Thigh (quadraceps), Calf (gastrocnemius)  Fluid Accumulation:  Mild Extremities   Strength:  Not Performed      NUTRITION DIAGNOSIS   Moderate malnutrition, in context of social or environmental circumstances related to inadequate protein-energy intake, decreased appetite, altered taste perception as evidenced by poor intake prior to admission, weight loss, muscle loss, loss of subcutaneous fat    Goals: PO intake 50% or greater, by next RD assessment, Maintain adequate nutrition status     NUTRITION RELATED FINDINGS  Objective: K - 3.2 (L)  Wounds: Skin Tears    CURRENT NUTRITION THERAPIES  ADULT ORAL NUTRITION SUPPLEMENT; Lunch, Dinner; Fortified Gelatin Oral Supplement  ADULT DIET; Dysphagia - Soft and Bite Sized; 4 carb 
Occupational Therapy      Occupational Therapy Treatment Note    Name: Annie France MRN: [id number] :   1933   Date:  2025   Admission Date: 2025 Room:  [address] [place] [address]-A     Primary Problem:  Sepsis    Restrictions/Precautions:          General Precautions, Fall Risk    Communication with other providers: Co-tx w/ PT Livia for part of session    Subjective:  Patient states:  \"I feel better today\"  Pain:   None at rest    Objective:    Observation: Pt received supine in bed, agreeable to therapy   Objective Measures:  Vitals stable throughout session    Treatment, including education:  Therapeutic Activity Training:   Therapeutic activity training was instructed today.  Cues were given for safety, sequence, UE/LE placement, awareness, and balance.    Activities performed today included bed mobility training, sup-sit, sit-stand, stand to sit, sit to supine, scooting to HOB    Self Care Training:   Cues were given for safety, sequence, UE/LE placement, visual cues, and balance.    Activities performed today included grooming    PT Livia entered session    Grooming washing hands/face w/ warm washcloth SBA, supine to sit Norma, sitting EOB SBA, STS from EOB up to HHA modA, in stand Norma ~15 seconds before fatiguing due to dizziness, stand to sit Norma, sit to supine modA, scooting to HOB maxA x 2    PT Livia exited session    Pt supine in bed to perform following therex:     Therapeutic Exercise:  Cues were given for technique, safety, recruitment, and rationale.  Cues were verbal and/or tactile.   1 x 10 BLE ankle pumps  1 x 10 BLE knee flexion  1 x 10 BLE knee marches  1 x 10 BLE hip flexion    Assessment / Impression:    Patient's tolerance of treatment: Well  Adverse Reaction: None  Significant change in status and impact: Improved from initial evaluation  Barriers to improvement: None noted      Plan for Next Session:    Continue OT POC    Time in:  1428  Time out:  1456  Timed treatment minutes:  
Occupational Therapy    [facility] ACUTE CARE OCCUPATIONAL THERAPY EVALUATION  Annie France, 5/25/1933, 4104/4104-A, 5/17/2025    History  [place]:  The primary encounter diagnosis was Septicemia (HCC). Diagnoses of Atrial fibrillation with RVR (HCC), Acute UTI, and Acute coronary syndrome (HCC) were also pertinent to this visit.  Patient  has a past medical history of Arrhythmia, Arthritis, Atrophic vaginitis, Blood transfusion, CAD (coronary artery disease), Cataracts, bilateral, CHF (congestive heart failure) (HCC), COPD (chronic obstructive pulmonary disease) (HCC), Depression, Essential hypertension, Fatty liver disease, nonalcoholic, GERD (gastroesophageal reflux disease), H/O 24 hour EKG monitoring, H/O cardiac catheterization, H/O cardiovascular stress test, H/O cardiovascular stress test, H/O Doppler ultrasound, H/O echocardiogram, H/O echocardiogram, Heart valve problem, History of Doppler ultrasound, HX OTHER MEDICAL, Hyperlipidemia, Mild tricuspid regurgitation, Mitral regurgitation, Nausea & vomiting, Near syncope, Osteopenia, Pulmonary hypertension (HCC), Pulmonary nodules, Rectal bleeding, Supraventricular tachycardia, Thyroid disease, and Type 2 diabetes mellitus (HCC).  Patient  has a past surgical history that includes Thyroidectomy, partial; colectomy; Cholecystectomy; Appendectomy; Hysterectomy; Breast surgery; fracture surgery; Tonsillectomy; skin biopsy; Colonoscopy (12-21-12); Pancreas surgery (11/7/2006); Thyroidectomy, partial (1999); Diagnostic Cardiac Cath Lab Procedure (8/2005); Upper gastrointestinal endoscopy (N/A, 8/1/2022); and US BREAST BIOPSY W LOC DEVICE 1ST LESION LEFT (Left, 11/17/2023).    Subjective:  Patient states:  \"I'm not sure what year it is\".    Pain:  Denies at rest, endorses nausea.    Communication with other providers:  Handoff to RN  Restrictions: General Precautions, Fall Risk    Home Setup/Prior level of function  Social/Functional History  Lives 
Occupational Therapy  Attempted to see pt on this date for OT session. Pt declined this date. Will attempt as able and appropriate.    Marina BLAKC  
PHARMACY VANCOMYCIN MONITORING SERVICE  Pharmacy consulted by Dr. Mayo for monitoring and adjustment.    Indication for treatment: Vancomycin indication: Bacteremia  Goal trough: Trough Goal: 15-20 mcg/mL  AUC/JULY: 400-600    Risk Factors for MRSA Identified:   Hospitalization within the past 90 days, Received IV antibiotics within the past 90 days    Pertinent Laboratory Values:   Temp Readings from Last 3 Encounters:   05/17/25 97.7 °F (36.5 °C) (Axillary)   02/28/25 98.3 °F (36.8 °C)   02/27/25 97.1 °F (36.2 °C)     Recent Labs     05/16/25  1600 05/17/25  0648   WBC 20.3* 10.5     Recent Labs     05/16/25  1600 05/17/25  1210   BUN 64* 47*   CREATININE 2.2* 1.7*     Estimated Creatinine Clearance: 18 mL/min (A) (based on SCr of 1.7 mg/dL (H)).    Intake/Output Summary (Last 24 hours) at 5/17/2025 1934  Last data filed at 5/17/2025 1747  Gross per 24 hour   Intake 120 ml   Output 600 ml   Net -480 ml     Last Encounter Weight:  Wt Readings from Last 3 Encounters:   05/17/25 60.1 kg (132 lb 7.9 oz)   03/28/25 67.9 kg (149 lb 12.8 oz)   03/06/25 74.4 kg (164 lb)       Pertinent Cultures:   Date    Source    Results  5/16   Blood    Positive Staph  5/16   Urine    Enterobacter cloacae      Vancomycin level:   TROUGH:  No results for input(s): \"VANCOTROUGH\" in the last 72 hours.  RANDOM:  No results for input(s): \"VANCORANDOM\" in the last 72 hours.    Assessment:  HPI: Annie France is a 89 y.o. female who presents with Sepsis due to urinary tract infection that grew E.cloacae,patient also presented with 1 of 2 positive blood culture for Staph.   SCr, BUN, and urine output: JERZY on CKD stage 3   Scr-1.7 mg/dL (Baseline 1.3 mg/dL)   BUN-47 downtrending  UOP not documented   Day(s) of therapy: 1  Vancomycin concentration: TBD     Plan:  Loading dose of vancomycin administered: 1500 mg. Intermittent dosing of vancomycin will be used due to renal function.   Level tomorrow AM   Pharmacy will continue to monitor patient 
PHARMACY VANCOMYCIN MONITORING SERVICE  Pharmacy consulted by Dr. Mayo for monitoring and adjustment.    Indication for treatment: Vancomycin indication: Bacteremia  Goal trough: Trough Goal: 15-20 mcg/mL  AUC/JULY: 400-600    Risk Factors for MRSA Identified:   Hospitalization within the past 90 days, Received IV antibiotics within the past 90 days    Pertinent Laboratory Values:   Temp Readings from Last 3 Encounters:   05/18/25 97.3 °F (36.3 °C) (Oral)   02/28/25 98.3 °F (36.8 °C)   02/27/25 97.1 °F (36.2 °C)     Recent Labs     05/16/25  1600 05/17/25  0648   WBC 20.3* 10.5     Recent Labs     05/16/25  1600 05/17/25  1210 05/18/25  0832   BUN 64* 47* 40*   CREATININE 2.2* 1.7* 1.6*     Estimated Creatinine Clearance: 19 mL/min (A) (based on SCr of 1.6 mg/dL (H)).    Intake/Output Summary (Last 24 hours) at 5/18/2025 1110  Last data filed at 5/18/2025 0958  Gross per 24 hour   Intake 240 ml   Output 450 ml   Net -210 ml     In: 240   Out: 450 [Urine:450]    Last Encounter Weight:  Wt Readings from Last 3 Encounters:   05/18/25 61 kg (134 lb 7.7 oz)   03/28/25 67.9 kg (149 lb 12.8 oz)   03/06/25 74.4 kg (164 lb)       Pertinent Cultures:   Date    Source    Results  5/16   Blood    Staph species- 1 set  5/16   Urine    Enterobacter cloacae      Vancomycin level:   TROUGH:  No results for input(s): \"VANCOTROUGH\" in the last 72 hours.  RANDOM:    Recent Labs     05/18/25  0832   VANCORANDOM 17.6       Assessment:  HPI: Annie France is a 89 y.o. female who presents with Sepsis due to urinary tract infection that grew E.cloacae,patient also presented with 1 of 2 positive blood culture for Staph.   SCr, BUN, and urine output: JERZY on CKD stage 3   Scr-1.6 mg/dL (Baseline 1.3 mg/dL)   BUN-40 downtrending  UOP - 450mL documented in last 24 hours  Day(s) of therapy: 2  Vancomycin concentration:   5/18 @ 0832 = 17.6 ~10 hour level     Plan:  Vanco 1000mg IV x 1. The continue with intermittent dosing based on levels given 
Patient ate small ice cream cup daughter at bedside- voiced that I wanted to give IM phenergan to stop N & V. -Daughter said to hold off until her starts having N & V again  
Physical Therapy  Attempted to see pt this date for tx but pt refusing due to general malaise. Offered to attempt in PM, pt continued to refuse. Will continue to attempt as schedule allows.     
Spiritual Health History and Assessment/Progress Note  [facility]    Spiritual/Emotional Needs,  ,  ,      Name: Annie France MRN: [id number]    Age: 89 y.o.     Sex: female   Language: English   Confucianism: Rastafarian   Sepsis due to urinary tract infection (HCC)     Date: 5/21/2025            Total Time Calculated: 4 min              Spiritual Assessment began in [facility]        Referral/Consult From: Rounding   Encounter Overview/Reason: Spiritual/Emotional Needs  Service Provided For: Patient and family together    [place], Belief, Meaning:   Patient unable to assess at this time  Family/Friends Other: did not assess      Importance and Influence:  Patient unable to assess at this time  Family/Friends Other: did not assess    Community:  Patient Other: did not assess  Family/Friends Other: did not assess    Assessment and Plan of Care:     Patient Interventions include: Other: did not communicate  Family/Friends Interventions include: Facilitated expression of thoughts and feelings    Patient Plan of Care: Spiritual Care available upon further referral  Family/Friends Plan of Care: Spiritual Care available upon further referral    Electronically signed by TRE Ley on 5/21/2025 at 10:42 AM   
Spoke to Dr Miller regarding pt's GFR.  stated pt's labs were down 1 month ago, pt is severely dehydrated, pt will be getting hydrated, and stated that ct w/iv contrast will be beneficial.  Will continue scan w/contrast  Pt is also CKD stage 3. GFR 21 today  
hypertension  CAD  HTN  Left elbow fracture-nonoperative      Personally reviewed Lab Studies and Imaging    EKG interpreted personally and results [id number]     Drugs that require monitoring for toxicity include vanc and the method of monitoring was renal      Diet ADULT DIET; Clear Liquid; 4 carb choices (60 gm/meal)  ADULT ORAL NUTRITION SUPPLEMENT; Lunch, Dinner; Fortified Gelatin Oral Supplement   DVT Prophylaxis [x] Lovenox, []  Heparin, [] SCDs, [] Ambulation,  [] Eliquis, [] Xarelto  [] Coumadin   Code Status Full Code   Disposition From: home  Expected Disposition: SNF vs [facility]  Estimated Date of Discharge: 1-2 days  Patient requires continued admission due to oral intake, PT/OT   Surrogate Decision Maker/ POA      Review of Systems:    Review of Systems    See above    Objective:     Intake/Output Summary (Last 24 hours) at 5/19/2025 1153  Last data filed at 5/19/2025 0927  Gross per 24 hour   Intake 0 ml   Output 200 ml   Net -200 ml        Vitals:   Vitals:    05/19/25 1048   BP: 107/70   Pulse: 90   Resp: 18   Temp: 97.5 °F (36.4 °C)   SpO2: 99%       Physical Exam:     General: NAD  Eyes: EOMI  ENT: neck supple  Cardiovascular: Regular rate.  Respiratory: Clear to auscultation  Gastrointestinal: Soft, non tender  Genitourinary: no suprapubic tenderness  Musculoskeletal: No edema  Skin: warm, dry  Neuro: Alert.  Psych: Mood appropriate.     Medications:   Medications:    magnesium sulfate  2,000 mg IntraVENous Once    ciprofloxacin  500 mg Oral Daily    polyethylene glycol  17 g Oral BID    senna  1 tablet Oral BID    metoprolol succinate  50 mg Oral Daily    insulin lispro  0-4 Units SubCUTAneous 4x Daily AC & HS    sodium chloride flush  5-40 mL IntraVENous 2 times per day    enoxaparin  30 mg SubCUTAneous Daily      Infusions:    sodium chloride       PRN Meds: promethazine, 6.25 mg, Q6H PRN  sodium chloride flush, 5-40 mL, PRN  sodium chloride, , PRN  acetaminophen, 650 mg, Q6H PRN   Or  acetaminophen, 
abnormality. The duodenum is normal in caliber along its course. No focal abnormality is seen. The small bowel is normal caliber. There is no focal stricture or dilatation. The colon is normal in caliber. No focal abnormality is seen. The appendix is not visualized. There is no evidence of pericecal inflammation. Peritoneal structures: No evidence of free air or free fluid. No masses. The omentum and small bowel mesentery are normal.  Retroperitoneum: No focal retroperitoneal abnormality is seen. Abdominal wall:  The visualized portions of the abdominal wall are within normal  limits. CT PELVIS: Urinary bladder: The bladder is minimally distended with a Choi catheter noted within the lumen as well as air. Soft tissues: No other significant abnormalities in the pelvic structures. No free fluid or lymphadenopathy. The ischiorectal fossa and inguinal regions are normal. Bones: Degenerative changes are noted throughout the visualized spine. A compression deformity is noted at T12. IMPRESSION: 1.  No acute intra-abdominal or pelvic process. 2.  Ancillary findings as described above. This dictation was created with voice recognition software.  While attempts have  been made to review the dictation as it is transcribed, on occasion the spoken word can be misinterpreted by the technology leading to omissions or inappropriate words, phrases or sentences.  Dictated and Electronically Signed By: Gisella Morgan [facility] 5/16/2025 20:15        XR CHEST PORTABLE  Result Date: 5/16/2025  PORTABLE CHEST Clinical History: Chest Pain DATE OF SERVICE:5/16/2025 16:25 Comparison: 8/11/2022 Findings: Single AP portable chest is obtained which demonstrates no evidence of acute infiltrate, consolidation  or pleural effusion. Cardiac silhouette within normal  limits. Degenerative changes within the visualized spine and bilateral shoulders. Trachea is midline. IMPRESSION: No evidence of acute cardiopulmonary 
is noted without evidence of an obstructing stone. Vasculature: No evidence of aneurysm or other significant vascular pathology.  Atherosclerotic calcification is noted throughout the intra-abdominal aorta. Lymphatic system: No pathologically enlarged lymph nodes are seen. Bowel: The stomach is normally distended with no focal wall abnormality. The duodenum is normal in caliber along its course. No focal abnormality is seen. The small bowel is normal caliber. There is no focal stricture or dilatation. The colon is normal in caliber. No focal abnormality is seen. The appendix is not visualized. There is no evidence of pericecal inflammation. Peritoneal structures: No evidence of free air or free fluid. No masses. The omentum and small bowel mesentery are normal.  Retroperitoneum: No focal retroperitoneal abnormality is seen. Abdominal wall:  The visualized portions of the abdominal wall are within normal  limits. CT PELVIS: Urinary bladder: The bladder is minimally distended with a Choi catheter noted within the lumen as well as air. Soft tissues: No other significant abnormalities in the pelvic structures. No free fluid or lymphadenopathy. The ischiorectal fossa and inguinal regions are normal. Bones: Degenerative changes are noted throughout the visualized spine. A compression deformity is noted at T12. IMPRESSION: 1.  No acute intra-abdominal or pelvic process. 2.  Ancillary findings as described above. This dictation was created with voice recognition software.  While attempts have  been made to review the dictation as it is transcribed, on occasion the spoken word can be misinterpreted by the technology leading to omissions or inappropriate words, phrases or sentences.  Dictated and Electronically Signed By: Gisella Morgan [facility] 5/16/2025 20:15        XR CHEST PORTABLE  Result Date: 5/16/2025  PORTABLE CHEST Clinical History: Chest Pain DATE OF SERVICE:5/16/2025 16:25 Comparison:

## 2025-05-21 NOTE — CARE COORDINATION
[employer] has been APPROVED for [facility]  Certificate Information  Certification Number  [id number]  Status  CERTIFIED IN TOTAL    Effective Date  2025-05-21  Expiration Date  2025-05-29

## 2025-05-22 ENCOUNTER — RESULTS FOLLOW-UP (OUTPATIENT)
Dept: EMERGENCY DEPT | Age: 89
End: 2025-05-22

## 2025-05-22 ENCOUNTER — CARE COORDINATION (OUTPATIENT)
Dept: CARE COORDINATION | Age: 89
End: 2025-05-22

## 2025-05-22 LAB
MICROORGANISM SPEC CULT: NORMAL
SERVICE CMNT-IMP: NORMAL
SPECIMEN DESCRIPTION: NORMAL

## 2025-05-22 NOTE — CARE COORDINATION
Ambulatory Care Coordination Note     5/22/2025 8:23 AM     Patient and Family, Tanja  outreach attempt by this ACM today to perform hospital follow up. ACM was unable to reach the patient, family, Tnaja  by telephone today;   left voice message requesting a return phone call to this ACM.  Anipipohart message sent requesting patient and family to contact this ACM.     ACM: Urszula Garcia, RN     Care Summary Note: ACM outreach to the patient and family daughter Tanja HIPAA verified for Care Management.  Patient did go to the skilled nursing unit at [facility] unsure if just short term or if this is permanent.     PCP/Specialist follow up:   Future Appointments         Provider Specialty Dept Phone    5/22/2025 2:45 PM Amadou Glez,  Family Medicine [phone]    8/21/2025 2:00 PM [facility] LPN Family Medicine [phone]            Follow Up:   Plan for next ACM outreach in approximately 1-2 days  to complete:  - hospital follow up.

## 2025-05-24 LAB
MICROORGANISM SPEC CULT: NORMAL
MICROORGANISM SPEC CULT: NORMAL
SERVICE CMNT-IMP: NORMAL
SERVICE CMNT-IMP: NORMAL
SPECIMEN DESCRIPTION: NORMAL
SPECIMEN DESCRIPTION: NORMAL

## 2025-06-23 ENCOUNTER — CARE COORDINATION (OUTPATIENT)
Dept: CARE COORDINATION | Age: 89
End: 2025-06-23

## (undated) DEVICE — Z DISCONTINUED (USE MFG CAT MVABO)  TUBING GAS SAMPLING STD 6.5 FT FEMALE CONN SMRT CAPNOLINE